# Patient Record
Sex: FEMALE | Race: WHITE | NOT HISPANIC OR LATINO | Employment: OTHER | URBAN - METROPOLITAN AREA
[De-identification: names, ages, dates, MRNs, and addresses within clinical notes are randomized per-mention and may not be internally consistent; named-entity substitution may affect disease eponyms.]

---

## 2017-01-13 ENCOUNTER — TELEPHONE (OUTPATIENT)
Dept: FAMILY MEDICINE | Facility: CLINIC | Age: 67
End: 2017-01-13

## 2017-01-13 NOTE — TELEPHONE ENCOUNTER
1/13/2017    Call Regarding Preventive Health Screening Colonoscopy and Mammogram    Attempt 1    Message     Comments: per patient has declined to schedule mammogram and colonoscopy screening at this time and states that she will call back on her own time to schedule.          Outreach   uche

## 2017-12-26 DIAGNOSIS — I10 HTN, GOAL BELOW 140/90: ICD-10-CM

## 2017-12-26 NOTE — TELEPHONE ENCOUNTER
Requested Prescriptions   Pending Prescriptions Disp Refills     lisinopril (PRINIVIL/ZESTRIL) 10 MG tablet [Pharmacy Med Name: LISINOPRIL 10MG TABLETS]  Last Written Prescription Date:  12/29/17  Last Fill Quantity: 90 tablet,  # refills: 3   Last Office Visit with [facility], P or [facility] prescribing provider:  12/29/17   Future Office Visit:      90 tablet 0     Sig: TAKE 1 TABLET BY MOUTH DAILY.    ACE Inhibitors (Including Combos) Protocol Failed    12/26/2017  9:45 AM       Failed - Blood pressure under 140/90    BP Readings from Last 3 Encounters:   12/29/16 (!) 158/92   11/13/15 126/74   06/01/15 140/80          Failed - Normal serum creatinine on file in past 12 months    Recent Labs   Lab Test 04/11/14   CR  1.0          Failed - Normal serum potassium on file in past 12 months    No lab results found.         Passed - Recent or future visit with authorizing provider's specialty    Patient had office visit in the last year or has a visit in the next 30 days with authorizing provider.  See chart review.          Passed - Patient is age 18 or older       Passed - No active pregnancy on record       Passed - No positive pregnancy test in past 12 months

## 2017-12-27 ENCOUNTER — TELEPHONE (OUTPATIENT)
Dept: FAMILY MEDICINE | Facility: CLINIC | Age: 67
End: 2017-12-27

## 2017-12-27 NOTE — TELEPHONE ENCOUNTER
12/27/2017    Call Regarding Preventive Health Screening Colonoscopy and Mammogram    Attempt 1    Message with male    Comments:       Outreach   SB

## 2017-12-27 NOTE — TELEPHONE ENCOUNTER
Patient returned call and stated that she will check with her outside of [place] provider, as she may not be due for colonoscopy and may no longer need a mammogram.

## 2017-12-28 RX ORDER — LISINOPRIL 10 MG/1
TABLET ORAL
Qty: 30 TABLET | Refills: 0 | Status: SHIPPED | OUTPATIENT
Start: 2017-12-28 | End: 2018-01-26

## 2017-12-28 NOTE — TELEPHONE ENCOUNTER
Medication is being filled for 1 time refill only due to:  Patient needs to be seen because it has been more than one year since last visit.   Berenice Nelson RN, BSN  [facility]

## 2018-01-26 DIAGNOSIS — I10 HTN, GOAL BELOW 140/90: ICD-10-CM

## 2018-01-26 RX ORDER — LISINOPRIL 10 MG/1
TABLET ORAL
Qty: 30 TABLET | Refills: 0 | Status: SHIPPED | OUTPATIENT
Start: 2018-01-26 | End: 2018-08-17 | Stop reason: DRUGHIGH

## 2018-01-26 NOTE — TELEPHONE ENCOUNTER
Angelina vicotria signed. She should keep appointment with Dr. Maloney in February to evaluate blood pressure, especially as it has been over a year since she was last seen in clinic.    He Levy,   1/26/2018 1:00 PM

## 2018-01-26 NOTE — TELEPHONE ENCOUNTER
"Requested Prescriptions   Pending Prescriptions Disp Refills     lisinopril (PRINIVIL/ZESTRIL) 10 MG tablet 30 tablet 0    ACE Inhibitors (Including Combos) Protocol Failed    1/26/2018 10:52 AM       Failed - Blood pressure under 140/90    BP Readings from Last 3 Encounters:   12/29/16 (!) 158/92   11/13/15 126/74   06/01/15 140/80                Failed - Normal serum creatinine on file in past 12 months    Recent Labs   Lab Test 04/11/14   CR  1.0            Failed - Normal serum potassium on file in past 12 months    No lab results found.         Passed - Recent or future visit with authorizing provider's specialty    Patient had office visit in the last year or has a visit in the next 30 days with authorizing provider.  See \"Patient Info\" tab in inbasket, or \"Choose Columns\" in Meds & Orders section of the refill encounter.            Passed - Patient is age 18 or older       Passed - No active pregnancy on record       Passed - No positive pregnancy test in past 12 months            Routing refill request to provider for review/approval because:  Labs not current:  Creatinine and potassium  BP out of range.      Patient has never been seen by Dr. Maloney in the past.      Routing this to [facility].      Essence Hargrove, BS, RN, PHN  [facility]) [phone]      "

## 2018-01-26 NOTE — TELEPHONE ENCOUNTER
Reason for Call:  Medication or medication refill:    Do you use a [facility]?  Name of the pharmacy and phone number for the current request:  [facility] [address] [place] [address] [id number] [facility] AT [address]    Name of the medication requested: lisinopril (PRINIVIL/ZESTRIL) 10 MG tablet    Other request: The patient says she only has one pill left of her lisinopril. She says she needs enough to get her by to see Dr. Maloney on 02/09/2018 at 10:00 a.m.    Can we leave a detailed message on this number? YES    Phone number patient can be reached at: Cell number on file:    Telephone Information:   Mobile [phone]     Best Time: Anytime    Call taken on 1/26/2018 at 10:49 AM by Jessenia Mckoy

## 2018-02-06 ENCOUNTER — TRANSFERRED RECORDS (OUTPATIENT)
Dept: HEALTH INFORMATION MANAGEMENT | Facility: CLINIC | Age: 68
End: 2018-02-06

## 2018-02-09 ENCOUNTER — OFFICE VISIT (OUTPATIENT)
Dept: FAMILY MEDICINE | Facility: CLINIC | Age: 68
End: 2018-02-09
Payer: COMMERCIAL

## 2018-02-09 VITALS
HEIGHT: 62 IN | SYSTOLIC BLOOD PRESSURE: 138 MMHG | BODY MASS INDEX: 29.4 KG/M2 | TEMPERATURE: 98 F | HEART RATE: 72 BPM | DIASTOLIC BLOOD PRESSURE: 82 MMHG | WEIGHT: 159.8 LBS | OXYGEN SATURATION: 98 %

## 2018-02-09 DIAGNOSIS — R39.15 URINARY URGENCY: ICD-10-CM

## 2018-02-09 DIAGNOSIS — I10 ESSENTIAL HYPERTENSION WITH GOAL BLOOD PRESSURE LESS THAN 140/90: Primary | ICD-10-CM

## 2018-02-09 DIAGNOSIS — Z11.59 NEED FOR HEPATITIS C SCREENING TEST: ICD-10-CM

## 2018-02-09 DIAGNOSIS — Z13.6 CARDIOVASCULAR SCREENING; LDL GOAL LESS THAN 130: ICD-10-CM

## 2018-02-09 DIAGNOSIS — Z78.0 ASYMPTOMATIC POSTMENOPAUSAL STATUS: ICD-10-CM

## 2018-02-09 DIAGNOSIS — H17.9 CORNEAL CLOUDING: ICD-10-CM

## 2018-02-09 DIAGNOSIS — Z12.11 SCREEN FOR COLON CANCER: ICD-10-CM

## 2018-02-09 DIAGNOSIS — Z91.81 AT RISK FOR FALLING: ICD-10-CM

## 2018-02-09 DIAGNOSIS — Z12.31 VISIT FOR SCREENING MAMMOGRAM: ICD-10-CM

## 2018-02-09 DIAGNOSIS — M05.79 RHEUMATOID ARTHRITIS INVOLVING MULTIPLE SITES WITH POSITIVE RHEUMATOID FACTOR (H): ICD-10-CM

## 2018-02-09 LAB
ALBUMIN UR-MCNC: NEGATIVE MG/DL
APPEARANCE UR: CLEAR
BILIRUB UR QL STRIP: NEGATIVE
COLOR UR AUTO: YELLOW
GLUCOSE UR STRIP-MCNC: NEGATIVE MG/DL
HGB UR QL STRIP: ABNORMAL
KETONES UR STRIP-MCNC: NEGATIVE MG/DL
LEUKOCYTE ESTERASE UR QL STRIP: NEGATIVE
NITRATE UR QL: NEGATIVE
NON-SQ EPI CELLS #/AREA URNS LPF: ABNORMAL /LPF
PH UR STRIP: 6.5 PH (ref 5–7)
RBC #/AREA URNS AUTO: ABNORMAL /HPF
SOURCE: ABNORMAL
SP GR UR STRIP: 1.01 (ref 1–1.03)
UROBILINOGEN UR STRIP-ACNC: 0.2 EU/DL (ref 0.2–1)
WBC #/AREA URNS AUTO: ABNORMAL /HPF

## 2018-02-09 PROCEDURE — 81001 URINALYSIS AUTO W/SCOPE: CPT | Performed by: FAMILY MEDICINE

## 2018-02-09 PROCEDURE — 99214 OFFICE O/P EST MOD 30 MIN: CPT | Performed by: FAMILY MEDICINE

## 2018-02-09 RX ORDER — INFLIXIMAB 100 MG/10ML
INJECTION, POWDER, LYOPHILIZED, FOR SOLUTION INTRAVENOUS
COMMUNITY
Start: 2016-11-04 | End: 2021-02-24

## 2018-02-09 RX ORDER — LISINOPRIL 10 MG/1
15 TABLET ORAL DAILY
Qty: 135 TABLET | Refills: 1 | Status: SHIPPED | OUTPATIENT
Start: 2018-02-09 | End: 2018-08-16

## 2018-02-09 RX ORDER — PREDNISONE 5 MG/1
TABLET ORAL PRN
Refills: 0 | COMMUNITY
Start: 2018-02-09 | End: 2021-02-24

## 2018-02-09 ASSESSMENT — ANXIETY QUESTIONNAIRES
5. BEING SO RESTLESS THAT IT IS HARD TO SIT STILL: NOT AT ALL
GAD7 TOTAL SCORE: 0
3. WORRYING TOO MUCH ABOUT DIFFERENT THINGS: NOT AT ALL
6. BECOMING EASILY ANNOYED OR IRRITABLE: NOT AT ALL
2. NOT BEING ABLE TO STOP OR CONTROL WORRYING: NOT AT ALL
1. FEELING NERVOUS, ANXIOUS, OR ON EDGE: NOT AT ALL
7. FEELING AFRAID AS IF SOMETHING AWFUL MIGHT HAPPEN: NOT AT ALL

## 2018-02-09 ASSESSMENT — PATIENT HEALTH QUESTIONNAIRE - PHQ9: 5. POOR APPETITE OR OVEREATING: NOT AT ALL

## 2018-02-09 NOTE — PROGRESS NOTES
"  SUBJECTIVE:                                                    Natasha Allison is a 67 year old female who presents to clinic today for the following health issues:    Patient of Dr. Karen Weiler's - transferring care to us at [place] from our [facility] as Dr. Weiler recently left there for a new position at the [facility].    Hypertension Follow-up:     Is exercising daily - for 35-60 minutes - crosstraining - bike for 4 miles and/or treadmill. Has some signif. Rheumatoid Arthritis - having a flare of her feet pain right now.  Wearing orthopedic slippers now.   Takes her lisinopril 10mg daily.       Outpatient blood pressures are not being checked.    Low Salt Diet: low salt    BP Readings from Last 5 Encounters:   02/09/18 138/82   12/29/16 (!) 158/92   11/13/15 126/74   06/01/15 140/80   11/21/14 116/62         Amount of exercise or physical activity: 6-7 days/week for an average of 30-60 minutes    Problems taking medications regularly: No    Medication side effects: methotrexate - headache and stomach issues    Diet: trying to eat healthier    Patient Active Problem List   Diagnosis     Rheumatoid arthritis involving multiple sites with positive rheumatoid factor (H)- sees  \"Tip\" =Dr. Benito Garcia MD - at [facility]      CARDIOVASCULAR SCREENING; LDL GOAL LESS THAN 130     Advanced directives, counseling/discussion     Essential hypertension with goal blood pressure less than 140/90     Corneal clouding- left eye  - since childhood shovel to the eye - decreased vision secondary to that - sees [facility] physicians and surgeons      Urinary urgency       Current Outpatient Prescriptions   Medication Sig Dispense Refill     inFLIXimab (REMICADE) 100 MG injection 3 mg/kg on Day 1, day 2nd week, day 6th week then every 8 weeks.       predniSONE (DELTASONE) 5 MG tablet Takes periodically for flares per her rheumatologist at [facility] 0     lisinopril (PRINIVIL/ZESTRIL) 10 MG tablet Take " "1.5 tablets (15 mg) by mouth daily 135 tablet 1     lisinopril (PRINIVIL/ZESTRIL) 10 MG tablet TAKE 1 TABLET BY MOUTH DAILY. 30 tablet 0     methotrexate sodium, pres-free, 50 MG/2ML SOLN injection CHEMO   0     aspirin 81 MG tablet Take  by mouth daily.       desoximetasone (TOPICORT) 0.25 % cream Apply 0.5 inches topically 2 times daily.       syringe, disposable, 1 ML MISC 1 Syringe once a week. To be used with Methotrexate Injections       meloxicam (MOBIC) 15 MG tablet Take 15 mg by mouth daily.            Allergies   Allergen Reactions     Latex          Problem list and histories reviewed & adjusted, as indicated.  Additional history: as documented    Reviewed and updated as needed this visit by clinical staff  Tobacco  Allergies  Meds  Med Hx  Surg Hx  Fam Hx  Soc Hx      Reviewed and updated as needed this visit by Provider  Tobacco  Med Hx       Recent Labs   Lab Test  04/04/14   0834   CHOL  246*   HDL  101   LDL  125   TRIG  103   CHOLHDLRATIO  2.4      No results found for: AST  No results found for: ALT  No results found for: BILICONJ   No results found for: BILITOTAL  No results found for: ALBUMIN  No results found for: PROTTOTAL   No results found for: ALKPHOS    Last colonoscopy appears to be in 2005 - pt will check on that - thinks she may have had one in [place] since then.       ROS:getting some urinary urgency. No dysuria. No blood in urine. No stress incontinence . Has been wearing pads just in case.    ROS: 12 point ROS neg other than the symptoms noted above    OBJECTIVE:                                                    /82 (BP Location: Right arm, Patient Position: Chair, Cuff Size: Adult Regular)  Pulse 72  Temp 98  F (36.7  C) (Oral)  Ht 5' 2\" (1.575 m)  Wt 159 lb 12.8 oz (72.5 kg)  SpO2 98%  BMI 29.23 kg/m2  Body mass index is 29.23 kg/(m^2).   GENERAL: healthy, alert, well nourished, well hydrated, no distress  HENT: Corneal clouding- left eye  , right eye normal; ear " canals- normal; TMs- normal; Nose- normal; Mouth- no ulcers, no lesions  NECK: no tenderness, no adenopathy, no asymmetry, no masses, no stiffness; thyroid- normal to palpation  RESP: lungs clear to auscultation - no rales, no rhonchi, no wheezes  CV: regular rates and rhythm, normal S1 S2, no S3 or S4 and no murmur, no click or rub -  ABDOMEN: soft, no tenderness, no  hepatosplenomegaly, no masses, normal bowel sounds  MS: extremities- no gross deformities noted, no edema  Diffuse rheumatoid arthritis changes note.     Diagnostic test results:  No results found for this or any previous visit (from the past 24 hour(s)).     ASSESSMENT/PLAN:                                                        ICD-10-CM    1. Essential hypertension with goal blood pressure less than 140/90 I10 lisinopril (PRINIVIL/ZESTRIL) 10 MG tablet     TSH with free T4 reflex     Comprehensive metabolic panel     CBC with platelets     Urine Microscopic   2. Rheumatoid arthritis involving multiple sites with positive rheumatoid factor (H) M05.79 predniSONE (DELTASONE) 5 MG tablet   3. Screen for colon cancer Z12.11 GASTROENTEROLOGY ADULT REF PROCEDURE ONLY     Fecal colorectal cancer screen FIT   4. Asymptomatic postmenopausal status Z78.0 DEXA HIP/PELVIS/SPINE - Future   5. Visit for screening mammogram Z12.31 MA SCREENING DIGITAL BILAT - Future  (s+30)   6. Need for hepatitis C screening test Z11.59 Hepatitis C Screen Reflex to HCV RNA Quant and Genotype   7. At risk for falling Z91.81    8. CARDIOVASCULAR SCREENING; LDL GOAL LESS THAN 130 Z13.6 Lipid panel reflex to direct LDL Fasting   9. Urinary urgency R39.15 UA reflex to Microscopic and Culture   10. Corneal clouding- left eye  - since childhood shovel to the eye - decreased vision secondary to that - sees [facility] physicians and surgeons  H17.9         Increase your lisinopril to 15mg daily.  Recheck your blood pressure in our pharmacy in 1 week or sooner if needed.  Have pharmacy send  me their note.    Recheck for fasting labs in the next month and recheck with me in 6 months. Please, call or return to clinic or go to the ER immediately if signs or symptoms worsen or fail to improve as anticipated.     See Patient Instructions         Beverley Maloney MD    [facility]

## 2018-02-09 NOTE — NURSING NOTE
"Chief Complaint   Patient presents with     Recheck Medication     Establish Care       Initial /80 (BP Location: Right arm, Patient Position: Chair, Cuff Size: Adult Large)  Pulse 72  Temp 98  F (36.7  C) (Oral)  Ht 5' 2\" (1.575 m)  Wt 159 lb 12.8 oz (72.5 kg)  SpO2 98%  BMI 29.23 kg/m2 Estimated body mass index is 29.23 kg/(m^2) as calculated from the following:    Height as of this encounter: 5' 2\" (1.575 m).    Weight as of this encounter: 159 lb 12.8 oz (72.5 kg).  Medication Reconciliation: complete   Laurie Dial CMA  "

## 2018-02-09 NOTE — MR AVS SNAPSHOT
"              After Visit Summary   2/9/2018    Natasha Alliosn    MRN: [id number]           Patient Information     Date Of Birth          1950        Visit Information        Provider Department      2/9/2018 10:00 AM Beverley Maloney MD [facility] [place]        Today's Diagnoses     Essential hypertension with goal blood pressure less than 140/90    -  1    Rheumatoid arthritis involving multiple sites with positive rheumatoid factor (H)        Screen for colon cancer        Asymptomatic postmenopausal status        Visit for screening mammogram        Need for hepatitis C screening test        At risk for falling        CARDIOVASCULAR SCREENING; LDL GOAL LESS THAN 130        Urinary urgency        Corneal clouding- left eye  - since childhood shovel to the eye - decreased vision secondary to that - sees [facility] and surgeons           Care Instructions        Please set up a lab only appointment to come in for your fasting labs.     URINARY INCONTINENCE [female]  Urinary Incontinence means loss of control of the bladder. This may be due to infection, medicine, aging, poor pelvic muscle tone, bladder spasms, obesity or urinary retention.    STRESS INCONTINENCE is a special form of incontinence in women where the muscles and ligaments supporting the bladder have been stretched by pregnancy. Coughing, sneezing or laughing increases bladder pressure and may cause loss of urine.  URGE INCONTINENCE (also called \"overactive bladder\") is a sudden urge to urinate even though there may not be much urine in the bladder. The need to urinate often during the night is common. It is due to bladder spasms. It is a common cause of incontinence in both men and women.  Treatment of this condition depends on the cause. Infections of the bladder are treated with antibiotics. Urinary retention is treated with a bladder catheter. Stress incontinence can be treated with special exercises to strengthen " muscles around the bladder, although sometimes surgery is required.  HOME CARE:  1) Avoid foods and drinks that may irritate the bladder (alcohol, caffeine, artificial sweeteners,  carbonated drinks, chocolate, citrus fruits and acidic fruits and juices).  2) Limit fluid intake to 6 to 8 cups a day.  3) Lose weight if you are overweight. This will reduce your symptoms.  4) If your problem is stress incontinence, talk to your doctor about Kegel exercises to strengthen the muscles around the bladder.  5) If necessary, wear absorbent pads to catch urine.  6) Bathe daily to maintain good hygiene and prevent odors and skin irritation from contact with urine.  7) If an antibiotic was prescribed to treat a bladder infection, be sure to take it until finished, even if you are feeling better before then.  8) If a bladder catheter was left in place, it is important to keep the bacteria from getting into the collection bag. Use a leg band to secure the drainage tube, so it does not pull on the catheter. Drain the collection bag when it becomes full using the drain spout at the bottom of the bag. Do not disconnect the bag from the catheter.  FOLLOW UP with your doctor or this facility as advised. If a catheter was left in place, it will need to be removed or changed soon.  GET PROMPT MEDICAL ATTENTION if any of the following occur:  -- Fever of 99.5 F (orally)  -- Bladder pain or fullness  -- Abdominal swelling, nausea or vomiting or back pain  -- Catheter falls out or stops draining (no urine from catheter in six hours)  -- Weakness, dizziness or fainting    5657-3250 Merged with [facility], [address] [place] [address], [place] [id number]. All rights reserved. This information is not intended as a substitute for professional medical care. Always follow your healthcare professional's instructions.                 Thank you for choosing [facility]  for your Health Care. It was a pleasure seeing you at your visit today.  Please contact us with any questions or concerns you may have.                   Beverley Maloney MD                                  To reach your [facility] team after hours call:   [phone]    Our clinic hours are:     Monday- 7:30 am - 7:00 pm                             Tuesday through Friday- 7:30 am - 5:00 pm                                        Saturday- 8:00 am - 12:00 pm                  Phone:  [phone]    Our pharmacy hours are:     Monday  8:00 am to 7:00 pm      Tuesday through Friday 8:00am to 6:00pm                        Saturday - 9:00 am to 1:00 pm      Sunday : Closed.              Phone:  [phone]      There is also information available at our web site:  [email or web address]    If your provider ordered any lab tests and you do not receive the results within 10 business days, please call the clinic.    If you need a medication refill please contact your pharmacy.  Please allow 2 business days for your refill to be completed.    Our clinic offers telephone visits and e visits.  Please ask one of your team members to explain more.      Use Zee[employer] (secure email communication and access to your chart) to send your primary care provider a message or make an appointment. Ask someone on your Team how to sign up for Zeebo.                           Follow-ups after your visit        Additional Services     GASTROENTEROLOGY ADULT REF PROCEDURE ONLY       Last Lab Result: Creatinine (mg/dL)       Date                     Value                 04/11/2014               1.0              ----------  Body mass index is 29.23 kg/(m^2).     Needed:  No  Language:  English    Patient will be contacted to schedule procedure.     Please be aware that coverage of these services is subject to the terms and limitations of your health insurance plan.  Call member services at your health plan with any benefit or coverage questions.  Any procedures must be performed  at a [facility] OR coordinated by your clinic's referral office.    Please bring the following with you to your appointment:    (1) Any X-Rays, CTs or MRIs which have been performed.  Contact the facility where they were done to arrange for  prior to your scheduled appointment.    (2) List of current medications   (3) This referral request   (4) Any documents/labs given to you for this referral                  Follow-up notes from your care team     Return in about 4 weeks (around 3/9/2018) for within the next month for fasting lab work - lab on ly visit ok  and recheck with me in 6 months .      Future tests that were ordered for you today     Open Future Orders        Priority Expected Expires Ordered    TSH with free T4 reflex Routine 2/24/2018 5/9/2018 2/9/2018    Comprehensive metabolic panel Routine 2/24/2018 5/9/2018 2/9/2018    CBC with platelets Routine 2/24/2018 5/9/2018 2/9/2018    Lipid panel reflex to direct LDL Fasting Routine 2/24/2018 5/9/2018 2/9/2018    DEXA HIP/PELVIS/SPINE - Future Routine  2/9/2019 2/9/2018    MA SCREENING DIGITAL BILAT - Future  (s+30) Routine  2/9/2019 2/9/2018    Hepatitis C Screen Reflex to HCV RNA Quant and Genotype Routine 2/24/2018 5/9/2018 2/9/2018    Fecal colorectal cancer screen FIT Routine 2/24/2018 5/9/2018 2/9/2018            Who to contact     If you have questions or need follow up information about today's clinic visit or your schedule please contact [facility] directly at [phone].  Normal or non-critical lab and imaging results will be communicated to you by MyChart, letter or phone within 4 business days after the clinic has received the results. If you do not hear from us within 7 days, please contact the clinic through MyChart or phone. If you have a critical or abnormal lab result, we will notify you by phone as soon as possible.  Submit refill requests through [email or web address] or call your pharmacy and they will forward the  "refill request to us. Please allow 3 business days for your refill to be completed.          Additional Information About Your Visit        [employer] Information     [employer] lets you send messages to your doctor, view your test results, renew your prescriptions, schedule appointments and more. To sign up, go to [email or web address] . Click on \"Log in\" on the left side of the screen, which will take you to the Welcome page. Then click on \"Sign up Now\" on the right side of the page.     You will be asked to enter the access code listed below, as well as some personal information. Please follow the directions to create your username and password.     Your access code is: [email or web address]  Expires: 5/10/2018 11:26 AM     Your access code will  in 90 days. If you need help or a new code, please call your [facility] clinic or [phone].        Care EveryWhere ID     This is your Care EveryWhere ID. This could be used by other organizations to access your [facility] medical records  [id number]        Your Vitals Were     Pulse Temperature Height Pulse Oximetry BMI (Body Mass Index)       72 98  F (36.7  C) (Oral) 5' 2\" (1.575 m) 98% 29.23 kg/m2        Blood Pressure from Last 3 Encounters:   18 138/82   16 (!) 158/92   11/13/15 126/74    Weight from Last 3 Encounters:   18 159 lb 12.8 oz (72.5 kg)   16 160 lb (72.6 kg)   11/13/15 157 lb (71.2 kg)              We Performed the Following     GASTROENTEROLOGY ADULT REF PROCEDURE ONLY     UA reflex to Microscopic and Culture          Today's Medication Changes          These changes are accurate as of 18 11:26 AM.  If you have any questions, ask your nurse or doctor.               These medicines have changed or have updated prescriptions.        Dose/Directions    * lisinopril 10 MG tablet   Commonly known as:  PRINIVIL/ZESTRIL   This may have changed:  Another medication with the same name was added. Make sure you understand how and when to " take each.   Used for:  HTN, goal below 140/90   Changed by:  Beverley Maloney MD        TAKE 1 TABLET BY MOUTH DAILY.   Quantity:  30 tablet   Refills:  0       * lisinopril 10 MG tablet   Commonly known as:  PRINIVIL/ZESTRIL   This may have changed:  You were already taking a medication with the same name, and this prescription was added. Make sure you understand how and when to take each.   Used for:  Essential hypertension with goal blood pressure less than 140/90   Changed by:  Beverley Maloney MD        Dose:  15 mg   Take 1.5 tablets (15 mg) by mouth daily   Quantity:  135 tablet   Refills:  1       predniSONE 5 MG tablet   Commonly known as:  DELTASONE   This may have changed:  additional instructions   Used for:  Rheumatoid arthritis involving multiple sites with positive rheumatoid factor (H)   Changed by:  Beverley Maloney MD        Takes periodically for flares per her rheumatologist at [facility]   Refills:  0       * Notice:  This list has 2 medication(s) that are the same as other medications prescribed for you. Read the directions carefully, and ask your doctor or other care provider to review them with you.         Where to get your medicines      These medications were sent to [facility] [address] [place] [address] [id number] [place] [address] AT SEC [address]  [address] [facility] [address]     Phone:  [phone]     lisinopril 10 MG tablet                Primary Care Provider Office Phone # Fax #    Beverley Maloney [phone]       [address] [place] [address]        Equal Access to Services     [facility] AH: Hadii aad ku hadasho Soomaali, waaxda luqadaha, qaybta kaalmada adeegyada, tory mack . So [facility] [phone].    ATENCIÓN: Si habla español, tiene a mondragon disposición servicios gratuitos de asistencia lingüística. Llame al [phone].    We comply with applicable federal civil rights  laws and [place] laws. We do not discriminate on the basis of race, color, national origin, age, disability, sex, sexual orientation, or gender identity.            Thank you!     Thank you for choosing [facility]  for your care. Our goal is always to provide you with excellent care. Hearing back from our patients is one way we can continue to improve our services. Please take a few minutes to complete the written survey that you may receive in the mail after your visit with us. Thank you!             Your Updated Medication List - Protect others around you: Learn how to safely use, store and throw away your medicines at www.disposemymeds.org.          This list is accurate as of 2/9/18 11:26 AM.  Always use your most recent med list.                   Brand Name Dispense Instructions for use Diagnosis    aspirin 81 MG tablet      Take  by mouth daily.        desoximetasone 0.25 % cream    TOPICORT     Apply 0.5 inches topically 2 times daily.        inFLIXimab 100 MG injection    REMICADE     3 mg/kg on Day 1, day 2nd week, day 6th week then every 8 weeks.        * lisinopril 10 MG tablet    PRINIVIL/ZESTRIL    30 tablet    TAKE 1 TABLET BY MOUTH DAILY.    HTN, goal below 140/90       * lisinopril 10 MG tablet    PRINIVIL/ZESTRIL    135 tablet    Take 1.5 tablets (15 mg) by mouth daily    Essential hypertension with goal blood pressure less than 140/90       meloxicam 15 MG tablet    MOBIC     Take 15 mg by mouth daily.        methotrexate sodium (pres-free) 50 MG/2ML Soln injection CHEMO           predniSONE 5 MG tablet    DELTASONE     Takes periodically for flares per her rheumatologist at [facility]    Rheumatoid arthritis involving multiple sites with positive rheumatoid factor (H)       syringe (disposable) 1 ML Misc      1 Syringe once a week. To be used with Methotrexate Injections        * Notice:  This list has 2 medication(s) that are the same as other medications prescribed for  you. Read the directions carefully, and ask your doctor or other care provider to review them with you.

## 2018-02-10 ASSESSMENT — ANXIETY QUESTIONNAIRES: GAD7 TOTAL SCORE: 0

## 2018-02-10 ASSESSMENT — PATIENT HEALTH QUESTIONNAIRE - PHQ9: SUM OF ALL RESPONSES TO PHQ QUESTIONS 1-9: 0

## 2018-02-12 ENCOUNTER — TELEPHONE (OUTPATIENT)
Dept: FAMILY MEDICINE | Facility: CLINIC | Age: 68
End: 2018-02-12

## 2018-02-12 NOTE — TELEPHONE ENCOUNTER
Pt wants to know if the DEXA scan is medicare eligible.    Advised pt to also call the central billing office and check on this, or their own insurance, but would send over to PCP incase they know as well.    María Elena Tavarez RN  [place] Triage

## 2018-02-13 PROCEDURE — G0328 FECAL BLOOD SCRN IMMUNOASSAY: HCPCS | Performed by: FAMILY MEDICINE

## 2018-02-14 NOTE — TELEPHONE ENCOUNTER
elpidio medicare usually pays for it every 2-3 years.   Please order- dx: asymptomatic menopausal status   Please inform patient.

## 2018-02-15 DIAGNOSIS — Z12.11 SCREEN FOR COLON CANCER: ICD-10-CM

## 2018-02-15 LAB — HEMOCCULT STL QL IA: NEGATIVE

## 2018-02-20 ENCOUNTER — TRANSFERRED RECORDS (OUTPATIENT)
Dept: HEALTH INFORMATION MANAGEMENT | Facility: CLINIC | Age: 68
End: 2018-02-20

## 2018-03-15 DIAGNOSIS — Z13.6 CARDIOVASCULAR SCREENING; LDL GOAL LESS THAN 130: ICD-10-CM

## 2018-03-15 DIAGNOSIS — Z12.31 VISIT FOR SCREENING MAMMOGRAM: ICD-10-CM

## 2018-03-15 DIAGNOSIS — I10 ESSENTIAL HYPERTENSION WITH GOAL BLOOD PRESSURE LESS THAN 140/90: ICD-10-CM

## 2018-03-15 DIAGNOSIS — Z11.59 NEED FOR HEPATITIS C SCREENING TEST: ICD-10-CM

## 2018-03-15 LAB
ALBUMIN SERPL-MCNC: 3.9 G/DL (ref 3.4–5)
ALP SERPL-CCNC: 51 U/L (ref 40–150)
ALT SERPL W P-5'-P-CCNC: 21 U/L (ref 0–50)
ANION GAP SERPL CALCULATED.3IONS-SCNC: 4 MMOL/L (ref 3–14)
AST SERPL W P-5'-P-CCNC: 21 U/L (ref 0–45)
BILIRUB SERPL-MCNC: 0.5 MG/DL (ref 0.2–1.3)
BUN SERPL-MCNC: 19 MG/DL (ref 7–30)
CALCIUM SERPL-MCNC: 9 MG/DL (ref 8.5–10.1)
CHLORIDE SERPL-SCNC: 103 MMOL/L (ref 94–109)
CHOLEST SERPL-MCNC: 270 MG/DL
CO2 SERPL-SCNC: 30 MMOL/L (ref 20–32)
CREAT SERPL-MCNC: 0.96 MG/DL (ref 0.52–1.04)
ERYTHROCYTE [DISTWIDTH] IN BLOOD BY AUTOMATED COUNT: 13.8 % (ref 10–15)
GFR SERPL CREATININE-BSD FRML MDRD: 58 ML/MIN/1.7M2
GLUCOSE SERPL-MCNC: 93 MG/DL (ref 70–99)
HCT VFR BLD AUTO: 43.2 % (ref 35–47)
HCV AB SERPL QL IA: NONREACTIVE
HDLC SERPL-MCNC: 85 MG/DL
HGB BLD-MCNC: 14.5 G/DL (ref 11.7–15.7)
LDLC SERPL CALC-MCNC: 161 MG/DL
MCH RBC QN AUTO: 32.3 PG (ref 26.5–33)
MCHC RBC AUTO-ENTMCNC: 33.6 G/DL (ref 31.5–36.5)
MCV RBC AUTO: 96 FL (ref 78–100)
NONHDLC SERPL-MCNC: 185 MG/DL
PLATELET # BLD AUTO: 230 10E9/L (ref 150–450)
POTASSIUM SERPL-SCNC: 4 MMOL/L (ref 3.4–5.3)
PROT SERPL-MCNC: 7.6 G/DL (ref 6.8–8.8)
RBC # BLD AUTO: 4.49 10E12/L (ref 3.8–5.2)
SODIUM SERPL-SCNC: 137 MMOL/L (ref 133–144)
TRIGL SERPL-MCNC: 118 MG/DL
TSH SERPL DL<=0.005 MIU/L-ACNC: 3.52 MU/L (ref 0.4–4)
WBC # BLD AUTO: 10.2 10E9/L (ref 4–11)

## 2018-03-15 PROCEDURE — 85027 COMPLETE CBC AUTOMATED: CPT | Performed by: FAMILY MEDICINE

## 2018-03-15 PROCEDURE — 80061 LIPID PANEL: CPT | Performed by: FAMILY MEDICINE

## 2018-03-15 PROCEDURE — 80053 COMPREHEN METABOLIC PANEL: CPT | Performed by: FAMILY MEDICINE

## 2018-03-15 PROCEDURE — 84443 ASSAY THYROID STIM HORMONE: CPT | Performed by: FAMILY MEDICINE

## 2018-03-15 PROCEDURE — 86803 HEPATITIS C AB TEST: CPT | Performed by: FAMILY MEDICINE

## 2018-03-15 PROCEDURE — 36415 COLL VENOUS BLD VENIPUNCTURE: CPT | Performed by: FAMILY MEDICINE

## 2018-03-15 PROCEDURE — 77067 SCR MAMMO BI INCL CAD: CPT | Mod: TC

## 2018-03-16 ENCOUNTER — RADIANT APPOINTMENT (OUTPATIENT)
Dept: BONE DENSITY | Facility: CLINIC | Age: 68
End: 2018-03-16
Attending: FAMILY MEDICINE
Payer: COMMERCIAL

## 2018-03-16 DIAGNOSIS — Z78.0 ASYMPTOMATIC POSTMENOPAUSAL STATUS: ICD-10-CM

## 2018-03-16 PROCEDURE — 77080 DXA BONE DENSITY AXIAL: CPT | Performed by: INTERNAL MEDICINE

## 2018-03-19 DIAGNOSIS — Z13.6 CARDIOVASCULAR SCREENING; LDL GOAL LESS THAN 130: Primary | ICD-10-CM

## 2018-03-26 NOTE — PROGRESS NOTES
"Please call pt with results below and mail her results too   Osteopenia., Degenerative changes of the lumbar spine which may falsely elevate results.     Patient had a study performed previously, however the scans are not available to compare to the current study.      Recommendations include ensuring adequate Calcium and Vitamin D.     The current NOF Guidelines recommend treatment for patients with prior hip or vertebral fracture, T-score -2.5 or below, or 10 year risk of any major osteoporotic fracture >20% or 10 year risk of hip fracture >3%, as calculated using the FRAX calculator (www.shef.ac.uk/FRAX or you can google \"FRAX\").       This patient's risks based on available information, with the use of FRAX, are 25 % for major osteoporotic fracture and 6.0 % for hip fracture.   Based on these guidelines, treatment (in addition to calcium and vitamin D) is recommended for this patient, after ruling out other causes of osteoporosis. - pt is on chronic intermittent prednisone for her arthritis issues - recommend also checking a vitamin D deficiency level as a future lab order. Please  enter future orders for this and pt  can do this as a lab only appointment.       Also recommend starting alendronate 70mg 1 tab po weekly . #12 refill x 3. . Recheck DEXA scan in 2 years per Medicare Guidelines.         For additional lab test information, labtestsonline.org is an excellent reference.    "

## 2018-03-29 ENCOUNTER — TELEPHONE (OUTPATIENT)
Dept: FAMILY MEDICINE | Facility: CLINIC | Age: 68
End: 2018-03-29

## 2018-03-29 DIAGNOSIS — M85.80 OSTEOPENIA: ICD-10-CM

## 2018-03-29 DIAGNOSIS — E55.9 VITAMIN D DEFICIENCY: Primary | ICD-10-CM

## 2018-03-29 NOTE — TELEPHONE ENCOUNTER
"Patient talked with Lou yesterday regarding dexa.  Natasha wanted to \"think about it and call back\".     She was very surprised at the results- \"I have never had a problem before\" .  She is reluctant to go on another medication- Alendronate.  She talked with pharmacy and they said they do have a liquid available.  She would like me to ask Dr Maloney what would be best absorbed and easiest for her to digest.    She doesn't feel she is high risk as she only takes prednisone 2x/year x one month.    She has never taken Calcium or Vit D before.  She is wondering if there is a certain brand Dr Maloney would suggest.    She was reluctant to do a Vit D level but after discussing she did agree to a lab only on 4/12/18    Routing to Dr Maloney:     1.Should we wait to suggest Calcium and Vit D until she does labs?    2. Alendrondrate liquid or tablets?     Vangie Desai RN- Triage [employer]            "

## 2018-04-03 NOTE — TELEPHONE ENCOUNTER
Reason for Call:  Other call back    Detailed comments: Pt calling back after talked to pharmacy    Phone Number Patient can be reached at: Home number on file [phone] (home)    Best Time: anytime    Can we leave a detailed message on this number? YES    Call taken on 4/3/2018 at 1:35 PM by Marina Salinas

## 2018-04-04 NOTE — TELEPHONE ENCOUNTER
Ok for alendronate liquid . Orders pended.  Ok to sign.     Recommend calcium 1200mg daily ( divided in twice daily dosing - and vitamin D 1000iu daily and daily exercise with some resistance training to help keep your bones strong.  Calcium citrate , gummies or chews are the most likely to be best absorbed.      Ok to start the above prior to vitamin d testing . Please  enter future orders for this and pt  can do this as a lab only appointment.

## 2018-04-04 NOTE — TELEPHONE ENCOUNTER
Left detailed vm advising pt to call back for recommendations below. Need pharmacy.    María Elena Tavarez RN  [facility]

## 2018-04-05 ENCOUNTER — MYC MEDICAL ADVICE (OUTPATIENT)
Dept: FAMILY MEDICINE | Facility: CLINIC | Age: 68
End: 2018-04-05

## 2018-04-05 DIAGNOSIS — M85.89 OSTEOPENIA OF MULTIPLE SITES: Primary | ICD-10-CM

## 2018-04-05 PROBLEM — M85.80 OSTEOPENIA: Status: ACTIVE | Noted: 2018-04-05

## 2018-04-05 RX ORDER — ALENDRONATE SODIUM 70 MG/75ML
SOLUTION ORAL
Qty: 325 ML | Refills: 5 | Status: CANCELLED | OUTPATIENT
Start: 2018-04-05

## 2018-04-05 NOTE — TELEPHONE ENCOUNTER
answered and advised us to send a mychart with any notes- pt is on a week vacation right now. Still awaiting pharmacy.    Sent mychart.  María Elena Tavarez RN  [place] Triage

## 2018-04-11 RX ORDER — ALENDRONATE SODIUM 70 MG/75ML
70 SOLUTION ORAL
Qty: 321.3 ML | Refills: 11 | Status: SHIPPED | OUTPATIENT
Start: 2018-04-11 | End: 2018-04-19

## 2018-04-11 NOTE — TELEPHONE ENCOUNTER
Orders for Alendronate were not in the pts chart anymore. Pended order, please advise on dosing in  and put through for the pt.    María Elena Tavarez RN  [place] Triage

## 2018-04-12 DIAGNOSIS — E55.9 VITAMIN D DEFICIENCY: ICD-10-CM

## 2018-04-12 LAB — DEPRECATED CALCIDIOL+CALCIFEROL SERPL-MC: 18 UG/L (ref 20–75)

## 2018-04-12 PROCEDURE — 36415 COLL VENOUS BLD VENIPUNCTURE: CPT | Performed by: FAMILY MEDICINE

## 2018-04-12 PROCEDURE — 82306 VITAMIN D 25 HYDROXY: CPT | Performed by: FAMILY MEDICINE

## 2018-04-12 NOTE — TELEPHONE ENCOUNTER
This has been routed with pharmacy to provider for specific orders. Done per yesterday.  María Elena Tavarez RN  [facility]

## 2018-04-13 ENCOUNTER — TELEPHONE (OUTPATIENT)
Dept: FAMILY MEDICINE | Facility: CLINIC | Age: 68
End: 2018-04-13

## 2018-04-13 DIAGNOSIS — M85.88 OSTEOPENIA OF OTHER SITE: Primary | ICD-10-CM

## 2018-04-13 NOTE — TELEPHONE ENCOUNTER
Prior Authorization Retail Medication Request    Medication/Dose: Alendronate 70 mg/75 ML solution  ICD code (if different than what is on RX):  M85.89  Previously Tried and Failed:  none  Rationale:  none    Insurance Name:  [facility]  Insurance ID:  [id number]      Pharmacy Information (if different than what is on RX)  Name:  Fiorella stark  Phone:  [phone]

## 2018-04-17 NOTE — TELEPHONE ENCOUNTER
PA Initiation    Medication: Alendronate 70 mg/75 ML solution  Insurance Company: [employer] [place] - Phone [phone] Fax [phone]  Pharmacy Filling the Rx: [facility] [id number] [place] [address] [facility] [address] OF [facility] [address]  Filling Pharmacy Phone: [phone]  Filling Pharmacy Fax:    Start Date: 4/17/2018

## 2018-04-17 NOTE — TELEPHONE ENCOUNTER
PRIOR AUTHORIZATION DENIED    Medication: Alendronate 70 mg/75 ML solution - DENIED     Denial Date: 4/17/2018    Denial Rational: The patient must try and fail 2 other drugs for her condition. These include: Alendronate tablets ( 5 mg, 35 mg, 70 mg), Ibandronate tablets or injection, Risedronate tablets, Etidronate tablets or Zoledronic acid injection ( 4 mg/5 ml, 5 mg /100 ml)      Appeal Information: A letter of Medical Necessity is needed

## 2018-04-19 RX ORDER — ALENDRONATE SODIUM 5 MG
5 TABLET ORAL
Qty: 90 TABLET | Refills: 3 | Status: SHIPPED | OUTPATIENT
Start: 2018-04-19 | End: 2018-10-29 | Stop reason: ALTCHOICE

## 2018-04-19 NOTE — TELEPHONE ENCOUNTER
Pt uses [facility]     The patient indicates understanding of these issues and agrees with the plan.  María Elena Tavarez RN  [facility]

## 2018-04-19 NOTE — TELEPHONE ENCOUNTER
Please call pt inform  and offer her option of doing alendronate 5mg po daily. - smaller pill.

## 2018-06-13 ENCOUNTER — OFFICE VISIT (OUTPATIENT)
Dept: FAMILY MEDICINE | Facility: CLINIC | Age: 68
End: 2018-06-13
Payer: COMMERCIAL

## 2018-06-13 VITALS
TEMPERATURE: 98.5 F | BODY MASS INDEX: 28.89 KG/M2 | HEART RATE: 80 BPM | SYSTOLIC BLOOD PRESSURE: 132 MMHG | HEIGHT: 62 IN | WEIGHT: 157 LBS | DIASTOLIC BLOOD PRESSURE: 76 MMHG | OXYGEN SATURATION: 98 %

## 2018-06-13 DIAGNOSIS — J01.00 ACUTE NON-RECURRENT MAXILLARY SINUSITIS: Primary | ICD-10-CM

## 2018-06-13 PROCEDURE — 99213 OFFICE O/P EST LOW 20 MIN: CPT | Performed by: FAMILY MEDICINE

## 2018-06-13 RX ORDER — DOXYCYCLINE 100 MG/1
100 CAPSULE ORAL 2 TIMES DAILY
Qty: 20 CAPSULE | Refills: 0 | Status: SHIPPED | OUTPATIENT
Start: 2018-06-13 | End: 2018-06-13

## 2018-06-13 RX ORDER — DOXYCYCLINE 100 MG/1
100 CAPSULE ORAL 2 TIMES DAILY
Qty: 20 CAPSULE | Refills: 0 | Status: SHIPPED | OUTPATIENT
Start: 2018-06-13 | End: 2018-10-29

## 2018-06-13 NOTE — MR AVS SNAPSHOT
After Visit Summary   6/13/2018    Natasha Allison    MRN: [id number]           Patient Information     Date Of Birth          1950        Visit Information        Provider Department      6/13/2018 10:20 AM He Levy,  [facility]        Today's Diagnoses     Acute non-recurrent maxillary sinusitis    -  1       Follow-ups after your visit        Follow-up notes from your care team     Return in about 10 days (around 6/23/2018) for follow up if symptoms not improving..      Your next 10 appointments already scheduled     Aug 15, 2018  9:40 AM CDT   Office Visit with Beverley Maloney MD   [facility]    [address] [place] [id number] [phone]           Bring a current list of meds and any records pertaining to this visit. For Physicals, please bring immunization records and any forms needing to be filled out. Please arrive 10 minutes early to complete paperwork.              Who to contact     If you have questions or need follow up information about today's clinic visit or your schedule please contact [facility] directly at [phone].  Normal or non-critical lab and imaging results will be communicated to you by MyChart, letter or phone within 4 business days after the clinic has received the results. If you do not hear from us within 7 days, please contact the clinic through [employer] or phone. If you have a critical or abnormal lab result, we will notify you by phone as soon as possible.  Submit refill requests through [employer] or call your pharmacy and they will forward the refill request to us. Please allow 3 business days for your refill to be completed.          Additional Information About Your Visit        MyChart Information     [employer] gives you secure access to your electronic health record. If you see a primary care provider, you can also send messages to your care team  "and make appointments. If you have questions, please call your primary care clinic.  If you do not have a primary care provider, please call [phone] and they will assist you.        Care EveryWhere ID     This is your Care EveryWhere ID. This could be used by other organizations to access your [place] medical records  [id number]        Your Vitals Were     Pulse Temperature Height Pulse Oximetry BMI (Body Mass Index)       80 98.5  F (36.9  C) (Oral) 5' 2\" (1.575 m) 98% 28.72 kg/m2        Blood Pressure from Last 3 Encounters:   06/13/18 132/76   02/09/18 138/82   12/29/16 (!) 158/92    Weight from Last 3 Encounters:   06/13/18 157 lb (71.2 kg)   02/09/18 159 lb 12.8 oz (72.5 kg)   12/29/16 160 lb (72.6 kg)              Today, you had the following     No orders found for display         Today's Medication Changes          These changes are accurate as of 6/13/18 11:02 AM.  If you have any questions, ask your nurse or doctor.               Start taking these medicines.        Dose/Directions    doxycycline 100 MG capsule   Commonly known as:  VIBRAMYCIN   Used for:  Acute non-recurrent maxillary sinusitis   Started by:  He Levy DO        Dose:  100 mg   Take 1 capsule (100 mg) by mouth 2 times daily   Quantity:  20 capsule   Refills:  0            Where to get your medicines      Some of these will need a paper prescription and others can be bought over the counter.  Ask your nurse if you have questions.     Bring a paper prescription for each of these medications     doxycycline 100 MG capsule                Primary Care Provider Office Phone # Fax #    Beverley Maloney [phone] [address] [facility] [address]        Equal Access to Services     [facility] LINA : Abbie June, jesusita brennan, tory reyes. So [facility] [phone].    ATENCIÓN: Si habla español, tiene a mondragon disposición " servicios gratuitos de asistencia lingüística. Gail keith [phone].    We comply with applicable federal civil rights laws and [place] laws. We do not discriminate on the basis of race, color, national origin, age, disability, sex, sexual orientation, or gender identity.            Thank you!     Thank you for choosing [facility]  for your care. Our goal is always to provide you with excellent care. Hearing back from our patients is one way we can continue to improve our services. Please take a few minutes to complete the written survey that you may receive in the mail after your visit with us. Thank you!             Your Updated Medication List - Protect others around you: Learn how to safely use, store and throw away your medicines at www.disposemymeds.org.          This list is accurate as of 6/13/18 11:02 AM.  Always use your most recent med list.                   Brand Name Dispense Instructions for use Diagnosis    alendronate 5 MG tablet    FOSAMAX    90 tablet    Take 1 tablet (5 mg) by mouth every morning (before breakfast) On empty stomach - wait 1 hour upright prior to other intake    Osteopenia of other site       aspirin 81 MG tablet      Take  by mouth daily.        desoximetasone 0.25 % cream    TOPICORT     Apply 0.5 inches topically 2 times daily.        doxycycline 100 MG capsule    VIBRAMYCIN    20 capsule    Take 1 capsule (100 mg) by mouth 2 times daily    Acute non-recurrent maxillary sinusitis       inFLIXimab 100 MG injection    REMICADE     3 mg/kg on Day 1, day 2nd week, day 6th week then every 8 weeks.        * lisinopril 10 MG tablet    PRINIVIL/ZESTRIL    30 tablet    TAKE 1 TABLET BY MOUTH DAILY.    HTN, goal below 140/90       * lisinopril 10 MG tablet    PRINIVIL/ZESTRIL    135 tablet    Take 1.5 tablets (15 mg) by mouth daily    Essential hypertension with goal blood pressure less than 140/90       meloxicam 15 MG tablet    MOBIC     Take 15 mg by mouth daily.         methotrexate sodium (pres-free) 50 MG/2ML Soln injection CHEMO           predniSONE 5 MG tablet    DELTASONE     Takes periodically for flares per her rheumatologist at [facility]    Rheumatoid arthritis involving multiple sites with positive rheumatoid factor (H)       syringe (disposable) 1 ML Misc      1 Syringe once a week. To be used with Methotrexate Injections        * Notice:  This list has 2 medication(s) that are the same as other medications prescribed for you. Read the directions carefully, and ask your doctor or other care provider to review them with you.

## 2018-06-13 NOTE — PROGRESS NOTES
"  SUBJECTIVE:   Natasha Allison is a 67 year old female who presents to clinic today for the following health issues:      Acute Illness   Acute illness concerns: URI- hoarse voice  Onset: x 1 week ago    Fever: no     Chills/Sweats: no     Headache (location?): YES- last night, this is not unusual through for her    Sinus Pressure:YES- feels it more in head    Conjunctivitis:  no    Ear Pain: no    Rhinorrhea: YES- a little    Congestion: no     Sore Throat: no      Cough: no    Wheeze: no     Decreased Appetite: no     Nausea: no     Vomiting: no     Diarrhea:  no     Dysuria/Freq.: no     Fatigue/Achiness: YES    Sick/Strep Exposure: YES- Sister was sick     Therapies Tried and outcome: nothing, worsening.      Problem list and histories reviewed & adjusted, as indicated.  Additional history: as documented    Patient Active Problem List   Diagnosis     Rheumatoid arthritis involving multiple sites with positive rheumatoid factor (H)- sees  \"Tip\" =Dr. Benito Garcia MD - at [facility]      CARDIOVASCULAR SCREENING; LDL GOAL LESS THAN 130     Advanced directives, counseling/discussion     Essential hypertension with goal blood pressure less than 140/90     Corneal clouding- left eye  - since childhood shovel to the eye - decreased vision secondary to that - sees [facility] surgeons      Urinary urgency     Osteopenia     Osteoporosis     No past surgical history on file.    Social History   Substance Use Topics     Smoking status: Former Smoker     Quit date: 11/13/1978     Smokeless tobacco: Never Used     Alcohol use Yes      Comment: 0-1 QD     Family History   Problem Relation Age of Onset     Hypertension Mother      Hypertension Father      HEART DISEASE Father      Intellectual Disability (Mental Retardation) Brother      Hypertension Sister      Hypertension Son      Hypertension Daughter      Colon Cancer Brother      Hypertension Brother      Hypertension Brother      " "Hypertension Brother      Hypertension Sister      Hypertension Sister            Reviewed and updated as needed this visit by clinical staff  Tobacco  Allergies  Meds  Problems       Reviewed and updated as needed this visit by Provider  Allergies  Meds  Problems         ROS:  Constitutional, HEENT, cardiovascular, pulmonary, gi and gu systems are negative, except as otherwise noted.    OBJECTIVE:     /76 (BP Location: Right arm, Patient Position: Sitting, Cuff Size: Adult Regular)  Pulse 80  Temp 98.5  F (36.9  C) (Oral)  Ht 5' 2\" (1.575 m)  Wt 157 lb (71.2 kg)  SpO2 98%  BMI 28.72 kg/m2  Body mass index is 28.72 kg/(m^2).  GENERAL: healthy, alert and no distress  EYES: Eyes grossly normal to inspection, PERRL and conjunctivae and sclerae normal  HENT: normal cephalic/atraumatic, ear canals and TM's normal, nose and mouth without ulcers or lesions, oropharynx clear, oral mucous membranes moist and sinuses: maxillary tenderness  NECK: no adenopathy and no asymmetry, masses, or scars  RESP: lungs clear to auscultation - no rales, rhonchi or wheezes  CV: regular rate and rhythm, normal S1 S2, no S3 or S4, no murmur, click or rub, no peripheral edema and peripheral pulses strong  MS: no gross musculoskeletal defects noted, no edema  SKIN: no suspicious lesions or rashes  PSYCH: mentation appears normal, affect normal/bright    Diagnostic Test Results:  none     ASSESSMENT/PLAN:   1. Acute non-recurrent maxillary sinusitis: discussed that symptoms may still be viral in nature. Recommend trying supportive cares for the next 3 days -- try Sudafed, acetaminophen and/or ibuprofen, push fluids, nasal saline or Flonase to help with congestion. Will also prescribe doxycycline to be filled if symptoms do not seem to improve with aforementioned cares over the next several days.  - doxycycline (VIBRAMYCIN) 100 MG capsule; Take 1 capsule (100 mg) by mouth 2 times daily  Dispense: 20 capsule; Refill: 0    He " DAVE Levy,   [facility]

## 2018-08-16 ENCOUNTER — MYC REFILL (OUTPATIENT)
Dept: FAMILY MEDICINE | Facility: CLINIC | Age: 68
End: 2018-08-16

## 2018-08-16 DIAGNOSIS — I10 ESSENTIAL HYPERTENSION WITH GOAL BLOOD PRESSURE LESS THAN 140/90: ICD-10-CM

## 2018-08-16 NOTE — TELEPHONE ENCOUNTER
"Requested Prescriptions   Pending Prescriptions Disp Refills     lisinopril (PRINIVIL/ZESTRIL) 10 MG tablet  Last Written Prescription Date:  1/26/2018  Last Fill Quantity: 30 tablet,  # refills: 0   Last Office Visit: 6/13/2018   Future Office Visit:      135 tablet 1     Sig: Take 1.5 tablets (15 mg) by mouth daily    ACE Inhibitors (Including Combos) Protocol Passed    8/16/2018  8:44 AM       Passed - Blood pressure under 140/90 in past 12 months    BP Readings from Last 3 Encounters:   06/13/18 132/76   02/09/18 138/82   12/29/16 (!) 158/92                Passed - Recent (12 mo) or future (30 days) visit within the authorizing provider's specialty    Patient had office visit in the last 12 months or has a visit in the next 30 days with authorizing provider or within the authorizing provider's specialty.  See \"Patient Info\" tab in inbasket, or \"Choose Columns\" in Meds & Orders section of the refill encounter.           Passed - Patient is age 18 or older       Passed - No active pregnancy on record       Passed - Normal serum creatinine on file in past 12 months    Recent Labs   Lab Test  03/15/18   0731   CR  0.96            Passed - Normal serum potassium on file in past 12 months    Recent Labs   Lab Test  03/15/18   0731   POTASSIUM  4.0            Passed - No positive pregnancy test in past 12 months          "

## 2018-08-16 NOTE — TELEPHONE ENCOUNTER
Message from MyChart:  Original authorizing provider: MD Natasha Espana would like a refill of the following medications:  lisinopril (PRINIVIL/ZESTRIL) 10 MG tablet [Beverley Maloney MD]    Preferred pharmacy: [facility] [address] [place] [id number] [facility] [address]    Comment:  Need refill.

## 2018-08-17 RX ORDER — LISINOPRIL 10 MG/1
15 TABLET ORAL DAILY
Qty: 135 TABLET | Refills: 1 | Status: SHIPPED | OUTPATIENT
Start: 2018-08-17 | End: 2019-02-09

## 2018-08-17 NOTE — TELEPHONE ENCOUNTER
Disp Refills Start End DANIEL   lisinopril (PRINIVIL/ZESTRIL) 10 MG tablet 135 tablet 1 2/9/2018  --   Sig - Route: Take 1.5 tablets (15 mg) by mouth daily - Oral   Class: E-Prescribe   Order: [id number]   E-Prescribing Status: Receipt confirmed by pharmacy (2/9/2018 11:17 AM CST)     Prescription approved per [facility] Refill Protocol.  María Elena Tavarez RN  [facility]

## 2018-10-09 ENCOUNTER — MYC MEDICAL ADVICE (OUTPATIENT)
Dept: FAMILY MEDICINE | Facility: CLINIC | Age: 68
End: 2018-10-09

## 2018-10-09 DIAGNOSIS — Z83.49 FAMILY HISTORY OF HEMOCHROMATOSIS: Primary | ICD-10-CM

## 2018-10-09 NOTE — TELEPHONE ENCOUNTER
Forwarded to J.  Please review patient's Mychart message and advise.  Essence Hargrove, RN, BS, PHN

## 2018-10-10 ENCOUNTER — TELEPHONE (OUTPATIENT)
Dept: FAMILY MEDICINE | Facility: CLINIC | Age: 68
End: 2018-10-10

## 2018-10-10 NOTE — TELEPHONE ENCOUNTER
Attempted to call patient to reschedule appointment, no answer and no option to leave a voicemail.  I will try again at a later time.    Laurie Dial, CMA

## 2018-10-10 NOTE — TELEPHONE ENCOUNTER
Name of caller: Natasha  Relationship of Patient: Self    Reason for Call: PT was calling back because she had to be resched due to Dr. Maloney being out. She does not want to see anyone else but she wants in the next few days or weeks.     Best phone number to reach pt at is: [phone]  Ok to leave a message with medical info? Yes    Pharmacy preferred (if calling for a refill): [facility]  Patient Representative - [facility]

## 2018-10-12 PROBLEM — Z83.49 FAMILY HISTORY OF HEMOCHROMATOSIS: Status: ACTIVE | Noted: 2018-10-12

## 2018-10-12 NOTE — TELEPHONE ENCOUNTER
futured  order. This is a genetic test and requires pt to sign consent prior to draw. Can do in lab. Please inform patient.

## 2018-10-12 NOTE — TELEPHONE ENCOUNTER
Patient notified by marco.    Essence Hargrove, BS, RN, N  [facility]) [phone]

## 2018-10-16 DIAGNOSIS — Z83.49 FAMILY HISTORY OF HEMOCHROMATOSIS: ICD-10-CM

## 2018-10-16 PROCEDURE — 36415 COLL VENOUS BLD VENIPUNCTURE: CPT | Performed by: FAMILY MEDICINE

## 2018-10-16 PROCEDURE — 81256 HFE GENE: CPT | Performed by: FAMILY MEDICINE

## 2018-10-23 LAB — COPATH REPORT: NORMAL

## 2018-10-29 ENCOUNTER — OFFICE VISIT (OUTPATIENT)
Dept: FAMILY MEDICINE | Facility: CLINIC | Age: 68
End: 2018-10-29
Payer: COMMERCIAL

## 2018-10-29 VITALS
SYSTOLIC BLOOD PRESSURE: 138 MMHG | BODY MASS INDEX: 28.71 KG/M2 | WEIGHT: 156 LBS | OXYGEN SATURATION: 100 % | DIASTOLIC BLOOD PRESSURE: 84 MMHG | TEMPERATURE: 98.3 F | HEART RATE: 78 BPM | HEIGHT: 62 IN

## 2018-10-29 DIAGNOSIS — Z23 NEED FOR PROPHYLACTIC VACCINATION AND INOCULATION AGAINST INFLUENZA: ICD-10-CM

## 2018-10-29 DIAGNOSIS — M81.0 AGE-RELATED OSTEOPOROSIS WITHOUT CURRENT PATHOLOGICAL FRACTURE: Primary | ICD-10-CM

## 2018-10-29 DIAGNOSIS — Z12.11 SCREEN FOR COLON CANCER: ICD-10-CM

## 2018-10-29 DIAGNOSIS — E83.110: ICD-10-CM

## 2018-10-29 DIAGNOSIS — M05.79 RHEUMATOID ARTHRITIS INVOLVING MULTIPLE SITES WITH POSITIVE RHEUMATOID FACTOR (H): ICD-10-CM

## 2018-10-29 DIAGNOSIS — H17.9 CORNEAL CLOUDING: ICD-10-CM

## 2018-10-29 DIAGNOSIS — R42 DIZZINESS: ICD-10-CM

## 2018-10-29 DIAGNOSIS — Z23 NEED FOR SHINGLES VACCINE: ICD-10-CM

## 2018-10-29 DIAGNOSIS — I10 ESSENTIAL HYPERTENSION WITH GOAL BLOOD PRESSURE LESS THAN 140/90: ICD-10-CM

## 2018-10-29 PROCEDURE — 36415 COLL VENOUS BLD VENIPUNCTURE: CPT | Performed by: FAMILY MEDICINE

## 2018-10-29 PROCEDURE — 99214 OFFICE O/P EST MOD 30 MIN: CPT | Performed by: FAMILY MEDICINE

## 2018-10-29 PROCEDURE — 80048 BASIC METABOLIC PNL TOTAL CA: CPT | Performed by: FAMILY MEDICINE

## 2018-10-29 RX ORDER — FOLIC ACID 1 MG/1
1 TABLET ORAL DAILY
Qty: 100 TABLET | Refills: 3 | COMMUNITY
Start: 2018-10-29

## 2018-10-29 NOTE — MR AVS SNAPSHOT
"              After Visit Summary   10/29/2018    Natasha Allison    MRN: [id number]           Patient Information     Date Of Birth          1950        Visit Information        Provider Department      10/29/2018 4:20 PM Beverley Maloney MD [facility]        Today's Diagnoses     Age-related osteoporosis without current pathological fracture    -  1    Rheumatoid arthritis involving multiple sites with positive rheumatoid factor (H)- sees  \"Tip\" =Dr. Benito Garcia MD - at [facility]         Dizziness - with a transient chill & ? 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                Thank you for choosing [facility]  for your Health Care. It was a pleasure seeing you at your visit today. Please contact us with any questions or concerns you may have.                   Beverley Maloney MD                                  To reach your [facility] care team after hours call:   [phone]    Our clinic hours are:     Monday- 7:30 am - 7:00 pm                             Tuesday through Friday- 7:30 am - 5:00 pm                                        Saturday- 8:00 am - 12:00 pm                  Phone:  [phone]    Our pharmacy hours are:     Monday  8:00 am to 7:00 pm      Tuesday through Friday 8:00am to 6:00pm         "                Saturday - 9:00 am to 1:00 pm      Sunday : Closed.              Phone:  [phone]      There is also information available at our web site:  [email or web address]    If your provider ordered any lab tests and you do not receive the results within 10 business days, please call the clinic.    If you need a medication refill please contact your pharmacy.  Please allow 2 business days for your refill to be completed.    Our clinic offers telephone visits and e visits.  Please ask one of your team members to explain more.      Use [employer] (secure email communication and access to your chart) to send your primary care provider a message or make an appointment. Ask someone on your Team how to sign up for [employer].                       Follow-ups after your visit        Additional Services     GASTROENTEROLOGY ADULT REF PROCEDURE ONLY Sandra Roman [phone]       Last Lab Result: Creatinine (mg/dL)       Date                     Value                 03/15/2018               0.96             ----------  There is no height or weight on file to calculate BMI.     Needed:  No  Language:  English    Patient will be contacted to schedule procedure.     Please be aware that coverage of these services is subject to the terms and limitations of your health insurance plan.  Call member services at your health plan with any benefit or coverage questions.  Any procedures must be performed at a [facility] facility OR coordinated by your clinic's referral office.    Please bring the following with you to your appointment:    (1) Any X-Rays, CTs or MRIs which have been performed.  Contact the facility where they were done to arrange for  prior to your scheduled appointment.    (2) List of current medications   (3) This referral request   (4) Any documents/labs given to you for this referral                  Follow-up notes from your care team     Return in about 3 months (around 1/29/2019) for  "Physical Exam, Lab Work, Routine Visit.      Your next 10 appointments already scheduled     Dec 07, 2018  1:40 PM CST   [facility]    [address] [place] [id number] [phone]           Bring a current list of meds and any records pertaining to this visit. For Physicals, please bring immunization records and any forms needing to be filled out. Please arrive 10 minutes early to complete paperwork.              Who to contact     If you have questions or need follow up information about today's clinic visit or your schedule please contact [facility] directly at [phone].  Normal or non-critical lab and imaging results will be communicated to you by [employer] In[employer], letter or phone within 4 business days after the clinic has received the results. If you do not hear from us within 7 days, please contact the clinic through [employer] or phone. If you have a critical or abnormal lab result, we will notify you by phone as soon as possible.  Submit refill requests through [employer] or call your pharmacy and they will forward the refill request to us. Please allow 3 business days for your refill to be completed.          Additional Information About Your Visit        [employer] Information     [employer] gives you secure access to your electronic health record. If you see a primary care provider, you can also send messages to your care team and make appointments. If you have questions, please call your primary care clinic.  If you do not have a primary care provider, please call [phone] and they will assist you.        Care EveryWhere ID     This is your Care EveryWhere ID. This could be used by other organizations to access your [place] medical records  [id number]        Your Vitals Were     Pulse Temperature Height Pulse Oximetry BMI (Body Mass Index)       78 98.3  F (36.8  C) (Oral) 5' 2\" " (1.575 m) 100% 28.53 kg/m2        Blood Pressure from Last 3 Encounters:   10/29/18 138/84   06/13/18 132/76   02/09/18 138/82    Weight from Last 3 Encounters:   10/29/18 156 lb (70.8 kg)   06/13/18 157 lb (71.2 kg)   02/09/18 159 lb 12.8 oz (72.5 kg)              We Performed the Following     GASTROENTEROLOGY ADULT REF PROCEDURE ONLY Sandra Roman [phone]          Today's Medication Changes          These changes are accurate as of 10/29/18  5:21 PM.  If you have any questions, ask your nurse or doctor.               Start taking these medicines.        Dose/Directions    folic acid 1 MG tablet   Commonly known as:  FOLVITE   Used for:  Rheumatoid arthritis involving multiple sites with positive rheumatoid factor (H)   Started by:  Beverley Maloney MD        Dose:  1 mg   Take 1 tablet (1 mg) by mouth daily   Quantity:  100 tablet   Refills:  3         Stop taking these medicines if you haven't already. Please contact your care team if you have questions.     alendronate 5 MG tablet   Commonly known as:  FOSAMAX   Stopped by:  Beverley Maloney MD                Where to get your medicines      Some of these will need a paper prescription and others can be bought over the counter.  Ask your nurse if you have questions.     You don't need a prescription for these medications     folic acid 1 MG tablet                Primary Care Provider Office Phone # Fax #    Beverley Maloney [phone]       [address] [place] [address]        Equal Access to Services     [facility]: Hadii misa vernon hadasho Sovelvet, waaxda luqadaha, qaybta kaalmada adeegyada, waxay idibrian erickson. So [facility] [phone].    ATENCIÓN: Si habla español, tiene a mondragon disposición servicios gratuitos de asistencia lingüística. Llame al [phone].    We comply with applicable federal civil rights laws and [place] laws. We do not discriminate on the basis of race,  color, national origin, age, disability, sex, sexual orientation, or gender identity.            Thank you!     Thank you for choosing [facility]  for your care. Our goal is always to provide you with excellent care. Hearing back from our patients is one way we can continue to improve our services. Please take a few minutes to complete the written survey that you may receive in the mail after your visit with us. Thank you!             Your Updated Medication List - Protect others around you: Learn how to safely use, store and throw away your medicines at www.disposemymeds.org.          This list is accurate as of 10/29/18  5:21 PM.  Always use your most recent med list.                   Brand Name Dispense Instructions for use Diagnosis    aspirin 81 MG tablet      Take  by mouth daily.        CALCIUM 500 + D3 500-200 MG-UNIT Tabs   Generic drug:  Calcium Carb-Cholecalciferol      Take 1 tablet by mouth 2 times daily    Age-related osteoporosis without current pathological fracture       * denosumab 60 MG/ML Soln injection    PROLIA     Inject 60 mg Subcutaneous once    Age-related osteoporosis without current pathological fracture       * denosumab 60 MG/ML Soln injection    PROLIA    1 mL    Inject 1 mL (60 mg) Subcutaneous once for 1 dose    Age-related osteoporosis without current pathological fracture       desoximetasone 0.25 % cream    TOPICORT     Apply 0.5 inches topically 2 times daily.        folic acid 1 MG tablet    FOLVITE    100 tablet    Take 1 tablet (1 mg) by mouth daily    Rheumatoid arthritis involving multiple sites with positive rheumatoid factor (H)       inFLIXimab 100 MG injection    REMICADE     3 mg/kg on Day 1, day 2nd week, day 6th week then every 8 weeks.        lisinopril 10 MG tablet    PRINIVIL/ZESTRIL    135 tablet    Take 1.5 tablets (15 mg) by mouth daily    Essential hypertension with goal blood pressure less than 140/90       meloxicam 15 MG tablet    MOBIC      Take 15 mg by mouth daily.        methotrexate sodium (pres-free) 50 MG/2ML Soln injection CHEMO           predniSONE 5 MG tablet    DELTASONE     Takes periodically for flares per her rheumatologist at [facility]    Rheumatoid arthritis involving multiple sites with positive rheumatoid factor (H)       syringe (disposable) 1 ML Misc      1 Syringe once a week. To be used with Methotrexate Injections        * Notice:  This list has 2 medication(s) that are the same as other medications prescribed for you. Read the directions carefully, and ask your doctor or other care provider to review them with you.

## 2018-10-29 NOTE — PROGRESS NOTES
"  SUBJECTIVE:                                                    Natasha Allison is a 68 year old female who presents to clinic today for the following health issues:    Hypertension Follow-up  Takes 15 mg lisinopril once daily.       Outpatient blood pressures are not being checked.    Low Salt Diet: no added salt    Amount of exercise or physical activity: 6-7 days/week for an average of 30-45 minutes    Problems taking medications regularly: No    Medication side effects: None    Diet: low salt    Intermittent episodes of chills, unusual intense focus on one thing, has to sit down and compose herself and lasts for about 1 minute then goes away.  Wondering if it has to do with new medication she is on.     BP Readings from Last 3 Encounters:   10/29/18 138/84   06/13/18 132/76   02/09/18 138/82     Osteoporosis  Takes 60 mg Prolia injections. Denies past history of compression fracture.     Rheumatoid Arthritis  Takes 100 mg Remicade infusion as prescribed. Takes meloxicam (15 mg), folic acid (1 mg) and prednisone (5 mg) for rheumatoid arthritis. Follows up with Dr. Garcia, a rheumatologist. After taking Prolia & Remicade injections together that started October 9th, she experiences chills and then she feels \"someting takes over her\" that lasts for about 1 minute. Believes it could be an anxious feeling. No numbness, tingling, or weakness in upper or lower extremities. No bowel or bladder control problems. No changes in speech, swallowing, hearing, coordination  or vision.     Cloudy cornea  She had a childhood injury that cause clouding of left cornea. This causes decreased vision on left side. Follows up with an ophthalmologist.     Problem list and histories reviewed & adjusted, as indicated.  Additional history: as documented    Reviewed and updated as needed this visit by clinical staff  Meds       Reviewed and updated as needed this visit by Provider       ROS:   ROS: 12 point ROS neg other than the " "symptoms noted above    This document serves as a record of the services and decisions personally performed and made by Beverley Maloney MD. It was created on her behalf by Shobha Btuler, a trained medical scribe. The creation of this document is based on the provider's statements to the medical scribe.  Shobha Butler 4:57 PM October 29, 2018  OBJECTIVE:                                                    /84 (BP Location: Left arm, Patient Position: Chair, Cuff Size: Adult Regular)  Pulse 78  Temp 98.3  F (36.8  C) (Oral)  Ht 1.575 m (5' 2\")  Wt 70.8 kg (156 lb)  SpO2 100%  BMI 28.53 kg/m2  Body mass index is 28.53 kg/(m^2).     GENERAL: healthy, alert, well nourished, well hydrated, no distress  HENT: clouding of left cornea secondary to childhood injury, ear canals- normal; TMs- normal; Nose- normal; Mouth- no ulcers, no lesions  NECK: no tenderness, no adenopathy, no asymmetry, no masses, no stiffness; thyroid- normal to palpation  RESP: lungs clear to auscultation - no rales, no rhonchi, no wheezes  CV: regular rates and rhythm, normal S1 S2, no S3 or S4 and no murmur, no click or rub -  ABDOMEN: soft, no tenderness, no  hepatosplenomegaly, no masses, normal bowel sounds    Diagnostic test results:  Diagnostic Test Results:  none      ASSESSMENT/PLAN:                                                        ICD-10-CM    1. Age-related osteoporosis without current pathological fracture M81.0 denosumab (PROLIA) 60 MG/ML SOLN injection     denosumab (PROLIA) 60 MG/ML SOLN injection     Calcium Carb-Cholecalciferol (CALCIUM 500 + D3) 500-200 MG-UNIT TABS   2. Rheumatoid arthritis involving multiple sites with positive rheumatoid factor (H)- sees  \"Tip\" =Dr. Benito Garcia MD - at [facility]  M05.79 folic acid (FOLVITE) 1 MG tablet   3. Dizziness - with a transient chill & ? increased anxiety- since conmittant dosing of Prolia and remicade at her rheumatologist's office 10/9/2018 R42    4. Essential " hypertension with goal blood pressure less than 140/90 I10    5. Compound heterozygous hemochromatosis type 1 (H) - H63D type -- doesn't predispose pt to iron overload  E83.110    6. Need for prophylactic vaccination and inoculation against influenza Z23    7. Screen for colon cancer Z12.11 GASTROENTEROLOGY ADULT REF PROCEDURE ONLY Sandra Roman [phone]   8. 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 Due for colonoscopy, she'll schedule at her convenience. Wait for a couple weeks to see if symptoms improve. Recommended to take Prolia and Remicade at separate times. Flu shot administered in office today.     See Patient Instructions    The information in this document, created by the medical scribe for me, accurately reflects the services I personally performed and the decisions made by me. I have reviewed and approved this document for accuracy prior to leaving the patient care area.  October 29, 2018 5:22 PM         Beverley Maloney MD    [facility]

## 2018-10-29 NOTE — PATIENT INSTRUCTIONS
Please make appt for routine preventative exam - medicare annual wellness visit.                Thank you for choosing [facility]  for your Health Care. It was a pleasure seeing you at your visit today. Please contact us with any questions or concerns you may have.                   Beverley Maloney MD                                  To reach your [facility] care team after hours call:   [phone]    Our clinic hours are:     Monday- 7:30 am - 7:00 pm                             Tuesday through Friday- 7:30 am - 5:00 pm                                        Saturday- 8:00 am - 12:00 pm                  Phone:  [phone]    Our pharmacy hours are:     Monday  8:00 am to 7:00 pm      Tuesday through Friday 8:00am to 6:00pm                        Saturday - 9:00 am to 1:00 pm      Sunday : Closed.              Phone:  [phone]      There is also information available at our web site:  [email or web address]    If your provider ordered any lab tests and you do not receive the results within 10 business days, please call the clinic.    If you need a medication refill please contact your pharmacy.  Please allow 2 business days for your refill to be completed.    Our clinic offers telephone visits and e visits.  Please ask one of your team members to explain more.      Use [employer] (secure email communication and access to your chart) to send your primary care provider a message or make an appointment. Ask someone on your Team how to sign up for [employer].

## 2018-10-30 LAB
ANION GAP SERPL CALCULATED.3IONS-SCNC: 9 MMOL/L (ref 3–14)
BUN SERPL-MCNC: 31 MG/DL (ref 7–30)
CALCIUM SERPL-MCNC: 10.2 MG/DL (ref 8.5–10.1)
CHLORIDE SERPL-SCNC: 105 MMOL/L (ref 94–109)
CO2 SERPL-SCNC: 26 MMOL/L (ref 20–32)
CREAT SERPL-MCNC: 0.92 MG/DL (ref 0.52–1.04)
GFR SERPL CREATININE-BSD FRML MDRD: 61 ML/MIN/1.7M2
GLUCOSE SERPL-MCNC: 81 MG/DL (ref 70–99)
POTASSIUM SERPL-SCNC: 4.9 MMOL/L (ref 3.4–5.3)
SODIUM SERPL-SCNC: 140 MMOL/L (ref 133–144)

## 2019-02-01 ENCOUNTER — TELEPHONE (OUTPATIENT)
Dept: FAMILY MEDICINE | Facility: CLINIC | Age: 69
End: 2019-02-01

## 2019-02-01 NOTE — TELEPHONE ENCOUNTER
I spoke with patient.  She asked if we received colonoscopy report from the [facility]. I advised her it looks like we have received her last colonoscopy report from them.  She also asked where order was sent for her colonoscopy order.  I advised her that order was sent to [facility].  I gave her the scheduling number at [facility] [phone].  Patient verbalized understanding and agreed with plan.    MICHAEL Huffman, RN, PHN  [facility]) [phone]

## 2019-02-01 NOTE — TELEPHONE ENCOUNTER
Reason for Call:  Other call back    Detailed comments: Confirm that clinic has gotten the records and talk about a colonoscopy  location    Phone Number Patient can be reached at: Cell number on file:    Telephone Information:   Mobile [phone]   She will be at home also - you can try that number if she does not answer her cell phone.     Best Time: any    Can we leave a detailed message on this number? Yes    Call taken on 2/1/2019 at 2:12 PM by Marlyn Fulton

## 2019-02-09 DIAGNOSIS — I10 ESSENTIAL HYPERTENSION WITH GOAL BLOOD PRESSURE LESS THAN 140/90: ICD-10-CM

## 2019-02-11 NOTE — TELEPHONE ENCOUNTER
"Requested Prescriptions   Pending Prescriptions Disp Refills     lisinopril (PRINIVIL/ZESTRIL) 10 MG tablet [Pharmacy Med Name: LISINOPRIL 10MG TABLETS] 135 tablet 0    Last Written Prescription Date:  8.17.18  Last Fill Quantity: 135,  # refills: 1   Last Office Visit: 10/29/2018   Future Office Visit:    Next 5 appointments (look out 90 days)    Apr 03, 2019  8:00 AM CDT  PHYSICAL with Beverley Maloney MD  [facility] [address] [place] [address] [phone]          Sig: TAKE 1 AND 1/2 TABLETS(15 MG) BY MOUTH DAILY    ACE Inhibitors (Including Combos) Protocol Passed - 2/9/2019  3:08 PM       Passed - Blood pressure under 140/90 in past 12 months    BP Readings from Last 3 Encounters:   10/29/18 138/84   06/13/18 132/76   02/09/18 138/82                Passed - Recent (12 mo) or future (30 days) visit within the authorizing provider's specialty    Patient had office visit in the last 12 months or has a visit in the next 30 days with authorizing provider or within the authorizing provider's specialty.  See \"Patient Info\" tab in inbasket, or \"Choose Columns\" in Meds & Orders section of the refill encounter.             Passed - Medication is active on med list       Passed - Patient is age 18 or older       Passed - No active pregnancy on record       Passed - Normal serum creatinine on file in past 12 months    Recent Labs   Lab Test 10/29/18  1722   CR 0.92            Passed - Normal serum potassium on file in past 12 months    Recent Labs   Lab Test 10/29/18  1722   POTASSIUM 4.9            Passed - No positive pregnancy test within past 12 months          "

## 2019-02-12 RX ORDER — LISINOPRIL 10 MG/1
TABLET ORAL
Qty: 135 TABLET | Refills: 0 | Status: SHIPPED | OUTPATIENT
Start: 2019-02-12 | End: 2019-04-03

## 2019-02-12 NOTE — TELEPHONE ENCOUNTER
Ok x one - patient has an appointment pending  Delmis Valenzuela,RN BSN  [facility]  [phone]

## 2019-03-12 ENCOUNTER — HOSPITAL ENCOUNTER (OUTPATIENT)
Facility: CLINIC | Age: 69
Discharge: HOME | End: 2019-03-12
Attending: INTERNAL MEDICINE | Admitting: INTERNAL MEDICINE
Payer: MEDICARE

## 2019-03-12 VITALS
OXYGEN SATURATION: 95 % | BODY MASS INDEX: 26.5 KG/M2 | WEIGHT: 144 LBS | DIASTOLIC BLOOD PRESSURE: 82 MMHG | SYSTOLIC BLOOD PRESSURE: 119 MMHG | RESPIRATION RATE: 16 BRPM | HEIGHT: 62 IN | HEART RATE: 65 BPM

## 2019-03-12 LAB — COLONOSCOPY: NORMAL

## 2019-03-12 PROCEDURE — 45378 DIAGNOSTIC COLONOSCOPY: CPT | Performed by: INTERNAL MEDICINE

## 2019-03-12 PROCEDURE — 25000128 H RX IP 250 OP 636: Performed by: INTERNAL MEDICINE

## 2019-03-12 PROCEDURE — G0105 COLORECTAL SCRN; HI RISK IND: HCPCS | Performed by: INTERNAL MEDICINE

## 2019-03-12 PROCEDURE — G0500 MOD SEDAT ENDO SERVICE >5YRS: HCPCS | Performed by: INTERNAL MEDICINE

## 2019-03-12 RX ORDER — ONDANSETRON 2 MG/ML
4 INJECTION INTRAMUSCULAR; INTRAVENOUS EVERY 6 HOURS PRN
Status: DISCONTINUED | OUTPATIENT
Start: 2019-03-12 | End: 2019-03-12 | Stop reason: HOSPADM

## 2019-03-12 RX ORDER — NALOXONE HYDROCHLORIDE 0.4 MG/ML
.1-.4 INJECTION, SOLUTION INTRAMUSCULAR; INTRAVENOUS; SUBCUTANEOUS
Status: DISCONTINUED | OUTPATIENT
Start: 2019-03-12 | End: 2019-03-12 | Stop reason: HOSPADM

## 2019-03-12 RX ORDER — FLUMAZENIL 0.1 MG/ML
0.2 INJECTION, SOLUTION INTRAVENOUS
Status: DISCONTINUED | OUTPATIENT
Start: 2019-03-12 | End: 2019-03-12 | Stop reason: HOSPADM

## 2019-03-12 RX ORDER — FENTANYL CITRATE 50 UG/ML
INJECTION, SOLUTION INTRAMUSCULAR; INTRAVENOUS PRN
Status: DISCONTINUED | OUTPATIENT
Start: 2019-03-12 | End: 2019-03-12 | Stop reason: HOSPADM

## 2019-03-12 RX ORDER — ONDANSETRON 4 MG/1
4 TABLET, ORALLY DISINTEGRATING ORAL EVERY 6 HOURS PRN
Status: DISCONTINUED | OUTPATIENT
Start: 2019-03-12 | End: 2019-03-12 | Stop reason: HOSPADM

## 2019-03-12 ASSESSMENT — MIFFLIN-ST. JEOR: SCORE: 1141.43

## 2019-03-12 NOTE — LETTER
February 8, 2019      Natasha A Estefany  [address] [facility] [address]    Thank you for choosing [facility]. You are scheduled for the following service:     Date:  03/12/19             Procedure:  COLONOSCOPY  Doctor:        Shweta  Arrival Time:  8:00 AM  *Check in at Emergency/Endoscopy desk*  Procedure Time:  8:30 AM     Location:   [facility]        Endoscopy Department, First Floor (Enter through ER Doors) *        [address] [place], [place] [address]      [phone] or [phone] () to reschedule      MIRALAX -GATORADE  PREP  Colonoscopy is the most accurate test to detect colon polyps and colon cancer; and the only test where polyps can be removed. During this procedure, a doctor examines the lining of your large intestine and rectum through a flexible tube.   Transportation  Arrange for a ride for the day of your procedure with a responsible adult.  A taxi ride is not an option unless you are accompanied by a responsible adult. If you fail to arrange transportation with a responsible adult, your procedure will be cancelled and rescheduled.    Purchase the  following supplies at your local pharmacy:  - 2 (two) bisacodyl tablets: each tablet contains 5 mg.  (Dulcolax  laxative NOT Dulcolax  stool softener)   - 1 (one) 8.3 oz bottle of Polyethylene Glycol (PEG) 3350 Powder   (MiraLAX , Smooth LAX , ClearLAX  or equivalent)  - 64 oz Gatorade    Regular Gatorade, Gatorade G2 , Powerade , Powerade Zero  or Pedialyte  is acceptable. Red colored flavors are not allowed; all other colors (yellow, green, orange, purple and blue) are okay. It is also okay to buy two 2.12 oz packets of powdered Gatorade that can be mixed with water to a total volume of 64 oz of liquid.  - 1 (one) 10 oz bottle of Magnesium Citrate (Red colored flavors are not allowed)  It is also okay for you to use a 0.5 oz package of powdered magnesium citrate (17  g) mixed with 10 oz of water.      PREPARATION FOR COLONOSCOPY    7 days before:    Discontinue fiber supplements and medications containing iron. This includes Metamucil  and Fibercon ; and multivitamins with iron.    3 days before:    Begin a low-fiber diet. A low-fiber diet helps making the cleanout more effective.     Examples of a low-fiber diet include (but are not limited to): white bread, white rice, pasta, crackers, fish, chicken, eggs, ground beef, creamy peanut butter, cooked/steamed/boiled vegetables, canned fruit, bananas, melons, milk, plain yogurt cheese, salad dressing and other condiments.     The following are not allowed on a low-fiber diet: seeds, nuts, popcorn, bran, whole wheat, corn, quinoa, raw fruits and vegetables, berries and dried fruit, beans and lentils.    For additional details on low-fiber diet, please refer to the table on the last page.    2 days before:    Continue the low-fiber diet.     Drink at least 8 glasses of water throughout the day.     Stop eating solid foods at 11:45 pm.    1 day before:    In the morning: begin a clear liquid diet (liquids you can see through).     Examples of a clear liquid diet include: water, clear broth or bouillon, Gatorade, Pedialyte or Powerade, carbonated and non-carbonated soft drinks (Sprite , 7-Up , ginger ale), strained fruit juices without pulp (apple, white grape, white cranberry), Jell-O  and popsicles.     The following are not allowed on a clear liquid diet: red liquids, alcoholic beverages, dairy products (milk, creamer, and yogurt), protein shakes, creamy broths, juice with pulp and chewing tobacco.    At noon: take 2 (two) bisacodyl tablets     At 4 (and no later than 6pm): start drinking the Miralax-Gatorade preparation (8.3 oz of Miralax mixed with 64 oz of Gatorade in a large pitcher). Drink 1(one) 8 oz glass every 15 minutes thereafter, until the mixture is gone.    COLON CLEANSING TIPS: drink adequate amounts of fluids before  and after your colon cleansing to prevent dehydration. Stay near a toilet because you will have diarrhea. Even if you are sitting on the toilet, continue to drink the cleansing solution every 15 minutes. If you feel nauseous or vomit, rinse your mouth with water, take a 15 to 30-minute-break and then continue drinking the solution. You will be uncomfortable until the stool has flushed from your colon (in about 2 to 4 hours). You may feel chilled.    Day of your procedure  You may take all of your morning medications including blood pressure medications, blood thinners (if you have not been instructed to stop these by our office), methadone, anti-seizure medications with sips of water 3 hours prior to your procedure or earlier. Do not take insulin or vitamins prior to your procedure. Continue the clear liquid diet.       4 hours prior: drink 10 oz of magnesium citrate. It may be easier to drink it with a straw.    STOP consuming all liquids after that.     Do not take anything by mouth during this time.     Allow extra time to travel to your procedure as you may need to stop and use a restroom along the way.    You are ready for the procedure, if you followed all instructions and your stool is no longer formed, but clear or yellow liquid. If you are unsure whether your colon is clean, please call our office at [phone] before you leave for your appointment.    Bring the following to your procedure:  - Insurance Card/Photo ID.   - List of current medications including over-the-counter medications and supplements.   - Your rescue inhaler if you currently use one to control asthma.      Canceling or rescheduling your appointment:   If you must cancel or reschedule your appointment, please call [phone] as soon as possible.      COLONOSCOPY PRE-PROCEDURE CHECKLIST    If you have diabetes, ask your regular doctor for diet and medication restrictions.  If you take an anticoagulant or anti-platelet medication (such  as Coumadin , Lovenox , Pradaxa , Xarelto , Eliquis , etc.), please call your primary doctor for advice on holding this medication.  If you take aspirin you may continue to do so.  If you are or may be pregnant, please discuss the risks and benefits of this procedure with your doctor.        What happens during a colonoscopy?    Plan to spend up to two hours, starting at registration time, at the endoscopy center the day of your procedure. The colonoscopy takes an average of 15 to 30 minutes. Recovery time is about 30 minutes.      Before the exam:    You will change into a gown.    Your medical history and medication list will be reviewed with you, unless that has been done over the phone prior to the procedure.     A nurse will insert an intravenous (IV) line into your hand or arm.    The doctor will meet with you and will give you a consent form to sign.  During the exam:     Medicine will be given through the IV line to help you relax.     Your heart rate and oxygen levels will be monitored. If your blood pressure is low, you may be given fluids through the IV line.     The doctor will insert a flexible hollow tube, called a colonoscope, into your rectum. The scope will be advanced slowly through the large intestine (colon).    You may have a feeling of fullness or pressure.     If an abnormal tissue or a polyp is found, the doctor may remove it through the endoscope for closer examination, or biopsy. Tissue removal is painless    After the exam:           Any tissue samples removed during the exam will be sent to a lab for evaluation. It may take 5-7 working days for you to be notified of the results.     A nurse will provide you with complete discharge instructions before you leave the endoscopy center. Be sure to ask the nurse for specific instructions if you take blood thinners such as Aspirin, Coumadin or Plavix.     The doctor will prepare a full report for you and for the physician who referred you for  the procedure.     Your doctor will talk with you about the initial results of your exam.      Medication given during the exam will prohibit you from driving for the rest of the day.     Following the exam, you may resume your normal diet. Your first meal should be light, no greasy foods. Avoid alcohol until the next day.     You may resume your regular activities the day after the procedure.         LOW-FIBER DIET    Foods RECOMMENDED Foods to AVOID   Breads, Cereal, Rice and Pasta:   White bread, rolls, biscuits, croissant and seymour toast.   Waffles, Surinamese toast and pancakes.   White rice, noodles, pasta, macaroni and peeled cooked potatoes.   Plain crackers and saltines.   Cooked cereals: farina, cream of rice.   Cold cereals: Puffed Rice , Rice Krispies , Corn Flakes  and Special K    Breads, Cereal, Rice and Pasta:   Breads or rolls with nuts, seeds or fruit.   Whole wheat, pumpernickel, rye breads and cornbread.   Potatoes with skin, brown or wild rice, and kasha (buckwheat).     Vegetables:   Tender cooked and canned vegetables without seeds: carrots, asparagus tips, green or wax beans, pumpkin, spinach, lima beans. Vegetables:   Raw or steamed vegetables.   Vegetables with seeds.   Sauerkraut.   Winter squash, peas, broccoli, Brussel sprouts, cabbage, onions, cauliflower, baked beans, peas and corn.   Fruits:   Strained fruit juice.   Canned fruit, except pineapple.   Ripe bananas and melon. Fruits:   Prunes and prune juice.   Raw fruits.   Dried fruits: figs, dates and raisins.   Milk/Dairy:   Milk: plain or flavored.   Yogurt, custard and ice cream.   Cheese and cottage cheese Milk/Dairy:     Meat and other proteins:   ground, well-cooked tender beef, lamb, ham, veal, pork, fish, poultry and organ meats.   Eggs.   Peanut butter without nuts. Meat and other proteins:   Tough, fibrous meats with gristle.   Dry beans, peas and lentils.   Peanut butter with nuts.   Tofu.   Fats, Snack, Sweets, Condiments and  Beverages:   Margarine, butter, oils, mayonnaise, sour cream and salad dressing, plain gravy.   Sugar, hard candy, clear jelly, honey and syrup.   Spices, cooked herbs, bouillon, broth and soups made with allowed vegetable, ketchup and mustard.   Coffee, tea and carbonated drinks.   Plain cakes, cookies and pretzels.   Gelatin, plain puddings, custard, ice cream, sherbet and popsicles. Fats, Snack, Sweets, Condiments and Beverages:   Nuts, seeds and coconut.   Jam, marmalade and preserves.   Pickles, olives, relish and horseradish.   All desserts containing nuts, seeds, dried fruit and coconut; or made from whole grains or bran.   Candy made with nuts or seeds.   Popcorn.                     DIRECTIONS TO THE ENDOSCOPY DEPARTMENT     From the north ([facility])  Take [address] South, exit on [address] [address]. Get into the left hand manjit, turn left (east), go one-half mile to [address] and turn left. Go north to the first stoplight, take a right on [address] and follow it to the Emergency entrance.    From the south ([facility])  Take [address]N to the [address]E split and exit on [address] [address]. On [address] , turn left (west) to [address]. Turn right (north) on [address]. Go north to the first stoplight, take a right on [address] and follow it to the Emergency entrance.    From the east via 35E ([facility])  Take [address] south to [address] [address] exit. Turn right on [address] . Go west to [address]. Turn right (north) on [address]. Go to the first stoplight, take a right and follow on [address] to the Emergency entrance.    From the east via [address] ([facility])  Take [address] to [address]. Turn left (south) on [address] to [address]. Turn left (east) on [address] and follow it to the Emergency entrance.    From the west via [address] ([place], [place])  Take [address] east to [address]. Turn right (south)  on [address] to [address]. Turn left (east) on [address] and follow it to the Emergency entrance.

## 2019-03-12 NOTE — H&P
"Pre-Endoscopy History and Physical     Natasha Allison MRN# [id number]   YOB: 1950 Age: 68 year old     Date of Procedure: 3/12/2019  Primary care provider: Beverley Maloney  Type of Endoscopy: Colonoscopy with possible biopsy, possible polypectomy  Reason for Procedure: screen  Type of Anesthesia Anticipated: Conscious Sedation    HPI:    Natasha is a 68 year old female who will be undergoing the above procedure.      A history and physical has been performed. The patient's medications and allergies have been reviewed. The risks and benefits of the procedure and the sedation options and risks were discussed with the patient.  All questions were answered and informed consent was obtained.      She denies a personal or family history of anesthesia complications or bleeding disorders.     Patient Active Problem List   Diagnosis     Rheumatoid arthritis involving multiple sites with positive rheumatoid factor (H)- sees  \"Tip\" =Dr. 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 increased anxiety- since conmittant dosing of Prolia and remicade at her rheumatologist's office 10/9/2018        Past Medical History:   Diagnosis Date     Hypertension      Osteoporosis     alendronate 5mg daily = stomach upset /nausea     " Rheumatoid arthritis(714.0)         Past Surgical History:   Procedure Laterality Date     CARPAL TUNNEL RELEASE RT/LT         Social History     Tobacco Use     Smoking status: Former Smoker     Last attempt to quit: 1978     Years since quittin.3     Smokeless tobacco: Never Used   Substance Use Topics     Alcohol use: Yes     Comment: 0-1 QD       Family History   Problem Relation Age of Onset     Hypertension Mother      Hypertension Father      Heart Disease Father      Intellectual Disability (Mental Retardation) Brother      Hypertension Sister      Hypertension Son      Hypertension Daughter      Colon Cancer Brother      Hypertension Brother      Hypertension Brother      Hypertension Brother      Hypertension Sister      Hypertension Sister        Prior to Admission medications    Medication Sig Start Date End Date Taking? Authorizing Provider   aspirin 81 MG tablet Take  by mouth daily.   Yes Reported, Patient   Calcium Carb-Cholecalciferol (CALCIUM 500 + D3) 500-200 MG-UNIT TABS Take 1 tablet by mouth 2 times daily 10/29/18  Yes Beverley Maloney MD   folic acid (FOLVITE) 1 MG tablet Take 1 tablet (1 mg) by mouth daily 10/29/18  Yes Beverley Maloney MD   lisinopril (PRINIVIL/ZESTRIL) 10 MG tablet TAKE 1 AND 1/2 TABLETS(15 MG) BY MOUTH DAILY 19  Yes Beverley Maloney MD   meloxicam (MOBIC) 15 MG tablet Take 15 mg by mouth daily.   Yes Reported, Patient   methotrexate sodium, pres-free, 50 MG/2ML SOLN injection CHEMO  16  Yes Reported, Patient   denosumab (PROLIA) 60 MG/ML SOLN injection Inject 60 mg Subcutaneous once    Reported, Patient   denosumab (PROLIA) 60 MG/ML SOLN injection Inject 1 mL (60 mg) Subcutaneous once for 1 dose 10/29/18   Beverley Maloney MD   desoximetasone (TOPICORT) 0.25 % cream Apply 0.5 inches topically 2 times daily.    Reported, Patient   inFLIXimab (REMICADE) 100 MG injection 3 mg/kg on Day 1, day 2nd week, day 6th week then every 8  "weeks. 11/4/16   Reported, Patient   predniSONE (DELTASONE) 5 MG tablet Takes periodically for flares per her rheumatologist at [facility] 2/9/18   Beverley Maloney MD   syringe, disposable, 1 ML MISC 1 Syringe once a week. To be used with Methotrexate Injections    Reported, Patient   zoster vaccine recombinant adjuvanted (SHINGRIX) injection Inject 0.5 mLs into the muscle once for 1 dose 10/29/18 10/29/18  Beverley Maloney MD       Allergies   Allergen Reactions     Latex         REVIEW OF SYSTEMS:   5 point ROS negative except as noted above in HPI, including Gen., Resp., CV, GI &  system review.    PHYSICAL EXAM:   There were no vitals taken for this visit. Estimated body mass index is 28.53 kg/m  as calculated from the following:    Height as of 10/29/18: 1.575 m (5' 2\").    Weight as of 10/29/18: 70.8 kg (156 lb).   GENERAL APPEARANCE: alert, and oriented  MENTAL STATUS: alert  AIRWAY EXAM: Mallampatti Class I (visualization of the soft palate, fauces, uvula, anterior and posterior pillars)  RESP: lungs clear to auscultation - no rales, rhonchi or wheezes  CV: regular rates and rhythm  DIAGNOSTICS:    Not indicated    IMPRESSION   ASA Class 2 - Mild systemic disease    PLAN:   Plan for Colonoscopy with possible biopsy, possible polypectomy. We discussed the risks, benefits and alternatives and the patient wished to proceed.    The above has been forwarded to the consulting provider.      Signed Electronically by: Jermaine Rock  March 12, 2019          "

## 2019-04-03 ENCOUNTER — OFFICE VISIT (OUTPATIENT)
Dept: FAMILY MEDICINE | Facility: CLINIC | Age: 69
End: 2019-04-03
Payer: MEDICARE

## 2019-04-03 VITALS
TEMPERATURE: 98.3 F | HEIGHT: 62 IN | OXYGEN SATURATION: 98 % | BODY MASS INDEX: 26.76 KG/M2 | SYSTOLIC BLOOD PRESSURE: 120 MMHG | HEART RATE: 90 BPM | DIASTOLIC BLOOD PRESSURE: 76 MMHG | WEIGHT: 145.4 LBS

## 2019-04-03 DIAGNOSIS — M81.0 AGE-RELATED OSTEOPOROSIS WITHOUT CURRENT PATHOLOGICAL FRACTURE: ICD-10-CM

## 2019-04-03 DIAGNOSIS — Z00.00 MEDICARE ANNUAL WELLNESS VISIT, SUBSEQUENT: ICD-10-CM

## 2019-04-03 DIAGNOSIS — D48.5 NEOPLASM OF UNCERTAIN BEHAVIOR OF SKIN: ICD-10-CM

## 2019-04-03 DIAGNOSIS — Z13.6 CARDIOVASCULAR SCREENING; LDL GOAL LESS THAN 130: ICD-10-CM

## 2019-04-03 DIAGNOSIS — Z71.89 ADVANCED DIRECTIVES, COUNSELING/DISCUSSION: ICD-10-CM

## 2019-04-03 DIAGNOSIS — M05.79 RHEUMATOID ARTHRITIS INVOLVING MULTIPLE SITES WITH POSITIVE RHEUMATOID FACTOR (H): ICD-10-CM

## 2019-04-03 DIAGNOSIS — Z00.01 ENCOUNTER FOR GENERAL ADULT MEDICAL EXAMINATION WITH ABNORMAL FINDINGS: Primary | ICD-10-CM

## 2019-04-03 DIAGNOSIS — I10 ESSENTIAL HYPERTENSION WITH GOAL BLOOD PRESSURE LESS THAN 140/90: ICD-10-CM

## 2019-04-03 DIAGNOSIS — E83.110: ICD-10-CM

## 2019-04-03 DIAGNOSIS — L60.8 TOENAIL DEFORMITY: ICD-10-CM

## 2019-04-03 DIAGNOSIS — Z12.31 VISIT FOR SCREENING MAMMOGRAM: ICD-10-CM

## 2019-04-03 LAB
ALBUMIN SERPL-MCNC: 4.1 G/DL (ref 3.4–5)
ALP SERPL-CCNC: 39 U/L (ref 40–150)
ALT SERPL W P-5'-P-CCNC: 23 U/L (ref 0–50)
ANION GAP SERPL CALCULATED.3IONS-SCNC: 7 MMOL/L (ref 3–14)
AST SERPL W P-5'-P-CCNC: 28 U/L (ref 0–45)
BILIRUB SERPL-MCNC: 0.9 MG/DL (ref 0.2–1.3)
BUN SERPL-MCNC: 26 MG/DL (ref 7–30)
CALCIUM SERPL-MCNC: 9.3 MG/DL (ref 8.5–10.1)
CHLORIDE SERPL-SCNC: 106 MMOL/L (ref 94–109)
CHOLEST SERPL-MCNC: 245 MG/DL
CO2 SERPL-SCNC: 26 MMOL/L (ref 20–32)
CREAT SERPL-MCNC: 0.93 MG/DL (ref 0.52–1.04)
FERRITIN SERPL-MCNC: 131 NG/ML (ref 8–252)
GFR SERPL CREATININE-BSD FRML MDRD: 63 ML/MIN/{1.73_M2}
GLUCOSE SERPL-MCNC: 99 MG/DL (ref 70–99)
HDLC SERPL-MCNC: 90 MG/DL
IRON SATN MFR SERPL: 43 % (ref 15–46)
IRON SERPL-MCNC: 167 UG/DL (ref 35–180)
LDLC SERPL CALC-MCNC: 129 MG/DL
NONHDLC SERPL-MCNC: 155 MG/DL
POTASSIUM SERPL-SCNC: 4.3 MMOL/L (ref 3.4–5.3)
PROT SERPL-MCNC: 8.2 G/DL (ref 6.8–8.8)
SODIUM SERPL-SCNC: 139 MMOL/L (ref 133–144)
TIBC SERPL-MCNC: 390 UG/DL (ref 240–430)
TRIGL SERPL-MCNC: 130 MG/DL
TSH SERPL DL<=0.005 MIU/L-ACNC: 1.87 MU/L (ref 0.4–4)

## 2019-04-03 PROCEDURE — 84443 ASSAY THYROID STIM HORMONE: CPT | Performed by: FAMILY MEDICINE

## 2019-04-03 PROCEDURE — 80061 LIPID PANEL: CPT | Performed by: FAMILY MEDICINE

## 2019-04-03 PROCEDURE — 83540 ASSAY OF IRON: CPT | Performed by: FAMILY MEDICINE

## 2019-04-03 PROCEDURE — 36415 COLL VENOUS BLD VENIPUNCTURE: CPT | Performed by: FAMILY MEDICINE

## 2019-04-03 PROCEDURE — 99214 OFFICE O/P EST MOD 30 MIN: CPT | Mod: 25 | Performed by: FAMILY MEDICINE

## 2019-04-03 PROCEDURE — 82728 ASSAY OF FERRITIN: CPT | Performed by: FAMILY MEDICINE

## 2019-04-03 PROCEDURE — 83550 IRON BINDING TEST: CPT | Performed by: FAMILY MEDICINE

## 2019-04-03 PROCEDURE — G0439 PPPS, SUBSEQ VISIT: HCPCS | Performed by: FAMILY MEDICINE

## 2019-04-03 PROCEDURE — 80053 COMPREHEN METABOLIC PANEL: CPT | Performed by: FAMILY MEDICINE

## 2019-04-03 RX ORDER — LISINOPRIL 10 MG/1
TABLET ORAL
Qty: 135 TABLET | Refills: 3 | Status: SHIPPED | OUTPATIENT
Start: 2019-04-03 | End: 2020-02-20

## 2019-04-03 ASSESSMENT — ANXIETY QUESTIONNAIRES
3. WORRYING TOO MUCH ABOUT DIFFERENT THINGS: NOT AT ALL
IF YOU CHECKED OFF ANY PROBLEMS ON THIS QUESTIONNAIRE, HOW DIFFICULT HAVE THESE PROBLEMS MADE IT FOR YOU TO DO YOUR WORK, TAKE CARE OF THINGS AT HOME, OR GET ALONG WITH OTHER PEOPLE: NOT DIFFICULT AT ALL
5. BEING SO RESTLESS THAT IT IS HARD TO SIT STILL: SEVERAL DAYS
7. FEELING AFRAID AS IF SOMETHING AWFUL MIGHT HAPPEN: NOT AT ALL
1. FEELING NERVOUS, ANXIOUS, OR ON EDGE: NOT AT ALL
6. BECOMING EASILY ANNOYED OR IRRITABLE: NOT AT ALL
GAD7 TOTAL SCORE: 1
2. NOT BEING ABLE TO STOP OR CONTROL WORRYING: NOT AT ALL

## 2019-04-03 ASSESSMENT — PATIENT HEALTH QUESTIONNAIRE - PHQ9
5. POOR APPETITE OR OVEREATING: NOT AT ALL
SUM OF ALL RESPONSES TO PHQ QUESTIONS 1-9: 1

## 2019-04-03 ASSESSMENT — MIFFLIN-ST. JEOR: SCORE: 1147.78

## 2019-04-03 NOTE — PATIENT INSTRUCTIONS
Preventive Health Recommendations    See your health care provider every year to    Review health changes.     Discuss preventive care.      Review your medicines if your doctor has prescribed any.      You no longer need a yearly Pap test unless you've had an abnormal Pap test in the past 10 years. If you have vaginal symptoms, such as bleeding or discharge, be sure to talk with your provider about a Pap test.      Every 1 to 2 years, have a mammogram.  If you are over 69, talk with your health care provider about whether or not you want to continue having screening mammograms.      Every 10 years, have a colonoscopy. Or, have a yearly FIT test (stool test). These exams will check for colon cancer.       Have a cholesterol test every 5 years, or more often if your doctor advises it.       Have a diabetes test (fasting glucose) every three years. If you are at risk for diabetes, you should have this test more often.       At age 65, have a bone density scan (DEXA) to check for osteoporosis (brittle bone disease).    Shots:    Get a flu shot each year.    Get a tetanus shot every 10 years.    Talk to your doctor about your pneumonia vaccines. There are now two you should receive - Pneumovax (PPSV 23) and Prevnar (PCV 13).    Talk to your pharmacist about the shingles vaccine.    Talk to your doctor about the hepatitis B vaccine.    Nutrition:     Eat at least 5 servings of fruits and vegetables each day.      Eat whole-grain bread, whole-wheat pasta and brown rice instead of white grains and rice.      Get adequate Calcium and Vitamin D.     Lifestyle    Exercise at least 150 minutes a week (30 minutes a day, 5 days a week). This will help you control your weight and prevent disease.      Limit alcohol to one drink per day.      No smoking.       Wear sunscreen to prevent skin cancer.       See your dentist twice a year for an exam and cleaning.      See your eye doctor every 1 to 2 years to screen for conditions  such as glaucoma, macular degeneration and cataracts.    Personalized Prevention Plan  You are due for the preventive services outlined below.  Your care team is available to assist you in scheduling these services.  If you have already completed any of these items, please share that information with your care team to update in your medical record.  Health Maintenance Due   Topic Date Due     Zoster (Shingles) Vaccine (1 of 2) 07/19/2001     Annual Wellness Visit  07/19/2016     Discuss Advance Directive Planning  04/01/2018     FALL RISK ASSESSMENT  02/09/2019     Depression Assessment 2 - yearly  02/09/2019     Colon Cancer Screening - FIT Test - yearly  02/13/2019                Thank you for choosing [facility]  for your Health Care. It was a pleasure seeing you at your visit today. Please contact us with any questions or concerns you may have.                   Beverley Maloney MD                                  To reach your [facility] care team after hours call:   [phone]    Our clinic hours are:     Monday- 7:30 am - 7:00 pm                             Tuesday through Friday- 7:30 am - 5:00 pm                                        Saturday- 8:00 am - 12:00 pm                  Phone:  [phone]    Our pharmacy hours are:     Monday  8:00 am to 7:00 pm      Tuesday through Friday 8:00am to 6:00pm                        Saturday - 9:00 am to 1:00 pm      Sunday : Closed.              Phone:  [phone]      There is also information available at our web site:  [email or web address]    If your provider ordered any lab tests and you do not receive the results within 10 business days, please call the clinic.    If you need a medication refill please contact your pharmacy.  Please allow 2 business days for your refill to be completed.    Our clinic offers telephone visits and e visits.  Please ask one of your team members to explain more.      Use [employer] (secure  email communication and access to your chart) to send your primary care provider a message or make an appointment. Ask someone on your Team how to sign up for [facility].

## 2019-04-03 NOTE — PROGRESS NOTES
"  SUBJECTIVE:   Natasha Allison is a 68 year old female who presents for Preventive Visit.  Are you in the first 12 months of your Medicare Part B coverage?  No    Physical Health:    In general, how would you rate your overall physical health? good    Outside of work, how many days during the week do you exercise? 2-3 days/week    Outside of work, approximately how many minutes a day do you exercise? 2-3 miles    If you drink alcohol do you typically have >3 drinks per day or >7 drinks per week? No    Do you usually eat at least 4 servings of fruit and vegetables a day, include whole grains & fiber and avoid regularly eating high fat or \"junk\" foods? Yes    Do you have any problems taking medications regularly?  No    Do you have any side effects from medications? yes with some medications, has discussed this before.    Needs assistance for the following daily activities: no assistance needed    Which of the following safety concerns are present in your home?  none identified     Hearing impairment: No    In the past 6 months, have you been bothered by leaking of urine? no      Mental Health:    In general, how would you rate your overall mental or emotional health? good  PHQ-2 Score:    PHQ-2 (  [employer]) 4/3/2019 2018   Q1: Little interest or pleasure in doing things 0 0   Q2: Feeling down, depressed or hopeless 0 0   PHQ-2 Score 0 0       Do you feel safe in your environment? Yes    Do you have a Health Care Directive? No: Advance care planning reviewed with patient; information given to patient to review.    Additional concerns to address?  YES - allergy testing, mole on nose that has possibly grown in size, and spot on big toe of left foot.    Fall risk:  Fallen 2 or more times in the past year?: No  Any fall with injury in the past year?: No      Cognitive Screenin) Repeat 3 items (Leader, Season, Table)  2) Clock draw: NORMAL  3) 3 item recall: Recalls 3 objects  Results: 3 items recalled: " COGNITIVE IMPAIRMENT LESS LIKELY    Mini-CogTM Copyright S Jim. Licensed by the author for use in [facility]; reprinted with permission (landon@.[email or web address]). All rights reserved.        Do you have sleep apnea, excessive snoring or daytime drowsiness?: sleep apnea      Hypertension Follow-up:       Outpatient blood pressures are not being checked.    Low Salt Diet: low salt    BP Readings from Last 5 Encounters:   19 120/76   19 119/82   10/29/18 138/84   18 132/76   18 138/82       Reviewed and updated as needed this visit by clinical staff  Tobacco  Allergies  Meds  Med Hx  Surg Hx  Fam Hx  Soc Hx      Reviewed and updated as needed this visit by Provider        Social History     Tobacco Use     Smoking status: Former Smoker     Last attempt to quit: 1978     Years since quittin.4     Smokeless tobacco: Never Used   Substance Use Topics     Alcohol use: Yes     Comment: 0-1 QD                           Current providers sharing in care for this patient include:   Patient Care Team:  Beverley Maloney MD as PCP - General (Family Practice)  Beverley Maloney MD as Assigned PCP    The following health maintenance items are reviewed in Epic and correct as of today:  Health Maintenance   Topic Date Due     ZOSTER IMMUNIZATION (2 of 2) 2018     FALL RISK ASSESSMENT  2019     PHQ-2 Q1 YR  2019     FIT Q1 YR  2019     MAMMO Q1 YR  03/15/2019     BMP Q1 YR  10/29/2019     MEDICARE ANNUAL WELLNESS VISIT  2020     DTAP/TDAP/TD IMMUNIZATION (3 - Td) 2022     LIPID SCREEN Q5 YR FEMALE (SYSTEM ASSIGNED)  03/15/2023     COLONOSCOPY Q5 YR  2024     ADVANCE DIRECTIVE PLANNING Q5 YRS  2024     DEXA SCAN SCREENING (SYSTEM ASSIGNED)  Completed     INFLUENZA VACCINE  Completed     PNEUMOCOCCAL IMMUNIZATION 65+ LOW/MEDIUM RISK  Completed     HEPATITIS C SCREENING  Completed     IPV IMMUNIZATION  Aged Out     MENINGITIS  "IMMUNIZATION  Aged Out     BP Readings from Last 3 Encounters:   19 120/76   19 119/82   10/29/18 138/84    Wt Readings from Last 3 Encounters:   19 66 kg (145 lb 6.4 oz)   19 65.3 kg (144 lb)   10/29/18 70.8 kg (156 lb)                  Patient Active Problem List   Diagnosis     Rheumatoid arthritis involving multiple sites with positive rheumatoid factor (H)- sees  \"Tip\" =Dr. 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 increased anxiety- since conmittant dosing of Prolia and remicade at her rheumatologist's office 10/9/2018     Past Surgical History:   Procedure Laterality Date     CARPAL TUNNEL RELEASE RT/LT       COLONOSCOPY N/A 3/12/2019    Procedure: COLONOSCOPY;  Surgeon: Jermaine Rock MD;  Location:  GI       Social History     Tobacco Use     Smoking status: Former Smoker     Last attempt to quit: 1978     Years since quittin.4     Smokeless tobacco: Never Used   Substance Use Topics     Alcohol use: Yes     Comment: 0-1 QD     Family History   Problem Relation Age of Onset     Hypertension Mother      Hypertension Father      Heart Disease Father      Intellectual Disability (Mental Retardation) Brother      Hypertension Sister      Hypertension Son      Hypertension Daughter  "     Colon Cancer Brother      Hypertension Brother      Hypertension Brother      Hypertension Brother      Hypertension Sister      Hypertension Sister          Current Outpatient Medications   Medication Sig Dispense Refill     aspirin 81 MG tablet Take  by mouth daily.       Calcium Carb-Cholecalciferol (CALCIUM 500 + D3) 500-200 MG-UNIT TABS Take 1 tablet by mouth 2 times daily       desoximetasone (TOPICORT) 0.25 % cream Apply 0.5 inches topically 2 times daily as needed        folic acid (FOLVITE) 1 MG tablet Take 1 tablet (1 mg) by mouth daily 100 tablet 3     inFLIXimab (REMICADE) 100 MG injection 3 mg/kg on Day 1, day 2nd week, day 6th week then every 8 weeks.       lisinopril (PRINIVIL/ZESTRIL) 10 MG tablet TAKE 1 AND 1/2 TABLETS(15 MG) BY MOUTH DAILY 135 tablet 3     meloxicam (MOBIC) 15 MG tablet Take 15 mg by mouth daily.       methotrexate sodium, pres-free, 50 MG/2ML SOLN injection CHEMO   0     predniSONE (DELTASONE) 5 MG tablet as needed Takes periodically for flares per her rheumatologist at [facility].  0     syringe, disposable, 1 ML MISC 1 Syringe once a week. To be used with Methotrexate Injections       denosumab (PROLIA) 60 MG/ML SOLN injection Inject 1 mL (60 mg) Subcutaneous once for 1 dose 1 mL 0     Allergies   Allergen Reactions     Alendronic Acid      Severe aching     Latex      Recent Labs   Lab Test 10/29/18  1722 03/15/18  0731  04/04/14  0834   LDL  --  161*  --  125   HDL  --  85  --  101   TRIG  --  118  --  103   ALT  --  21  --   --    CR 0.92 0.96   < >  --    GFRESTIMATED 61 58*   < >  --    GFRESTBLACK 74 70   < >  --    POTASSIUM 4.9 4.0  --   --    TSH  --  3.52  --   --     < > = values in this interval not displayed.      Pneumonia Vaccine:Adults age 65+ who received their first dose of Pneumovax (PPSV23) prior to age 65 years: Should be given PCV 13 > 1 year after their most recent PPSV23 AND should be given a another dose of PPSV23 > 5 years after their most recent  "dose of PPSV23      Mammogram Screening: Mammogram Screening: Patient over age 50, mutual decision to screen reflected in health maintenance.    Ma Screening Digital Bilat - Future  (s+30)    Result Date: 3/15/2018  Narrative: SCREENING MAMMOGRAM, BILATERAL, DIGITAL w/CAD - 3/15/2018 12:15 PM BREAST SYMPTOMS: No current breast complaints. COMPARISON:  Baseline. BREAST DENSITY: Scattered fibroglandular densities. COMMENTS: No findings of suspicion for malignancy.       History of abnormal Pap smear: NO - age 66 - see link Cervical Cytology Screening Guidelines  Last 3 Pap and HPV Results:          ROS: has lost some weight purposefully through good nutrition and exercise.   Wt Readings from Last 5 Encounters:   04/03/19 66 kg (145 lb 6.4 oz)   03/12/19 65.3 kg (144 lb)   10/29/18 70.8 kg (156 lb)   06/13/18 71.2 kg (157 lb)   02/09/18 72.5 kg (159 lb 12.8 oz)     Also has a new uncertain  neoplasm on nose and pigmented/brown area on left great toenail - would like those checked.   Constitutional, HEENT, cardiovascular, pulmonary, GI, , musculoskeletal, neuro, skin, endocrine and psych systems are negative, except as otherwise noted.    OBJECTIVE:   /76 (BP Location: Left arm, Patient Position: Chair, Cuff Size: Adult Regular)   Pulse 90   Temp 98.3  F (36.8  C) (Oral)   Ht 1.575 m (5' 2\")   Wt 66 kg (145 lb 6.4 oz)   SpO2 98%   BMI 26.59 kg/m   Estimated body mass index is 26.59 kg/m  as calculated from the following:    Height as of this encounter: 1.575 m (5' 2\").    Weight as of this encounter: 66 kg (145 lb 6.4 oz).  EXAM:   GENERAL APPEARANCE: healthy, alert and no distress  EYES: Eyes grossly normal to inspection, PERRL and conjunctivae and sclerae normal  HENT: ear canals and TM's normal, nose and mouth without ulcers or lesions, oropharynx clear and oral mucous membranes moist  NECK: no adenopathy, no asymmetry, masses, or scars and thyroid normal to palpation  RESP: lungs clear to auscultation " "- no rales, rhonchi or wheezes  BREAST: normal without masses, tenderness or nipple discharge and no palpable axillary masses or adenopathy  CV: regular rate and rhythm, normal S1 S2, no S3 or S4, no murmur, click or rub, no peripheral edema and peripheral pulses strong  ABDOMEN: soft, nontender, no hepatosplenomegaly, no masses and bowel sounds normal  MS: no musculoskeletal defects are noted and gait is age appropriate without ataxia  SKIN: no suspicious lesions or rashes- there is a pigmented brownish area under and including the left lateral great toenail and a 3mm pigmented lesion on nose that is a little suspicious.   NEURO: Normal strength and tone, sensory exam grossly normal, mentation intact and speech normal  PSYCH: mentation appears normal and affect normal/bright.     Diagnostic Test Results:  See [facility] orders.   Had cbc done at [facility] on 3/26/2019 - hgb 14.5  Normal cbc.   GFR was 58 and creatinine 1.00.   ALT was 17.     ASSESSMENT / PLAN:       ICD-10-CM    1. Encounter for general adult medical examination with abnormal findings Z00.01    2. Medicare annual wellness visit, subsequent Z00.00    3. Neoplasm of uncertain behavior of skin- pigmented nevus on nose - changing  D48.5 SKIN CARE REFERRAL     OFFICE/OUTPT VISIT,EST,LEVL IV   4. Toenail deformity- brownish pigmentation - left great toe  L60.8 SKIN CARE REFERRAL     OFFICE/OUTPT VISIT,EST,LEVL IV   5. Rheumatoid arthritis involving multiple sites with positive rheumatoid factor (H) - sees  \"Keys\" - rheumatology - in [place]  M05.79 OFFICE/OUTPT VISIT,EST,LEVL IV   6. Compound heterozygous hemochromatosis type 1 (H)  E83.110 Comprehensive metabolic panel     Ferritin     Iron and iron binding capacity     OFFICE/OUTPT VISIT,EST,LEVL IV   7. Essential hypertension with goal blood pressure less than 140/90- gets a little elevated for 1-2 days after MTX administration I10 Comprehensive metabolic panel     TSH with free T4 reflex     " "OFFICE/OUTPT VISIT,EST,LEVL IV   8. Age-related osteoporosis without current pathological fracture M81.0 OFFICE/OUTPT VISIT,EST,LEVL IV   9. Advanced directives, counseling/discussion Z71.89 Full Code   10. Visit for screening mammogram Z12.31 *MA Screening Digital Bilateral   11. CARDIOVASCULAR SCREENING; LDL GOAL LESS THAN 130 Z13.6 Lipid panel reflex to direct LDL Fasting     Comprehensive metabolic panel     Return in about 6 months (around 10/3/2019) for BP Recheck.     End of Life Planning:  Patient currently has an advanced directive: Yes.  Practitioner is supportive of decision.    COUNSELING:   Reviewed preventive health counseling, as reflected in patient instructions    BP Readings from Last 1 Encounters:   04/03/19 120/76     Estimated body mass index is 26.59 kg/m  as calculated from the following:    Height as of this encounter: 1.575 m (5' 2\").    Weight as of this encounter: 66 kg (145 lb 6.4 oz).      Weight management plan: Discussed healthy diet and exercise guidelines     reports that she quit smoking about 40 years ago. she has never used smokeless tobacco.      Appropriate preventive services were discussed with this patient, including applicable screening as appropriate for cardiovascular disease, diabetes, osteopenia/osteoporosis, and glaucoma.  As appropriate for age/gender, discussed screening for colorectal cancer, prostate cancer, breast cancer, and cervical cancer. Checklist reviewing preventive services available has been given to the patient.    Reviewed patients plan of care and provided an AVS. The Intermediate Care Plan ( asthma action plan, low back pain action plan, and migraine action plan) for Natasha meets the Care Plan requirement. This Care Plan has been established and reviewed with the Patient    No follow-ups on file. .    Counseling Resources:  ATP IV Guidelines  Pooled Cohorts Equation Calculator  Breast Cancer Risk Calculator  FRAX Risk Assessment  ICSI Preventive " Guidelines  Dietary Guidelines for Americans, 2010  USDA's MyPlate  ASA Prophylaxis  Lung CA Screening    Beverley Maloney MD  [facility]

## 2019-04-04 ASSESSMENT — ANXIETY QUESTIONNAIRES: GAD7 TOTAL SCORE: 1

## 2019-04-16 ENCOUNTER — TELEPHONE (OUTPATIENT)
Dept: FAMILY MEDICINE | Facility: CLINIC | Age: 69
End: 2019-04-16

## 2019-04-16 ENCOUNTER — ANCILLARY PROCEDURE (OUTPATIENT)
Dept: MAMMOGRAPHY | Facility: CLINIC | Age: 69
End: 2019-04-16
Payer: MEDICARE

## 2019-04-16 DIAGNOSIS — Z12.31 VISIT FOR SCREENING MAMMOGRAM: ICD-10-CM

## 2019-04-16 PROCEDURE — 77067 SCR MAMMO BI INCL CAD: CPT | Mod: TC

## 2019-04-16 NOTE — TELEPHONE ENCOUNTER
Reason for Call:  Other returning call    Detailed comments: Patient returning a call left for her. She would like a call back.    Phone Number Patient can be reached at: Cell number on file:    Telephone Information:   Mobile [phone]     Best Time: Anytime    Can we leave a detailed message on this number? YES    Call taken on 4/16/2019 at 1:48 PM by Jessenia Mckoy

## 2019-04-16 NOTE — TELEPHONE ENCOUNTER
Called # below    Advised pt there is nothing in the chart for results or calls from our clinic     Pt sated she was calling the skin clinic and somehow started talking with [facility] clinic     Rn advised to try call them again     Riddhi Barlkey RN, BSN  [facility]

## 2019-05-03 ENCOUNTER — TRANSFERRED RECORDS (OUTPATIENT)
Dept: HEALTH INFORMATION MANAGEMENT | Facility: CLINIC | Age: 69
End: 2019-05-03

## 2019-05-13 ENCOUNTER — MYC REFILL (OUTPATIENT)
Dept: FAMILY MEDICINE | Facility: CLINIC | Age: 69
End: 2019-05-13

## 2019-05-15 RX ORDER — LISINOPRIL 10 MG/1
TABLET ORAL
Qty: 135 TABLET | Refills: 3 | OUTPATIENT
Start: 2019-05-15

## 2019-05-15 NOTE — TELEPHONE ENCOUNTER
"Requested Prescriptions   Pending Prescriptions Disp Refills     lisinopril (PRINIVIL/ZESTRIL) 10 MG tablet 135 tablet 3     Sig: TAKE 1 AND 1/2 TABLETS(15 MG) BY MOUTH DAILY       ACE Inhibitors (Including Combos) Protocol Passed - 5/13/2019  7:49 AM        Passed - Blood pressure under 140/90 in past 12 months     BP Readings from Last 3 Encounters:   04/03/19 120/76   03/12/19 119/82   10/29/18 138/84                 Passed - Recent (12 mo) or future (30 days) visit within the authorizing provider's specialty     Patient had office visit in the last 12 months or has a visit in the next 30 days with authorizing provider or within the authorizing provider's specialty.  See \"Patient Info\" tab in inbasket, or \"Choose Columns\" in Meds & Orders section of the refill encounter.              Passed - Medication is active on med list        Passed - Patient is age 18 or older        Passed - No active pregnancy on record        Passed - Normal serum creatinine on file in past 12 months     Recent Labs   Lab Test 04/03/19  0906   CR 0.93             Passed - Normal serum potassium on file in past 12 months     Recent Labs   Lab Test 04/03/19  0906   POTASSIUM 4.3             Passed - No positive pregnancy test within past 12 months        Duplicate. Just filled. Advised pharmacy.  María Elena Tavarez RN  [facility]    "

## 2019-05-17 DIAGNOSIS — I10 ESSENTIAL HYPERTENSION WITH GOAL BLOOD PRESSURE LESS THAN 140/90: ICD-10-CM

## 2019-05-17 RX ORDER — LISINOPRIL 10 MG/1
TABLET ORAL
Qty: 135 TABLET | Refills: 0 | OUTPATIENT
Start: 2019-05-17

## 2019-05-17 NOTE — TELEPHONE ENCOUNTER
"Requested Prescriptions   Pending Prescriptions Disp Refills     lisinopril (PRINIVIL/ZESTRIL) 10 MG tablet [Pharmacy Med Name: LISINOPRIL 10MG TABLETS] 135 tablet 0     Sig: TAKE 1 AND 1/2 TABLETS(15 MG) BY MOUTH DAILY       ACE Inhibitors (Including Combos) Protocol Passed - 5/17/2019  3:59 AM        Passed - Blood pressure under 140/90 in past 12 months     BP Readings from Last 3 Encounters:   04/03/19 120/76   03/12/19 119/82   10/29/18 138/84                 Passed - Recent (12 mo) or future (30 days) visit within the authorizing provider's specialty     Patient had office visit in the last 12 months or has a visit in the next 30 days with authorizing provider or within the authorizing provider's specialty.  See \"Patient Info\" tab in inbasket, or \"Choose Columns\" in Meds & Orders section of the refill encounter.              Passed - Medication is active on med list        Passed - Patient is age 18 or older        Passed - No active pregnancy on record        Passed - Normal serum creatinine on file in past 12 months     Recent Labs   Lab Test 04/03/19  0906   CR 0.93             Passed - Normal serum potassium on file in past 12 months     Recent Labs   Lab Test 04/03/19  0906   POTASSIUM 4.3             Passed - No positive pregnancy test within past 12 months        Just filled 4/2019. Advised pharmacy.  María Elena Tavarez RN  [facility]    "

## 2019-09-30 ENCOUNTER — HEALTH MAINTENANCE LETTER (OUTPATIENT)
Age: 69
End: 2019-09-30

## 2020-02-19 NOTE — PATIENT INSTRUCTIONS
Try Chair yoga or Yoga for seniors and/or Pilates for Seniors on YouTube.      Look into Silver Sneakers programs at [facility] in [place] or [employer] in [place] as well.       Patient Education   Personalized Prevention Plan  You are due for the preventive services outlined below.  Your care team is available to assist you in scheduling these services.  If you have already completed any of these items, please share that information with your care team to update in your medical record.  Health Maintenance Due   Topic Date Due     Kidney Microalbumin Urine Test  1950     Zoster (Shingles) Vaccine (2 of 2) 12/24/2018     FIT Test  02/13/2019     PHQ-2  01/01/2020         Thank you so much or choosing [facility]  for your Health Care. It was a pleasure seeing you at your visit today! Please contact us with any questions or concerns you may have.                   Beverley Maloney MD                              To reach your [facility] care team after hours call:   [phone]    Our clinic hours are:     Monday- 7:30 am - 7:00 pm                             Tuesday through Friday- 7:30 am - 5:00 pm                                        Saturday- 8:00 am - 12:00 pm                  Phone:  [phone]    Our pharmacy hours are:     Monday  8:00 am to 7:00 pm      Tuesday through Friday 8:00am to 6:00pm                        Saturday - 9:00 am to 1:00 pm      Sunday : Closed.              Phone:  [phone]      There is also information available at our web site:  [email or web address]    If your provider ordered any lab tests and you do not receive the results within 10 business days, please call the clinic.    If you need a medication refill please contact your pharmacy.  Please allow 2 business days for your refill to be completed.    Our clinic offers telephone visits and e visits.  Please ask one of your team members to explain more.       Use [employer] (secure email communication and access to your chart) to send your primary care provider a message or make an appointment. Ask someone on your Team how to sign up for [employer].

## 2020-02-19 NOTE — PROGRESS NOTES
"SUBJECTIVE:   Natasha Allison is a 69 year old female who presents for Preventive Visit.  {PVP to remind patient that this is not necessarily a physical exam; physical exam may or may not be done:529513::\"click delete button to remove this line now\"}  {PVP to inform patient that additional E&M charge may apply, if additional problems addressed:675980::\"click delete button to remove this line now\"}  Are you in the first 12 months of your Medicare coverage?  { :944279::\"No\"}    HPI  Do you feel safe in your environment? { :820104}    Have you ever done Advance Care Planning? (For example, a Health Directive, POLST, or a discussion with a medical provider or your loved ones about your wishes): { :615708}    {Hearing Test Done (Optional):726383}  Fall risk  { :000348}  {If any of the above assessments are answered yes, consider ordering appropriate referrals (Optional):579518::\"click delete button to remove this line now\"}  Cognitive Screening { :139284}    {Do you have sleep apnea, excessive snoring or daytime drowsiness? (Optional):518214}    Reviewed and updated as needed this visit by clinical staff         Reviewed and updated as needed this visit by Provider        Social History     Tobacco Use     Smoking status: Former Smoker     Last attempt to quit: 1978     Years since quittin.2     Smokeless tobacco: Never Used   Substance Use Topics     Alcohol use: Yes     Comment: 0-1 QD     {Rooming Staff- Complete this question if Prescreen response is not shown below for today's visit. If you drink alcohol do you typically have >3 drinks per day or >7 drinks per week? (Optional):415278}    No flowsheet data found.{add AUDIT responses (Optional) (A score of 7 for adult men is an indication of hazardous drinking; a score of 8 or more is an indication of an alcohol use disorder.  A score of 7 or more for adult women is an indication of hazardous drinking or an alchohol use disorder):601159}    {Outside tests " "to abstract? :006474}    Hypertension Follow-up      Do you check your blood pressure regularly outside of the clinic? { :344733}     Are you following a low salt diet? { :513309}    Are your blood pressures ever more than 140 on the top number (systolic) OR more   than 90 on the bottom number (diastolic), for example 140/90? { :060068}      Current providers sharing in care for this patient include:   Patient Care Team:  Beverley Maloney MD as PCP - General (Family Practice)  Beverley Maloney MD as Assigned PCP    The following health maintenance items are reviewed in Epic and correct as of today:  Health Maintenance   Topic Date Due     MICROALBUMIN  1950     ZOSTER IMMUNIZATION (2 of 2) 12/24/2018     FIT  02/13/2019     PHQ-2  01/01/2020     MEDICARE ANNUAL WELLNESS VISIT  04/03/2020     BMP  04/03/2020     FALL RISK ASSESSMENT  04/03/2020     MAMMO SCREENING  04/16/2020     DTAP/TDAP/TD IMMUNIZATION (3 - Td) 12/18/2022     LIPID  04/03/2024     ADVANCE CARE PLANNING  04/03/2024     DEXA  Completed     HEPATITIS C SCREENING  Completed     INFLUENZA VACCINE  Completed     PNEUMOCOCCAL IMMUNIZATION 65+ LOW/MEDIUM RISK  Completed     IPV IMMUNIZATION  Aged Out     MENINGITIS IMMUNIZATION  Aged Out     Patient Active Problem List   Diagnosis     Rheumatoid arthritis involving multiple sites with positive rheumatoid factor (H)- sees  \"Tip\" =Dr. 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 increased anxiety- since conmittant dosing of Prolia and remicade at her rheumatologist's office 10/9/2018     Past Surgical History:   Procedure Laterality Date     CARPAL TUNNEL RELEASE RT/LT       COLONOSCOPY N/A 3/12/2019    Procedure: COLONOSCOPY;  Surgeon: Jermaine Rock MD;  Location:  GI       Social History     Tobacco Use     Smoking status: Former Smoker     Last attempt to quit: 1978     Years since quittin.2     Smokeless tobacco: Never Used   Substance Use Topics     Alcohol use: Yes     Comment: 0-1 QD     Family History   Problem Relation Age of Onset     Hypertension Mother      Hypertension Father      Heart Disease Father      Intellectual Disability (Mental Retardation) Brother      Hypertension Sister      Hypertension Son      Hypertension Daughter      Colon Cancer Brother      Hypertension Brother      Hypertension Brother      Hypertension Brother      Hypertension Sister      Hypertension Sister          Current Outpatient Medications   Medication Sig Dispense Refill     aspirin 81 MG tablet Take  by mouth daily.       Calcium Carb-Cholecalciferol (CALCIUM 500 + D3) 500-200 MG-UNIT TABS Take 1 tablet by mouth 2 times daily       denosumab (PROLIA) 60 MG/ML SOLN injection Inject 1 mL (60 mg) Subcutaneous once for 1 dose 1 mL 0     desoximetasone (TOPICORT) 0.25 % cream Apply 0.5 inches topically 2 times daily as needed        folic acid (FOLVITE) 1 MG tablet Take 1 tablet (1 mg) by mouth daily 100 tablet 3     inFLIXimab (REMICADE) 100 MG injection 3 mg/kg on Day 1, day 2nd week, day 6th week then every 8 weeks.       lisinopril (PRINIVIL/ZESTRIL) 10 MG tablet TAKE 1 AND 1/2 TABLETS(15 MG) BY MOUTH DAILY 135 tablet 3     meloxicam (MOBIC) 15 MG tablet Take 15 mg by mouth daily.       methotrexate  "sodium, pres-free, 50 MG/2ML SOLN injection CHEMO   0     predniSONE (DELTASONE) 5 MG tablet as needed Takes periodically for flares per her rheumatologist at [facility].  0     syringe, disposable, 1 ML MISC 1 Syringe once a week. To be used with Methotrexate Injections       Allergies   Allergen Reactions     Alendronic Acid      Severe aching     Latex      Review of Systems  Constitutional, HEENT, cardiovascular, pulmonary, GI, , musculoskeletal, neuro, skin, endocrine and psych systems are negative, except as otherwise noted.    OBJECTIVE:   There were no vitals taken for this visit. Estimated body mass index is 26.59 kg/m  as calculated from the following:    Height as of 4/3/19: 1.575 m (5' 2\").    Weight as of 4/3/19: 66 kg (145 lb 6.4 oz).  Physical Exam  GENERAL APPEARANCE: healthy, alert and no distress  EYES: Eyes grossly normal to inspection, PERRL and conjunctivae and sclerae normal  HENT: ear canals and TM's normal, nose and mouth without ulcers or lesions, oropharynx clear and oral mucous membranes moist  NECK: no adenopathy, no asymmetry, masses, or scars and thyroid normal to palpation  RESP: lungs clear to auscultation - no rales, rhonchi or wheezes  BREAST: normal without masses, tenderness or nipple discharge and no palpable axillary masses or adenopathy  CV: regular rate and rhythm, normal S1 S2, no S3 or S4, no murmur, click or rub, no peripheral edema and peripheral pulses strong  ABDOMEN: soft, nontender, no hepatosplenomegaly, no masses and bowel sounds normal  MS: no musculoskeletal defects are noted and gait is age appropriate without ataxia  SKIN: no suspicious lesions or rashes  NEURO: Normal strength and tone, sensory exam grossly normal, mentation intact and speech normal  PSYCH: mentation appears normal and affect normal/bright    {Diagnostic Test Results (Optional):959667::\"Diagnostic Test Results:\",\"Labs reviewed in Epic\"}    ASSESSMENT / PLAN:       ICD-10-CM    1. Encounter " "for Medicare annual wellness exam Z00.00          See patient instructions. Please, call or return to clinic or go to the ER immediately if signs or symptoms worsen or fail to improve as anticipated.     Return in about 53 weeks (around 2021) for Annual Wellness Visit.    COUNSELING:  {Medicare Counselin}    Estimated body mass index is 26.59 kg/m  as calculated from the following:    Height as of 4/3/19: 1.575 m (5' 2\").    Weight as of 4/3/19: 66 kg (145 lb 6.4 oz).    {Weight Management Plan (ACO) Complete if BMI is abnormal-  Ages 18-64  BMI >24.9.  Age 65+ with BMI <23 or >30 (Optional):826582}     reports that she quit smoking about 41 years ago. She has never used smokeless tobacco.  {Tobacco Cessation -- Complete if patient is a smoker (Optional):642373}    Appropriate preventive services were discussed with this patient, including applicable screening as appropriate for cardiovascular disease, diabetes, osteopenia/osteoporosis, and glaucoma.  As appropriate for age/gender, discussed screening for colorectal cancer, prostate cancer, breast cancer, and cervical cancer. Checklist reviewing preventive services available has been given to the patient.    Reviewed patients plan of care and provided an AVS. The {CarePlan:525133} for Natasha meets the Care Plan requirement. This Care Plan has been established and reviewed with the {PATIENT, FAMILY MEMBER, CAREGIVER:971936}.    Counseling Resources:  ATP IV Guidelines  Pooled Cohorts Equation Calculator  Breast Cancer Risk Calculator  FRAX Risk Assessment  ICSI Preventive Guidelines  Dietary Guidelines for Americans, 2010  USDA's MyPlate  ASA Prophylaxis  Lung CA Screening    Beverley Maloney MD  [facility]    Identified Health Risks:  "

## 2020-02-20 ENCOUNTER — OFFICE VISIT (OUTPATIENT)
Dept: FAMILY MEDICINE | Facility: CLINIC | Age: 70
End: 2020-02-20
Payer: MEDICARE

## 2020-02-20 VITALS
WEIGHT: 143 LBS | BODY MASS INDEX: 26.31 KG/M2 | HEIGHT: 62 IN | HEART RATE: 75 BPM | OXYGEN SATURATION: 98 % | DIASTOLIC BLOOD PRESSURE: 78 MMHG | SYSTOLIC BLOOD PRESSURE: 118 MMHG | TEMPERATURE: 98.1 F

## 2020-02-20 DIAGNOSIS — Z00.00 ENCOUNTER FOR MEDICARE ANNUAL WELLNESS EXAM: Primary | ICD-10-CM

## 2020-02-20 DIAGNOSIS — Z00.01 ENCOUNTER FOR ROUTINE ADULT MEDICAL EXAM WITH ABNORMAL FINDINGS: ICD-10-CM

## 2020-02-20 DIAGNOSIS — R42 DIZZINESS: ICD-10-CM

## 2020-02-20 DIAGNOSIS — I10 ESSENTIAL HYPERTENSION WITH GOAL BLOOD PRESSURE LESS THAN 140/90: ICD-10-CM

## 2020-02-20 DIAGNOSIS — Z12.31 VISIT FOR SCREENING MAMMOGRAM: ICD-10-CM

## 2020-02-20 DIAGNOSIS — H17.9 CORNEAL CLOUDING: ICD-10-CM

## 2020-02-20 DIAGNOSIS — E83.110: ICD-10-CM

## 2020-02-20 DIAGNOSIS — R39.15 URINARY URGENCY: ICD-10-CM

## 2020-02-20 DIAGNOSIS — Z78.0 ASYMPTOMATIC POSTMENOPAUSAL STATUS: ICD-10-CM

## 2020-02-20 DIAGNOSIS — Z13.6 CARDIOVASCULAR SCREENING; LDL GOAL LESS THAN 130: ICD-10-CM

## 2020-02-20 DIAGNOSIS — M05.79 RHEUMATOID ARTHRITIS INVOLVING MULTIPLE SITES WITH POSITIVE RHEUMATOID FACTOR (H): ICD-10-CM

## 2020-02-20 DIAGNOSIS — E55.9 VITAMIN D DEFICIENCY: ICD-10-CM

## 2020-02-20 DIAGNOSIS — Z23 NEED FOR SHINGLES VACCINE: ICD-10-CM

## 2020-02-20 DIAGNOSIS — J30.0 VASOMOTOR RHINITIS: ICD-10-CM

## 2020-02-20 DIAGNOSIS — Z12.11 SCREEN FOR COLON CANCER: ICD-10-CM

## 2020-02-20 DIAGNOSIS — M81.0 AGE-RELATED OSTEOPOROSIS WITHOUT CURRENT PATHOLOGICAL FRACTURE: ICD-10-CM

## 2020-02-20 LAB
ALBUMIN UR-MCNC: NEGATIVE MG/DL
AMORPH CRY #/AREA URNS HPF: ABNORMAL /HPF
APPEARANCE UR: CLEAR
BILIRUB UR QL STRIP: NEGATIVE
COLOR UR AUTO: YELLOW
GLUCOSE UR STRIP-MCNC: NEGATIVE MG/DL
HGB UR QL STRIP: ABNORMAL
KETONES UR STRIP-MCNC: NEGATIVE MG/DL
LEUKOCYTE ESTERASE UR QL STRIP: NEGATIVE
NITRATE UR QL: NEGATIVE
PH UR STRIP: 8 PH (ref 5–7)
RBC #/AREA URNS AUTO: ABNORMAL /HPF
SOURCE: ABNORMAL
SP GR UR STRIP: 1.02 (ref 1–1.03)
UROBILINOGEN UR STRIP-ACNC: 0.2 EU/DL (ref 0.2–1)
WBC #/AREA URNS AUTO: ABNORMAL /HPF

## 2020-02-20 PROCEDURE — 81001 URINALYSIS AUTO W/SCOPE: CPT | Performed by: FAMILY MEDICINE

## 2020-02-20 PROCEDURE — 36415 COLL VENOUS BLD VENIPUNCTURE: CPT | Performed by: FAMILY MEDICINE

## 2020-02-20 PROCEDURE — 82728 ASSAY OF FERRITIN: CPT | Performed by: FAMILY MEDICINE

## 2020-02-20 PROCEDURE — 80053 COMPREHEN METABOLIC PANEL: CPT | Performed by: FAMILY MEDICINE

## 2020-02-20 PROCEDURE — 99397 PER PM REEVAL EST PAT 65+ YR: CPT | Performed by: FAMILY MEDICINE

## 2020-02-20 PROCEDURE — 84443 ASSAY THYROID STIM HORMONE: CPT | Performed by: FAMILY MEDICINE

## 2020-02-20 PROCEDURE — 82043 UR ALBUMIN QUANTITATIVE: CPT | Performed by: FAMILY MEDICINE

## 2020-02-20 PROCEDURE — 82306 VITAMIN D 25 HYDROXY: CPT | Performed by: FAMILY MEDICINE

## 2020-02-20 PROCEDURE — 80061 LIPID PANEL: CPT | Performed by: FAMILY MEDICINE

## 2020-02-20 RX ORDER — LISINOPRIL 10 MG/1
10 TABLET ORAL DAILY
Qty: 90 TABLET | Refills: 3 | Status: SHIPPED | OUTPATIENT
Start: 2020-02-20 | End: 2020-10-16 | Stop reason: SINTOL

## 2020-02-20 RX ORDER — IPRATROPIUM BROMIDE 21 UG/1
2 SPRAY, METERED NASAL EVERY 12 HOURS PRN
Qty: 30 ML | Refills: 4 | Status: SHIPPED | OUTPATIENT
Start: 2020-02-20 | End: 2020-02-24

## 2020-02-20 ASSESSMENT — MIFFLIN-ST. JEOR: SCORE: 1131.89

## 2020-02-20 ASSESSMENT — ACTIVITIES OF DAILY LIVING (ADL): CURRENT_FUNCTION: NO ASSISTANCE NEEDED

## 2020-02-20 NOTE — PROGRESS NOTES
"SUBJECTIVE:   Natasha Allison is a 69 year old female who presents for Preventive Visit.  Are you in the first 12 months of your Medicare coverage?  No    Healthy Habits:    In general, how would you rate your overall health?  Good    Frequency of exercise:  2-3 days/week    Duration of exercise:  45-60 minutes    Do you usually eat at least 4 servings of fruit and vegetables a day, include whole grains    & fiber and avoid regularly eating high fat or \"junk\" foods?  Yes    Taking medications regularly:  Yes    Barriers to taking medications:  Not applicable    Medication side effects:  None    Ability to successfully perform activities of daily living:  No assistance needed    Home Safety:  No safety concerns identified    Hearing Impairment:  No hearing concerns    In the past 6 months, have you been bothered by leaking of urine?  No    In general, how would you rate your overall mental or emotional health?  Excellent      PHQ-2 Total Score:    Additional concerns today:  Yes (runny nose, prolia medication)    Do you feel safe in your environment? Yes    Have you ever done Advance Care Planning? (For example, a Health Directive, POLST, or a discussion with a medical provider or your loved ones about your wishes): Yes, patient states has an Advance Care Planning document and will bring a copy to the clinic.      Fall risk  Fallen 2 or more times in the past year?: No  Any fall with injury in the past year?: No    Cognitive Screening   1) Repeat 3 items (Leader, Season, Table)    2) Clock draw: NORMAL  3) 3 item recall: Recalls 3 objects  Results: 3 items recalled: COGNITIVE IMPAIRMENT LESS LIKELY    Mini-CogTM Copyright JOE Fernandez. Licensed by the author for use in [facility]; reprinted with permission ([email or web address]@.[facility]). All rights reserved.      Do you have sleep apnea, excessive snoring or daytime drowsiness?: yes    Reviewed and updated as needed this visit by clinical staff  Tobacco  Allergies  Meds  " "Problems  Med Hx  Surg Hx  Fam Hx         Reviewed and updated as needed this visit by Provider        Social History     Tobacco Use     Smoking status: Former Smoker     Last attempt to quit: 1978     Years since quittin.2     Smokeless tobacco: Never Used   Substance Use Topics     Alcohol use: Yes     Comment: 0-1 QD     If you drink alcohol do you typically have >3 drinks per day or >7 drinks per week? No    No flowsheet data found.    Hypertension Follow-up      Do you check your blood pressure regularly outside of the clinic? Yes     Are you following a low salt diet? Yes    Are your blood pressures ever more than 140 on the top number (systolic) OR more   than 90 on the bottom number (diastolic), for example 140/90? No    Pt lost ~30 lbs since she retired - believes this helping BP. Checks BP at home regularly. Pt wants to continue to lose weight = considering doing [employer] program. Notes no issues with lisinopril, though she's wondering if can lower dose. Denies leg edema.     BP Readings from Last 6 Encounters:   20 118/78   19 120/76   19 119/82   10/29/18 138/84   18 132/76   18 138/82     Osteoporosis  Pt dx'd with osteoporosis and treated with Prolia once yearly in past. She has not done Prolia injection for a while - found this may have been contributing to dizziness and increased anxiety she was having at the time. Pt interested in seeing if bone density has improved.     Rhinorrhea  Pt complains of rhinorrhea year round - clear mucus, not yellow/green. Her nose will \"drip\" anytime, even when she's only sitting. Tried using neti pot. Mother and sister has this issue as well.     Moles  Pt recently had full body skin exam by dermatologist in [place].     Colon cancer screening  Last colonoscopy in 2019 and needs repeat in 5 years (). Rarely gets hemorrhoids.     Pt had lab work done at rheumatologist. Results noted " "below:              Current providers sharing in care for this patient include:   Patient Care Team:  Beverley Maloney MD as PCP - General (Family Practice)  Beverley Maloney MD as Assigned PCP    The following health maintenance items are reviewed in Epic and correct as of today:  Health Maintenance   Topic Date Due     MICROALBUMIN  1950     ZOSTER IMMUNIZATION (2 of 2) 12/24/2018     FIT  02/13/2019     PHQ-2  01/01/2020     BMP  04/03/2020     FALL RISK ASSESSMENT  04/03/2020     MAMMO SCREENING  04/16/2020     MEDICARE ANNUAL WELLNESS VISIT  02/20/2021     DTAP/TDAP/TD IMMUNIZATION (3 - Td) 12/18/2022     LIPID  04/03/2024     ADVANCE CARE PLANNING  04/03/2024     DEXA  Completed     HEPATITIS C SCREENING  Completed     INFLUENZA VACCINE  Completed     PNEUMOCOCCAL IMMUNIZATION 65+ LOW/MEDIUM RISK  Completed     IPV IMMUNIZATION  Aged Out     MENINGITIS IMMUNIZATION  Aged Out     Patient Active Problem List   Diagnosis     Rheumatoid arthritis involving multiple sites with positive rheumatoid factor (H)- sees  \"Tip\" =Dr. Benito Garcia MD - at [facility]      CARDIOVASCULAR SCREENING; LDL GOAL LESS THAN 130     Advanced directives, counseling/discussion     Essential hypertension with goal blood pressure less than 140/90- gets a little elevated for 1-2 days after MTX administration     Corneal clouding- left eye  - since childhood shovel to the eye - decreased vision secondary to that - sees [facility] physicians and surgeons      Urinary urgency     Osteopenia     Osteoporosis     Family history of hemochromatosis- son     Age-related osteoporosis without current pathological fracture     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 increased anxiety- since conmittant dosing of Prolia and remicade at her rheumatologist's office 10/9/2018     Past Surgical History: "   Procedure Laterality Date     CARPAL TUNNEL RELEASE RT/LT       COLONOSCOPY N/A 3/12/2019    Procedure: COLONOSCOPY;  Surgeon: Jermaine Rock MD;  Location:  GI       Social History     Tobacco Use     Smoking status: Former Smoker     Last attempt to quit: 1978     Years since quittin.2     Smokeless tobacco: Never Used   Substance Use Topics     Alcohol use: Yes     Comment: 0-1 QD     Family History   Problem Relation Age of Onset     Hypertension Mother      Hypertension Father      Heart Disease Father      Intellectual Disability (Mental Retardation) Brother      Hypertension Sister      Hypertension Son      Hypertension Daughter      Colon Cancer Brother      Hypertension Brother      Hypertension Brother      Hypertension Brother      Hypertension Sister      Hypertension Sister          Current Outpatient Medications   Medication Sig Dispense Refill     aspirin 81 MG tablet Take  by mouth daily.       Calcium Carb-Cholecalciferol (CALCIUM 500 + D3) 500-200 MG-UNIT TABS Take 1 tablet by mouth 2 times daily       denosumab (PROLIA) 60 MG/ML SOLN injection Inject 1 mL (60 mg) Subcutaneous once for 1 dose 1 mL 0     desoximetasone (TOPICORT) 0.25 % cream Apply 0.5 inches topically 2 times daily as needed        folic acid (FOLVITE) 1 MG tablet Take 1 tablet (1 mg) by mouth daily 100 tablet 3     inFLIXimab (REMICADE) 100 MG injection 3 mg/kg on Day 1, day 2nd week, day 6th week then every 8 weeks.       lisinopril (PRINIVIL/ZESTRIL) 10 MG tablet TAKE 1 AND 1/2 TABLETS(15 MG) BY MOUTH DAILY 135 tablet 3     meloxicam (MOBIC) 15 MG tablet Take 15 mg by mouth daily.       methotrexate sodium, pres-free, 50 MG/2ML SOLN injection CHEMO 20 mg   0     predniSONE (DELTASONE) 5 MG tablet as needed Takes periodically for flares per her rheumatologist at [facility].  0     syringe, disposable, 1 ML MISC 1 Syringe once a week. To be used with Methotrexate Injections       Allergies   Allergen  "Reactions     Alendronic Acid      Severe aching     Latex      Review of Systems  Constitutional, HEENT, cardiovascular, pulmonary, GI, , musculoskeletal, neuro, skin, endocrine and psych systems are negative, except as otherwise noted.    This document serves as a record of the services and decisions personally performed and made by Beverley Maloney MD. It was created on her behalf by Shobha Butler, a trained medical scribe. The creation of this document is based on the provider's statements to the medical scribe.  Shobha Butler 10:38 AM February 20, 2020    OBJECTIVE:   /78   Pulse 75   Temp 98.1  F (36.7  C)   Ht 1.575 m (5' 2\")   Wt 64.9 kg (143 lb)   SpO2 98%   BMI 26.16 kg/m   Estimated body mass index is 26.16 kg/m  as calculated from the following:    Height as of this encounter: 1.575 m (5' 2\").    Weight as of this encounter: 64.9 kg (143 lb).  Physical Exam  GENERAL APPEARANCE: healthy, alert and no distress  EYES: slight congential corneal clouding and irregular pupil on left from trauma years ago, unchanged from previous. right eye grossly normal to inspection, PERRL and conjunctivae and sclerae normal  HENT: ear canals and TM's normal, nose and mouth without ulcers or lesions, oropharynx clear and oral mucous membranes moist  NECK: no adenopathy, no asymmetry, masses, or scars and thyroid normal to palpation  RESP: lungs clear to auscultation - no rales, rhonchi or wheezes  BREAST: normal without masses, tenderness or nipple discharge and no palpable axillary masses or adenopathy  CV: regular rate and rhythm, normal S1 S2, no S3 or S4, no murmur, click or rub, no peripheral edema and peripheral pulses strong  ABDOMEN: soft, nontender, no hepatosplenomegaly, no masses and bowel sounds normal  MS: no musculoskeletal defects are noted and gait is age appropriate without ataxia  SKIN: no suspicious lesions or rashes  NEURO: Normal strength and tone, sensory exam grossly normal, mentation intact and " speech normal  PSYCH: mentation appears normal and affect normal/bright    Diagnostic Test Results:  Labs reviewed in Epic  Results for orders placed or performed in visit on 02/20/20 (from the past 24 hour(s))   UA reflex to Microscopic and Culture   Result Value Ref Range    Color Urine Yellow     Appearance Urine Clear     Glucose Urine Negative NEG^Negative mg/dL    Bilirubin Urine Negative NEG^Negative    Ketones Urine Negative NEG^Negative mg/dL    Specific Gravity Urine 1.025 1.003 - 1.035    Blood Urine Trace (A) NEG^Negative    pH Urine 8.0 (H) 5.0 - 7.0 pH    Protein Albumin Urine Negative NEG^Negative mg/dL    Urobilinogen Urine 0.2 0.2 - 1.0 EU/dL    Nitrite Urine Negative NEG^Negative    Leukocyte Esterase Urine Negative NEG^Negative    Source Midstream Urine    Urine Microscopic   Result Value Ref Range    WBC Urine 0 - 5 OTO5^0 - 5 /HPF    RBC Urine O - 2 OTO2^O - 2 /HPF    Amorphous Crystals Many (A) NEG^Negative /HPF     ASSESSMENT / PLAN:       ICD-10-CM    1. Encounter for Medicare annual wellness exam Z00.00    2. Encounter for routine adult medical exam with abnormal findings Z00.01    3. Rheumatoid arthritis involving multiple sites with positive rheumatoid factor (H) M05.79    4. Compound heterozygous hemochromatosis type 1 (H) E83.110 Ferritin   5. Corneal clouding- left eye  - since childhood shovel to the eye - decreased vision secondary to that - sees [facility] physicians and surgeons  H17.9    6. 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 Age-related osteoporosis without current pathological fracture M81.0 DX Hip/Pelvis/Spine   8. CARDIOVASCULAR SCREENING; LDL GOAL LESS THAN 130 Z13.6 Lipid panel reflex to direct LDL Fasting   9. Essential hypertension with goal blood pressure less than 140/90- gets a little elevated for 1-2 days after MTX administration I10 Albumin Random Urine Quantitative with Creat Ratio      "Comprehensive metabolic panel     TSH with free T4 reflex     UA reflex to Microscopic and Culture     lisinopril 10 MG PO tablet     CANCELED: Magnesium   10. Rheumatoid arthritis involving multiple sites with positive rheumatoid factor (H)- sees  \"Tip\" =Dr. Benito Garcia MD - at [facility]  M05.79    11. Dizziness - with a transient chill & ? increased anxiety- since conmittant dosing of Prolia and remicade at her rheumatologist's office 10/9/2018 R42    12. Urinary urgency R39.15    13. Asymptomatic postmenopausal status Z78.0 DX Hip/Pelvis/Spine   14. Visit for screening mammogram Z12.31 MA Screen Bilateral w/Mohan   15. Screen for colon cancer Z12.11 Fecal colorectal cancer screen FIT   16. Vitamin D deficiency E55.9 25 Hydroxyvitamin D2 and D3   17. Need for shingles vaccine Z23 zoster vaccine recombinant adjuvanted IM injection   18. Vasomotor rhinitis J30.0 ipratropium 0.03 % NA nasal spray     Decrease lisinopril from 15mg to 10mg once daily. Monitor BP at home.     Start ipratropium 2 sprays into both nostrils every 12 hours as needed.     Try Chair yoga or Yoga for seniors and/or Pilates for Seniors on YouTube.  Look into [employer] programs at [facility] in [place] or [facility] [employer] [facility] in [place] as well.     See patient instructions. Please, call or return to clinic or go to the ER immediately if signs or symptoms worsen or fail to improve as anticipated.     Return in about 53 weeks (around 2/25/2021) for Annual Wellness Visit, Physical Exam, Lab Work.    COUNSELING:  Reviewed preventive health counseling, as reflected in patient instructions       Regular exercise       Healthy diet/nutrition       Vision screening       Dental care       Osteoporosis Prevention/Bone Health       Colon cancer screening    Estimated body mass index is 26.16 kg/m  as calculated from the following:    Height as of this encounter: 1.575 m (5' 2\").    Weight as of this encounter: 64.9 kg (143 lb).     " reports that she quit smoking about 41 years ago. She has never used smokeless tobacco.      Appropriate preventive services were discussed with this patient, including applicable screening as appropriate for cardiovascular disease, diabetes, osteopenia/osteoporosis, and glaucoma.  As appropriate for age/gender, discussed screening for colorectal cancer, prostate cancer, breast cancer, and cervical cancer. Checklist reviewing preventive services available has been given to the patient.    Reviewed patients plan of care and provided an AVS. The Basic Care Plan (routine screening as documented in Health Maintenance) for Natasha meets the Care Plan requirement. This Care Plan has been established and reviewed with the Patient.    Counseling Resources:  ATP IV Guidelines  Pooled Cohorts Equation Calculator  Breast Cancer Risk Calculator  FRAX Risk Assessment  ICSI Preventive Guidelines  Dietary Guidelines for Americans, 2010  USDA's MyPlate  ASA Prophylaxis  Lung CA Screening    The information in this document, created by the medical scribe for me, accurately reflects the services I personally performed and the decisions made by me. I have reviewed and approved this document for accuracy prior to leaving the patient care area.  February 20, 2020 11:02 AM    Beverley Maloney MD  [facility] [place]    Identified Health Risks:

## 2020-02-21 LAB
ALBUMIN SERPL-MCNC: 3.8 G/DL (ref 3.4–5)
ALP SERPL-CCNC: 46 U/L (ref 40–150)
ALT SERPL W P-5'-P-CCNC: 48 U/L (ref 0–50)
ANION GAP SERPL CALCULATED.3IONS-SCNC: 4 MMOL/L (ref 3–14)
AST SERPL W P-5'-P-CCNC: 54 U/L (ref 0–45)
BILIRUB SERPL-MCNC: 0.6 MG/DL (ref 0.2–1.3)
BUN SERPL-MCNC: 22 MG/DL (ref 7–30)
CALCIUM SERPL-MCNC: 9.6 MG/DL (ref 8.5–10.1)
CHLORIDE SERPL-SCNC: 104 MMOL/L (ref 94–109)
CHOLEST SERPL-MCNC: 235 MG/DL
CO2 SERPL-SCNC: 27 MMOL/L (ref 20–32)
CREAT SERPL-MCNC: 0.83 MG/DL (ref 0.52–1.04)
CREAT UR-MCNC: 49 MG/DL
DEPRECATED CALCIDIOL+CALCIFEROL SERPL-MC: <42 UG/L (ref 20–75)
FERRITIN SERPL-MCNC: 121 NG/ML (ref 8–252)
GFR SERPL CREATININE-BSD FRML MDRD: 72 ML/MIN/{1.73_M2}
GLUCOSE SERPL-MCNC: 83 MG/DL (ref 70–99)
HDLC SERPL-MCNC: 89 MG/DL
LDLC SERPL CALC-MCNC: 119 MG/DL
MICROALBUMIN UR-MCNC: 6 MG/L
MICROALBUMIN/CREAT UR: 12.73 MG/G CR (ref 0–25)
NONHDLC SERPL-MCNC: 146 MG/DL
POTASSIUM SERPL-SCNC: 4.7 MMOL/L (ref 3.4–5.3)
PROT SERPL-MCNC: 7.6 G/DL (ref 6.8–8.8)
SODIUM SERPL-SCNC: 135 MMOL/L (ref 133–144)
TRIGL SERPL-MCNC: 136 MG/DL
TSH SERPL DL<=0.005 MIU/L-ACNC: 1.87 MU/L (ref 0.4–4)
VITAMIN D2 SERPL-MCNC: <5 UG/L
VITAMIN D3 SERPL-MCNC: 37 UG/L

## 2020-02-24 ENCOUNTER — TELEPHONE (OUTPATIENT)
Dept: FAMILY MEDICINE | Facility: CLINIC | Age: 70
End: 2020-02-24

## 2020-02-24 DIAGNOSIS — J30.0 VASOMOTOR RHINITIS: Primary | ICD-10-CM

## 2020-02-24 DIAGNOSIS — Z12.11 SCREEN FOR COLON CANCER: ICD-10-CM

## 2020-02-24 PROCEDURE — 82274 ASSAY TEST FOR BLOOD FECAL: CPT | Performed by: FAMILY MEDICINE

## 2020-02-24 RX ORDER — AZELASTINE 1 MG/ML
1 SPRAY, METERED NASAL 2 TIMES DAILY PRN
Qty: 30 ML | Refills: 11 | Status: SHIPPED | OUTPATIENT
Start: 2020-02-24 | End: 2021-02-24

## 2020-02-24 NOTE — TELEPHONE ENCOUNTER
rec'd fax from [facility] - re: ipratropium 0.03 % NA nasal spray not covered well - too costly for patient. Requesting lower cost alternative.  Will try azelastine for the vasomotor rhinitis.   Please inform patient.

## 2020-02-29 LAB — HEMOCCULT STL QL IA: NEGATIVE

## 2020-03-16 ENCOUNTER — TELEPHONE (OUTPATIENT)
Dept: FAMILY MEDICINE | Facility: CLINIC | Age: 70
End: 2020-03-16

## 2020-03-16 NOTE — TELEPHONE ENCOUNTER
Reason for Call: Request for an order or referral:    Order or referral being requested: bone density    Date needed: n/a    Has the patient been seen by the PCP for this problem? YES    Additional comments: Pt had to cancel bone density test. Wondering if we can keep the order open so that she can schedule it in a few months without any problems. Thanks!    Phone number Patient can be reached at:  Home number on file [phone] (home)    Best Time:  any    Can we leave a detailed message on this number?  YES    Call taken on 3/16/2020 at 1:40 PM by Loraine Carey

## 2020-03-16 NOTE — TELEPHONE ENCOUNTER
Called and informed patient that her order is good for one year, and recommended waiting to scheduled until after July 6th. Patient understood.  Beverley Le MA

## 2020-05-05 DIAGNOSIS — I10 ESSENTIAL HYPERTENSION WITH GOAL BLOOD PRESSURE LESS THAN 140/90: ICD-10-CM

## 2020-05-06 RX ORDER — LISINOPRIL 10 MG/1
TABLET ORAL
Qty: 135 TABLET | OUTPATIENT
Start: 2020-05-06

## 2020-06-17 ENCOUNTER — ANCILLARY PROCEDURE (OUTPATIENT)
Dept: MAMMOGRAPHY | Facility: CLINIC | Age: 70
End: 2020-06-17
Attending: FAMILY MEDICINE
Payer: MEDICARE

## 2020-06-17 DIAGNOSIS — Z12.31 VISIT FOR SCREENING MAMMOGRAM: ICD-10-CM

## 2020-06-17 PROCEDURE — 77067 SCR MAMMO BI INCL CAD: CPT | Mod: TC

## 2020-09-01 ENCOUNTER — ANCILLARY PROCEDURE (OUTPATIENT)
Dept: BONE DENSITY | Facility: CLINIC | Age: 70
End: 2020-09-01
Payer: MEDICARE

## 2020-09-01 DIAGNOSIS — M81.0 AGE-RELATED OSTEOPOROSIS WITHOUT CURRENT PATHOLOGICAL FRACTURE: ICD-10-CM

## 2020-09-01 DIAGNOSIS — Z78.0 ASYMPTOMATIC POSTMENOPAUSAL STATUS: ICD-10-CM

## 2020-09-01 PROCEDURE — 77085 DXA BONE DENSITY AXL VRT FX: CPT | Performed by: FAMILY MEDICINE

## 2020-09-17 ENCOUNTER — MYC MEDICAL ADVICE (OUTPATIENT)
Dept: FAMILY MEDICINE | Facility: CLINIC | Age: 70
End: 2020-09-17

## 2020-10-15 ENCOUNTER — MYC MEDICAL ADVICE (OUTPATIENT)
Dept: FAMILY MEDICINE | Facility: CLINIC | Age: 70
End: 2020-10-15

## 2020-10-15 DIAGNOSIS — I10 ESSENTIAL HYPERTENSION WITH GOAL BLOOD PRESSURE LESS THAN 140/90: Primary | ICD-10-CM

## 2020-10-16 RX ORDER — LOSARTAN POTASSIUM 25 MG/1
25 TABLET ORAL DAILY
Qty: 90 TABLET | Refills: 1 | Status: SHIPPED | OUTPATIENT
Start: 2020-10-16 | End: 2021-02-24

## 2020-10-16 NOTE — TELEPHONE ENCOUNTER
Interesting.  Makes sense. Try losartan 25mg instead - this is the closest equivalent to 10mg of lisinopril .  New rx sent to    [id number] - [place], [place] - [address]      Please inform patient.     BP Readings from Last 3 Encounters:   02/20/20 118/78   04/03/19 120/76   03/12/19 119/82     Recommend Recheck your blood pressure as a nurse only visit appointment or walk in to our pharmacy  (only if you are already a member of our pharmacy blood pressure program) in 1-2 weeks or sooner if needed.  Have nursing or pharmacy send me their note.     Recommend rechecking bmp at that time as well secondary to change in medication  -Please assist pt in making appt for that as a lab only appt.     Creatinine   Date Value Ref Range Status   02/20/2020 0.83 0.52 - 1.04 mg/dL Final     Pt has appt with me for px 2/21/2021.

## 2020-10-16 NOTE — TELEPHONE ENCOUNTER
Routing to PCP to review and advise if new med is needed.    Mahamed SMITH RN   [facility]

## 2021-02-24 ENCOUNTER — OFFICE VISIT (OUTPATIENT)
Dept: FAMILY MEDICINE | Facility: CLINIC | Age: 71
End: 2021-02-24
Payer: MEDICARE

## 2021-02-24 VITALS
HEART RATE: 96 BPM | HEIGHT: 62 IN | WEIGHT: 149 LBS | DIASTOLIC BLOOD PRESSURE: 76 MMHG | BODY MASS INDEX: 27.42 KG/M2 | SYSTOLIC BLOOD PRESSURE: 120 MMHG | TEMPERATURE: 97 F | OXYGEN SATURATION: 98 %

## 2021-02-24 DIAGNOSIS — E83.110: ICD-10-CM

## 2021-02-24 DIAGNOSIS — E55.9 VITAMIN D DEFICIENCY: ICD-10-CM

## 2021-02-24 DIAGNOSIS — R42 DIZZINESS: ICD-10-CM

## 2021-02-24 DIAGNOSIS — H17.9 CORNEAL CLOUDING: ICD-10-CM

## 2021-02-24 DIAGNOSIS — Z00.01 ENCOUNTER FOR ROUTINE ADULT MEDICAL EXAM WITH ABNORMAL FINDINGS: ICD-10-CM

## 2021-02-24 DIAGNOSIS — L71.0 PERIORAL DERMATITIS: ICD-10-CM

## 2021-02-24 DIAGNOSIS — Z12.31 VISIT FOR SCREENING MAMMOGRAM: ICD-10-CM

## 2021-02-24 DIAGNOSIS — M05.79 RHEUMATOID ARTHRITIS INVOLVING MULTIPLE SITES WITH POSITIVE RHEUMATOID FACTOR (H): ICD-10-CM

## 2021-02-24 DIAGNOSIS — J30.0 VASOMOTOR RHINITIS: ICD-10-CM

## 2021-02-24 DIAGNOSIS — Z00.00 ENCOUNTER FOR MEDICARE ANNUAL WELLNESS EXAM: Primary | ICD-10-CM

## 2021-02-24 DIAGNOSIS — M81.0 AGE-RELATED OSTEOPOROSIS WITHOUT CURRENT PATHOLOGICAL FRACTURE: ICD-10-CM

## 2021-02-24 DIAGNOSIS — Z13.6 CARDIOVASCULAR SCREENING; LDL GOAL LESS THAN 130: ICD-10-CM

## 2021-02-24 DIAGNOSIS — I10 ESSENTIAL HYPERTENSION WITH GOAL BLOOD PRESSURE LESS THAN 140/90: ICD-10-CM

## 2021-02-24 PROBLEM — M85.80 OSTEOPENIA: Status: RESOLVED | Noted: 2018-04-05 | Resolved: 2021-02-24

## 2021-02-24 PROCEDURE — 99214 OFFICE O/P EST MOD 30 MIN: CPT | Mod: 25 | Performed by: FAMILY MEDICINE

## 2021-02-24 PROCEDURE — 82043 UR ALBUMIN QUANTITATIVE: CPT | Performed by: FAMILY MEDICINE

## 2021-02-24 PROCEDURE — G0439 PPPS, SUBSEQ VISIT: HCPCS | Performed by: FAMILY MEDICINE

## 2021-02-24 RX ORDER — SWAB
1 SWAB, NON-MEDICATED MISCELLANEOUS DAILY
COMMUNITY
Start: 2021-02-24 | End: 2023-01-01

## 2021-02-24 RX ORDER — MINOCYCLINE HYDROCHLORIDE 100 MG/1
100 TABLET ORAL 2 TIMES DAILY
COMMUNITY
Start: 2021-02-24 | End: 2021-08-19

## 2021-02-24 RX ORDER — CLINDAMYCIN PHOSPHATE 10 UG/ML
LOTION TOPICAL 2 TIMES DAILY
COMMUNITY
Start: 2021-02-24 | End: 2022-01-01

## 2021-02-24 RX ORDER — INFLIXIMAB-DYYB 100 MG/10ML
INJECTION, POWDER, LYOPHILIZED, FOR SOLUTION INTRAVENOUS
COMMUNITY

## 2021-02-24 RX ORDER — LOSARTAN POTASSIUM 25 MG/1
25 TABLET ORAL DAILY
Qty: 90 TABLET | Refills: 3 | Status: SHIPPED | OUTPATIENT
Start: 2021-02-24 | End: 2022-01-01

## 2021-02-24 RX ORDER — BACILLUS COAGULANS/INULIN 1B-250 MG
1 CAPSULE ORAL DAILY
COMMUNITY
Start: 2021-02-24 | End: 2022-01-01

## 2021-02-24 RX ORDER — PIMECROLIMUS 10 MG/G
CREAM TOPICAL 2 TIMES DAILY
COMMUNITY
Start: 2021-02-24 | End: 2021-08-19

## 2021-02-24 RX ORDER — LACTOBACILLUS RHAMNOSUS GG 10B CELL
1 CAPSULE ORAL 2 TIMES DAILY
COMMUNITY
End: 2021-08-19

## 2021-02-24 ASSESSMENT — MIFFLIN-ST. JEOR: SCORE: 1154.11

## 2021-02-24 ASSESSMENT — ACTIVITIES OF DAILY LIVING (ADL): CURRENT_FUNCTION: NO ASSISTANCE NEEDED

## 2021-02-24 NOTE — PATIENT INSTRUCTIONS
[facility]  [address] [place] [address]  Office: [phone]   Fax:    [phone]     Ask your rheumatologist about getting the Pfizer or Moderna COVID-19 novel coronavirus vaccines.     We are working hard to begin vaccinating more people against COVID-19. Currently, we are only vaccinating individuals age 75 and older and Phase 1a workers - healthcare workers who are unable to do their job remotely. Vaccine availability is very limited.    If you are 75 or older, or a healthcare worker who is unable to do your job remotely, please log in to [facility] using this link to see if we have an open appointment and schedule an appointment.  If there are no appointments left, you will be unable to schedule and need to check back later.  If you are a healthcare worker, you will be asked to provide proof of employment at your appointment. If you cannot, you will be turned away.    Vaccine appointments are being added as they become available. Please check your [facility] account frequently for availability. If you have technical difficulty using [employer], call [phone] for assistance.    You can learn more about the [facility]'s phased approach to administering the vaccine, with details on each phase, https://www.health.[facility].[place]/dise[place]/coronavirus/vaccine/plan.html and/or https://www.[place]/covid19/vaccine/connector/    As vaccine supply increases and we are able to open appointments to more groups, we will share that information on our website [email or web address]. Check this website to stay up to date on COVID-19 vaccination information.    Thank you so much or choosing [facility]  for your Health Care. It was a pleasure seeing you at your visit today! Please contact us with any questions or concerns you may have.                   Beverley Maloney MD                              To reach your [facility]  - [facility] care team after hours call:   [phone]    PLEASE NOTE OUR HOURS HAVE CHANGED secondary to COVID-19 coronavirus pandemic, as we are trying to minimize patient exposure to the virus,  which is now widespread in the state.  These hours may change with very little notice.  We apologize for any inconvenience.       Our current clinic hours are:          Monday- Thursday   7:00am - 6:00pm  in person.      Friday  7:00am- 5:00pm                       Saturday and Sunday : Closed to in person and virtual visits        We have telephone and virtual visit times available between    7:00am - 6pm on Monday-Friday as well.                                                Phone:  [phone]      Our pharmacy hours: Monday through Friday 9:00am to 5:00pm                        Saturday - 9:00 am to 12 noon       Sunday : Closed.              Phone:  [phone]              ###  Please note: at this time we are not accepting any walk-in visits. ###      There is also information available at our web site:  [email or web address]    If your provider ordered any lab tests and you do not receive the results within 10 business days, please call the clinic.    If you need a medication refill please contact your pharmacy.  Please allow 2 business days for your refill to be completed.    Our clinic offers telephone visits and e visits.  Please ask one of your team members to explain more.      Use [facility] (secure email communication and access to your chart) to send your primary care provider a message or make an appointment. Ask someone on your Team how to sign up for [employer].                       Patient Education   Personalized Prevention Plan  You are due for the preventive services outlined below.  Your care team is available to assist you in scheduling these services.  If you have already completed any of these items, please share that information with your care team to update in your medical record.  Health  Maintenance Due   Topic Date Due     ANNUAL REVIEW OF HM ORDERS  1950     Zoster (Shingles) Vaccine (2 of 2) 12/24/2018     PHQ-2  01/01/2021     Basic Metabolic Panel  02/20/2021     Kidney Microalbumin Urine Test  02/20/2021     FALL RISK ASSESSMENT  02/20/2021       Thank you so much or choosing [facility]  for your Health Care. It was a pleasure seeing you at your visit today! Please contact us with any questions or concerns you may have.                   Beverley Maloney MD                              To reach your [facility] care team after hours call:   [phone]    PLEASE NOTE OUR HOURS HAVE CHANGED secondary to COVID-19 coronavirus pandemic, as we are trying to minimize patient exposure to the virus,  which is now widespread in the [facility].  These hours may change with very little notice.  We apologize for any inconvenience.       Our current clinic hours are:          Monday- Thursday   7:00am - 6:00pm  in person.      Friday  7:00am- 5:00pm                       Saturday and Sunday : Closed to in person and virtual visits        We have telephone and virtual visit times available between    7:00am - 6pm on Monday-Friday as well.                                                Phone:  [phone]      Our pharmacy hours: Monday through Friday 9:00am to 5:00pm                        Saturday - 9:00 am to 12 noon       Sunday : Closed.              Phone:  [phone]              ###  Please note: at this time we are not accepting any walk-in visits. ###      There is also information available at our web site:  [email or web address]    If your provider ordered any lab tests and you do not receive the results within 10 business days, please call the clinic.    If you need a medication refill please contact your pharmacy.  Please allow 2 business days for your refill to be completed.    Our clinic offers telephone visits and e visits.  Please  ask one of your team members to explain more.      Use [employer] (secure email communication and access to your chart) to send your primary care provider a message or make an appointment. Ask someone on your Team how to sign up for [employer].

## 2021-02-24 NOTE — PROGRESS NOTES
"SUBJECTIVE:   Natasha Allison is a 70 year old female who presents for Preventive Visit.    {Split Bill scripting  The purpose of this visit is to discuss your medical history and prevent health problems before you are sick. You may be responsible for a co-pay, coinsurance, or deductible if your visit today includes services such as checking on a sore throat, having an x-ray or lab test, or treating and evaluating a new or existing condition :251029}  Patient has been advised of split billing requirements and indicates understanding: {Yes and No:443965}   Are you in the first 12 months of your Medicare coverage?  { :384276::\"No\"}    SUBJECTIVE:  Natasha Allison is a 70 year old female who presents for Preventive Visit.    {Split Bill scripting  The purpose of this visit is to discuss your medical history and prevent health problems before you are sick. You may be responsible for a co-pay, coinsurance, or deductible if your visit today includes services such as checking on a sore throat, having an x-ray or lab test, or treating and evaluating a new or existing condition :226772}  Patient has been advised of split billing requirements and indicates understanding: {Yes and No:804282}  Are you in the first 12 months of your Medicare Part B coverage?  { :164374::\"No\"}    Physical Health:  In general, how would you rate your overall physical health? { :168702}  Outside of work, how many days during the week do you exercise? { :893143}  Outside of work, approximately how many minutes a day do you exercise?{ :951084}   If you drink alcohol do you typically have >3 drinks per day or >7 drinks per week? { :728613}  Do you usually eat at least 4 servings of fruit and vegetables a day, include whole grains & fiber and avoid regularly eating high fat or \"junk\" foods? { :710492::\"Yes\"}  Do you have any problems taking medications regularly?  { :634129::\"No\"}  Do you have any side effects from medications? { :489048}  Needs " "assistance for the following daily activities: { :052437}  Which of the following safety concerns are present in your home?  { :272212::\"none identified\"}   Hearing impairment: { :201173}  In the past 6 months, have you been bothered by leaking of urine? { :657269}    Mental Health:  In general, how would you rate your overall mental or emotional health? { :662722}  PHQ-2 Score:      Do you feel safe in your environment? { :340336}    Have you ever done Advance Care Planning? (For example, a Health Directive, POLST, or a discussion with a medical provider or your loved ones about your wishes): { :001111}    Additional concerns to address?  {If YES, notify patient they may be responsible for a copay, coinsurance or deductible if the visit today includes services such as checking on a sore throat, having an x-ray or lab test, or treating and evaluating a new or existing condition :328144::\"No\"}    Fall risk:  { :052029}  {If any of the above assessments are answered yes, consider ordering appropriate referrals (Optional):753059::\"click delete button to remove this line now\"}  Cognitive Screening: { :609452}    {Do you have sleep apnea, excessive snoring or daytime drowsiness? (Optional):149790}    {Outside tests to abstract? :171584}    {additional problems to add (Optional):036265}    Reviewed and updated as needed this visit by clinical staff                 Reviewed and updated as needed this visit by Provider                Social History    Tobacco Use      Smoking status: Former Smoker        Quit date: 1978        Years since quittin.3      Smokeless tobacco: Never Used    Alcohol use: Yes      Comment: 0-1 QD                          Current providers sharing in care for this patient include: {Rooming staff:  Please update Care Team in Rooming Activity, refresh this note and then delete this statement}  Patient Care Team:  Beverley Maloney MD as PCP - General (Family Practice)  [place], " "Beverley TOBIN MD as Assigned PCP    The following health maintenance items are reviewed in Epic and correct as of today:  ZOSTER IMMUNIZATION(2 of 2) due on 2018  PHQ-2 due on 2021  BMP due on 2021  MICROALBUMIN due on 2021  FALL RISK ASSESSMENT due on 2021  MAMMO SCREENING due on 2021  MEDICARE ANNUAL WELLNESS VISIT due on 2022  ANNUAL REVIEW OF HM ORDERS due on 2022  DTAP/TDAP/TD IMMUNIZATION(3 - Td) due on 2022  COLORECTAL CANCER SCREENING due on 2024  LIPID due on 2025  ADVANCE CARE PLANNING due on 2025  DEXA due on 2035  HEPATITIS C SCREENING Completed  INFLUENZA VACCINE Completed  Pneumococcal Vaccine: 65+ Years Completed  Pneumococcal Vaccine: Pediatrics (0 to 5 Years) and At-Risk Patients (6 to 64 Years) Completed  IPV IMMUNIZATION Completed  MENINGITIS IMMUNIZATION Completed  HEPATITIS B IMMUNIZATION Completed  {Chronicprobdata (Optional):333306}  {Decision Support (Optional):272078}    ROS:  {ROS COMP:972747}    OBJECTIVE:  There were no vitals taken for this visit. Estimated body mass index is 26.16 kg/m  as calculated from the following:    Height as of 20: 1.575 m (5' 2\").    Weight as of 20: 64.9 kg (143 lb).  EXAM:   {Exam :254518}    {Diagnostic Test Results (Optional):975431::\"Diagnostic Test Results:\",\"Labs reviewed in Epic\"}    ASSESSMENT / PLAN:  {Dia Picklist:207939}    Patient has been advised of split billing requirements and indicates understanding: {YES / NO:087526::\"Yes\"}    COUNSELING:  {Medicare Counselin}    Estimated body mass index is 26.16 kg/m  as calculated from the following:    Height as of 20: 1.575 m (5' 2\").    Weight as of 20: 64.9 kg (143 lb).    {Weight Management Plan (ACO) Complete if BMI is abnormal-  Ages 18-64  BMI >24.9.  Age 65+ with BMI <23 or >30 (Optional):535624}    She reports that she quit smoking about 42 years ago. She has never used smokeless " "tobacco.    Appropriate preventive services were discussed with this patient, including applicable screening as appropriate for cardiovascular disease, diabetes, osteopenia/osteoporosis, and glaucoma.  As appropriate for age/gender, discussed screening for colorectal cancer, prostate cancer, breast cancer, and cervical cancer. Checklist reviewing preventive services available has been given to the patient.    Reviewed patients plan of care and provided an AVS. The {CarePlan:447829} for Natasha meets the Care Plan requirement. This Care Plan has been established and reviewed with the {PATIENT, FAMILY MEMBER, CAREGIVER:531800}.    Counseling Resources:  ATP IV Guidelines  Pooled Cohorts Equation Calculator  Breast Cancer Risk Calculator  BRCA-Related Cancer Risk Assessment: FHS-7 Tool  FRAX Risk Assessment  ICSI Preventive Guidelines  Dietary Guidelines for Americans,   [email or web address]'s MyPlate  ASA Prophylaxis  Lung CA Screening    Beverley Maloney MD  [facility] [place]      Do you feel safe in your environment? { :909055}    Have you ever done Advance Care Planning? (For example, a Health Directive, POLST, or a discussion with a medical provider or your loved ones about your wishes): { :463796}    {Hearing Test Done (Optional):505027}   Fall risk  { :672805}  {If any of the above assessments are answered yes, consider ordering appropriate referrals (Optional):941653::\"click delete button to remove this line now\"}  Cognitive Screening { :773821}    {Do you have sleep apnea, excessive snoring or daytime drowsiness? (Optional):841044}    Reviewed and updated as needed this visit by clinical staff                 Reviewed and updated as needed this visit by Provider                Social History     Tobacco Use     Smoking status: Former Smoker     Quit date: 1978     Years since quittin.3     Smokeless tobacco: Never Used   Substance Use Topics     Alcohol use: Yes     Comment: 0-1 QD "     {Rooming Staff- Complete this question if Prescreen response is not shown below for today's visit. If you drink alcohol do you typically have >3 drinks per day or >7 drinks per week? (Optional):378202}    Alcohol Use 2/20/2020   Prescreen: >3 drinks/day or >7 drinks/week? No   {add AUDIT responses (Optional) (A score of 7 for adult men is an indication of hazardous drinking; a score of 8 or more is an indication of an alcohol use disorder.  A score of 7 or more for adult women is an indication of hazardous drinking or an alchohol use disorder):630003}    {Outside tests to abstract? :883164}    Hypertension Follow-up      Do you check your blood pressure regularly outside of the clinic? { :505793}     Are you following a low salt diet? { :170525}    Are your blood pressures ever more than 140 on the top number (systolic) OR more   than 90 on the bottom number (diastolic), for example 140/90? { :511526}      Current providers sharing in care for this patient include: {Rooming staff:  Please update Care Team in Rooming Activity, refresh this note and then delete this statement}  Patient Care Team:  Beverley Maloney MD as PCP - General (Family Practice)  Beverley Maloney MD as Assigned PCP    The following health maintenance items are reviewed in Epic and correct as of today:  Health Maintenance   Topic Date Due     ANNUAL REVIEW OF HM ORDERS  1950     ZOSTER IMMUNIZATION (2 of 2) 12/24/2018     PHQ-2  01/01/2021     BMP  02/20/2021     MICROALBUMIN  02/20/2021     FALL RISK ASSESSMENT  02/20/2021     MEDICARE ANNUAL WELLNESS VISIT  02/20/2021     MAMMO SCREENING  06/17/2021     DTAP/TDAP/TD IMMUNIZATION (3 - Td) 12/18/2022     COLORECTAL CANCER SCREENING  03/12/2024     LIPID  02/20/2025     ADVANCE CARE PLANNING  03/01/2025     DEXA  09/01/2035     HEPATITIS C SCREENING  Completed     INFLUENZA VACCINE  Completed     Pneumococcal Vaccine: 65+ Years  Completed     Pneumococcal Vaccine:  "Pediatrics (0 to 5 Years) and At-Risk Patients (6 to 64 Years)  Aged Out     IPV IMMUNIZATION  Aged Out     MENINGITIS IMMUNIZATION  Aged Out     HEPATITIS B IMMUNIZATION  Aged Out     {Chronicprobdata (optional):615994}  {Mammo Decision Support :909509}    Review of Systems  {ROS COMP (Optional):741075}    OBJECTIVE:   There were no vitals taken for this visit. Estimated body mass index is 26.16 kg/m  as calculated from the following:    Height as of 20: 1.575 m (5' 2\").    Weight as of 20: 64.9 kg (143 lb).  Physical Exam  {Exam (Optional) :020238}    {Diagnostic Test Results (Optional):523080::\"Diagnostic Test Results:\",\"Labs reviewed in Epic\"}    ASSESSMENT / PLAN:   {Diag Picklist:434774}    Patient has been advised of split billing requirements and indicates understanding: {YES / NO:827862::\"Yes\"}  COUNSELING:  {Medicare Counselin}    Estimated body mass index is 26.16 kg/m  as calculated from the following:    Height as of 20: 1.575 m (5' 2\").    Weight as of 20: 64.9 kg (143 lb).    {Weight Management Plan (ACO) Complete if BMI is abnormal-  Ages 18-64  BMI >24.9.  Age 65+ with BMI <23 or >30 (Optional):849416}    She reports that she quit smoking about 42 years ago. She has never used smokeless tobacco.      Appropriate preventive services were discussed with this patient, including applicable screening as appropriate for cardiovascular disease, diabetes, osteopenia/osteoporosis, and glaucoma.  As appropriate for age/gender, discussed screening for colorectal cancer, prostate cancer, breast cancer, and cervical cancer. Checklist reviewing preventive services available has been given to the patient.    Reviewed patients plan of care and provided an AVS. The {CarePlan:128156} for Natasha meets the Care Plan requirement. This Care Plan has been established and reviewed with the {PATIENT, FAMILY MEMBER, CAREGIVER:258245}.    Counseling Resources:  ATP IV Guidelines  Pooled Cohorts " Equation Calculator  Breast Cancer Risk Calculator  Breast Cancer: Medication to Reduce Risk  FRAX Risk Assessment  ICSI Preventive Guidelines  Dietary Guidelines for Americans, 2010  [employer]'s MyPlate  ASA Prophylaxis  Lung CA Screening    Beverley Maloney MD  [facility]    Identified Health Risks:

## 2021-02-24 NOTE — PROGRESS NOTES
"SUBJECTIVE:   Natasha Allison is a 70 year old female who presents for Preventive Visit and the following other medical problems:      1. Encounter for Medicare annual wellness exam    2. Encounter for routine adult medical exam with abnormal findings    3. Essential hypertension with goal blood pressure less than 140/90- gets a little elevated for 1-2 days after MTX administration- needs labs and med refill     4. Rheumatoid arthritis involving multiple sites with positive rheumatoid factor (H)    5. Compound heterozygous hemochromatosis type 1 (H)    6. Compound heterozygous hemochromatosis type 1 (H) - H63D type -- doesn't predispose pt to iron overload     7. CARDIOVASCULAR SCREENING; LDL GOAL LESS THAN 130    8. Age-related osteoporosis without current pathological fracture    9. Corneal clouding- left eye  - since childhood shovel to the eye - decreased vision secondary to that - sees [facility] physicians and surgeons     10. Dizziness - with a transient chill & ? increased anxiety- since conmittant dosing of Prolia and remicade at her rheumatologist's office 10/9/2018    11. Rheumatoid arthritis involving multiple sites with positive rheumatoid factor (H)- sees  \"Tip\" =Dr. Benito Garcia MD - at [facility]     12. Vasomotor rhinitis- pt declines refills on azelastine nasal spray today - no longer using this     13. Vitamin D deficiency [E55.9]- has 1-2 dairy servings/day with 400 iu vitamin D daily supplement     14. Perioral dermatitis- at [facility] - Dr. Villanueva- see below.     15. Visit for screening mammogram       Has been seeing dermatologist for perioral dermatitis  - on clindamycin topical and minocycline oral abx.      Went over her med list in detail today and did full reconciliation.      Pt will ask her rheumatologist about getting Pfizer and Moderna vaccines with  Her immunosuppressive meds.     Patient has been advised of split billing requirements and indicates " "understanding: Yes   Are you in the first 12 months of your Medicare coverage?  No    Healthy Habits:    In general, how would you rate your overall health?  Good    Frequency of exercise:  6-7 days/week    Duration of exercise:  30-45 minutes    Do you usually eat at least 4 servings of fruit and vegetables a day, include whole grains    & fiber and avoid regularly eating high fat or \"junk\" foods?  No    Taking medications regularly:  Yes    Barriers to taking medications:  None    Medication side effects:  Other (Sometimes a litle dizzy)    Ability to successfully perform activities of daily living:  No assistance needed    Home Safety:  No safety concerns identified    Hearing Impairment:  No hearing concerns    In the past 6 months, have you been bothered by leaking of urine?  No    In general, how would you rate your overall mental or emotional health?  Good      PHQ-2 Total Score:    Additional concerns today:  Yes (calcium questions and covid immunization)    Do you feel safe in your environment? Yes    Have you ever done Advance Care Planning? (For example, a Health Directive, POLST, or a discussion with a medical provider or your loved ones about your wishes): No, advance care planning information given to patient to review.  Patient declined advance care planning discussion at this time.      Fall risk  Fallen 2 or more times in the past year?: No  Any fall with injury in the past year?: No    Cognitive Screening :   1) Repeat 3 items (Leader, Season, Table)    2) Clock draw: NORMAL  3) 3 item recall: Recalls 2 objects   Results: NORMAL clock, 1-2 items recalled: COGNITIVE IMPAIRMENT LESS LIKELY    Mini-CogTM Copyright JOE Fernandez. Licensed by the author for use in [facility]; reprinted with permission (landon@.[facility]). All rights reserved.      Do you have sleep apnea, excessive snoring or daytime drowsiness?: no    Reviewed and updated as needed this visit by clinical staff  Tobacco  Allergies  " Meds  Problems  Med Hx  Surg Hx  Fam Hx          Reviewed and updated as needed this visit by Provider     Problems            Social History     Tobacco Use     Smoking status: Former Smoker     Quit date: 1978     Years since quittin.3     Smokeless tobacco: Never Used   Substance Use Topics     Alcohol use: Yes     Comment: 0-1 QD     If you drink alcohol do you typically have >3 drinks per day or >7 drinks per week? No    Alcohol Use 2021   Prescreen: >3 drinks/day or >7 drinks/week? No           Hypertension Follow-up:       Do you check your blood pressure regularly outside of the clinic? No     Are you following a low salt diet? Yes    Are your blood pressures ever more than 140 on the top number (systolic) OR more   than 90 on the bottom number (diastolic), for example 140/90? No      Current providers sharing in care for this patient include:   Patient Care Team:  Beverley Maloney MD as PCP - General (Family Practice)  Beverley Maloney MD as Assigned PCP    The following health maintenance items are reviewed in Epic and correct as of today:  Health Maintenance   Topic Date Due     BMP  2021     MICROALBUMIN  2021     FALL RISK ASSESSMENT  2021     ZOSTER IMMUNIZATION (2 of 2) 2021 (Originally 2018)     MAMMO SCREENING  2021     MEDICARE ANNUAL WELLNESS VISIT  2022     ANNUAL REVIEW OF HM ORDERS  2022     DTAP/TDAP/TD IMMUNIZATION (3 - Td) 2022     COLORECTAL CANCER SCREENING  2024     LIPID  2025     ADVANCE CARE PLANNING  2026     DEXA  2035     HEPATITIS C SCREENING  Completed     PHQ-2  Completed     INFLUENZA VACCINE  Completed     Pneumococcal Vaccine: 65+ Years  Completed     Pneumococcal Vaccine: Pediatrics (0 to 5 Years) and At-Risk Patients (6 to 64 Years)  Aged Out     IPV IMMUNIZATION  Aged Out     MENINGITIS IMMUNIZATION  Aged Out     HEPATITIS B IMMUNIZATION  Aged Out     Labs  "reviewed in EPIC  BP Readings from Last 3 Encounters:   21 120/76   20 118/78   19 120/76    Wt Readings from Last 3 Encounters:   21 67.6 kg (149 lb)   20 64.9 kg (143 lb)   19 66 kg (145 lb 6.4 oz)                  Patient Active Problem List   Diagnosis     Rheumatoid arthritis involving multiple sites with positive rheumatoid factor (H)- sees  \"Tip\" =Dr. Benito Garcia MD - at [facility]      CARDIOVASCULAR SCREENING; LDL GOAL LESS THAN 130     Advanced directives, counseling/discussion     Essential hypertension with goal blood pressure less than 140/90- gets a little elevated for 1-2 days after MTX administration     Corneal clouding- left eye  - since childhood shovel to the eye - decreased vision secondary to that - sees [facility] physicians and surgeons      Urinary urgency     Other osteoporosis without current pathological fracture     Family history of hemochromatosis- son     Age-related osteoporosis without current pathological fracture     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 increased anxiety- since conmittant dosing of Prolia and remicade at her rheumatologist's office 10/9/2018     Vasomotor rhinitis     Vitamin D deficiency     Perioral dermatitis- at [facility] - Dr. Villanueva     Past Surgical History:   Procedure Laterality Date     CARPAL TUNNEL RELEASE RT/LT       COLONOSCOPY N/A 3/12/2019    Procedure: COLONOSCOPY;  Surgeon: Jermaine Rock MD;  Location:  GI       Social History     Tobacco Use     Smoking status: Former Smoker     Quit date: 1978     Years since quittin.3     Smokeless tobacco: Never Used   Substance Use Topics     Alcohol use: Yes     Comment: 0-1 QD     Family History   Problem Relation Age of Onset     Hypertension Mother      Hypertension Father      Heart Disease Father      Intellectual " Disability (Mental Retardation) Brother      Hypertension Sister      Hypertension Son      Hypertension Daughter      Colon Cancer Brother      Hypertension Brother      Hypertension Brother      Hypertension Brother      Hypertension Sister      Hypertension Sister          Current Outpatient Medications   Medication Sig Dispense Refill     aspirin 81 MG tablet Take  by mouth daily.       Bacillus Coagulans-Inulin (PROBIOTIC) 1-250 BILLION-MG CAPS Take 1 capsule by mouth daily       Calcium Carb-Cholecalciferol (CALCIUM 500 + D3) 500-200 MG-UNIT TABS Take 1 tablet by mouth 2 times daily       clindamycin (CLEOCIN T) 1 % external lotion Apply topically 2 times daily       desoximetasone (TOPICORT) 0.25 % cream Apply 0.5 inches topically 2 times daily as needed        folic acid (FOLVITE) 1 MG tablet Take 1 tablet (1 mg) by mouth daily 100 tablet 3     inFLIXimab-dyyb (INFLECTRA) 100 MG injection Inject into the vein once       lactobacillus rhamnosus, GG, (CULTURELL) capsule Take 1 capsule by mouth 2 times daily       losartan (COZAAR) 25 MG tablet Take 1 tablet (25 mg) by mouth daily For blood pressure 90 tablet 3     meloxicam (MOBIC) 15 MG tablet Take 15 mg by mouth daily.       methotrexate sodium, pres-free, 50 MG/2ML SOLN injection CHEMO 20 mg   0     minocycline (DYNACIN) 100 MG tablet Take 1 tablet (100 mg) by mouth 2 times daily       pimecrolimus (ELIDEL) 1 % external cream Apply topically 2 times daily       syringe, disposable, 1 ML MISC 1 Syringe once a week. To be used with Methotrexate Injections       vitamin D3 (CHOLECALCIFEROL) 10 MCG (400 UNIT) capsule Take 1 capsule (400 Units) by mouth daily       Allergies   Allergen Reactions     Alendronic Acid      Severe aching     Latex      Recent Labs   Lab Test 02/20/20  1109 04/03/19  0906 03/15/18  0731 03/15/18  0731   * 129*  --  161*   HDL 89 90  --  85   TRIG 136 130  --  118   ALT 48 23  --  21   CR 0.83 0.93   < > 0.96   GFRESTIMATED 72  "63   < > 58*   GFRESTBLACK 83 73   < > 70   POTASSIUM 4.7 4.3   < > 4.0   TSH 1.87 1.87  --  3.52    < > = values in this interval not displayed.      mammo annually     Review of Systems  Constitutional, HEENT, cardiovascular, pulmonary, gi and gu systems are negative, except as otherwise noted.    OBJECTIVE:   /76   Pulse 96   Temp 97  F (36.1  C)   Ht 1.575 m (5' 2\")   Wt 67.6 kg (149 lb)   SpO2 98%   BMI 27.25 kg/m   Estimated body mass index is 27.25 kg/m  as calculated from the following:    Height as of this encounter: 1.575 m (5' 2\").    Weight as of this encounter: 67.6 kg (149 lb).  Physical Exam  GENERAL APPEARANCE: healthy, alert and no distress  EYES: Eyes grossly normal to inspection, PERRL and conjunctivae and sclerae normal  HENT: ear canals and TM's normal, nose and mouth without ulcers or lesions, oropharynx clear and oral mucous membranes moist  NECK: no adenopathy, no asymmetry, masses, or scars and thyroid normal to palpation  RESP: lungs clear to auscultation - no rales, rhonchi or wheezes  BREAST: normal without masses, tenderness or nipple discharge and no palpable axillary masses or adenopathy  CV: regular rate and rhythm, normal S1 S2, no S3 or S4, no murmur, click or rub, no peripheral edema and peripheral pulses strong  ABDOMEN: soft, nontender, no hepatosplenomegaly, no masses and bowel sounds normal  MS: no musculoskeletal defects are noted and gait is age appropriate without ataxia  SKIN: no suspicious lesions or rashes  NEURO: Normal strength and tone, sensory exam grossly normal, mentation intact and speech normal  PSYCH: mentation appears normal and affect normal/bright    Diagnostic Test Results:  Labs reviewed in Epic    ASSESSMENT / PLAN:       ICD-10-CM    1. Encounter for Medicare annual wellness exam  Z00.00 Albumin Random Urine Quantitative with Creat Ratio     CANCELED: Basic metabolic panel  (Ca, Cl, CO2, Creat, Gluc, K, Na, BUN)   2. Encounter for routine adult " "medical exam with abnormal findings  Z00.01    3. Essential hypertension with goal blood pressure less than 140/90- gets a little elevated for 1-2 days after MTX administration- needs labs and med refill   I10 losartan (COZAAR) 25 MG tablet     vitamin D3 (CHOLECALCIFEROL) 10 MCG (400 UNIT) capsule     Albumin Random Urine Quantitative with Creat Ratio     CBC with platelets differential     Comprehensive metabolic panel     TSH with free T4 reflex   4. Rheumatoid arthritis involving multiple sites with positive rheumatoid factor (H)  M05.79 Bacillus Coagulans-Inulin (PROBIOTIC) 1-250 BILLION-MG CAPS   5. Compound heterozygous hemochromatosis type 1 (H)  E83.110    6. Compound heterozygous hemochromatosis type 1 (H) - H63D type -- doesn't predispose pt to iron overload   E83.110    7. CARDIOVASCULAR SCREENING; LDL GOAL LESS THAN 130  Z13.6 Lipid panel reflex to direct LDL Fasting   8. Age-related osteoporosis without current pathological fracture  M81.0 Comprehensive metabolic panel   9. Corneal clouding- left eye  - since childhood shovel to the eye - decreased vision secondary to that - sees [facility] physicians and surgeons   H17.9    10. Dizziness - with a transient chill & ? increased anxiety- since conmittant dosing of Prolia and remicade at her rheumatologist's office 10/9/2018  R42    11. Rheumatoid arthritis involving multiple sites with positive rheumatoid factor (H)- sees  \"Tip\" =Dr. Benito Garcia MD - at [facility]   M05.79    12. Vasomotor rhinitis- pt declines refills on azelastine nasal spray today - no longer using this   J30.0    13. Vitamin D deficiency [E55.9]- has 1-2 dairy servings/day with 400 iu vitamin D daily supplement   E55.9 25 Hydroxyvitamin D2 and D3   14. Perioral dermatitis- at [facility] - Dr. Villanueva- see below.   L71.0 minocycline (DYNACIN) 100 MG tablet   15. Visit for screening mammogram  Z12.31 MA Screen Bilateral w/Mohan       Patient has been advised of " "split billing requirements and indicates understanding: Yes  COUNSELING:  Reviewed preventive health counseling, as reflected in patient instructions    Estimated body mass index is 27.25 kg/m  as calculated from the following:    Height as of this encounter: 1.575 m (5' 2\").    Weight as of this encounter: 67.6 kg (149 lb).    Last colonoscopy 3/12/2019   - Impression:                 - The entire examined colon is normal on direct and                             retroflexion views.                             - No specimens collected.   Recommendation:           - Repeat colonoscopy in 5 years for surveillance.       She reports that she quit smoking about 42 years ago. She has never used smokeless tobacco.      Appropriate preventive services were discussed with this patient, including applicable screening as appropriate for cardiovascular disease, diabetes, osteopenia/osteoporosis, and glaucoma.  As appropriate for age/gender, discussed screening for colorectal cancer, prostate cancer, breast cancer, and cervical cancer. Checklist reviewing preventive services available has been given to the patient.    Reviewed patients plan of care and provided an AVS. The Complex Care Plan (for patients with higher acuity and needing more deliberate coordination of services) for Natasha meets the Care Plan requirement. This Care Plan has been established and reviewed with the Patient.    Counseling Resources:  ATP IV Guidelines  Pooled Cohorts Equation Calculator  Breast Cancer Risk Calculator  Breast Cancer: Medication to Reduce Risk  FRAX Risk Assessment  ICSI Preventive Guidelines  Dietary Guidelines for Americans, 2010  USDA's MyPlate  ASA Prophylaxis  Lung CA Screening    Beverley Maloney MD  [facility]    Identified Health Risks:  "

## 2021-02-24 NOTE — PROGRESS NOTES
"SUBJECTIVE:   Natasha Allison is a 70 year old female who presents for Preventive Visit.    {Split Bill scripting  The purpose of this visit is to discuss your medical history and prevent health problems before you are sick. You may be responsible for a co-pay, coinsurance, or deductible if your visit today includes services such as checking on a sore throat, having an x-ray or lab test, or treating and evaluating a new or existing condition :161615}  Patient has been advised of split billing requirements and indicates understanding: {Yes and No:642832}   Are you in the first 12 months of your Medicare coverage?  { :198059::\"No\"}    SUBJECTIVE:  Natasha Allison is a 70 year old female who presents for Preventive Visit.    {Split Bill scripting  The purpose of this visit is to discuss your medical history and prevent health problems before you are sick. You may be responsible for a co-pay, coinsurance, or deductible if your visit today includes services such as checking on a sore throat, having an x-ray or lab test, or treating and evaluating a new or existing condition :010115}  Patient has been advised of split billing requirements and indicates understanding: {Yes and No:103138}  Are you in the first 12 months of your Medicare Part B coverage?  { :548059::\"No\"}    Physical Health:  In general, how would you rate your overall physical health? { :273521}  Outside of work, how many days during the week do you exercise? { :746891}  Outside of work, approximately how many minutes a day do you exercise?{ :419058}   If you drink alcohol do you typically have >3 drinks per day or >7 drinks per week? { :509788}  Do you usually eat at least 4 servings of fruit and vegetables a day, include whole grains & fiber and avoid regularly eating high fat or \"junk\" foods? { :977954::\"Yes\"}  Do you have any problems taking medications regularly?  { :934752::\"No\"}  Do you have any side effects from medications? { :780773}  Needs " "assistance for the following daily activities: { :880899}  Which of the following safety concerns are present in your home?  { :020458::\"none identified\"}   Hearing impairment: { :542873}  In the past 6 months, have you been bothered by leaking of urine? { :877601}    Mental Health:  In general, how would you rate your overall mental or emotional health? { :994227}  PHQ-2 Score:      Do you feel safe in your environment? { :125057}    Have you ever done Advance Care Planning? (For example, a Health Directive, POLST, or a discussion with a medical provider or your loved ones about your wishes): { :790614}    Additional concerns to address?  {If YES, notify patient they may be responsible for a copay, coinsurance or deductible if the visit today includes services such as checking on a sore throat, having an x-ray or lab test, or treating and evaluating a new or existing condition :629792::\"No\"}    Fall risk:  { :993929}  {If any of the above assessments are answered yes, consider ordering appropriate referrals (Optional):585763::\"click delete button to remove this line now\"}  Cognitive Screening: { :622333}    {Do you have sleep apnea, excessive snoring or daytime drowsiness? (Optional):999956}    {Outside tests to abstract? :046048}    {additional problems to add (Optional):705798}    Reviewed and updated as needed this visit by clinical staff                 Reviewed and updated as needed this visit by Provider                Social History    Tobacco Use      Smoking status: Former Smoker        Quit date: 1978        Years since quittin.3      Smokeless tobacco: Never Used    Alcohol use: Yes      Comment: 0-1 QD                          Current providers sharing in care for this patient include: {Rooming staff:  Please update Care Team in Rooming Activity, refresh this note and then delete this statement}  Patient Care Team:  Beverley Maloney MD as PCP - General (Family Practice)  Haider, " "Beverley TOBIN MD as Assigned PCP    The following health maintenance items are reviewed in Epic and correct as of today:  ZOSTER IMMUNIZATION(2 of 2) due on 2018  PHQ-2 due on 2021  BMP due on 2021  MICROALBUMIN due on 2021  FALL RISK ASSESSMENT due on 2021  MAMMO SCREENING due on 2021  MEDICARE ANNUAL WELLNESS VISIT due on 2022  ANNUAL REVIEW OF HM ORDERS due on 2022  DTAP/TDAP/TD IMMUNIZATION(3 - Td) due on 2022  COLORECTAL CANCER SCREENING due on 2024  LIPID due on 2025  ADVANCE CARE PLANNING due on 2025  DEXA due on 2035  HEPATITIS C SCREENING Completed  INFLUENZA VACCINE Completed  Pneumococcal Vaccine: 65+ Years Completed  Pneumococcal Vaccine: Pediatrics (0 to 5 Years) and At-Risk Patients (6 to 64 Years) Completed  IPV IMMUNIZATION Completed  MENINGITIS IMMUNIZATION Completed  HEPATITIS B IMMUNIZATION Completed  {Chronicprobdata (Optional):239569}  {Decision Support (Optional):801577}    ROS:  {ROS COMP:230942}    OBJECTIVE:  There were no vitals taken for this visit. Estimated body mass index is 26.16 kg/m  as calculated from the following:    Height as of 20: 1.575 m (5' 2\").    Weight as of 20: 64.9 kg (143 lb).  EXAM:   {Exam :694929}    {Diagnostic Test Results (Optional):211284::\"Diagnostic Test Results:\",\"Labs reviewed in Epic\"}    ASSESSMENT / PLAN:  {Dia Picklist:036770}    Patient has been advised of split billing requirements and indicates understanding: {YES / NO:324165::\"Yes\"}    COUNSELING:  {Medicare Counselin}    Estimated body mass index is 26.16 kg/m  as calculated from the following:    Height as of 20: 1.575 m (5' 2\").    Weight as of 20: 64.9 kg (143 lb).    {Weight Management Plan (ACO) Complete if BMI is abnormal-  Ages 18-64  BMI >24.9.  Age 65+ with BMI <23 or >30 (Optional):047464}    She reports that she quit smoking about 42 years ago. She has never used smokeless " "tobacco.    Appropriate preventive services were discussed with this patient, including applicable screening as appropriate for cardiovascular disease, diabetes, osteopenia/osteoporosis, and glaucoma.  As appropriate for age/gender, discussed screening for colorectal cancer, prostate cancer, breast cancer, and cervical cancer. Checklist reviewing preventive services available has been given to the patient.    Reviewed patients plan of care and provided an AVS. The {CarePlan:360287} for Natasha meets the Care Plan requirement. This Care Plan has been established and reviewed with the {PATIENT, FAMILY MEMBER, CAREGIVER:961498}.    Counseling Resources:  ATP IV Guidelines  Pooled Cohorts Equation Calculator  Breast Cancer Risk Calculator  BRCA-Related Cancer Risk Assessment: FHS-7 Tool  FRAX Risk Assessment  ICSI Preventive Guidelines  Dietary Guidelines for Americans,   [employer]'s MyPlate  ASA Prophylaxis  Lung CA Screening    Beverley Maloney MD  [facility] [place]      Do you feel safe in your environment? { :187352}    Have you ever done Advance Care Planning? (For example, a Health Directive, POLST, or a discussion with a medical provider or your loved ones about your wishes): { :748418}    {Hearing Test Done (Optional):273420}   Fall risk  { :544914}  {If any of the above assessments are answered yes, consider ordering appropriate referrals (Optional):324132::\"click delete button to remove this line now\"}  Cognitive Screening { :632712}    {Do you have sleep apnea, excessive snoring or daytime drowsiness? (Optional):659924}    Reviewed and updated as needed this visit by clinical staff                 Reviewed and updated as needed this visit by Provider                Social History     Tobacco Use     Smoking status: Former Smoker     Quit date: 1978     Years since quittin.3     Smokeless tobacco: Never Used   Substance Use Topics     Alcohol use: Yes     Comment: 0-1 QD "     {Rooming Staff- Complete this question if Prescreen response is not shown below for today's visit. If you drink alcohol do you typically have >3 drinks per day or >7 drinks per week? (Optional):030267}    Alcohol Use 2/20/2020   Prescreen: >3 drinks/day or >7 drinks/week? No   {add AUDIT responses (Optional) (A score of 7 for adult men is an indication of hazardous drinking; a score of 8 or more is an indication of an alcohol use disorder.  A score of 7 or more for adult women is an indication of hazardous drinking or an alchohol use disorder):890046}    {Outside tests to abstract? :416550}    Hypertension Follow-up      Do you check your blood pressure regularly outside of the clinic? { :277889}     Are you following a low salt diet? { :578085}    Are your blood pressures ever more than 140 on the top number (systolic) OR more   than 90 on the bottom number (diastolic), for example 140/90? { :589210}      Current providers sharing in care for this patient include: {Rooming staff:  Please update Care Team in Rooming Activity, refresh this note and then delete this statement}  Patient Care Team:  Beverley Maloney MD as PCP - General (Family Practice)  Beverley Maloney MD as Assigned PCP    The following health maintenance items are reviewed in Epic and correct as of today:  Health Maintenance   Topic Date Due     ZOSTER IMMUNIZATION (2 of 2) 12/24/2018     PHQ-2  01/01/2021     BMP  02/20/2021     MICROALBUMIN  02/20/2021     FALL RISK ASSESSMENT  02/20/2021     MAMMO SCREENING  06/17/2021     MEDICARE ANNUAL WELLNESS VISIT  02/24/2022     ANNUAL REVIEW OF HM ORDERS  02/24/2022     DTAP/TDAP/TD IMMUNIZATION (3 - Td) 12/18/2022     COLORECTAL CANCER SCREENING  03/12/2024     LIPID  02/20/2025     ADVANCE CARE PLANNING  03/01/2025     DEXA  09/01/2035     HEPATITIS C SCREENING  Completed     INFLUENZA VACCINE  Completed     Pneumococcal Vaccine: 65+ Years  Completed     Pneumococcal Vaccine:  "Pediatrics (0 to 5 Years) and At-Risk Patients (6 to 64 Years)  Aged Out     IPV IMMUNIZATION  Aged Out     MENINGITIS IMMUNIZATION  Aged Out     HEPATITIS B IMMUNIZATION  Aged Out     {Chronicprobdata (Optional):037435}  {Mammo Decision Support :452396}    Review of Systems  {ROS COMP (Optional):834947}    OBJECTIVE:   There were no vitals taken for this visit. Estimated body mass index is 26.16 kg/m  as calculated from the following:    Height as of 20: 1.575 m (5' 2\").    Weight as of 20: 64.9 kg (143 lb).  Physical Exam  {Exam (Optional) :938056}    {Diagnostic Test Results (Optional):723565::\"Diagnostic Test Results:\",\"Labs reviewed in Epic\"}    ASSESSMENT / PLAN:   {Diag Picklist:592733}    Patient has been advised of split billing requirements and indicates understanding: {YES / NO:633321::\"Yes\"}  COUNSELING:  {Medicare Counselin}    Estimated body mass index is 26.16 kg/m  as calculated from the following:    Height as of 20: 1.575 m (5' 2\").    Weight as of 20: 64.9 kg (143 lb).    {Weight Management Plan (ACO) Complete if BMI is abnormal-  Ages 18-64  BMI >24.9.  Age 65+ with BMI <23 or >30 (Optional):865115}    She reports that she quit smoking about 42 years ago. She has never used smokeless tobacco.      Appropriate preventive services were discussed with this patient, including applicable screening as appropriate for cardiovascular disease, diabetes, osteopenia/osteoporosis, and glaucoma.  As appropriate for age/gender, discussed screening for colorectal cancer, prostate cancer, breast cancer, and cervical cancer. Checklist reviewing preventive services available has been given to the patient.    Reviewed patients plan of care and provided an AVS. The {CarePlan:465396} for Natasha meets the Care Plan requirement. This Care Plan has been established and reviewed with the {PATIENT, FAMILY MEMBER, CAREGIVER:417798}.    Counseling Resources:  ATP IV Guidelines  Pooled Cohorts " Equation Calculator  Breast Cancer Risk Calculator  Breast Cancer: Medication to Reduce Risk  FRAX Risk Assessment  ICSI Preventive Guidelines  Dietary Guidelines for Americans, 2010  [employer]'s MyPlate  ASA Prophylaxis  Lung CA Screening    Beverley Maloney MD  [facility]    Identified Health Risks:

## 2021-02-25 LAB
CREAT UR-MCNC: 90 MG/DL
MICROALBUMIN UR-MCNC: 35 MG/L
MICROALBUMIN/CREAT UR: 38.45 MG/G CR (ref 0–25)

## 2021-03-02 ENCOUNTER — TRANSFERRED RECORDS (OUTPATIENT)
Dept: HEALTH INFORMATION MANAGEMENT | Facility: CLINIC | Age: 71
End: 2021-03-02

## 2021-03-02 LAB
ALT SERPL-CCNC: 19 U/L (ref 0–55)
CREAT SERPL-MCNC: 0.9 MG/DL (ref 0.55–1.02)
GFR SERPL CREATININE-BSD FRML MDRD: >60 ML/MIN/1.73M2

## 2021-03-05 ENCOUNTER — IMMUNIZATION (OUTPATIENT)
Dept: NURSING | Facility: CLINIC | Age: 71
End: 2021-03-05
Payer: MEDICARE

## 2021-03-05 PROCEDURE — 0011A PR COVID VAC MODERNA 100 MCG/0.5 ML IM: CPT

## 2021-03-05 PROCEDURE — 91301 PR COVID VAC MODERNA 100 MCG/0.5 ML IM: CPT

## 2021-03-10 DIAGNOSIS — E55.9 VITAMIN D DEFICIENCY: ICD-10-CM

## 2021-03-10 DIAGNOSIS — I10 ESSENTIAL HYPERTENSION WITH GOAL BLOOD PRESSURE LESS THAN 140/90: ICD-10-CM

## 2021-03-10 DIAGNOSIS — Z13.6 CARDIOVASCULAR SCREENING; LDL GOAL LESS THAN 130: ICD-10-CM

## 2021-03-10 DIAGNOSIS — M81.0 AGE-RELATED OSTEOPOROSIS WITHOUT CURRENT PATHOLOGICAL FRACTURE: ICD-10-CM

## 2021-03-10 LAB
BASOPHILS NFR BLD AUTO: 0.3 %
DIFFERENTIAL METHOD BLD: NORMAL
EOSINOPHIL NFR BLD AUTO: 1.4 %
ERYTHROCYTE [DISTWIDTH] IN BLOOD BY AUTOMATED COUNT: 13.5 % (ref 10–15)
HCT VFR BLD AUTO: 41.4 % (ref 35–47)
HGB BLD-MCNC: 13.8 G/DL (ref 11.7–15.7)
LYMPHOCYTES NFR BLD AUTO: 45 %
MCH RBC QN AUTO: 33 PG (ref 26.5–33)
MCHC RBC AUTO-ENTMCNC: 33.3 G/DL (ref 31.5–36.5)
MCV RBC AUTO: 99 FL (ref 78–100)
MONOCYTES NFR BLD AUTO: 9.1 %
NEUTROPHILS NFR BLD AUTO: 44.2 %
PLATELET # BLD AUTO: 200 10E9/L (ref 150–450)
RBC # BLD AUTO: 4.18 10E12/L (ref 3.8–5.2)
WBC # BLD AUTO: 6.3 10E9/L (ref 4–11)

## 2021-03-10 PROCEDURE — 80061 LIPID PANEL: CPT | Performed by: FAMILY MEDICINE

## 2021-03-10 PROCEDURE — 36415 COLL VENOUS BLD VENIPUNCTURE: CPT | Performed by: FAMILY MEDICINE

## 2021-03-10 PROCEDURE — 85025 COMPLETE CBC W/AUTO DIFF WBC: CPT | Performed by: FAMILY MEDICINE

## 2021-03-10 PROCEDURE — 84443 ASSAY THYROID STIM HORMONE: CPT | Performed by: FAMILY MEDICINE

## 2021-03-10 PROCEDURE — 82043 UR ALBUMIN QUANTITATIVE: CPT | Performed by: FAMILY MEDICINE

## 2021-03-10 PROCEDURE — 80053 COMPREHEN METABOLIC PANEL: CPT | Performed by: FAMILY MEDICINE

## 2021-03-10 PROCEDURE — 82306 VITAMIN D 25 HYDROXY: CPT | Performed by: FAMILY MEDICINE

## 2021-03-11 LAB
ALBUMIN SERPL-MCNC: 3.3 G/DL (ref 3.4–5)
ALP SERPL-CCNC: 61 U/L (ref 40–150)
ALT SERPL W P-5'-P-CCNC: 21 U/L (ref 0–50)
ANION GAP SERPL CALCULATED.3IONS-SCNC: 5 MMOL/L (ref 3–14)
AST SERPL W P-5'-P-CCNC: 24 U/L (ref 0–45)
BILIRUB SERPL-MCNC: 0.4 MG/DL (ref 0.2–1.3)
BUN SERPL-MCNC: 16 MG/DL (ref 7–30)
CALCIUM SERPL-MCNC: 8.6 MG/DL (ref 8.5–10.1)
CHLORIDE SERPL-SCNC: 109 MMOL/L (ref 94–109)
CHOLEST SERPL-MCNC: 224 MG/DL
CO2 SERPL-SCNC: 24 MMOL/L (ref 20–32)
CREAT SERPL-MCNC: 0.85 MG/DL (ref 0.52–1.04)
CREAT UR-MCNC: 160 MG/DL
GFR SERPL CREATININE-BSD FRML MDRD: 69 ML/MIN/{1.73_M2}
GLUCOSE SERPL-MCNC: 91 MG/DL (ref 70–99)
HDLC SERPL-MCNC: 89 MG/DL
LDLC SERPL CALC-MCNC: 117 MG/DL
MICROALBUMIN UR-MCNC: 7 MG/L
MICROALBUMIN/CREAT UR: 4.36 MG/G CR (ref 0–25)
NONHDLC SERPL-MCNC: 135 MG/DL
POTASSIUM SERPL-SCNC: 4.3 MMOL/L (ref 3.4–5.3)
PROT SERPL-MCNC: 7.2 G/DL (ref 6.8–8.8)
SODIUM SERPL-SCNC: 138 MMOL/L (ref 133–144)
TRIGL SERPL-MCNC: 91 MG/DL
TSH SERPL DL<=0.005 MIU/L-ACNC: 2.76 MU/L (ref 0.4–4)

## 2021-03-13 LAB
DEPRECATED CALCIDIOL+CALCIFEROL SERPL-MC: <41 UG/L (ref 20–75)
VITAMIN D2 SERPL-MCNC: <5 UG/L
VITAMIN D3 SERPL-MCNC: 36 UG/L

## 2021-03-29 NOTE — RESULT ENCOUNTER NOTE
All your labs are normal or stable from previous.   . Your total cholesterol is a bit high because your HDL ( healthy) cholesterol is so awesome!    Keep up the good work!     Please, continue your current medications and/or supplements.   Follow up as we discussed at your last office visit.     Thank you so much for choosing [facility].  Please contact us with any questions that you may have.   We appreciate the opportunity to serve you now and look forward to supporting your healthcare needs for a long time to come!    Most Sincerely,     Beverley Maloney MD

## 2021-04-02 ENCOUNTER — IMMUNIZATION (OUTPATIENT)
Dept: NURSING | Facility: CLINIC | Age: 71
End: 2021-04-02
Attending: INTERNAL MEDICINE
Payer: MEDICARE

## 2021-04-02 PROCEDURE — 91301 PR COVID VAC MODERNA 100 MCG/0.5 ML IM: CPT

## 2021-04-02 PROCEDURE — 0012A PR COVID VAC MODERNA 100 MCG/0.5 ML IM: CPT

## 2021-04-12 DIAGNOSIS — I10 ESSENTIAL HYPERTENSION WITH GOAL BLOOD PRESSURE LESS THAN 140/90: ICD-10-CM

## 2021-04-14 RX ORDER — LOSARTAN POTASSIUM 25 MG/1
TABLET ORAL
Qty: 90 TABLET | Refills: 3 | OUTPATIENT
Start: 2021-04-14

## 2021-04-14 NOTE — TELEPHONE ENCOUNTER
#90 x 3 refill sent 2/24/21  No pharmacy change  Rx denied. Pharmacy notified.       Mirtha Briggs RN  [facility]

## 2021-04-15 ENCOUNTER — OFFICE VISIT (OUTPATIENT)
Dept: FAMILY MEDICINE | Facility: CLINIC | Age: 71
End: 2021-04-15
Payer: MEDICARE

## 2021-04-15 VITALS
OXYGEN SATURATION: 100 % | DIASTOLIC BLOOD PRESSURE: 80 MMHG | TEMPERATURE: 97.2 F | HEART RATE: 95 BPM | HEIGHT: 62 IN | BODY MASS INDEX: 27.6 KG/M2 | WEIGHT: 150 LBS | SYSTOLIC BLOOD PRESSURE: 130 MMHG | RESPIRATION RATE: 16 BRPM

## 2021-04-15 DIAGNOSIS — L25.9 CONTACT DERMATITIS, UNSPECIFIED CONTACT DERMATITIS TYPE, UNSPECIFIED TRIGGER: Primary | ICD-10-CM

## 2021-04-15 PROCEDURE — 99213 OFFICE O/P EST LOW 20 MIN: CPT | Performed by: NURSE PRACTITIONER

## 2021-04-15 RX ORDER — TRIAMCINOLONE ACETONIDE 1 MG/G
OINTMENT TOPICAL 2 TIMES DAILY
Qty: 30 G | Refills: 0 | Status: SHIPPED | OUTPATIENT
Start: 2021-04-15 | End: 2021-04-22

## 2021-04-15 ASSESSMENT — MIFFLIN-ST. JEOR: SCORE: 1158.65

## 2021-04-15 NOTE — PATIENT INSTRUCTIONS
"  Patient Education     Contact Dermatitis  Contact dermatitis is a skin rash caused by something that touches the skin and makes it irritated and inflamed. Your skin may be red, swollen, dry, and may be cracked. Blisters may form and ooze. The rash will itch.   Contact dermatitis often forms on the face and neck, backs of hands, forearms, genitals, and lower legs. But it can affect any area.   People can get contact dermatitis from lots of sources. These include:    Plants such as poison ivy, oak, or sumac    Chemicals in hair dyes and rinses, soaps, solvents, waxes, fingernail polish, and deodorants     Jewelry or watchbands made of nickel or cobalt  Contact dermatitis is not passed from person to person.  Talk with your healthcare provider about what may have caused the rash. A type of allergy testing called \"patch testing\" may be used to discover what you are allergic to. You will need to stay away from the source of the rash in the future to prevent it from coming back.   Treatment is done to ease itching and prevent the rash from coming back. The rash should go away in a few days to a few weeks.   Home care  Your healthcare provider may prescribe medicine to ease swelling and itching. Follow all instructions when using these medicines.   General care    Stay away from anything that heats up your skin, such as hot showers or baths, or direct sunlight. This can make itching worse.    Apply cold compresses to soothe your sores to help ease your symptoms. Do this for 30 minutes 3 to 4 times a day. You can make a cold compress by soaking a cloth in cold water. Squeeze out excess water. You can add colloidal oatmeal to the water to help reduce itching. For severe itching in a small area, apply an ice pack wrapped in a thin towel. Do this for 20 minutes 3 to 4 times a day.    You can also try wet dressings. One way to do this is to wear a wet piece of clothing under a dry one. Wear a damp shirt under a dry shirt if " your upper body is affected. This can relieve itching and prevent you from scratching the affected area.    You can also help ease large areas of itching by taking a lukewarm bath with colloidal oatmeal added to the water.    Use hydrocortisone cream for redness and irritation, unless another medicine was prescribed. Calamine lotion can also relieve mild symptoms.    Use oral diphenhydramine to help reduce itching. You can buy this antihistamine at drugstores and grocery stores. It can make you sleepy, so use lower doses during the daytime. Don't use diphenhydramine if you have glaucoma or have trouble urinating because of an enlarged prostate.    If a plant causes your rash, make sure to wash your skin and the clothes you were wearing when you came into contact with the plant. This is to wash away the plant oils that gave you the rash and prevent more or worse symptoms. If you have a pet that's been outdoors, its fur may also have oil from the plant. Bathe your pet with soap or shampoo.    Stay away from the substance or object that causes your symptoms. If you can t stay away from it, wear gloves or some other type of protection    Follow-up care  Follow up with your healthcare provider, or as advised.  When to seek medical advice  Call your healthcare provider or seek medical attention right away if any of these occur:     Spreading of the rash to other parts of your body    Severe swelling of your face, eyelids, mouth, throat or tongue    Trouble urinating due to swelling in the genital area    Fever of 100.4 F (38 C) or higher, or as advised by your provider    Redness or swelling that gets worse    Pain that gets worse    Foul-smelling fluid leaking from the skin    Yellow-brown crusts on the open blisters  Agenus last reviewed this educational content on 8/1/2019 2000-2021 [employer]. All rights reserved. This information is not intended as a substitute for professional medical care.  Always follow your healthcare professional's instructions.

## 2021-04-15 NOTE — PROGRESS NOTES
Assessment & Plan     1. Contact dermatitis, unspecified contact dermatitis type, unspecified trigger  Discussed to apply ointment in thin layers on areas with rash. Educated on contact dermatitis and when to come back for worsening, persistent, or a change in symptoms. Patient education sheet attached to AVS. Stated that she can continue taking Waldryl if that helps with the itching.   - triamcinolone (KENALOG) 0.1 % external ointment; Apply topically 2 times daily for 7 days  Dispense: 30 g; Refill: 0    Follow-up:    Return in about 1 week (around 4/22/2021) for No improvement or sooner with worsening symptoms.     MICHAEL FletcherN, RN, FNP DNP student     This patient was seen alongside a student nurse practitioner.  The history, reviews of systems, objective data, and assessment/plan were completed by myself.      SINCERE Torrez [facility] [place] is a 70 year old who presents for the following health issues:     HPI     Rash  Onset/Duration: 1 week (started Monday 4/12)   Description  Location: started on left leg; spread to right side of abdomen and bilateral upper thighs    Character: round, blotchy, raised, red  Itching: cant be itchy at times; doesn't always notice  Intensity: moderate  Progression of Symptoms: same, has not gotten worse besides spreading to other areas of the body  Accompanying signs and symptoms:   Fever: no  Body aches or joint pain: no  Sore throat symptoms: no  Recent cold symptoms: no  Slight body aches, but attributes this to not moving much   History:           Previous episodes of similar rash: None  New exposures: None  Recent travel: no  Exposure to similar rash: patient was doing yard work when the rash occurred, working in a new area of the yard, in between her house and neighbors; picking up leaves. Also cleaning up in further backyard, usually not in that area,  states that there is poison ivy but it has not been  "removed yet.   Precipitating or alleviating factors: cortaid waldryl  Therapies tried and outcome: Waldryl and Cortaid - been helping quite a bit     maybe has similar rash, been using calamine lotion and this is helping    Was off RA meds for COVID and restarted Methotrexate this week. Patient does infusions for RA- has concerns for taking a medication for the rash.     Review of Systems   CONSTITUTIONAL: NEGATIVE for fever, chills  INTEGUMENTARY/SKIN: POSITIVE for intermittently itchy, red, rash on bilateral upper thighs and right side of abdomen  EYES: NEGATIVE for vision changes or irritation  ENT/MOUTH: NEGATIVE for ear, mouth and throat problems  RESP: NEGATIVE for significant cough or SOB  CV: NEGATIVE for chest pain, palpitations or peripheral edema  GI: NEGATIVE for nausea, abdominal pain, heartburn, or change in bowel habits  MUSCULOSKELETAL: POSITIVE for slight myalgia; NEGATIVE for significant arthralgias       Objective    /80   Pulse 95   Temp 97.2  F (36.2  C) (Tympanic)   Resp 16   Ht 1.575 m (5' 2\")   Wt 68 kg (150 lb)   SpO2 100%   BMI 27.44 kg/m    Body mass index is 27.44 kg/m .     Physical Exam   GENERAL: healthy, alert and no distress  SKIN:  erythematous linear papular rash on bilateral upper thighs and erythematous patch on right side of abdomen      "

## 2021-04-19 ENCOUNTER — TELEPHONE (OUTPATIENT)
Dept: FAMILY MEDICINE | Facility: CLINIC | Age: 71
End: 2021-04-19

## 2021-04-19 NOTE — TELEPHONE ENCOUNTER
Pt calling - stating that her rash is spreading along her bra line and belt line - it is present on both legs as before - but it is starting to improve with the ointment     Bra line the rash is longer and thinner and oozing but it could be sweat possible too  - clear in color, itches     Belt line - looks like little bug bites (little red dots) and they itch      Pt stated that she has RA and will be getting her infusion tomorrow - pt would like to start over some left over prednisone if her RA acts up she has 5 mg tabs at home and 65 tabs     Best # [phone] ok LM     Please review and advise     Thank you     Riddhi Barkley RN, BSN  [facility]

## 2021-04-19 NOTE — TELEPHONE ENCOUNTER
Sounds like worsening.  Revisit to reassess advised.  In clinic versus she could send pictures via [employer] and set up a virtual visit.    I would be hesitant and do not recommend to start steroids without a visual on the rash and absolute necessity.      Please help her set up visit to discuss.         Shobha Wilkins, FNP-BC

## 2021-04-19 NOTE — LETTER
[address] [address]  [phone]        April 21, 2021    Natasha Allison                                                                                                                                                        [address] [place] [id number]              Dear Natasha,    We have been unsuccessful in our attempts to reach you by phone.  Please call us at the [facility] [phone]   between the hours of 8:00am - 5:00 pm, Monday through Friday.        Sincerely,            MATTHEW Torres / BEN-RN

## 2021-04-20 NOTE — TELEPHONE ENCOUNTER
Attempt # 1  Called # [phone]     Left a non detailed VM to call back at [phone] and ask for any available Triage Nurse.    Mahamed Mayberry RN   [facility]

## 2021-04-21 NOTE — TELEPHONE ENCOUNTER
Attempt # 2  Called # [phone]     Letter sent    Left a non detailed VM to call back at [phone] and ask for any available Triage Nurse.    Mahamed Mayberry RN   [facility]

## 2021-06-22 ENCOUNTER — ANCILLARY PROCEDURE (OUTPATIENT)
Dept: MAMMOGRAPHY | Facility: CLINIC | Age: 71
End: 2021-06-22
Payer: MEDICARE

## 2021-06-22 DIAGNOSIS — Z12.31 VISIT FOR SCREENING MAMMOGRAM: ICD-10-CM

## 2021-06-22 PROCEDURE — 77063 BREAST TOMOSYNTHESIS BI: CPT | Mod: TC | Performed by: RADIOLOGY

## 2021-06-22 PROCEDURE — 77067 SCR MAMMO BI INCL CAD: CPT | Mod: TC | Performed by: RADIOLOGY

## 2021-06-23 NOTE — RESULT ENCOUNTER NOTE
Your mammogram was normal.     Thank you so much for choosing [facility].  Please contact us with any questions that you may have.   We appreciate the opportunity to serve you now and look forward to supporting your healthcare needs for a long time to come!    Most Sincerely,     Bevelrey Maloney MD

## 2021-08-04 ENCOUNTER — NURSE TRIAGE (OUTPATIENT)
Dept: FAMILY MEDICINE | Facility: CLINIC | Age: 71
End: 2021-08-04

## 2021-08-04 NOTE — TELEPHONE ENCOUNTER
Pt calls.      She has been having problems - pronouncing words, SOB.  If she goes to the bathroom at night, she gets SOB.  Her speech is really bothering.  She feels as if she sounds as if she is drinking.  She says her mom had a stroke.    She said all of this started in May.      The speech problems started in May.  No weakness on one side.  No balance problems.  She is on a bp med.  She does not check her bp at home.  Denies dizziness and headaches.  No facial droop.      The SOB started before May - maybe earlier Spring.  The SOB bothers her when she is sleeping.  Sometimes she is SOB during the day and sometimes it is not there. No chest pain.  No cough.  She has RA so she has some joints that are swollen, but that has been there a long time.  No ankle swelling.      She says she feels physically pretty healthy.  She has not been seen for this because she thought it would go away.      Advised to be seen.      Offered virtual visit.  She would like to be seen in person. Will forward to Dr. Maloney and  Triage for advisal.

## 2021-08-04 NOTE — TELEPHONE ENCOUNTER
Attempt # 1    Called #   Telephone Information:   Mobile [phone]         Left a non detailed VM     Riddhi Barkley RN, BSN  [facility]

## 2021-08-04 NOTE — TELEPHONE ENCOUNTER
"Patient called nurse line back after being disconnected from previous triage nurse.     Discussed protocol with patient, see previous note.   S-(situation): Patient directed to RN triage line for concerns of ongoing neurological symptoms.     B-(background): Patient states that since May of this year (2021) she has been having more difficulty pronouncing certain words, often having trouble with shortness of breath after walking short distances as well.     A-(assessment):   Patient denies the following:  -gait changes  -facial droop  -tongue deviation  -eyelid droop  -numbness and tingling (both bilaterally and unilaterally, including arms, legs, and face)  -strength weak in one side vs the other (both arms and legs)  -headaches   -confusion  -disorientation/hallucinations  -changes to bowel and bladder    Patient states significant history of hypertension. Patient states family history of TIA (mother).     Patient does state that she has noticed new changes in her swallowing over the past couple weeks, mainly at night. Patient denies food or water \"going down the wrong pipe.\" Patient noticing it's a bit harder to swallow.     R-(recommendations): Due to development of symptoms and family history, discussed with patient the possibility of going to the Emergency Department for assessment and evaluation. This discussion was had due to limited availability of provider's at [facility] for several days, especially in-person visits. Patient hesitant, wants to set up an appointment with a provider at [facility] instead. Patient and RN discussed signs and symptoms that would warrant an emergency department visit, such as symptoms described above. Patient agreed. Only able to make a telephone visit tomorrow with [facility]CP clinic. Advised to call back if in need of further guidance.     Radha Martines, MICHAELN, RN  [facility]      "

## 2021-08-04 NOTE — TELEPHONE ENCOUNTER
"  Reason for Disposition    Neurologic deficit of gradual onset, ANY of the following: * Weakness of the face, arm, or leg on one side of the body * Numbness of the face, arm, or leg on one side of the body * Loss of speech or garbled speech     Patient states that she has been having speech difficulties since May of this year. Patient also states that she has noticed swallowing difficulty recently, but only at night. Patient denies food or water \"going down the wrong pipe.\"    Patient wants to be seen    Additional Information    Negative: Confusion, disorientation, or hallucinations is the main symptom    Negative: Dizziness is the main symptom    Negative: Followed a head injury within last 3 days    Negative: Headache (with neurologic deficit)    Negative: Unable to urinate (or only a few drops) and bladder feels very full    Negative: Loss of control of bowel or bladder (i.e., incontinence) of new onset    Negative: Back pain with numbness (loss of sensation) in groin or rectal area    Negative: Patient sounds very sick or weak to the triager    Protocols used: NEUROLOGIC DEFICIT-A-OH    "

## 2021-08-05 ENCOUNTER — VIRTUAL VISIT (OUTPATIENT)
Dept: FAMILY MEDICINE | Facility: CLINIC | Age: 71
End: 2021-08-05
Payer: MEDICARE

## 2021-08-05 DIAGNOSIS — R47.89 ALTERATION IN SPEECH: Primary | ICD-10-CM

## 2021-08-05 DIAGNOSIS — G45.9 TIA (TRANSIENT ISCHEMIC ATTACK): ICD-10-CM

## 2021-08-05 DIAGNOSIS — R06.02 SOB (SHORTNESS OF BREATH) ON EXERTION: ICD-10-CM

## 2021-08-05 PROCEDURE — 99442 PR PHYSICIAN TELEPHONE EVALUATION 11-20 MIN: CPT | Mod: 95 | Performed by: NURSE PRACTITIONER

## 2021-08-05 NOTE — PROGRESS NOTES
Natasha is a 71 year old who is being evaluated via a billable telephone visit.      What phone number would you like to be contacted at? [phone]  How would you like to obtain your AVS? MyChart    Assessment & Plan     Alteration in speech  Ongoing for several months. Feels slow and sometimes hard time pronouncing words. Discussed red flags and when to proceed to ED. For now evaluate imaging outpatient.   - CTA Head Neck with Contrast    TIA (transient ischemic attack)  Suspect, evaluate with CT.    SOB (shortness of breath) on exertion  Unknown etiology. No red flags of chest pain, lightheadedness, palpitations. Unable to assess due to virtual visit. Follow up visit scheduled. Encouraged to monitor pulse, BP and oxygen at home if able.       Ordering of each unique test  No LOS data to display   Time spent doing chart review, history and exam, documentation and further activities per the note       See Patient Instructions    No follow-ups on file.    Shobha Benedict [facility]   Natasha is a 71 year old who presents for the following health issues     HPI     B-(background): Patient states that since May of this year (2021) she has been having more difficulty pronouncing certain words, often having trouble with shortness of breath after walking short distances as well.      A-(assessment):   Patient denies the following:  -gait changes  -facial droop  -tongue deviation  -eyelid droop  -numbness and tingling (both bilaterally and unilaterally, including arms, legs, and face)  -strength weak in one side vs the other (both arms and legs)  -headaches   -confusion  -disorientation/hallucinations  -changes to bowel and bladder     Patient states significant history of hypertension. Patient states family history of TIA (mother), many TIA.      Patient does state that she has noticed new changes in her swallowing over the past couple weeks, mainly at night. Patient denies food  "or water \"going down the wrong pipe.\" Patient noticing it's a bit harder to swallow.      R-(recommendations): Due to development of symptoms and family history, discussed with patient the possibility of going to the Emergency Department for assessment and evaluation.     She has been having problems - pronouncing words, SOB.  If she goes to the bathroom at night, she gets SOB.  Her speech is really bothering.  She feels as if she sounds as if she is drinking.  She says her mom had a stroke.   She said all of this started in May.     The speech problems started in May.  No weakness on one side.  No balance problems.  She is on a bp med.  She does not check her bp at home.  Denies dizziness and headaches.  No facial droop.     The SOB started before May - maybe earlier Spring.  The SOB bothers her when she is sleeping.  Sometimes she is SOB during the day and sometimes it is not there. Notices with exercise. No chest pain.  No cough.  She has RA so she has some joints that are swollen, but that has been there a long time.  No ankle swelling.     She says she feels physically pretty healthy.  She has not been seen for this because she thought it would go away.           Feels speech is \"slow\" coming out. Has seemed like it has been there but has gotten worse over past few months. COVID shot in April, seemed to get worse in May.     No medication changes since January.    Review of Systems   Constitutional, HEENT, cardiovascular, pulmonary, GI, , musculoskeletal, neuro, skin, endocrine and psych systems are negative, except as otherwise noted.      Objective           Vitals:  No vitals were obtained today due to virtual visit.    Physical Exam   healthy, alert and no distress  PSYCH: Alert and oriented times 3; coherent speech, normal   rate and volume, able to articulate logical thoughts, able   to abstract reason, no tangential thoughts, no hallucinations   or delusions  Her affect is normal  RESP: No cough, no " audible wheezing, able to talk in full sentences  Remainder of exam unable to be completed due to telephone visits              MIKEY Perez     [address] [address]  [email or web address]  [email or web address]   Office: [phone]                 Phone call duration: 16 minutes

## 2021-08-11 ENCOUNTER — HOSPITAL ENCOUNTER (OUTPATIENT)
Dept: CT IMAGING | Facility: CLINIC | Age: 71
Discharge: HOME | End: 2021-08-11
Attending: NURSE PRACTITIONER | Admitting: NURSE PRACTITIONER
Payer: MEDICARE

## 2021-08-11 DIAGNOSIS — R47.89 ALTERATION IN SPEECH: ICD-10-CM

## 2021-08-11 LAB
CREAT BLD-MCNC: 1 MG/DL (ref 0.5–1)
GFR SERPL CREATININE-BSD FRML MDRD: 57 ML/MIN/1.73M2

## 2021-08-11 PROCEDURE — 82565 ASSAY OF CREATININE: CPT

## 2021-08-11 PROCEDURE — 250N000011 HC RX IP 250 OP 636: Performed by: RADIOLOGY

## 2021-08-11 PROCEDURE — 250N000009 HC RX 250: Performed by: RADIOLOGY

## 2021-08-11 PROCEDURE — 70498 CT ANGIOGRAPHY NECK: CPT | Mod: ME

## 2021-08-11 RX ORDER — IOPAMIDOL 755 MG/ML
500 INJECTION, SOLUTION INTRAVASCULAR ONCE
Status: COMPLETED | OUTPATIENT
Start: 2021-08-11 | End: 2021-08-11

## 2021-08-11 RX ADMIN — IOPAMIDOL 70 ML: 755 INJECTION, SOLUTION INTRAVENOUS at 13:29

## 2021-08-11 RX ADMIN — SODIUM CHLORIDE 80 ML: 9 INJECTION, SOLUTION INTRAVENOUS at 13:29

## 2021-08-12 NOTE — RESULT ENCOUNTER NOTE
Triage: please advise of results/recommendations:     Natasha  Here are your recent results.  Your CT angiogram of your head/neck do not reveal any abnormalities or evidence of a stroke or mini stroke.  I would recommend further discussion with Shobha Benedict on 8/16/2021 on next steps.      If you have any questions please do not hesitate to contact our office via phone ([phone]) or MyChart.    Cinda Marte, MS, PA-C (covering for Shobha Benedict)  [facility]

## 2021-08-19 ENCOUNTER — OFFICE VISIT (OUTPATIENT)
Dept: FAMILY MEDICINE | Facility: CLINIC | Age: 71
End: 2021-08-19
Payer: MEDICARE

## 2021-08-19 ENCOUNTER — ANCILLARY PROCEDURE (OUTPATIENT)
Dept: GENERAL RADIOLOGY | Facility: CLINIC | Age: 71
End: 2021-08-19
Attending: NURSE PRACTITIONER
Payer: MEDICARE

## 2021-08-19 VITALS
HEART RATE: 66 BPM | SYSTOLIC BLOOD PRESSURE: 132 MMHG | DIASTOLIC BLOOD PRESSURE: 78 MMHG | BODY MASS INDEX: 26.31 KG/M2 | HEIGHT: 62 IN | WEIGHT: 143 LBS | OXYGEN SATURATION: 98 % | TEMPERATURE: 97.6 F

## 2021-08-19 DIAGNOSIS — R05.9 COUGH: ICD-10-CM

## 2021-08-19 DIAGNOSIS — J01.10 ACUTE NON-RECURRENT FRONTAL SINUSITIS: Primary | ICD-10-CM

## 2021-08-19 PROCEDURE — 99213 OFFICE O/P EST LOW 20 MIN: CPT | Performed by: NURSE PRACTITIONER

## 2021-08-19 PROCEDURE — 71046 X-RAY EXAM CHEST 2 VIEWS: CPT | Mod: FY | Performed by: RADIOLOGY

## 2021-08-19 RX ORDER — NEEDLES, SAFETY 22GX1 1/2"
NEEDLE, DISPOSABLE MISCELLANEOUS
COMMUNITY
Start: 2021-06-21

## 2021-08-19 RX ORDER — DOXYCYCLINE 100 MG/1
100 CAPSULE ORAL 2 TIMES DAILY
Qty: 14 CAPSULE | Refills: 0 | Status: SHIPPED | OUTPATIENT
Start: 2021-08-19 | End: 2021-08-26

## 2021-08-19 ASSESSMENT — MIFFLIN-ST. JEOR: SCORE: 1121.89

## 2021-08-19 NOTE — PROGRESS NOTES
"Assessment & Plan     Acute non-recurrent frontal sinusitis  On exam today heart sounds good-sinuses look inflamed. Chest wheezy. Trial treatment for bronchitis/sinus and see if improving.   - doxycycline monohydrate (MONODOX) 100 MG capsule  Dispense: 14 capsule; Refill: 0    Cough  Xray normal.  - XR Chest 2 Views  - doxycycline monohydrate (MONODOX) 100 MG capsule  Dispense: 14 capsule; Refill: 0      Review of the result(s) of each unique test - CXR without acute findings  Ordering of each unique test  No LOS data to display   Time spent doing chart review, history and exam, documentation and further activities per the note       BMI:   Estimated body mass index is 26.16 kg/m  as calculated from the following:    Height as of this encounter: 1.575 m (5' 2\").    Weight as of this encounter: 64.9 kg (143 lb).       See Patient Instructions    No follow-ups on file.    Shobha Benedict [facility] PREET Kaur is a 71 year old who presents for the following health issues:    History of Present Illness       Vascular Disease:  She presents for follow up of vascular disease.  She never takes nitroglycerin. She takes daily aspirin.    She eats 2-3 servings of fruits and vegetables daily.She consumes 0 sweetened beverage(s) daily.She exercises with enough effort to increase her heart rate 30 to 60 minutes per day.  She exercises with enough effort to increase her heart rate 4 days per week.   She is taking medications regularly.        Follow up visit SOB    Onset: Approx 3 month(s) ago. Spring 2021.     History:  Patient states that since May of this year (2021) she has been having more difficulty pronouncing certain words, often having trouble with shortness of breath after walking short distances as well.     Accompanying Signs & Symptoms:   Some difficulty walking long distances. Bothers her when she is sleeping.    Precipitating:    Walking, talking, exertion.    Progression of " Symptoms: (better, worse, same): better    Therapies tried- Working on health, walking more.  ROS: review:       Tolerated treadmill alright when did it the other day.   At night noticing more SOB when up from sleep. Not sure if post nasal drip/congestion oculd contribute to symptoms. She has had ongoing congestion with symptoms. No allergy medications  Sometimes with walking short distances.          Review of Systems   Constitutional, HEENT, cardiovascular, pulmonary, GI, , musculoskeletal, neuro, skin, endocrine and psych systems are negative, except as otherwise noted.      Objective    There were no vitals taken for this visit.  There is no height or weight on file to calculate BMI.  Physical Exam   GENERAL: healthy, alert and no distress  NECK: no adenopathy, no asymmetry, masses, or scars and thyroid normal to palpation  RESP: lungs clear to auscultation - no rales, rhonchi or wheezes and expiratory wheezes R upper posterior and L upper posterior  CV: regular rate and rhythm, normal S1 S2, no S3 or S4, no murmur, click or rub, no peripheral edema and peripheral pulses strong  ABDOMEN: soft, nontender, no hepatosplenomegaly, no masses and bowel sounds normal  MS: no gross musculoskeletal defects noted, no edema    No results found.          MIKEY Perez     [address] [address]  [email or web address]   Office: [phone]

## 2021-10-24 ENCOUNTER — HEALTH MAINTENANCE LETTER (OUTPATIENT)
Age: 71
End: 2021-10-24

## 2021-10-25 ENCOUNTER — TELEPHONE (OUTPATIENT)
Dept: FAMILY MEDICINE | Facility: CLINIC | Age: 71
End: 2021-10-25
Payer: MEDICARE

## 2021-10-25 DIAGNOSIS — R06.02 SOB (SHORTNESS OF BREATH) ON EXERTION: Primary | ICD-10-CM

## 2021-10-25 NOTE — TELEPHONE ENCOUNTER
Patient is requesting a referral to an ENT.  She is still have breathing issues especially at night     Please set up referral and I will call patient to let her know you have placed it.       Delmis Noonan

## 2021-10-28 NOTE — TELEPHONE ENCOUNTER
Ok for ENT referral, but I'm not sure to what this exactly is pertaining.  Triage, please call pt. ? Recurrent sinusitis?   I haven't seen pt since her px 2/24/2021 and upon review of my note at that time there was no mention of problems sleeping or ENT issues.

## 2021-10-29 NOTE — TELEPHONE ENCOUNTER
"Called and spoke with patient.     She states at night mouth gets dry.  Per  she \"moans\" and wakes him up. Neither are getting a good nights sleep. She states she gets short of breath during the day, with walking, hard time talking. Sates her nose runs constantly. Last seen by Shobha Benedict CNP on 8/19.     Referral pended.     Routing to provider to review and advise.     Ambika Conde RN  [facility]    "

## 2021-10-29 NOTE — TELEPHONE ENCOUNTER
Attempt # 1    Called # [phone]     Left a non detailed VM to call back at [phone] and ask for any available Triage Nurse.    See 8/19/21 OV note from Shobha Benedict CNP.     Ambika Conde RN  [facility]

## 2021-11-12 ENCOUNTER — IMMUNIZATION (OUTPATIENT)
Dept: FAMILY MEDICINE | Facility: CLINIC | Age: 71
End: 2021-11-12
Payer: MEDICARE

## 2021-11-12 DIAGNOSIS — Z23 NEED FOR PROPHYLACTIC VACCINATION AND INOCULATION AGAINST INFLUENZA: Primary | ICD-10-CM

## 2021-11-12 PROCEDURE — 90662 IIV NO PRSV INCREASED AG IM: CPT

## 2021-11-12 PROCEDURE — 99207 PR NO CHARGE NURSE ONLY: CPT

## 2021-11-12 PROCEDURE — G0008 ADMIN INFLUENZA VIRUS VAC: HCPCS

## 2021-11-15 ENCOUNTER — TRANSFERRED RECORDS (OUTPATIENT)
Dept: HEALTH INFORMATION MANAGEMENT | Facility: CLINIC | Age: 71
End: 2021-11-15
Payer: MEDICARE

## 2022-01-01 ENCOUNTER — TELEPHONE (OUTPATIENT)
Dept: FAMILY MEDICINE | Facility: CLINIC | Age: 72
End: 2022-01-01
Payer: MEDICARE

## 2022-01-01 ENCOUNTER — MYC MEDICAL ADVICE (OUTPATIENT)
Dept: PULMONOLOGY | Facility: CLINIC | Age: 72
End: 2022-01-01

## 2022-01-01 ENCOUNTER — TELEPHONE (OUTPATIENT)
Dept: INTERVENTIONAL RADIOLOGY/VASCULAR | Facility: CLINIC | Age: 72
End: 2022-01-01

## 2022-01-01 ENCOUNTER — TELEPHONE (OUTPATIENT)
Dept: NEUROLOGY | Facility: CLINIC | Age: 72
End: 2022-01-01

## 2022-01-01 ENCOUNTER — MEDICAL CORRESPONDENCE (OUTPATIENT)
Dept: HEALTH INFORMATION MANAGEMENT | Facility: CLINIC | Age: 72
End: 2022-01-01

## 2022-01-01 ENCOUNTER — ALLIED HEALTH/NURSE VISIT (OUTPATIENT)
Dept: NEUROLOGY | Facility: CLINIC | Age: 72
End: 2022-01-01

## 2022-01-01 ENCOUNTER — THERAPY VISIT (OUTPATIENT)
Dept: OCCUPATIONAL THERAPY | Facility: CLINIC | Age: 72
End: 2022-01-01
Payer: MEDICARE

## 2022-01-01 ENCOUNTER — MYC MEDICAL ADVICE (OUTPATIENT)
Dept: NEUROLOGY | Facility: CLINIC | Age: 72
End: 2022-01-01

## 2022-01-01 ENCOUNTER — TELEPHONE (OUTPATIENT)
Dept: FAMILY MEDICINE | Facility: CLINIC | Age: 72
End: 2022-01-01

## 2022-01-01 ENCOUNTER — LAB (OUTPATIENT)
Dept: LAB | Facility: CLINIC | Age: 72
End: 2022-01-01
Payer: MEDICARE

## 2022-01-01 ENCOUNTER — OFFICE VISIT (OUTPATIENT)
Dept: NEUROLOGY | Facility: CLINIC | Age: 72
End: 2022-01-01
Payer: MEDICARE

## 2022-01-01 ENCOUNTER — HOSPITAL ENCOUNTER (OUTPATIENT)
Age: 72
End: 2022-01-01
Attending: STUDENT IN AN ORGANIZED HEALTH CARE EDUCATION/TRAINING PROGRAM
Payer: MEDICARE

## 2022-01-01 ENCOUNTER — MYC MEDICAL ADVICE (OUTPATIENT)
Dept: FAMILY MEDICINE | Facility: CLINIC | Age: 72
End: 2022-01-01

## 2022-01-01 ENCOUNTER — OFFICE VISIT (OUTPATIENT)
Dept: FAMILY MEDICINE | Facility: CLINIC | Age: 72
End: 2022-01-01
Payer: MEDICARE

## 2022-01-01 ENCOUNTER — OFFICE VISIT (OUTPATIENT)
Dept: PULMONOLOGY | Facility: CLINIC | Age: 72
End: 2022-01-01
Attending: FAMILY MEDICINE
Payer: MEDICARE

## 2022-01-01 ENCOUNTER — HOSPITAL ENCOUNTER (OUTPATIENT)
Dept: RESPIRATORY THERAPY | Facility: CLINIC | Age: 72
Discharge: HOME | End: 2022-03-21
Attending: FAMILY MEDICINE | Admitting: FAMILY MEDICINE
Payer: MEDICARE

## 2022-01-01 ENCOUNTER — MYC MEDICAL ADVICE (OUTPATIENT)
Dept: FAMILY MEDICINE | Facility: CLINIC | Age: 72
End: 2022-01-01
Payer: MEDICARE

## 2022-01-01 ENCOUNTER — HOSPITAL ENCOUNTER (OUTPATIENT)
Dept: CARDIOLOGY | Facility: CLINIC | Age: 72
Discharge: HOME | End: 2022-05-05
Attending: FAMILY MEDICINE | Admitting: FAMILY MEDICINE
Payer: MEDICARE

## 2022-01-01 ENCOUNTER — HOME INFUSION (PRE-WILLOW HOME INFUSION) (OUTPATIENT)
Dept: PHARMACY | Facility: CLINIC | Age: 72
End: 2022-01-01

## 2022-01-01 ENCOUNTER — TRANSFERRED RECORDS (OUTPATIENT)
Dept: HEALTH INFORMATION MANAGEMENT | Facility: CLINIC | Age: 72
End: 2022-01-01

## 2022-01-01 ENCOUNTER — THERAPY VISIT (OUTPATIENT)
Dept: SPEECH THERAPY | Facility: CLINIC | Age: 72
End: 2022-01-01
Payer: MEDICARE

## 2022-01-01 ENCOUNTER — PATIENT OUTREACH (OUTPATIENT)
Dept: CARE COORDINATION | Facility: CLINIC | Age: 72
End: 2022-01-01

## 2022-01-01 ENCOUNTER — APPOINTMENT (OUTPATIENT)
Dept: MEDSURG UNIT | Facility: CLINIC | Age: 72
End: 2022-01-01
Attending: PSYCHIATRY & NEUROLOGY
Payer: MEDICARE

## 2022-01-01 ENCOUNTER — OFFICE VISIT (OUTPATIENT)
Dept: NEUROLOGY | Facility: CLINIC | Age: 72
End: 2022-01-01
Attending: FAMILY MEDICINE
Payer: MEDICARE

## 2022-01-01 ENCOUNTER — TELEPHONE (OUTPATIENT)
Dept: PULMONOLOGY | Facility: CLINIC | Age: 72
End: 2022-01-01
Payer: MEDICARE

## 2022-01-01 ENCOUNTER — HOSPITAL ENCOUNTER (OUTPATIENT)
Facility: CLINIC | Age: 72
Setting detail: OBSERVATION
Discharge: HOME | End: 2022-11-02
Attending: PSYCHIATRY & NEUROLOGY | Admitting: STUDENT IN AN ORGANIZED HEALTH CARE EDUCATION/TRAINING PROGRAM
Payer: MEDICARE

## 2022-01-01 ENCOUNTER — APPOINTMENT (OUTPATIENT)
Dept: INTERVENTIONAL RADIOLOGY/VASCULAR | Facility: CLINIC | Age: 72
End: 2022-01-01
Attending: PSYCHIATRY & NEUROLOGY
Payer: MEDICARE

## 2022-01-01 ENCOUNTER — OFFICE VISIT (OUTPATIENT)
Dept: PULMONOLOGY | Facility: CLINIC | Age: 72
End: 2022-01-01
Payer: MEDICARE

## 2022-01-01 ENCOUNTER — APPOINTMENT (OUTPATIENT)
Dept: EDUCATION SERVICES | Facility: CLINIC | Age: 72
End: 2022-01-01
Attending: PSYCHIATRY & NEUROLOGY
Payer: MEDICARE

## 2022-01-01 ENCOUNTER — PRE VISIT (OUTPATIENT)
Dept: OTOLARYNGOLOGY | Facility: CLINIC | Age: 72
End: 2022-01-01

## 2022-01-01 ENCOUNTER — ANCILLARY PROCEDURE (OUTPATIENT)
Dept: MAMMOGRAPHY | Facility: CLINIC | Age: 72
End: 2022-01-01
Attending: FAMILY MEDICINE
Payer: MEDICARE

## 2022-01-01 ENCOUNTER — LAB (OUTPATIENT)
Dept: LAB | Facility: CLINIC | Age: 72
End: 2022-01-01
Attending: NURSE PRACTITIONER
Payer: MEDICARE

## 2022-01-01 ENCOUNTER — HOSPITAL ENCOUNTER (OUTPATIENT)
Dept: CT IMAGING | Facility: CLINIC | Age: 72
Discharge: HOME | End: 2022-06-14
Attending: OTOLARYNGOLOGY | Admitting: OTOLARYNGOLOGY
Payer: MEDICARE

## 2022-01-01 ENCOUNTER — DOCUMENTATION ONLY (OUTPATIENT)
Dept: RESPIRATORY THERAPY | Facility: CLINIC | Age: 72
End: 2022-01-01

## 2022-01-01 ENCOUNTER — MEDICAL CORRESPONDENCE (OUTPATIENT)
Dept: HEALTH INFORMATION MANAGEMENT | Facility: CLINIC | Age: 72
End: 2022-01-01
Payer: MEDICARE

## 2022-01-01 ENCOUNTER — HOSPITAL ENCOUNTER (OUTPATIENT)
Dept: CT IMAGING | Facility: CLINIC | Age: 72
Discharge: HOME | End: 2022-05-20
Attending: STUDENT IN AN ORGANIZED HEALTH CARE EDUCATION/TRAINING PROGRAM | Admitting: STUDENT IN AN ORGANIZED HEALTH CARE EDUCATION/TRAINING PROGRAM
Payer: MEDICARE

## 2022-01-01 ENCOUNTER — HEALTH MAINTENANCE LETTER (OUTPATIENT)
Age: 72
End: 2022-01-01

## 2022-01-01 VITALS
HEART RATE: 83 BPM | RESPIRATION RATE: 16 BRPM | OXYGEN SATURATION: 99 % | BODY MASS INDEX: 20.61 KG/M2 | SYSTOLIC BLOOD PRESSURE: 157 MMHG | WEIGHT: 112 LBS | HEIGHT: 62 IN | DIASTOLIC BLOOD PRESSURE: 97 MMHG

## 2022-01-01 VITALS
BODY MASS INDEX: 23.1 KG/M2 | WEIGHT: 125.5 LBS | HEART RATE: 82 BPM | DIASTOLIC BLOOD PRESSURE: 79 MMHG | OXYGEN SATURATION: 100 % | HEIGHT: 62 IN | SYSTOLIC BLOOD PRESSURE: 131 MMHG

## 2022-01-01 VITALS
SYSTOLIC BLOOD PRESSURE: 123 MMHG | TEMPERATURE: 98 F | HEIGHT: 62 IN | WEIGHT: 104.5 LBS | RESPIRATION RATE: 18 BRPM | DIASTOLIC BLOOD PRESSURE: 70 MMHG | HEART RATE: 72 BPM | BODY MASS INDEX: 19.23 KG/M2 | OXYGEN SATURATION: 96 %

## 2022-01-01 VITALS
HEIGHT: 62 IN | WEIGHT: 125 LBS | SYSTOLIC BLOOD PRESSURE: 136 MMHG | BODY MASS INDEX: 23 KG/M2 | HEART RATE: 75 BPM | DIASTOLIC BLOOD PRESSURE: 86 MMHG | OXYGEN SATURATION: 99 %

## 2022-01-01 VITALS
OXYGEN SATURATION: 100 % | SYSTOLIC BLOOD PRESSURE: 128 MMHG | HEIGHT: 62 IN | DIASTOLIC BLOOD PRESSURE: 85 MMHG | HEART RATE: 83 BPM | BODY MASS INDEX: 23 KG/M2 | WEIGHT: 125 LBS

## 2022-01-01 VITALS
WEIGHT: 105.1 LBS | HEART RATE: 87 BPM | OXYGEN SATURATION: 96 % | SYSTOLIC BLOOD PRESSURE: 143 MMHG | BODY MASS INDEX: 19.34 KG/M2 | DIASTOLIC BLOOD PRESSURE: 91 MMHG | HEIGHT: 62 IN

## 2022-01-01 VITALS
OXYGEN SATURATION: 99 % | TEMPERATURE: 97.7 F | HEIGHT: 62 IN | BODY MASS INDEX: 24.11 KG/M2 | WEIGHT: 131 LBS | HEART RATE: 84 BPM | DIASTOLIC BLOOD PRESSURE: 82 MMHG | SYSTOLIC BLOOD PRESSURE: 132 MMHG

## 2022-01-01 VITALS
HEART RATE: 100 BPM | WEIGHT: 107 LBS | BODY MASS INDEX: 19.57 KG/M2 | SYSTOLIC BLOOD PRESSURE: 125 MMHG | DIASTOLIC BLOOD PRESSURE: 80 MMHG | OXYGEN SATURATION: 97 %

## 2022-01-01 VITALS
OXYGEN SATURATION: 97 % | DIASTOLIC BLOOD PRESSURE: 74 MMHG | TEMPERATURE: 97 F | SYSTOLIC BLOOD PRESSURE: 158 MMHG | RESPIRATION RATE: 18 BRPM | HEART RATE: 65 BPM

## 2022-01-01 DIAGNOSIS — Z93.1 PEG (PERCUTANEOUS ENDOSCOPIC GASTROSTOMY) STATUS (H): ICD-10-CM

## 2022-01-01 DIAGNOSIS — R13.12 OROPHARYNGEAL DYSPHAGIA: ICD-10-CM

## 2022-01-01 DIAGNOSIS — R06.09 DOE (DYSPNEA ON EXERTION): ICD-10-CM

## 2022-01-01 DIAGNOSIS — N18.2 CKD (CHRONIC KIDNEY DISEASE) STAGE 2, GFR 60-89 ML/MIN: ICD-10-CM

## 2022-01-01 DIAGNOSIS — J38.01 PARESIS OF RIGHT VOCAL CORD: Primary | ICD-10-CM

## 2022-01-01 DIAGNOSIS — R49.8 VOICE TREMOR: ICD-10-CM

## 2022-01-01 DIAGNOSIS — Z11.59 ENCOUNTER FOR SCREENING FOR OTHER VIRAL DISEASES: ICD-10-CM

## 2022-01-01 DIAGNOSIS — E55.9 VITAMIN D DEFICIENCY: ICD-10-CM

## 2022-01-01 DIAGNOSIS — R47.1 DYSARTHRIA: ICD-10-CM

## 2022-01-01 DIAGNOSIS — R06.02 SOB (SHORTNESS OF BREATH) ON EXERTION: Primary | ICD-10-CM

## 2022-01-01 DIAGNOSIS — I10 ESSENTIAL HYPERTENSION WITH GOAL BLOOD PRESSURE LESS THAN 140/90: ICD-10-CM

## 2022-01-01 DIAGNOSIS — E46 MALNUTRITION, UNSPECIFIED TYPE (H): ICD-10-CM

## 2022-01-01 DIAGNOSIS — Z74.09 IMPAIRED MOBILITY AND ADLS: ICD-10-CM

## 2022-01-01 DIAGNOSIS — Z93.1 PEG (PERCUTANEOUS ENDOSCOPIC GASTROSTOMY) STATUS (H): Primary | ICD-10-CM

## 2022-01-01 DIAGNOSIS — M35.00 SICCA SYNDROME (H): ICD-10-CM

## 2022-01-01 DIAGNOSIS — G93.5 COMPRESSION OF BRAINSTEM (H): Primary | ICD-10-CM

## 2022-01-01 DIAGNOSIS — Z11.59 ENCOUNTER FOR SCREENING FOR OTHER VIRAL DISEASES: Primary | ICD-10-CM

## 2022-01-01 DIAGNOSIS — G12.21 ALS (AMYOTROPHIC LATERAL SCLEROSIS) (H): Primary | ICD-10-CM

## 2022-01-01 DIAGNOSIS — J38.01 PARESIS OF RIGHT VOCAL CORD: ICD-10-CM

## 2022-01-01 DIAGNOSIS — R63.4 WEIGHT LOSS: ICD-10-CM

## 2022-01-01 DIAGNOSIS — J39.2 DRY THROAT: ICD-10-CM

## 2022-01-01 DIAGNOSIS — R06.02 SOB (SHORTNESS OF BREATH) ON EXERTION: ICD-10-CM

## 2022-01-01 DIAGNOSIS — R47.89 ALTERATION IN SPEECH: ICD-10-CM

## 2022-01-01 DIAGNOSIS — K11.0 ATROPHY OF SALIVARY GLAND: ICD-10-CM

## 2022-01-01 DIAGNOSIS — R47.1 DYSARTHRIA: Primary | ICD-10-CM

## 2022-01-01 DIAGNOSIS — Z87.891 PERSONAL HISTORY OF TOBACCO USE, PRESENTING HAZARDS TO HEALTH: ICD-10-CM

## 2022-01-01 DIAGNOSIS — R68.2 DRY MOUTH: Primary | ICD-10-CM

## 2022-01-01 DIAGNOSIS — G12.20 MOTOR NEURON DISEASE (H): ICD-10-CM

## 2022-01-01 DIAGNOSIS — J38.00 VOCAL CORD PARALYSIS: Primary | ICD-10-CM

## 2022-01-01 DIAGNOSIS — Z23 HIGH PRIORITY FOR COVID-19 VACCINATION: ICD-10-CM

## 2022-01-01 DIAGNOSIS — M06.9 RHEUMATOID ARTHRITIS, RHEUMATOID FACTOR STATUS UNKNOWN (H): ICD-10-CM

## 2022-01-01 DIAGNOSIS — G25.2 ACTION TREMOR: ICD-10-CM

## 2022-01-01 DIAGNOSIS — Z78.9 IMPAIRED MOBILITY AND ADLS: ICD-10-CM

## 2022-01-01 DIAGNOSIS — G12.20 MOTOR NEURON DISEASE (H): Primary | ICD-10-CM

## 2022-01-01 DIAGNOSIS — G12.21 ALS (AMYOTROPHIC LATERAL SCLEROSIS) (H): ICD-10-CM

## 2022-01-01 DIAGNOSIS — R29.2 BRISK DEEP TENDON REFLEXES: ICD-10-CM

## 2022-01-01 DIAGNOSIS — R29.2 BRISK DEEP TENDON REFLEXES: Primary | ICD-10-CM

## 2022-01-01 DIAGNOSIS — Z12.31 VISIT FOR SCREENING MAMMOGRAM: ICD-10-CM

## 2022-01-01 DIAGNOSIS — Z78.9 IMPAIRED INSTRUMENTAL ACTIVITIES OF DAILY LIVING (IADL): ICD-10-CM

## 2022-01-01 DIAGNOSIS — Z13.6 CARDIOVASCULAR SCREENING; LDL GOAL LESS THAN 130: ICD-10-CM

## 2022-01-01 DIAGNOSIS — G95.20 CERVICAL SPINAL CORD COMPRESSION (H): ICD-10-CM

## 2022-01-01 DIAGNOSIS — Z00.00 ENCOUNTER FOR MEDICARE ANNUAL WELLNESS EXAM: Primary | ICD-10-CM

## 2022-01-01 DIAGNOSIS — M05.79 RHEUMATOID ARTHRITIS INVOLVING MULTIPLE SITES WITH POSITIVE RHEUMATOID FACTOR (H): ICD-10-CM

## 2022-01-01 DIAGNOSIS — E83.110: ICD-10-CM

## 2022-01-01 DIAGNOSIS — R13.19 OTHER DYSPHAGIA: ICD-10-CM

## 2022-01-01 LAB
ALBUMIN SERPL-MCNC: 3.8 G/DL (ref 3.4–5)
ALP SERPL-CCNC: 60 U/L (ref 40–150)
ALT SERPL W P-5'-P-CCNC: 24 U/L (ref 0–50)
ANION GAP SERPL CALCULATED.3IONS-SCNC: 10 MMOL/L (ref 7–15)
ANION GAP SERPL CALCULATED.3IONS-SCNC: 11 MMOL/L (ref 3–14)
ANION GAP SERPL CALCULATED.3IONS-SCNC: 11 MMOL/L (ref 7–15)
AST SERPL W P-5'-P-CCNC: 25 U/L (ref 0–45)
BASOPHILS # BLD AUTO: 0 10E3/UL (ref 0–0.2)
BASOPHILS NFR BLD AUTO: 0 %
BILIRUB SERPL-MCNC: 0.6 MG/DL (ref 0.2–1.3)
BUN SERPL-MCNC: 14 MG/DL (ref 7–30)
BUN SERPL-MCNC: 18 MG/DL (ref 8–23)
BUN SERPL-MCNC: 21.8 MG/DL (ref 8–23)
CALCIUM SERPL-MCNC: 10.3 MG/DL (ref 8.8–10.2)
CALCIUM SERPL-MCNC: 9.1 MG/DL (ref 8.8–10.2)
CALCIUM SERPL-MCNC: 9.5 MG/DL (ref 8.5–10.1)
CHLORIDE BLD-SCNC: 106 MMOL/L (ref 94–109)
CHLORIDE SERPL-SCNC: 101 MMOL/L (ref 98–107)
CHLORIDE SERPL-SCNC: 102 MMOL/L (ref 98–107)
CHOLEST SERPL-MCNC: 202 MG/DL
CO2 SERPL-SCNC: 21 MMOL/L (ref 20–32)
CREAT BLD-MCNC: 0.8 MG/DL (ref 0.5–1)
CREAT SERPL-MCNC: 0.64 MG/DL (ref 0.51–0.95)
CREAT SERPL-MCNC: 0.67 MG/DL (ref 0.51–0.95)
CREAT SERPL-MCNC: 0.72 MG/DL (ref 0.52–1.04)
CREAT UR-MCNC: 76 MG/DL
DEPRECATED CALCIDIOL+CALCIFEROL SERPL-MC: <50 UG/L (ref 20–75)
DEPRECATED HCO3 PLAS-SCNC: 27 MMOL/L (ref 22–29)
DEPRECATED HCO3 PLAS-SCNC: 28 MMOL/L (ref 22–29)
DLCOCOR-%PRED-PRE: 78 %
DLCOCOR-PRE: 14.2 ML/MIN/MMHG
DLCOUNC-%PRED-PRE: 80 %
DLCOUNC-PRE: 14.66 ML/MIN/MMHG
DLCOUNC-PRED: 18.17 ML/MIN/MMHG
ENA SS-A AB SER IA-ACNC: 0.5 U/ML
ENA SS-A AB SER IA-ACNC: NEGATIVE
ENA SS-B IGG SER IA-ACNC: <0.6 U/ML
ENA SS-B IGG SER IA-ACNC: NEGATIVE
EOSINOPHIL # BLD AUTO: 0.1 10E3/UL (ref 0–0.7)
EOSINOPHIL NFR BLD AUTO: 2 %
ERV-%PRED-PRE: 46 %
ERV-PRE: 0.33 L
ERV-PRED: 0.7 L
ERYTHROCYTE [DISTWIDTH] IN BLOOD BY AUTOMATED COUNT: 13.2 % (ref 10–15)
ERYTHROCYTE [DISTWIDTH] IN BLOOD BY AUTOMATED COUNT: 13.4 % (ref 10–15)
ERYTHROCYTE [DISTWIDTH] IN BLOOD BY AUTOMATED COUNT: 14.4 % (ref 10–15)
EXPTIME-PRE: 1.85 SEC
EXPTIME-PRE: 3.21 SEC
EXPTIME-PRE: 6.25 SEC
FASTING STATUS PATIENT QL REPORTED: YES
FEF2575-%PRED-PRE: 74 %
FEF2575-%PRED-PRE: 78 %
FEF2575-%PRED-PRE: 84 %
FEF2575-PRE: 1.3 L/SEC
FEF2575-PRE: 1.4 L/SEC
FEF2575-PRE: 1.48 L/SEC
FEF2575-PRED: 1.76 L/SEC
FEF2575-PRED: 1.76 L/SEC
FEF2575-PRED: 1.79 L/SEC
FEFMAX-%PRED-PRE: 45 %
FEFMAX-%PRED-PRE: 50 %
FEFMAX-%PRED-PRE: 72 %
FEFMAX-PRE: 2.42 L/SEC
FEFMAX-PRE: 2.64 L/SEC
FEFMAX-PRE: 3.85 L/SEC
FEFMAX-PRED: 5.27 L/SEC
FEFMAX-PRED: 5.27 L/SEC
FEFMAX-PRED: 5.34 L/SEC
FEV1-%PRED-PRE: 47 %
FEV1-%PRED-PRE: 63 %
FEV1-%PRED-PRE: 66 %
FEV1-PRE: 0.97 L
FEV1-PRE: 1.31 L
FEV1-PRE: 1.35 L
FEV1FEV6-PRE: 82 %
FEV1FEV6-PRE: 90 %
FEV1FEV6-PRE: 91 %
FEV1FEV6-PRED: 79 %
FEV1FVC-PRE: 82 %
FEV1FVC-PRE: 90 %
FEV1FVC-PRE: 90 %
FEV1FVC-PRED: 78 %
FEV1SVC-PRE: 74 %
FEV1SVC-PRED: 74 %
FIFMAX-PRE: 1.04 L/SEC
FIFMAX-PRE: 1.27 L/SEC
FIFMAX-PRE: 1.33 L/SEC
FRCPLETH-%PRED-PRE: 95 %
FRCPLETH-PRE: 2.49 L
FRCPLETH-PRED: 2.6 L
FVC-%PRED-PRE: 41 %
FVC-%PRED-PRE: 57 %
FVC-%PRED-PRE: 59 %
FVC-PRE: 1.08 L
FVC-PRE: 1.5 L
FVC-PRE: 1.58 L
FVC-PRED: 2.62 L
FVC-PRED: 2.62 L
FVC-PRED: 2.65 L
GFR SERPL CREATININE-BSD FRML MDRD: 89 ML/MIN/1.73M2
GFR SERPL CREATININE-BSD FRML MDRD: >60 ML/MIN/1.73M2
GFR SERPL CREATININE-BSD FRML MDRD: >90 ML/MIN/1.73M2
GFR SERPL CREATININE-BSD FRML MDRD: >90 ML/MIN/1.73M2
GLUCOSE BLD-MCNC: 94 MG/DL (ref 70–99)
GLUCOSE SERPL-MCNC: 84 MG/DL (ref 70–99)
GLUCOSE SERPL-MCNC: 91 MG/DL (ref 70–99)
HCT VFR BLD AUTO: 41.9 % (ref 35–47)
HCT VFR BLD AUTO: 43.8 % (ref 35–47)
HCT VFR BLD AUTO: 46.4 % (ref 35–47)
HDLC SERPL-MCNC: 94 MG/DL
HGB BLD-MCNC: 13.5 G/DL (ref 11.7–15.7)
HGB BLD-MCNC: 14.5 G/DL (ref 11.7–15.7)
HGB BLD-MCNC: 15.1 G/DL (ref 11.7–15.7)
IC-%PRED-PRE: 68 %
IC-PRE: 1.44 L
IC-PRED: 2.11 L
IMM GRANULOCYTES # BLD: 0 10E3/UL
IMM GRANULOCYTES NFR BLD: 0 %
INR PPP: 1.04 (ref 0.85–1.15)
LDLC SERPL CALC-MCNC: 92 MG/DL
LYMPHOCYTES # BLD AUTO: 2.1 10E3/UL (ref 0.8–5.3)
LYMPHOCYTES NFR BLD AUTO: 36 %
MAGNESIUM SERPL-MCNC: 1.9 MG/DL (ref 1.7–2.3)
MAGNESIUM SERPL-MCNC: 2.2 MG/DL (ref 1.6–2.3)
MCH RBC QN AUTO: 31 PG (ref 26.5–33)
MCH RBC QN AUTO: 31.3 PG (ref 26.5–33)
MCH RBC QN AUTO: 32.2 PG (ref 26.5–33)
MCHC RBC AUTO-ENTMCNC: 32.2 G/DL (ref 31.5–36.5)
MCHC RBC AUTO-ENTMCNC: 32.5 G/DL (ref 31.5–36.5)
MCHC RBC AUTO-ENTMCNC: 33.1 G/DL (ref 31.5–36.5)
MCV RBC AUTO: 96 FL (ref 78–100)
MCV RBC AUTO: 96 FL (ref 78–100)
MCV RBC AUTO: 97 FL (ref 78–100)
MEP-PRE: 29 CMH2O
MEP-PRE: 33 CMH2O
MICROALBUMIN UR-MCNC: 5 MG/L
MICROALBUMIN/CREAT UR: 6.58 MG/G CR (ref 0–25)
MIP-PRE: -11 CMH2O
MIP-PRE: -16 CMH2O
MONOCYTES # BLD AUTO: 0.5 10E3/UL (ref 0–1.3)
MONOCYTES NFR BLD AUTO: 9 %
NEUTROPHILS # BLD AUTO: 3.1 10E3/UL (ref 1.6–8.3)
NEUTROPHILS NFR BLD AUTO: 53 %
NONHDLC SERPL-MCNC: 108 MG/DL
PHOSPHATE SERPL-MCNC: 3.8 MG/DL (ref 2.5–4.5)
PLATELET # BLD AUTO: 137 10E3/UL (ref 150–450)
PLATELET # BLD AUTO: 148 10E3/UL (ref 150–450)
PLATELET # BLD AUTO: 201 10E3/UL (ref 150–450)
POTASSIUM BLD-SCNC: 4.1 MMOL/L (ref 3.4–5.3)
POTASSIUM SERPL-SCNC: 4.1 MMOL/L (ref 3.4–5.3)
POTASSIUM SERPL-SCNC: 4.3 MMOL/L (ref 3.4–5.3)
PROT SERPL-MCNC: 8.1 G/DL (ref 6.8–8.8)
PYRIDOXAL PHOS SERPL-SCNC: 46.7 NMOL/L
RBC # BLD AUTO: 4.35 10E6/UL (ref 3.8–5.2)
RBC # BLD AUTO: 4.51 10E6/UL (ref 3.8–5.2)
RBC # BLD AUTO: 4.83 10E6/UL (ref 3.8–5.2)
RVPLETH-%PRED-PRE: 109 %
RVPLETH-PRE: 2.17 L
RVPLETH-PRED: 1.97 L
SARS-COV-2 RNA RESP QL NAA+PROBE: NEGATIVE
SODIUM SERPL-SCNC: 138 MMOL/L (ref 133–144)
SODIUM SERPL-SCNC: 138 MMOL/L (ref 136–145)
SODIUM SERPL-SCNC: 141 MMOL/L (ref 136–145)
TLCPLETH-%PRED-PRE: 85 %
TLCPLETH-PRE: 3.94 L
TLCPLETH-PRED: 4.6 L
TRIGL SERPL-MCNC: 82 MG/DL
TSH SERPL DL<=0.005 MIU/L-ACNC: 1.53 MU/L (ref 0.4–4)
VA-%PRED-PRE: 69 %
VA-PRE: 2.99 L
VC-%PRED-PRE: 63 %
VC-PRE: 1.77 L
VC-PRED: 2.81 L
VITAMIN D2 SERPL-MCNC: <5 UG/L
VITAMIN D3 SERPL-MCNC: 45 UG/L
WBC # BLD AUTO: 5.9 10E3/UL (ref 4–11)
WBC # BLD AUTO: 6.2 10E3/UL (ref 4–11)
WBC # BLD AUTO: 7.5 10E3/UL (ref 4–11)

## 2022-01-01 PROCEDURE — 83735 ASSAY OF MAGNESIUM: CPT | Performed by: STUDENT IN AN ORGANIZED HEALTH CARE EDUCATION/TRAINING PROGRAM

## 2022-01-01 PROCEDURE — 99205 OFFICE O/P NEW HI 60 MIN: CPT | Performed by: PSYCHIATRY & NEUROLOGY

## 2022-01-01 PROCEDURE — 49440 PLACE GASTROSTOMY TUBE PERC: CPT | Performed by: RADIOLOGY

## 2022-01-01 PROCEDURE — 92610 EVALUATE SWALLOWING FUNCTION: CPT | Mod: GN | Performed by: SPEECH-LANGUAGE PATHOLOGIST

## 2022-01-01 PROCEDURE — 71250 CT THORAX DX C-: CPT | Mod: MG

## 2022-01-01 PROCEDURE — 84207 ASSAY OF VITAMIN B-6: CPT | Mod: 90 | Performed by: FAMILY MEDICINE

## 2022-01-01 PROCEDURE — 250N000009 HC RX 250: Performed by: PHYSICIAN ASSISTANT

## 2022-01-01 PROCEDURE — 99000 SPECIMEN HANDLING OFFICE-LAB: CPT | Performed by: FAMILY MEDICINE

## 2022-01-01 PROCEDURE — 92526 ORAL FUNCTION THERAPY: CPT | Mod: GN | Performed by: SPEECH-LANGUAGE PATHOLOGIST

## 2022-01-01 PROCEDURE — 250N000011 HC RX IP 250 OP 636: Performed by: OTOLARYNGOLOGY

## 2022-01-01 PROCEDURE — 999N000142 HC STATISTIC PROCEDURE PREP ONLY

## 2022-01-01 PROCEDURE — 95910 NRV CNDJ TEST 7-8 STUDIES: CPT | Performed by: PSYCHIATRY & NEUROLOGY

## 2022-01-01 PROCEDURE — 85027 COMPLETE CBC AUTOMATED: CPT | Performed by: STUDENT IN AN ORGANIZED HEALTH CARE EDUCATION/TRAINING PROGRAM

## 2022-01-01 PROCEDURE — 99152 MOD SED SAME PHYS/QHP 5/>YRS: CPT | Performed by: RADIOLOGY

## 2022-01-01 PROCEDURE — 86235 NUCLEAR ANTIGEN ANTIBODY: CPT | Mod: 59

## 2022-01-01 PROCEDURE — 82565 ASSAY OF CREATININE: CPT

## 2022-01-01 PROCEDURE — 82043 UR ALBUMIN QUANTITATIVE: CPT | Performed by: FAMILY MEDICINE

## 2022-01-01 PROCEDURE — 92522 EVALUATE SPEECH PRODUCTION: CPT | Mod: GN | Performed by: SPEECH-LANGUAGE PATHOLOGIST

## 2022-01-01 PROCEDURE — 85027 COMPLETE CBC AUTOMATED: CPT | Performed by: NURSE PRACTITIONER

## 2022-01-01 PROCEDURE — 84443 ASSAY THYROID STIM HORMONE: CPT | Performed by: FAMILY MEDICINE

## 2022-01-01 PROCEDURE — 272N000151 HC KIT CR11

## 2022-01-01 PROCEDURE — 82306 VITAMIN D 25 HYDROXY: CPT | Performed by: FAMILY MEDICINE

## 2022-01-01 PROCEDURE — 36415 COLL VENOUS BLD VENIPUNCTURE: CPT | Performed by: NURSE PRACTITIONER

## 2022-01-01 PROCEDURE — C1769 GUIDE WIRE: HCPCS

## 2022-01-01 PROCEDURE — 250N000009 HC RX 250: Performed by: OTOLARYNGOLOGY

## 2022-01-01 PROCEDURE — 97166 OT EVAL MOD COMPLEX 45 MIN: CPT | Mod: GO | Performed by: OCCUPATIONAL THERAPIST

## 2022-01-01 PROCEDURE — 94729 DIFFUSING CAPACITY: CPT

## 2022-01-01 PROCEDURE — 94375 RESPIRATORY FLOW VOLUME LOOP: CPT | Performed by: INTERNAL MEDICINE

## 2022-01-01 PROCEDURE — 99153 MOD SED SAME PHYS/QHP EA: CPT

## 2022-01-01 PROCEDURE — 99214 OFFICE O/P EST MOD 30 MIN: CPT | Mod: 25 | Performed by: FAMILY MEDICINE

## 2022-01-01 PROCEDURE — 80061 LIPID PANEL: CPT | Performed by: FAMILY MEDICINE

## 2022-01-01 PROCEDURE — G0378 HOSPITAL OBSERVATION PER HR: HCPCS

## 2022-01-01 PROCEDURE — 99219 PR INITIAL OBSERVATION CARE,LEVEL II: CPT | Mod: GC | Performed by: PSYCHIATRY & NEUROLOGY

## 2022-01-01 PROCEDURE — 77063 BREAST TOMOSYNTHESIS BI: CPT | Mod: TC | Performed by: RADIOLOGY

## 2022-01-01 PROCEDURE — 93306 TTE W/DOPPLER COMPLETE: CPT | Mod: 26 | Performed by: INTERNAL MEDICINE

## 2022-01-01 PROCEDURE — 99152 MOD SED SAME PHYS/QHP 5/>YRS: CPT

## 2022-01-01 PROCEDURE — G0439 PPPS, SUBSEQ VISIT: HCPCS | Performed by: FAMILY MEDICINE

## 2022-01-01 PROCEDURE — 85025 COMPLETE CBC W/AUTO DIFF WBC: CPT | Performed by: FAMILY MEDICINE

## 2022-01-01 PROCEDURE — 92507 TX SP LANG VOICE COMM INDIV: CPT | Mod: GN | Performed by: SPEECH-LANGUAGE PATHOLOGIST

## 2022-01-01 PROCEDURE — 80048 BASIC METABOLIC PNL TOTAL CA: CPT | Performed by: STUDENT IN AN ORGANIZED HEALTH CARE EDUCATION/TRAINING PROGRAM

## 2022-01-01 PROCEDURE — 95887 MUSC TST DONE W/N TST NONEXT: CPT | Performed by: PSYCHIATRY & NEUROLOGY

## 2022-01-01 PROCEDURE — 250N000013 HC RX MED GY IP 250 OP 250 PS 637: Performed by: STUDENT IN AN ORGANIZED HEALTH CARE EDUCATION/TRAINING PROGRAM

## 2022-01-01 PROCEDURE — 77067 SCR MAMMO BI INCL CAD: CPT | Mod: TC | Performed by: RADIOLOGY

## 2022-01-01 PROCEDURE — 84100 ASSAY OF PHOSPHORUS: CPT | Performed by: STUDENT IN AN ORGANIZED HEALTH CARE EDUCATION/TRAINING PROGRAM

## 2022-01-01 PROCEDURE — 96374 THER/PROPH/DIAG INJ IV PUSH: CPT

## 2022-01-01 PROCEDURE — 93306 TTE W/DOPPLER COMPLETE: CPT

## 2022-01-01 PROCEDURE — 83735 ASSAY OF MAGNESIUM: CPT | Performed by: FAMILY MEDICINE

## 2022-01-01 PROCEDURE — 85610 PROTHROMBIN TIME: CPT | Performed by: NURSE PRACTITIONER

## 2022-01-01 PROCEDURE — 86235 NUCLEAR ANTIGEN ANTIBODY: CPT

## 2022-01-01 PROCEDURE — U0003 INFECTIOUS AGENT DETECTION BY NUCLEIC ACID (DNA OR RNA); SEVERE ACUTE RESPIRATORY SYNDROME CORONAVIRUS 2 (SARS-COV-2) (CORONAVIRUS DISEASE [COVID-19]), AMPLIFIED PROBE TECHNIQUE, MAKING USE OF HIGH THROUGHPUT TECHNOLOGIES AS DESCRIBED BY CMS-2020-01-R: HCPCS

## 2022-01-01 PROCEDURE — 258N000003 HC RX IP 258 OP 636: Performed by: NURSE PRACTITIONER

## 2022-01-01 PROCEDURE — 95885 MUSC TST DONE W/NERV TST LIM: CPT | Performed by: PSYCHIATRY & NEUROLOGY

## 2022-01-01 PROCEDURE — 80053 COMPREHEN METABOLIC PANEL: CPT | Performed by: FAMILY MEDICINE

## 2022-01-01 PROCEDURE — 99204 OFFICE O/P NEW MOD 45 MIN: CPT | Performed by: STUDENT IN AN ORGANIZED HEALTH CARE EDUCATION/TRAINING PROGRAM

## 2022-01-01 PROCEDURE — 70491 CT SOFT TISSUE NECK W/DYE: CPT

## 2022-01-01 PROCEDURE — 94726 PLETHYSMOGRAPHY LUNG VOLUMES: CPT

## 2022-01-01 PROCEDURE — 999N000132 HC STATISTIC PP CARE STAGE 1

## 2022-01-01 PROCEDURE — 36415 COLL VENOUS BLD VENIPUNCTURE: CPT

## 2022-01-01 PROCEDURE — 36415 COLL VENOUS BLD VENIPUNCTURE: CPT | Performed by: STUDENT IN AN ORGANIZED HEALTH CARE EDUCATION/TRAINING PROGRAM

## 2022-01-01 PROCEDURE — 250N000011 HC RX IP 250 OP 636: Performed by: PHYSICIAN ASSISTANT

## 2022-01-01 PROCEDURE — 250N000011 HC RX IP 250 OP 636: Performed by: STUDENT IN AN ORGANIZED HEALTH CARE EDUCATION/TRAINING PROGRAM

## 2022-01-01 PROCEDURE — 99205 OFFICE O/P NEW HI 60 MIN: CPT | Performed by: STUDENT IN AN ORGANIZED HEALTH CARE EDUCATION/TRAINING PROGRAM

## 2022-01-01 PROCEDURE — 36415 COLL VENOUS BLD VENIPUNCTURE: CPT | Performed by: FAMILY MEDICINE

## 2022-01-01 PROCEDURE — 255N000002 HC RX 255 OP 636: Performed by: RADIOLOGY

## 2022-01-01 PROCEDURE — 91306 COVID-19,PF,MODERNA (18+ YRS BOOSTER .25ML): CPT | Performed by: FAMILY MEDICINE

## 2022-01-01 PROCEDURE — 99214 OFFICE O/P EST MOD 30 MIN: CPT | Performed by: STUDENT IN AN ORGANIZED HEALTH CARE EDUCATION/TRAINING PROGRAM

## 2022-01-01 PROCEDURE — 0064A COVID-19,PF,MODERNA (18+ YRS BOOSTER .25ML): CPT | Performed by: FAMILY MEDICINE

## 2022-01-01 PROCEDURE — 97535 SELF CARE MNGMENT TRAINING: CPT | Mod: GO | Performed by: OCCUPATIONAL THERAPIST

## 2022-01-01 PROCEDURE — U0005 INFEC AGEN DETEC AMPLI PROBE: HCPCS

## 2022-01-01 PROCEDURE — 80048 BASIC METABOLIC PNL TOTAL CA: CPT | Performed by: NURSE PRACTITIONER

## 2022-01-01 PROCEDURE — 94010 BREATHING CAPACITY TEST: CPT

## 2022-01-01 PROCEDURE — 250N000011 HC RX IP 250 OP 636: Performed by: NURSE PRACTITIONER

## 2022-01-01 PROCEDURE — 999N000157 HC STATISTIC RCP TIME EA 10 MIN

## 2022-01-01 RX ORDER — NALOXONE HYDROCHLORIDE 0.4 MG/ML
0.4 INJECTION, SOLUTION INTRAMUSCULAR; INTRAVENOUS; SUBCUTANEOUS
Status: DISCONTINUED | OUTPATIENT
Start: 2022-01-01 | End: 2022-01-01 | Stop reason: HOSPADM

## 2022-01-01 RX ORDER — IODIXANOL 320 MG/ML
50 INJECTION, SOLUTION INTRAVASCULAR ONCE
Status: COMPLETED | OUTPATIENT
Start: 2022-01-01 | End: 2022-01-01

## 2022-01-01 RX ORDER — ACETAMINOPHEN 325 MG/1
650 TABLET ORAL EVERY 4 HOURS PRN
Status: DISCONTINUED | OUTPATIENT
Start: 2022-01-01 | End: 2022-01-01 | Stop reason: HOSPADM

## 2022-01-01 RX ORDER — IBUPROFEN 200 MG
400 TABLET ORAL EVERY 6 HOURS PRN
Status: DISCONTINUED | OUTPATIENT
Start: 2022-01-01 | End: 2022-01-01 | Stop reason: HOSPADM

## 2022-01-01 RX ORDER — LIDOCAINE HYDROCHLORIDE 20 MG/ML
JELLY TOPICAL ONCE
Status: COMPLETED | OUTPATIENT
Start: 2022-01-01 | End: 2022-01-01

## 2022-01-01 RX ORDER — CEFAZOLIN SODIUM 2 G/100ML
2 INJECTION, SOLUTION INTRAVENOUS
Status: COMPLETED | OUTPATIENT
Start: 2022-01-01 | End: 2022-01-01

## 2022-01-01 RX ORDER — RILUZOLE 50 MG/1
TABLET, FILM COATED ORAL
Qty: 60 TABLET | Refills: 2 | Status: SHIPPED | OUTPATIENT
Start: 2022-01-01 | End: 2023-01-01

## 2022-01-01 RX ORDER — ONDANSETRON 2 MG/ML
4 INJECTION INTRAMUSCULAR; INTRAVENOUS EVERY 6 HOURS PRN
Status: DISCONTINUED | OUTPATIENT
Start: 2022-01-01 | End: 2022-01-01 | Stop reason: HOSPADM

## 2022-01-01 RX ORDER — NALOXONE HYDROCHLORIDE 0.4 MG/ML
0.2 INJECTION, SOLUTION INTRAMUSCULAR; INTRAVENOUS; SUBCUTANEOUS
Status: DISCONTINUED | OUTPATIENT
Start: 2022-01-01 | End: 2022-01-01 | Stop reason: HOSPADM

## 2022-01-01 RX ORDER — MELOXICAM 7.5 MG/1
15 TABLET ORAL DAILY
Status: DISCONTINUED | OUTPATIENT
Start: 2022-01-01 | End: 2022-01-01 | Stop reason: HOSPADM

## 2022-01-01 RX ORDER — SODIUM CHLORIDE 9 MG/ML
INJECTION, SOLUTION INTRAVENOUS CONTINUOUS
Status: DISCONTINUED | OUTPATIENT
Start: 2022-01-01 | End: 2022-01-01 | Stop reason: HOSPADM

## 2022-01-01 RX ORDER — DESOXIMETASONE 2.5 MG/G
1 CREAM TOPICAL PRN
COMMUNITY
Start: 2021-11-09

## 2022-01-01 RX ORDER — ONDANSETRON 4 MG/1
4 TABLET, ORALLY DISINTEGRATING ORAL EVERY 6 HOURS PRN
Status: DISCONTINUED | OUTPATIENT
Start: 2022-01-01 | End: 2022-01-01 | Stop reason: HOSPADM

## 2022-01-01 RX ORDER — RILUZOLE 50 MG/1
50 TABLET, FILM COATED ORAL EVERY 12 HOURS
Status: DISCONTINUED | OUTPATIENT
Start: 2022-01-01 | End: 2022-01-01 | Stop reason: HOSPADM

## 2022-01-01 RX ORDER — ASPIRIN 81 MG
1 TABLET,CHEWABLE ORAL DAILY PRN
COMMUNITY
End: 2023-01-01

## 2022-01-01 RX ORDER — FENTANYL CITRATE 50 UG/ML
25-50 INJECTION, SOLUTION INTRAMUSCULAR; INTRAVENOUS EVERY 5 MIN PRN
Status: DISCONTINUED | OUTPATIENT
Start: 2022-01-01 | End: 2022-01-01 | Stop reason: HOSPADM

## 2022-01-01 RX ORDER — ALBUTEROL SULFATE 90 UG/1
2 AEROSOL, METERED RESPIRATORY (INHALATION) EVERY 6 HOURS PRN
Qty: 18 G | Refills: 3 | Status: SHIPPED | OUTPATIENT
Start: 2022-01-01 | End: 2023-01-01

## 2022-01-01 RX ORDER — DIPHENHYDRAMINE HYDROCHLORIDE AND LIDOCAINE HYDROCHLORIDE AND ALUMINUM HYDROXIDE AND MAGNESIUM HYDRO
10 KIT EVERY 6 HOURS PRN
Status: DISCONTINUED | OUTPATIENT
Start: 2022-01-01 | End: 2022-01-01 | Stop reason: HOSPADM

## 2022-01-01 RX ORDER — LIDOCAINE 40 MG/G
CREAM TOPICAL
Status: DISCONTINUED | OUTPATIENT
Start: 2022-01-01 | End: 2022-01-01 | Stop reason: HOSPADM

## 2022-01-01 RX ORDER — FOLIC ACID 1 MG/1
1 TABLET ORAL DAILY
Status: DISCONTINUED | OUTPATIENT
Start: 2022-01-01 | End: 2022-01-01 | Stop reason: HOSPADM

## 2022-01-01 RX ORDER — IOPAMIDOL 755 MG/ML
500 INJECTION, SOLUTION INTRAVASCULAR ONCE
Status: COMPLETED | OUTPATIENT
Start: 2022-01-01 | End: 2022-01-01

## 2022-01-01 RX ORDER — LOSARTAN POTASSIUM 25 MG/1
25 TABLET ORAL DAILY
Qty: 90 TABLET | Refills: 3 | Status: ON HOLD | OUTPATIENT
Start: 2022-01-01 | End: 2022-01-01

## 2022-01-01 RX ORDER — FLUMAZENIL 0.1 MG/ML
0.2 INJECTION, SOLUTION INTRAVENOUS
Status: DISCONTINUED | OUTPATIENT
Start: 2022-01-01 | End: 2022-01-01 | Stop reason: HOSPADM

## 2022-01-01 RX ORDER — FLUTICASONE PROPIONATE AND SALMETEROL 113; 14 UG/1; UG/1
1 POWDER, METERED RESPIRATORY (INHALATION) 2 TIMES DAILY
Qty: 1 EACH | Refills: 3 | Status: SHIPPED | OUTPATIENT
Start: 2022-01-01 | End: 2022-01-01

## 2022-01-01 RX ORDER — RILUZOLE 50 MG/1
50 TABLET, FILM COATED ORAL EVERY 12 HOURS
Qty: 90 TABLET | Refills: 0 | Status: SHIPPED | OUTPATIENT
Start: 2022-01-01 | End: 2022-01-01

## 2022-01-01 RX ADMIN — LIDOCAINE HYDROCHLORIDE: 20 JELLY TOPICAL at 12:03

## 2022-01-01 RX ADMIN — SODIUM CHLORIDE: 9 INJECTION, SOLUTION INTRAVENOUS at 10:06

## 2022-01-01 RX ADMIN — FENTANYL CITRATE 50 MCG: 50 INJECTION, SOLUTION INTRAMUSCULAR; INTRAVENOUS at 11:50

## 2022-01-01 RX ADMIN — SODIUM CHLORIDE 65 ML: 9 INJECTION, SOLUTION INTRAVENOUS at 13:02

## 2022-01-01 RX ADMIN — FOLIC ACID 1 MG: 1 TABLET ORAL at 08:29

## 2022-01-01 RX ADMIN — MELOXICAM 15 MG: 7.5 TABLET ORAL at 08:30

## 2022-01-01 RX ADMIN — ACETAMINOPHEN 650 MG: 325 TABLET, FILM COATED ORAL at 08:29

## 2022-01-01 RX ADMIN — GLUCAGON HYDROCHLORIDE 1 MG: KIT at 11:51

## 2022-01-01 RX ADMIN — MIDAZOLAM 1 MG: 1 INJECTION INTRAMUSCULAR; INTRAVENOUS at 11:50

## 2022-01-01 RX ADMIN — CHOLECALCIFEROL (VITAMIN D3) 10 MCG (400 UNIT) TABLET 10 MCG: at 08:29

## 2022-01-01 RX ADMIN — ACETAMINOPHEN 650 MG: 325 TABLET, FILM COATED ORAL at 17:06

## 2022-01-01 RX ADMIN — MIDAZOLAM 0.5 MG: 1 INJECTION INTRAMUSCULAR; INTRAVENOUS at 11:55

## 2022-01-01 RX ADMIN — FENTANYL CITRATE 25 MCG: 50 INJECTION, SOLUTION INTRAMUSCULAR; INTRAVENOUS at 11:58

## 2022-01-01 RX ADMIN — ONDANSETRON 4 MG: 2 INJECTION INTRAMUSCULAR; INTRAVENOUS at 14:12

## 2022-01-01 RX ADMIN — IODIXANOL 10 ML: 320 INJECTION, SOLUTION INTRAVASCULAR at 12:25

## 2022-01-01 RX ADMIN — FENTANYL CITRATE 25 MCG: 50 INJECTION, SOLUTION INTRAMUSCULAR; INTRAVENOUS at 11:55

## 2022-01-01 RX ADMIN — ACETAMINOPHEN 650 MG: 325 TABLET, FILM COATED ORAL at 23:47

## 2022-01-01 RX ADMIN — MIDAZOLAM 0.5 MG: 1 INJECTION INTRAMUSCULAR; INTRAVENOUS at 11:58

## 2022-01-01 RX ADMIN — LIDOCAINE HYDROCHLORIDE: 20 JELLY TOPICAL at 12:12

## 2022-01-01 RX ADMIN — IOPAMIDOL 80 ML: 755 INJECTION, SOLUTION INTRAVENOUS at 13:01

## 2022-01-01 RX ADMIN — RILUZOLE 50 MG: 50 TABLET ORAL at 05:55

## 2022-01-01 RX ADMIN — RILUZOLE 50 MG: 50 TABLET ORAL at 19:45

## 2022-01-01 RX ADMIN — CEFAZOLIN SODIUM 2 G: 2 INJECTION, SOLUTION INTRAVENOUS at 10:06

## 2022-01-01 RX ADMIN — LIDOCAINE HYDROCHLORIDE 10 ML: 10 INJECTION, SOLUTION EPIDURAL; INFILTRATION; INTRACAUDAL; PERINEURAL at 11:57

## 2022-01-01 ASSESSMENT — ENCOUNTER SYMPTOMS
PARESTHESIAS: 0
CHILLS: 0
HEARTBURN: 0
DYSURIA: 0
DYSURIA: 0
FREQUENCY: 0
BREAST MASS: 0
PALPITATIONS: 0
HEMATURIA: 0
NAUSEA: 0
HEMATOCHEZIA: 0
NAUSEA: 0
PALPITATIONS: 0
DIZZINESS: 0
DIARRHEA: 0
DIARRHEA: 0
NERVOUS/ANXIOUS: 0
CONSTIPATION: 0
EYE PAIN: 0
ABDOMINAL PAIN: 0
FEVER: 0
COUGH: 0
CHILLS: 0
PARESTHESIAS: 0
WEAKNESS: 1
DIZZINESS: 0
HEADACHES: 0
FREQUENCY: 0
CONSTIPATION: 0
SHORTNESS OF BREATH: 1
MYALGIAS: 0
ABDOMINAL PAIN: 0
BREAST MASS: 0
HEMATURIA: 0
SORE THROAT: 0
SORE THROAT: 0
ARTHRALGIAS: 1
ARTHRALGIAS: 1
MYALGIAS: 0
NERVOUS/ANXIOUS: 0
HEMATOCHEZIA: 0
COUGH: 0
HEARTBURN: 0
EYE PAIN: 0
SHORTNESS OF BREATH: 1
JOINT SWELLING: 1
WEAKNESS: 1
FEVER: 0
HEADACHES: 0
JOINT SWELLING: 1

## 2022-01-01 ASSESSMENT — ACTIVITIES OF DAILY LIVING (ADL)
ADLS_ACUITY_SCORE: 38
ADLS_ACUITY_SCORE: 38
CURRENT_FUNCTION: NO ASSISTANCE NEEDED
ADLS_ACUITY_SCORE: 38
ADLS_ACUITY_SCORE: 35
ADLS_ACUITY_SCORE: 36
ADLS_ACUITY_SCORE: 36
ADLS_ACUITY_SCORE: 38
ADLS_ACUITY_SCORE: 36
ADLS_ACUITY_SCORE: 38
DEPENDENT_IADLS:: TRANSPORTATION
ADLS_ACUITY_SCORE: 38
CURRENT_FUNCTION: NO ASSISTANCE NEEDED
ADLS_ACUITY_SCORE: 38

## 2022-01-01 ASSESSMENT — REVISED AMYOTROPHIC LATERAL SCLEROSIS FUNCTIONAL RATING SCALE (ALSFRS-R)
SWALLOWING: 2
HANDWRITING: 4
SALIVATION: SLIGHT BUT DEFINITE EXCESS OF SALIVA IN MOUTH; MAY HAVE NIGHTTIME DROOLING
SALIVATION: MARKED EXCESS OF SALIVA WITH SOME DROOLING
RESPIRATORY_INSUFFICIENCY: 4
TURNING_IN_BED_AND_ADJUSTING_BED_CLOTHES: NORMAL
RESPIRATORY_INSUFFICIENCY: 3
ORTHOPENA: 3
WALKING: 4
RESPIRATORY_SUBTOTAL_SCORE: 9
SWALLOWING: 3
DRESSING_AND_HYGEINE: 3
SALIVATION: 1
CLIMBING_STAIRS: NORMAL
DRESSING_AND_HYGEINE: INDEPENDENT AND COMPLETE SELF-CARE WITH EFFORT OR DECREASED EFFICIENCY
SWALLOWING: EARLY EATING PROBLEMS - OCCASIONAL CHOKING
ORTHOPENA: SOME DIFFICULTY SLEEPING AT NIGHT DUE TO SHORTNESS OF BREATH, DOES NOT ROUTINELY USE MORE THAN TWO PILLOWS
CLIMBING_STAIRS: 4
FINE_MOTOR_SUBTOTAL_SCORE: 12
GROSS_MOTOR_SUBTOTAL_SCORE: 11
SPEECH: DETECTABLE SPEECH DISTURBANCES
DYSPNEA: OCCURS WHEN WALKING
SPEECH: 3
BULBAR_SUBTOTAL: 5
DRESSING_AND_HYGEINE: NORMAL FUNCTION
BULBAR_SUBTOTAL: 9
FINE_MOTOR_SUBTOTAL_SCORE: 9
SPEECH: INTELLIGIBLE WITH REPEATING
RESPIRATORY_SUBTOTAL_SCORE: 10
HANDWRITING: 3
TURNING_IN_BED_AND_ADJUSTING_BED_CLOTHES: SOMEWHAT SLOW AND CLUMSY, BUT NO HELP NEEDED
WALKING: NORMAL
CUTTING_FOOD_AND_HANDLING_UTENSILES: 4
CLIMBING_STAIRS: SLOW
DYSPNEA: 3
WALKING: 4
DYSPNEA: 3
HANDWRITING: SLOW OR SLOPPY: ALL WORDS ARE LEGIBLE
SPEECH: 2
CLIMBING_STAIRS: 3
TURNING_IN_BED_AND_ADJUSTING_BED_CLOTHES: 3
ORTHOPENA: 3
ORTHOPENA: SOME DIFFICULTY SLEEPING AT NIGHT DUE TO SHORTNESS OF BREATH, DOES NOT ROUTINELY USE MORE THAN TWO PILLOWS
TURNING_IN_BED_AND_ADJUSTING_BED_CLOTHES: 4
SIX_ITEM_SUBTOTAL: 19
WALKING: NORMAL
SWALLOWING: DIETARY CONSISTENCY CHANGES
DRESSING_AND_HYGEINE: 4
ALSFRS_TOTAL_SCORE: 39
DYSPNEA: OCCURS WHEN WALKING
SIX_ITEM_SUBTOTAL: 21
CUTTING_FOOD_AND_HANDLING_UTENSILES: 3
ALSFRS_TOTAL_SCORE: 37
HANDWRITING: NORMAL
GROSS_MOTOR_SUBTOTAL_SCORE: 11
RESPIRATORY_INSUFFICIENCY: INTERMITTENT USE OF NIPPV
SALIVATION: 3

## 2022-01-01 ASSESSMENT — PAIN SCALES - GENERAL: PAINLEVEL: NO PAIN (0)

## 2022-03-02 PROBLEM — R47.89 ALTERATION IN SPEECH: Status: ACTIVE | Noted: 2022-01-01

## 2022-03-02 PROBLEM — G25.2 ACTION TREMOR: Status: ACTIVE | Noted: 2022-01-01

## 2022-03-02 PROBLEM — Z87.891 PERSONAL HISTORY OF TOBACCO USE, PRESENTING HAZARDS TO HEALTH: Status: ACTIVE | Noted: 2022-01-01

## 2022-03-02 PROBLEM — R06.02 SOB (SHORTNESS OF BREATH) ON EXERTION: Status: ACTIVE | Noted: 2022-01-01

## 2022-03-02 PROBLEM — R06.09 DOE (DYSPNEA ON EXERTION): Status: ACTIVE | Noted: 2022-01-01

## 2022-03-02 NOTE — PATIENT INSTRUCTIONS
[facility]  [address] [place] [address]  Office: [phone]   Fax:    [phone]       For possible COVID-19 novel coronavirus vaccine or other vaccine mild reactions not related to allergies:   I would not take the Ibuprofen prior to your shot, but rather afterward.  No prescription steroids within 2 weeks of the shots as they can suppress your immune system, but Ibuprofen is not a significant immunosuppressant at the 400mg dosage.   Ok to take tylenol 2-325 or 2-500mg tabs prior to vaccine.  Ok to also take claritin (a non-sedating anti-histamine) 10 mg daily for 2 days and 2 otc Ibuprofen every 4-6 hours while awake for the next 36 hours and I had no problems.  There has been some controversy about whether to take Ibuprofen 400mg by mouth every 4-5 hours or naproxen 220mg by mouth twice daily  ,but nothing concrete from the CDC or WHO to recommend against taking those medications.  If you do take them, take them with food so they don't irritate your stomach.  You can do the above regimen for 2-3 after your first,  Second, or third  shots to decrease the possibility of achiness, fever, chills after your COVID-19 novel coronavirus vaccines.      Hope that helps!   Sincerely,  Beverley Maloney MD           Patient Education     Prevention Guidelines, Women Ages 65 and Older  Screening tests and vaccines are an important part of managing your health. A screening test is done to find possible disorders or diseases in people who don't have any symptoms. The goal is to find a disease early so lifestyle changes can be made and you can be watched more closely to reduce the risk of disease, or to detect it early enough to treat it most effectively. Screening tests are not considered diagnostic, but are used to determine if more testing is needed. Health counseling is essential, too. Below are guidelines for these, for women ages 65 and older. Talk with your healthcare  provider to make sure you re up to date on what you need.  Screening Who needs it How often   Type 2 diabetes or prediabetes All women beginning at age 45 and women without symptoms at any age who are overweight or obese and have 1 or more additional risk factors for diabetes At least every 3 years   Type 2 diabetes All women with prediabetes Every year   Unhealthy alcohol use All women in this age group At routine exams   Blood pressure All women in this age group Yearly checkup if your blood pressure is normal  Normal blood pressure is less than 120/80 mm Hg  If your blood pressure reading is higher than normal, follow the advice of your healthcare provider   Breast cancer All women of average risk Mammograms should be done every 1 or 2 years. Talk with your healthcare provider about your risk factors and how often you need the test and for how long.   Cervical cancer Only women who had abnormal screening results before age 65 Talk with your healthcare provider   Chlamydia Women at increased risk for infection At routine exams   Colorectal cancer All women at average risk in this age group through age 75 who are in good health. For women ages 76 to 85, talk with your healthcare provider about whether to continue screening. For women 85 and older, screening is not needed. Multiple tests are available and are used at different times. Possible tests include:    Flexible sigmoidoscopy every 5 years, or    Colonoscopy every 10 years, or    CT colonography (virtual colonoscopy) every 5 years, or    Yearly fecal occult blood test, or    Yearly fecal immunochemical test every year, or    Stool DNA test, every 3 years  If you choose a test other than a colonoscopy and have an abnormal test result, you will need to follow-up with a colonoscopy. Talk with your healthcare provider which test is best for you.  Some people should be screened using a different schedule because of their personal or family health history. Talk  with your healthcare provider about your health history.   Depression All women in this age group At routine exams   Gonorrhea Sexually active women at increased risk for infection At yearly routine exams   Hepatitis C Anyone at increased risk; 1 time for those born between 1945 and 1965 At routine exams   High cholesterol or triglycerides All women in this age group who are at risk for coronary artery disease At least every 5 years; talk with your healthcare provider about your risk   HIV Women at increased risk for infection At routine exams; talk with your healthcare provider about your risk   Lung cancer Adults ages 55 to 74 who are in fairly good health and are at higher risk for lung cancer    Currently smoke or have quit within the past 15 years    30-pack year smoking history  Eligibility criteria and age limit (possibly up to age 80) may vary across major organizations Yearly lung cancer screening with a low dose CT scan (LDCT); talk with your healthcare provider   Obesity All women in this age group At yearly routine exams   Osteoporosis All women in this age group Routinely done every 2 years, but repeat as advised by your healthcare provider   Syphilis Women at increased risk for infection At routine exams; talk with your healthcare provider   Thyroid-stimulating hormone (TSH) Only women in this age group with symptoms of thyroid dysfunction Talk with your healthcare provider   Tuberculosis Women at increased risk for infection Talk with your healthcare provider   Vision All women in this age group Every 1 to 2 years; if you have a chronic health condition, ask your healthcare provider if you need exams more often   Vaccine Who needs it How often   Chickenpox (varicella) All women in this age group who have no record of this infection or vaccine 2 doses; second dose should be given at least 4 weeks after the first dose   Hepatitis A Women at increased risk for infection 2 or 3 doses (depending on the  vaccine) given at least 6 months apart; talk with your healthcare provider   Hepatitis B Women at increased risk for infection 2 or 3 doses (depending on the vaccine); second dose should be given 1 month after the first dose; if a the third dose, it should be given at least 2 months after the second dose and at least 4 months after the first dose   Haemophilus influenza type B (HIB) Women at increased risk for infection 1 to 3 doses; talk with your healthcare provider   Influenza (flu) All women in this age group Once a year   Pneumococcal conjugate vaccine (PCV13) and pneumococcal polysaccharide vaccine (PPSV23) All women in this age group  PPSV 23: women who have not been vaccinated or have not had infection  PCV 13: women at increased risk for infection PPSV: 1 dose at age 65 or older  PCV 13: 1 dose at age 65 or older; talk with your healthcare provider   Tetanus/diphtheria/pertussis (Td/Tdap) booster All women in this age group Td every 10 years, or a 1-time dose of Tdap instead of a Td booster after age 18, then Td every 10 years   Zoster (shingles) All women in this age group 2 vaccines are available:    Recombinant zoster vaccine (RZV; Shigrix) is recommended as the preferred shingles vaccine. It's given in a series of 2 doses. The 2nd dose is given 2 to 6 months after the first. This is given even if you've had shingles before or had a previous zoster live vaccine.    Zoster live vaccine live (ZVL; Zostavax) may be given to healthy adults over age 60. It's given in one dose.   Counseling Who needs it How often   Diet and exercise Women who are overweight or obese When diagnosed, and then at routine exams   Fall prevention (exercise and vitamin D supplements) All women in this age group At yearly routine exams   Sexually transmitted infection prevention Women at increased risk for infection-talk with your healthcare provider At routine exams   Use of daily aspirin Women up to age 70 who are at high risk for  cardiovascular problems and not at increased risk for bleeding as identified by your healthcare provider When your risk is known   Use of tobacco and the health effects it can cause All women in this age group Every exam   [employer] last reviewed this educational content on 7/1/2020 2000-2021 [employer]. All rights reserved. This information is not intended as a substitute for professional medical care. Always follow your healthcare professional's instructions.         Thank you so much or choosing [facility]  for your Health Care. It was a pleasure seeing you at your visit today! Please contact us with any questions or concerns you may have.                   Beverley Maloney MD                              To reach your [facility] care team after hours call:   [phone]    PLEASE NOTE OUR HOURS HAVE CHANGED secondary to COVID-19 coronavirus pandemic, as we are trying to minimize patient exposure to the virus,  which is now widespread in the state.  These hours may change with very little notice.  We apologize for any inconvenience.       Our current clinic hours are:          Monday- Thursday   7:00am - 6:00pm  in person.      Friday  7:00am- 5:00pm                       Saturday and Sunday : Closed to in person and virtual visits        We have telephone and virtual visit times available between    7:00am - 6pm on Monday-Friday as well.                                                Phone:  [phone]      Our pharmacy hours: Monday through Friday 8:00am to 5:00pm                        Saturday - 9:00 am to 12 noon       Sunday : Closed.              Phone:  [phone]              ###  Please note: at this time we are not accepting any walk-in visits. ###      There is also information available at our web site:  [email or web address]    If your provider ordered any lab tests and you do not receive the results within 10 business  days, please call the clinic.    If you need a medication refill please contact your pharmacy.  Please allow 3 business days for your refill to be completed.    Our clinic offers telephone visits and e visits.  Please ask one of your team members to explain more.      Use [employer] (secure email communication and access to your chart) to send your primary care provider a message or make an appointment. Ask someone on your Team how to sign up for [employer].

## 2022-03-02 NOTE — PROGRESS NOTES
"SUBJECTIVE:   Natasha Allison is a 71 year old female who presents for Preventive Visit and the following other medical problems:     1. Encounter for Medicare annual wellness exam    2. Action tremor    3. Voice tremor    4. FORDE (dyspnea on exertion)    5. Essential hypertension with goal blood pressure less than 140/90- gets a little elevated for 1-2 days after MTX administration- needs labs and refills     6. Rheumatoid arthritis involving multiple sites with positive rheumatoid factor (H)- sees  \"Tip\" =Dr. Benito Garcia MD - at [facility]     7. Vitamin D deficiency- needs labs rechecked     8. CARDIOVASCULAR SCREENING; LDL GOAL LESS THAN 130    9. Alteration in speech    10. SOB (shortness of breath) on exertion    11. Compound heterozygous hemochromatosis type 1 (H) - H63D type -- doesn't predispose pt to iron overload     12. High priority for COVID-19 vaccination    13. Visit for screening mammogram    14. Personal history of tobacco use, presenting hazards to health    15. Essential hypertension with goal blood pressure less than 140/90- gets a little elevated for 1-2 days after MTX administration- needs labs and med refill               Patient has been advised of split billing requirements and indicates understanding: Yes  Are you in the first 12 months of your Medicare coverage?  No    Healthy Habits:     In general, how would you rate your overall health?  Good    Frequency of exercise:  1 day/week    Duration of exercise:  15-30 minutes    Do you usually eat at least 4 servings of fruit and vegetables a day, include whole grains    & fiber and avoid regularly eating high fat or \"junk\" foods?  No    Taking medications regularly:  Yes    Medication side effects:  None    Ability to successfully perform activities of daily living:  No assistance needed    Home Safety:  No safety concerns identified    Hearing Impairment:  Difficulty understanding soft or whispered speech    In the past 6 " months, have you been bothered by leaking of urine? Yes    In general, how would you rate your overall mental or emotional health?  Good      PHQ-2 Total Score: 0    Additional concerns today:  Yes    Feels like her voice is weaker and more shakey than previous. Seeing ENT and doing physical therapy for that.  She thought it was from the COVID-19 shots.   Also feels shortness of breath with talking and now with going up stairs.  Doesn't feel shortness of breath with going up stairs at home.  She used to walk/run for 2 miles over a 50 minutes to 1 hour period on her home treadmill. She stopped that about a month ago secondary to boredom and feeling isolated. Discussed restarting that for conditioning.     Likes to decorate her how for the seasons and is up and down her stairs a lot, but this year, feels more shortness of breath with stairs than previous.        Do you feel safe in your environment? Yes    Have you ever done Advance Care Planning? (For example, a Health Directive, POLST, or a discussion with a medical provider or your loved ones about your wishes): No, advance care planning information given to patient to review.  Patient declined advance care planning discussion at this time.     Pt cannot recall any hx of TIA.  Her Rheumatoid Arthritis is worsening.  Still seeing her [facility]ologist.        Immunization History   Administered Date(s) Administered     COVID-19,PF,Moderna 03/05/2021, 04/02/2021     Influenza (High Dose) 3 valent vaccine 11/04/2016, 11/14/2017, 09/25/2018, 10/30/2019     Influenza (IIV3) PF 11/13/2003     Influenza Vaccine IM > 6 months Valent IIV4 (Alfuria,Fluzone) 11/14/2017     Influenza, Quad, High Dose, Pf, 65yr+ (Fluzone HD) 10/07/2020, 11/12/2021     Pneumo Conj 13-V (2010&after) 08/16/2013     Pneumococcal 23 valent 12/16/2010, 11/14/2017     TD (ADULT, 7+) 02/06/2008     TDAP Vaccine (Boostrix) 12/18/2012     Zoster vaccine recombinant adjuvanted (SHINGRIX)  10/29/2018     Pt desires to get her Moderna mRNA COVID-19 novel coronavirus booster shot today.        Last colonoscopy was in 2019 - repeat in 5 years = .      Fall risk  Fallen 2 or more times in the past year?: No  Any fall with injury in the past year?: No     Cognitive Screening   1) Repeat 3 items (Leader, Season, Table)    2) Clock draw: NORMAL  3) 3 item recall: Recalls 2 objects   Results: NORMAL clock, 1-2 items recalled: COGNITIVE IMPAIRMENT LESS LIKELY    Mini-CogTM Copyright S Jim. Licensed by the author for use in [facility] [employer]; reprinted with permission (landon@[facility]). All rights reserved.      Do you have sleep apnea, excessive snoring or daytime drowsiness?: yes    Reviewed and updated as needed this visit by clinical staff   Tobacco  Allergies  Meds  Problems  Med Hx  Surg Hx  Fam Hx          Reviewed and updated as needed this visit by Provider   Tobacco  Allergies  Meds  Problems  Med Hx  Surg Hx  Fam Hx         Social History     Tobacco Use     Smoking status: Former Smoker     Packs/day: 0.00     Years: 0.00     Pack years: 0.00     Quit date: 1978     Years since quittin.3     Smokeless tobacco: Never Used   Substance Use Topics     Alcohol use: Yes     Comment: 0-1 QD         Alcohol Use 3/1/2022   Prescreen: >3 drinks/day or >7 drinks/week? Not Applicable   Prescreen: >3 drinks/day or >7 drinks/week? -     Hypertension Follow-up      Do you check your blood pressure regularly outside of the clinic? No     Are you following a low salt diet? Yes    Are your blood pressures ever more than 140 on the top number (systolic) OR more   than 90 on the bottom number (diastolic), for example 140/90? N/a    BP Readings from Last 3 Encounters:   22 132/82   21 132/78   04/15/21 130/80     Creatinine   Date Value Ref Range Status   03/10/2021 0.85 0.52 - 1.04 mg/dL Final     Creatinine POCT   Date Value Ref Range Status   2021 1.0 0.5 - 1.0  "mg/dL Final     Pt is fasting this am for her cholesterol, etc.    Does feels a bit shakey at times if she doesn't eat regularly.  No hx of hypoglycemia.        Current providers sharing in care for this patient include:   Patient Care Team:  Beverley Maloney MD as PCP - General (Family Practice)  Beverley Maloney MD as Assigned PCP    The following health maintenance items are reviewed in Epic and correct as of today:  Health Maintenance Due   Topic Date Due     COVID-19 Vaccine (3 - Moderna risk 4-dose series) 04/30/2021     FALL RISK ASSESSMENT  02/24/2022     CMP  03/10/2022     MICROALBUMIN  03/10/2022     CBC W/DIFFERENTIAL  03/10/2022     VITAMIN D  03/10/2022     Lab work is in process  Labs reviewed in EPIC  BP Readings from Last 3 Encounters:   03/02/22 132/82   08/19/21 132/78   04/15/21 130/80    Wt Readings from Last 3 Encounters:   03/02/22 59.4 kg (131 lb)   08/19/21 64.9 kg (143 lb)   04/15/21 68 kg (150 lb)                  Patient Active Problem List   Diagnosis     Rheumatoid arthritis involving multiple sites with positive rheumatoid factor (H)- sees Dr. \"Tip\" =Dr. Benito Garcia MD - at [facility]      CARDIOVASCULAR SCREENING; LDL GOAL LESS THAN 130     Advanced directives, counseling/discussion     Essential hypertension with goal blood pressure less than 140/90- gets a little elevated for 1-2 days after MTX administration     Corneal clouding- left eye  - since childhood shovel to the eye - decreased vision secondary to that - sees [facility] and surgeons      Urinary urgency     Other osteoporosis without current pathological fracture     Family history of hemochromatosis- son     Age-related osteoporosis without current pathological fracture     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 increased anxiety- since conmittant dosing of Prolia and " "remicade at her rheumatologist's office 10/9/2018     Vasomotor rhinitis     Vitamin D deficiency     Perioral dermatitis- at [facility] - Dr. Villanueva     Past Surgical History:   Procedure Laterality Date     CARPAL TUNNEL RELEASE RT/LT       COLONOSCOPY N/A 3/12/2019    Procedure: COLONOSCOPY;  Surgeon: Jermaine Rock MD;  Location:  GI       Social History     Tobacco Use     Smoking status: Former Smoker     Packs/day: 0.00     Years: 0.00     Pack years: 0.00     Quit date: 1978     Years since quittin.3     Smokeless tobacco: Never Used   Substance Use Topics     Alcohol use: Yes     Comment: 0-1 QD     Family History   Problem Relation Age of Onset     Hypertension Mother      Hypertension Father      Heart Disease Father      Intellectual Disability (Mental Retardation) Brother      Hypertension Sister      Hypertension Son      Hypertension Daughter      Colon Cancer Brother      Hypertension Brother      Hypertension Brother      Hypertension Brother      Hypertension Sister      Hypertension Sister          Current Outpatient Medications   Medication Sig Dispense Refill     B-D TB SYRINGE 27G X 1/2\" 1 ML MISC USE FOR METHOTREXATE INJECTION       Calcium Carb-Cholecalciferol (CALCIUM 500 + D3) 500-200 MG-UNIT TABS Take 1 tablet by mouth 2 times daily       folic acid (FOLVITE) 1 MG tablet Take 1 tablet (1 mg) by mouth daily 100 tablet 3     inFLIXimab-dyyb (INFLECTRA) 100 MG injection Inject into the vein once       losartan (COZAAR) 25 MG tablet Take 1 tablet (25 mg) by mouth daily For blood pressure 90 tablet 3     meloxicam (MOBIC) 15 MG tablet Take 15 mg by mouth daily.       methotrexate sodium, pres-free, 50 MG/2ML SOLN injection CHEMO 20 mg   0     syringe, disposable, 1 ML MISC 1 Syringe once a week. To be used with Methotrexate Injections       Syringe/Needle, Disp, (SYRINGE LUER SLIP) 27G X 1/2\" 1 ML MISC USE FOR METHOTREXATE INJECTION       vitamin D3 (CHOLECALCIFEROL) 10 " MCG (400 UNIT) capsule Take 1 capsule (400 Units) by mouth daily       aspirin 81 MG tablet Take  by mouth daily. (Patient not taking: Reported on 3/2/2022)       Allergies   Allergen Reactions     Alendronic Acid      Severe aching     Latex      Seasonal Allergies Other (See Comments)     Watery eyes, runny nose.     Recent Labs   Lab Test 08/11/21  1323 03/10/21  0824 03/02/21  0000 02/20/20  1109 04/03/19  0906   LDL  --  117*  --  119* 129*   HDL  --  89  --  89 90   TRIG  --  91  --  136 130   ALT  --  21 19 48 23   CR 1.0 0.85 0.90 0.83 0.93   GFRESTIMATED 57* 69 >60 72 63   GFRESTBLACK  --  80  --  83 73   POTASSIUM  --  4.3  --  4.7 4.3   TSH  --  2.76  --  1.87 1.87      Pneumonia Vaccine:Adults age 65+ who received Pneumovax (PPSV23) at 65 years or older: Should be given PCV13 > 1 year after their most recent PPSV23  Mammogram Screening: Mammogram Screening: Recommended mammography every 1-2 years with patient discussion and risk factor consideration  History of abnormal Pap smear: NO - age 65 - see link Cervical Cytology Screening Guidelines        Review of Systems   Constitutional: Negative for chills and fever.   HENT: Negative for congestion, ear pain, hearing loss and sore throat.    Eyes: Negative for pain and visual disturbance.   Respiratory: Positive for shortness of breath. Negative for cough.    Cardiovascular: Negative for chest pain, palpitations and peripheral edema.   Gastrointestinal: Negative for abdominal pain, constipation, diarrhea, heartburn, hematochezia and nausea.   Breasts:  Negative for tenderness, breast mass and discharge.   Genitourinary: Positive for urgency. Negative for dysuria, frequency, genital sores, hematuria, pelvic pain, vaginal bleeding and vaginal discharge.   Musculoskeletal: Positive for arthralgias and joint swelling. Negative for myalgias.   Skin: Negative for rash.   Neurological: Positive for weakness. Negative for dizziness, headaches and paresthesias.  "  Psychiatric/Behavioral: Negative for mood changes. The patient is not nervous/anxious.          OBJECTIVE:   /82   Pulse 84   Temp 97.7  F (36.5  C)   Ht 1.575 m (5' 2\")   Wt 59.4 kg (131 lb)   SpO2 99%   BMI 23.96 kg/m   Estimated body mass index is 23.96 kg/m  as calculated from the following:    Height as of this encounter: 1.575 m (5' 2\").    Weight as of this encounter: 59.4 kg (131 lb).  Physical Exam:   GENERAL APPEARANCE: healthy, alert and no distress, mild vocal tremor is noted.   EYES: Eyes grossly normal to inspection, PERRL and conjunctivae and sclerae normal  HENT: ear canals and TM's normal, nose and mouth without ulcers or lesions, oropharynx clear and oral mucous membranes moist  NECK: no adenopathy, no asymmetry, masses, or scars and thyroid normal to palpation  RESP: lungs clear to auscultation - no rales, rhonchi or wheezes  BREAST: normal without masses, tenderness or nipple discharge and no palpable axillary masses or adenopathy  CV: regular rate and rhythm, normal S1 S2, no S3 or S4, no murmur, click or rub, no peripheral edema and peripheral pulses strong  ABDOMEN: soft, nontender, no hepatosplenomegaly, no masses and bowel sounds normal  MS: no musculoskeletal defects are noted and gait is age appropriate without ataxia  SKIN: no suspicious lesions or rashes  NEURO: Normal strength and tone, sensory exam grossly normal, mentation intact and speech normal, mild action tremor noted on finger -nose-finger testing bilaterally today.   PSYCH: mentation appears normal and affect normal/bright    Diagnostic Test Results:  Labs reviewed in Epic    ASSESSMENT / PLAN:       ICD-10-CM    1. Encounter for Medicare annual wellness exam  Z00.00    2. Action tremor  G25.2 Adult Neurology  Referral     TSH with free T4 reflex     Magnesium     Vitamin B6     OFFICE/OUTPT VISIT,EST,LEVL IV     TSH with free T4 reflex     Magnesium     Vitamin B6   3. Voice tremor  R49.8 Adult Neurology " " Referral     TSH with free T4 reflex     Magnesium     Vitamin B6     OFFICE/OUTPT VISIT,EST,LEVL IV     TSH with free T4 reflex     Magnesium     Vitamin B6   4. FORDE (dyspnea on exertion)  R06.00 Adult Neurology  Referral     Echocardiogram Complete     General PFT Lab (Please always keep checked)     Pulmonary Function Test     OFFICE/OUTPT VISIT,EST,LEVL IV   5. Essential hypertension with goal blood pressure less than 140/90- gets a little elevated for 1-2 days after MTX administration- needs labs and refills   I10 REVIEW OF HEALTH MAINTENANCE PROTOCOL ORDERS     Albumin Random Urine Quantitative with Creat Ratio     CBC with Platelets & Differential     Comprehensive metabolic panel     OFFICE/OUTPT VISIT,EST,LEVL IV     losartan (COZAAR) 25 MG tablet     Albumin Random Urine Quantitative with Creat Ratio     CBC with Platelets & Differential     Comprehensive metabolic panel   6. Rheumatoid arthritis involving multiple sites with positive rheumatoid factor (H)- sees Dr. \"Tip\" =Dr. Benito Garcia MD - at [facility]   M05.79 CBC with Platelets & Differential     OFFICE/OUTPT VISIT,EST,LEVL IV     CBC with Platelets & Differential   7. Vitamin D deficiency- needs labs rechecked   E55.9 25 Hydroxyvitamin D2 and D3     OFFICE/OUTPT VISIT,EST,LEVL IV     25 Hydroxyvitamin D2 and D3   8. CARDIOVASCULAR SCREENING; LDL GOAL LESS THAN 130  Z13.6 Lipid panel reflex to direct LDL Fasting     Lipid panel reflex to direct LDL Fasting   9. Alteration in speech  R47.89 TSH with free T4 reflex     Vitamin B6     OFFICE/OUTPT VISIT,EST,LEVL IV     TSH with free T4 reflex     Vitamin B6   10. SOB (shortness of breath) on exertion  R06.02 Magnesium     OFFICE/OUTPT VISIT,EST,LEVL IV     Magnesium     Adult Pulmonary Medicine Referral   11. Compound heterozygous hemochromatosis type 1 (H) - H63D type -- doesn't predispose pt to iron overload   E83.110    12. High priority for COVID-19 vaccination  " "Z23 COVID-19,PF,MODERNA (18+ YRS BOOSTER .25ML)     VACCINE ADMINISTRATION, INITIAL   13. Visit for screening mammogram  Z12.31 MA Screen Bilateral w/Mohan   14. Personal history of tobacco use, presenting hazards to health  Z87.891 General PFT Lab (Please always keep checked)     Pulmonary Function Test     Adult Pulmonary Medicine Referral   15. Essential hypertension with goal blood pressure less than 140/90- gets a little elevated for 1-2 days after MTX administration- needs labs and med refill   I10      Was a smoker  Many years ago. Quit in 1978 afer 18 years of smoking 0.75 ppd on average.      Patient has been advised of split billing requirements and indicates understanding: Yes    COUNSELING:  Reviewed preventive health counseling, as reflected in patient instructions    Estimated body mass index is 23.96 kg/m  as calculated from the following:    Height as of this encounter: 1.575 m (5' 2\").    Weight as of this encounter: 59.4 kg (131 lb).    Reviewed pt's chart in detail today - hx of 0.75ppd over 18 years cigarette smoking.  Quit in 1978.      Pt will get her hearing checked with ENT and follow up with them re: her voice as well.     She reports that she quit smoking about 43 years ago. She smoked 0.00 packs per day for 0.00 years. She has never used smokeless tobacco.    Appropriate preventive services were discussed with this patient, including applicable screening as appropriate for cardiovascular disease, diabetes, osteopenia/osteoporosis, and glaucoma.  As appropriate for age/gender, discussed screening for colorectal cancer, prostate cancer, breast cancer, and cervical cancer. Checklist reviewing preventive services available has been given to the patient.    Reviewed patients plan of care and provided an AVS. The Complex Care Plan (for patients with higher acuity and needing more deliberate coordination of services) for Natasha meets the Care Plan requirement. This Care Plan has been established " and reviewed with the Patient.     says she makes voice noises when she sleep and he wakes her up to stop her from doing that.     Discussed sleeping in separate bedrooms so they can both get better rest.     Counseling Resources:  ATP IV Guidelines  Pooled Cohorts Equation Calculator  Breast Cancer Risk Calculator  Breast Cancer: Medication to Reduce Risk  FRAX Risk Assessment  ICSI Preventive Guidelines  Dietary Guidelines for Americans, 2010  USDA's MyPlate  ASA Prophylaxis  Lung CA Screening          Beverley Maloney MD  [facility]    Identified Health Risks:

## 2022-03-21 NOTE — PROGRESS NOTES
PFT Note:     Pt completed pulmonary function testing with DLCO.  Good Pt effort and cooperation.     Spirometry  Meets all ATS recommendations.    Plethysmography  All plethysmographic measurements meet ATS recommendations.    DLCO  Meets all ATS recommendations.  DLCO is an average of 2 maneuvers.  Predicted DLCO is corrected for a Hgb of 14.5 drawn on 3/2/2022.     March 21, 2022.5:42 PM  Stanislav Moran, RT

## 2022-05-09 NOTE — TELEPHONE ENCOUNTER
Patient calls.     Had wellness back in March. Dr. Maloney placed referrals for Neurologist and Pulmonologist. Patient scheduled for Both in June/July. Patient making sure Dr. Maloney wanted her to do those. Explained reasons for referrals. Patient will keep appointments. Patient stated an understanding and agreed with plan.     Ambika Conde RN  [facility]

## 2022-05-13 NOTE — LETTER
5/13/2022         RE: Natasha Allison  [address] [facility] [address]        Dear Colleague,    Thank you for referring your patient, Natasha Allison, to the [facility]. Please see a copy of my visit note below.    Pulmonary Clinic Note    Date of Service: 5/13/2022    Chief Complaint   Patient presents with     New Patient     FORDE and SOB       A/P:  71F being seen for dyspnea. Her PFTs show likely moderate obstruction w/ air-trapping. Last CXR clear, but in 8/2021. This is suggestive of asthma vs small airways disease related to rheumatoid arthritis. I would like to start her on ICS-LABA (Breo) and prn albuterol and assess her response.     Additionally, she has symptoms c/w vocal cord dysfunction. She has been following with a speech therapist outside the [facility] system and I recommended she talk to them if her inhalers are not providing relief.     I would like to obtain a CT chest to evaluate for underlying rheumatoid lung disease vs pulmonary toxicity related to her RA meds.      History:  71F HTN, RA (on methotrexate and infliximab) being seen for dyspnea on exertion. Started around August 2021. Notices at all times, w/ rest and exertion. Able to walk up a flight of stairs. No CP or chest tightness. Occasional wheezing. Never tried inhalers. No recent illnesses. Started coughing recently, dry. No orthopnea or PND. Has been on methotrexate for a long time. Has throat tightness. Harder to get air in. Voice gets hoarse. Not clearly triggered by odors. Triggered by laughing.     No known pulmonary dz. No FHx of lung disease.     Smoking: quit in 1982, < 10 pack year history       Recent travel: [place]                       Bird exposure: no             Animal exposure: no        Inhalation exposure: potential mold exposure     Occupation: retired, worked as a                           10 point review of systems negative, aside from that mentioned in HPI.    BP  "131/79 (BP Location: Left arm, Patient Position: Sitting, Cuff Size: Adult Regular)   Pulse 82   Ht 1.575 m (5' 2\")   Wt 56.9 kg (125 lb 8 oz)   SpO2 100%   BMI 22.95 kg/m    Gen: well-appearing  HEENT: Mallampati IV  Card: RRR  Pulm: clear bilaterally   Abd: soft  MSK: no edema  Skin: no obvious rash  Psych: normal affect  Neuro: alert and oriented     Labs:  Personally reviewed    Imaging/Studies: Personally reviewed  PFTs (3/2022) - non-specific spirometry s/o obstruction, normal TLC, e/o air-trapping, normal DLCO  CXR (2021) - clear     TTE (3/2022) - normal     Past Medical History:   Diagnosis Date     Hypertension     lisinopril 10mg daily = annoying runny nose     Osteoporosis     alendronate 5mg daily = stomach upset /nausea     Rheumatoid arthritis(714.0)      Past Surgical History:   Procedure Laterality Date     CARPAL TUNNEL RELEASE RT/LT       COLONOSCOPY N/A 3/12/2019    Procedure: COLONOSCOPY;  Surgeon: Jermaine Rock MD;  Location:  GI     Family History   Problem Relation Age of Onset     Hypertension Mother      Hypertension Father      Heart Disease Father      Intellectual Disability (Mental Retardation) Brother      Hypertension Sister      Hypertension Son      Hypertension Daughter      Colon Cancer Brother      Hypertension Brother      Hypertension Brother      Hypertension Brother      Hypertension Sister      Hypertension Sister      Social History     Socioeconomic History     Marital status:      Spouse name: Jaden     Number of children: 2     Years of education: 13     Highest education level: Not on file   Occupational History     Occupation: retired - offce with sales, admin      Comment: retired in 2016    Tobacco Use     Smoking status: Former Smoker     Packs/day: 0.75     Years: 18.00     Pack years: 13.50     Quit date: 1978     Years since quittin.5     Smokeless tobacco: Never Used   Vaping Use     Vaping Use: Never used   Substance and Sexual " Activity     Alcohol use: Yes     Comment: 0-1 QD     Drug use: No     Sexual activity: Not Currently   Other Topics Concern     Parent/sibling w/ CABG, MI or angioplasty before 65F 55M? Not Asked   Social History Narrative    Cinda Guy's sister      Social Determinants of Health     Financial Resource Strain: Not on file   Food Insecurity: Not on file   Transportation Needs: Not on file   Physical Activity: Not on file   Stress: Not on file   Social Connections: Not on file   Intimate Partner Violence: Not on file   Housing Stability: Not on file       50 minutes spent reviewing chart, reviewing test results, talking with and examining patient, formulating plan, and documentation on the day of the encounter.    Wei Cedillo MD  Pulmonary and Critical Care Medicine  [facility]

## 2022-05-13 NOTE — PROGRESS NOTES
"Pulmonary Clinic Note    Date of Service: 5/13/2022    Chief Complaint   Patient presents with     New Patient     FORDE and SOB       A/P:  71F being seen for dyspnea. Her PFTs show likely moderate obstruction w/ air-trapping. Last CXR clear, but in 8/2021. This is suggestive of asthma vs small airways disease related to rheumatoid arthritis. I would like to start her on ICS-LABA (Breo) and prn albuterol and assess her response.     Additionally, she has symptoms c/w vocal cord dysfunction. She has been following with a speech therapist outside the [facility] system and I recommended she talk to them if her inhalers are not providing relief.     I would like to obtain a CT chest to evaluate for underlying rheumatoid lung disease vs pulmonary toxicity related to her RA meds.      History:  71F HTN, RA (on methotrexate and infliximab) being seen for dyspnea on exertion. Started around August 2021. Notices at all times, w/ rest and exertion. Able to walk up a flight of stairs. No CP or chest tightness. Occasional wheezing. Never tried inhalers. No recent illnesses. Started coughing recently, dry. No orthopnea or PND. Has been on methotrexate for a long time. Has throat tightness. Harder to get air in. Voice gets hoarse. Not clearly triggered by odors. Triggered by laughing.     No known pulmonary dz. No FHx of lung disease.     Smoking: quit in 1982, < 10 pack year history       Recent travel: [place]                       Bird exposure: no             Animal exposure: no        Inhalation exposure: potential mold exposure     Occupation: retired, worked as a                           10 point review of systems negative, aside from that mentioned in HPI.    /79 (BP Location: Left arm, Patient Position: Sitting, Cuff Size: Adult Regular)   Pulse 82   Ht 1.575 m (5' 2\")   Wt 56.9 kg (125 lb 8 oz)   SpO2 100%   BMI 22.95 kg/m    Gen: well-appearing  HEENT: Mallampati IV  Card: RRR  Pulm: clear bilaterally "   Abd: soft  MSK: no edema  Skin: no obvious rash  Psych: normal affect  Neuro: alert and oriented     Labs:  Personally reviewed    Imaging/Studies: Personally reviewed  PFTs (3/2022) - non-specific spirometry s/o obstruction, normal TLC, e/o air-trapping, normal DLCO  CXR (2021) - clear     TTE (3/2022) - normal     Past Medical History:   Diagnosis Date     Hypertension     lisinopril 10mg daily = annoying runny nose     Osteoporosis     alendronate 5mg daily = stomach upset /nausea     Rheumatoid arthritis(714.0)      Past Surgical History:   Procedure Laterality Date     CARPAL TUNNEL RELEASE RT/LT       COLONOSCOPY N/A 3/12/2019    Procedure: COLONOSCOPY;  Surgeon: Jermaine Rock MD;  Location:  GI     Family History   Problem Relation Age of Onset     Hypertension Mother      Hypertension Father      Heart Disease Father      Intellectual Disability (Mental Retardation) Brother      Hypertension Sister      Hypertension Son      Hypertension Daughter      Colon Cancer Brother      Hypertension Brother      Hypertension Brother      Hypertension Brother      Hypertension Sister      Hypertension Sister      Social History     Socioeconomic History     Marital status:      Spouse name: Jaden     Number of children: 2     Years of education: 13     Highest education level: Not on file   Occupational History     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 Not Asked   Social History Narrative    Cinda Guy's sister       Social Determinants of Health     Financial Resource Strain: Not on file   Food Insecurity: Not on file   Transportation Needs: Not on file   Physical Activity: Not on file   Stress: Not on file   Social Connections: Not on file   Intimate Partner Violence: Not on file   Housing Stability: Not on file       50 minutes spent reviewing chart, reviewing test results, talking with and examining patient, formulating plan, and documentation on the day of the encounter.    Wei Cedillo MD  Pulmonary and Critical Care Medicine  [facility]

## 2022-05-13 NOTE — PATIENT INSTRUCTIONS
Thank you for coming to pulmonary clinic. Your pulmonary function tests shows obstructive lung disease which may be related to asthma or possibly rheumatoid arthritis related lung disease. Your chest imaging is clear. I would like you to start using Breo, regardless of how you are feeling, rinse your mouth out after using. You may use albuterol as needed for shortness of breath or wheezing. If this is not working in 1-2 months, I will refer you to speech therapy for potential vocal cord dysfunction. I would also like to get a CT scan to evaluate for rheumatoid related lung disease. I will see you back in 6 months.

## 2022-05-13 NOTE — NURSING NOTE
"Chief Complaint   Patient presents with     New Patient     FORDE and SOB       Vitals:    05/13/22 1229   BP: 131/79   BP Location: Left arm   Patient Position: Sitting   Cuff Size: Adult Regular   Pulse: 82   SpO2: 100%   Weight: 56.9 kg (125 lb 8 oz)   Height: 1.575 m (5' 2\")       Body mass index is 22.95 kg/m .      ANN-MARIE Dudley    "

## 2022-05-20 NOTE — TELEPHONE ENCOUNTER
Received fax from pharmacy stating copay for Breo was too high for pt. Sent in alternative.     Ann Chowdhury RN

## 2022-05-20 NOTE — TELEPHONE ENCOUNTER
DAI for pt to call  and reschdule her upcoming visit with Dr Cedillo on 11/9/2022. He will not be in clinic that day.       By Merissa Miller

## 2022-05-24 NOTE — TELEPHONE ENCOUNTER
Please see my chart message below     Please review and advise     Thank you     Riddhi Barkley RN, BSN  [facility]

## 2022-06-16 NOTE — PROGRESS NOTES
"[facility]/[place]  Section of General Neurology  New Patient Visit      Natasha Allison MRN# [id number]   Age: 71 year old YOB: 1950     Requesting physician: Beverley Nixon     Reason for Consultation: Dysarthria and dysphagia.        History of Presenting Symptoms:   Natasha Allison is a 71 year old female who presents today for evaluation of dysarthria and dysphagia.  She has a PMH of RA, HTN among other PMH as below.      She has noticed gradual worsening since summer 2021 regarding speech, swallowing.  Struggles to talk and breathe in this regard as well.   She wonders if it is related to her second COVID shot in April 2021.    They got COVID in December 2021 but ended up doing OK.   She has limited taste overall.    She has worked with ENT, has worked with SLP in this regard.   She has not found the speech therapy people to be all that helpful though she enjoyed her therapist and spending time with them.   Talking more out of the corner of her mouth, drooling more often too are other symptoms she has noticed.    She perhaps feels weaker in her arms and legs, non specifically, but feels more limited by pain.   She feels like she gets winded more easily too.    She has lost ~30 pounds not intentionally, she suspects due to poor appetite.      Here with her  Jaden  She lives in [place]  She is a retired .   She follows with Dr. Cedillo in Pulmonary regarding dyspnea who noted that \"her PFTs show likely moderate obstruction w/ air-trapping. Last CXR clear, but in 8/2021. This is suggestive of asthma vs small airways disease related to rheumatoid arthritis.\"     She follows with rheumatology for RA.  Has considered a burst of steroids in this regard, she is hesitant, but could be worth a try.       Past Medical History:     Patient Active Problem List   Diagnosis     Rheumatoid arthritis involving multiple sites with positive rheumatoid " "factor (H)- sees  \"Tip\" =Dr. Benito Garcia MD - at [facility]      CARDIOVASCULAR SCREENING; LDL GOAL LESS THAN 130     Advanced directives, counseling/discussion     Essential hypertension with goal blood pressure less than 140/90- gets a little elevated for 1-2 days after MTX administration     Corneal clouding- left eye  - since childhood shovel to the eye - decreased vision secondary to that - sees [facility] surgeons      Urinary urgency     Other osteoporosis without current pathological fracture     Family history of hemochromatosis- son     Age-related osteoporosis without current pathological fracture     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 increased anxiety- since conmittant dosing of Prolia and remicade at her rheumatologist's office 10/9/2018     Vasomotor rhinitis     Vitamin D deficiency     Perioral dermatitis- at [facility] - Dr. Villanueva     Action tremor     SOB (shortness of breath) on exertion     Alteration in speech     Personal history of tobacco use, presenting hazards to health     Past Medical History:   Diagnosis Date     Hypertension     lisinopril 10mg daily = annoying runny nose     Osteoporosis     alendronate 5mg daily = stomach upset /nausea     Rheumatoid arthritis(714.0)         Past Surgical History:     Past Surgical History:   Procedure Laterality Date     CARPAL TUNNEL RELEASE RT/LT       COLONOSCOPY N/A 3/12/2019    Procedure: COLONOSCOPY;  Surgeon: Jermaine Rock MD;  Location:  GI        Social History:     Social History     Tobacco Use     Smoking status: Former Smoker     Packs/day: 0.75     Years: 18.00     Pack years: 13.50     Quit date: 1978     Years since quittin.6     Smokeless tobacco: Never Used   Vaping Use     Vaping Use: Never used   Substance Use Topics     Alcohol use: Yes     Comment: 0-1 QD " "    Drug use: No        Family History:     Family History   Problem Relation Age of Onset     Hypertension Mother      Hypertension Father      Heart Disease Father      Intellectual Disability (Mental Retardation) Brother      Hypertension Sister      Hypertension Son      Hypertension Daughter      Colon Cancer Brother      Hypertension Brother      Hypertension Brother      Hypertension Brother      Hypertension Sister      Hypertension Sister         Medications:     Current Outpatient Medications   Medication Sig     albuterol (PROAIR HFA/PROVENTIL HFA/VENTOLIN HFA) 108 (90 Base) MCG/ACT inhaler Inhale 2 puffs into the lungs every 6 hours as needed for shortness of breath / dyspnea or wheezing     aspirin 81 MG tablet Take  by mouth daily. (Patient not taking: No sig reported)     B-D TB SYRINGE 27G X 1/2\" 1 ML MISC USE FOR METHOTREXATE INJECTION     calcium carb-cholecalciferol (OS-MICHELLE) 500-200 MG-UNIT tablet Take 1 tablet by mouth 2 times daily     fluticasone-salmeterol (AIRDUO RESPICLICK) 113-14 MCG/ACT inhaler Inhale 1 puff into the lungs 2 times daily     folic acid (FOLVITE) 1 MG tablet Take 1 tablet (1 mg) by mouth daily     inFLIXimab-dyyb (INFLECTRA) 100 MG injection Inject into the vein once     losartan (COZAAR) 25 MG tablet Take 1 tablet (25 mg) by mouth daily For blood pressure     meloxicam (MOBIC) 15 MG tablet Take 15 mg by mouth daily.     methotrexate sodium, pres-free, 50 MG/2ML SOLN injection CHEMO 20 mg      syringe, disposable, 1 ML MISC 1 Syringe once a week To be used with Methotrexate Injections     Syringe/Needle, Disp, (SYRINGE LUER SLIP) 27G X 1/2\" 1 ML MISC USE FOR METHOTREXATE INJECTION     vitamin D3 (CHOLECALCIFEROL) 10 MCG (400 UNIT) capsule Take 1 capsule (400 Units) by mouth daily     No current facility-administered medications for this visit.        Allergies:     Allergies   Allergen Reactions     Alendronic Acid      Severe aching     Latex      Seasonal Allergies Other " "(See Comments)     Watery eyes, runny nose.        Review of Systems:   As noted above     Physical Exam:   Vitals: /86   Pulse 75   Ht 1.575 m (5' 2\")   Wt 56.7 kg (125 lb)   SpO2 99%   BMI 22.86 kg/m    CV: peripheral pulse appreciated  Lungs: breathing comfortably  Extremities: no edema    Neuro:   General Appearance: No apparent distress, well-nourished, well-groomed, pleasant     Mental Status: Alert and oriented to person, place, and time. Speech fluent and comprehension intact. Moderate dysarthria    Cranial Nerves:   II: Visual fields: normal  III: Pupils: L cornea cloudy from injury as a child, R pupil normal/reactive  III,IV,VI: Extraocular Movements: intact   V: Facial sensation: intact to light touch  VII: Facial strength: possible R lower facial weakness but variable with activation.   VIII: Hearing: intact grossly  IX: Palate: elevates b/l but to variable heights and quivers easily  XII: Tongue movement: normal.  To rest there were possible fasciculations but was unclear if she was fully relaxed.     Motor Exam:   5/5 in LE.  UE confounded by RA.  5/5 proximally. 4/5 , slightly weaker on L, 4-/5 finger abduction b/l also worse on L slightly.  Atrophy of hand muscles noted in palm b/l.      Sensory: intact to light touch, vibration, and pinprick throughout    Coordination: no dysmetria with finger-to-nose bilaterally    Reflexes: biceps, triceps  patellar 3+, ankle jerks 2+ and symmetric. Toes are equivocal, pratt negative.              Data: Pertinent prior to visit   Imaging:  CT ANGIOGRAM OF THE HEAD AND NECK WITH CONTRAST  8/11/2021 1:45 PM      HISTORY: Transient ischemic attack (TIA); Alteration in speech.      TECHNIQUE:  CT angiography with an injection of 70mL Isovue-370 IV  with scans through the head and neck. Images were transferred to a  separate 3-D workstation where multiplanar reformations and 3-D images  were created. Estimates of carotid stenoses are made relative to " the  distal internal carotid artery diameters except as noted. Radiation  dose for this scan was reduced using automated exposure control,  adjustment of the mA and/or kV according to patient size, or iterative  reconstruction technique.     COMPARISON: None.      CT ANGIOGRAM HEAD FINDINGS:    The vertebral arteries, basilar artery, and posterior cerebral  arteries are patent. Fetal origin of the right posterior cerebral  artery. The internal carotid arteries, anterior cerebral arteries, and  middle cerebral arteries are patent. No evidence of large vessel  occlusion or high-grade stenosis. No evidence of aneurysm or vascular  malformation.      CT ANGIOGRAM NECK FINDINGS:   A three-vessel aortic arch is present. The bilateral common carotid,  internal carotid, external carotid, and vertebral arteries are patent.  Mild plaquing at the bilateral carotid bifurcations. Approximately 10%  stenosis of the proximal right internal carotid artery. No evidence of  large vessel occlusion or high-grade stenosis. No evidence of  dissection.      Moderate cervical spine degenerative change. Left greater than right  temporomandibular joint degenerative change. Fatty atrophy of the  bilateral submandibular glands.                                                                       IMPRESSION:   CTA Head: No evidence of large vessel occlusion or high-grade  stenosis.   CTA Neck: No evidence of large vessel occlusion or high-grade  stenosis.   I personally reviewed the above imaging and agree with the findings in the report.          CT OF THE NECK WITH CONTRAST  6/14/2022 1:10 PM      COMPARISON: None     HISTORY: Paresis of right vocal cord.     TECHNIQUE:  Axial CT images of the neck were acquired after the  intravenous administration of 80mL Isovue-370 nonionic iodinated  contrast material. Coronal reconstructions were created.     FINDINGS: There is no cervical lymphadenopathy. There are no fluid  collections or abscesses in  the neck. The submandibular glands  bilaterally are atrophied. The parotid glands bilaterally are  unremarkable. The thyroid gland is within normal limits. No evidence  for mucosal space lesion. The lung apices are clear.     Paramedian location of the right true vocal fold consistent with the  patient's history of right vocal cord paresis is noted. No evidence  for abnormality along the course of the 10th cranial nerve or right  recurrent laryngeal nerve from the skull base to the upper chest.                                                                      IMPRESSION:  1. Atrophy of the submandibular glands bilaterally.  2. Paramedian location of the right true vocal cord consistent with  patient's history of right vocal cord paresis. No evidence for  abnormality along the course of the right vagus or right recurrent  laryngeal nerves.  3. Otherwise, normal soft tissue neck CT.    Procedures:      Laboratory:  SSA, SSB negative           Assessment and Plan:   Assessment:  Natasha Allison is a pleasant 71 year old female who presents in consultation for dysarthria and dysphagia.   She has a PMH of RA, HTN among other PMH as above.  She has been worked up thoroughly in this regard including with ENT whose notes I cannot see in our system nor care everywhere to review at this time.  CT has shown radiographic evidence right vocal chord paresis which apparently has been confirmed via laryngoscopic evaluation as well.  The etiology of this remains unclear.  In setting of these symptoms, sialorrhea, some hand weakness that is confounded by her RA we discussed that neurologically speaking a neurodegenerative condition would be important to rule out.   To me the differential that needs to be worked up is bulbar ALS most prominently.  This could explain her brisk reflexes but we discussed I consider brain and C spine MRIs to be important things to obtain to rule out other confounding variables.  I do think they have a  bit of testing fatigue, and though some things can be seen on CT which were noted above we discussed what MRI could/couldn't tell us in this regard.  The insidious decline would point me more towards ruling in/out a neurodegenerative process more so than a stroke causing this but MRI would help in this regard as well. We will wait for now after discussion regarding MRIs.   Will start with EMG and see what this is able to tell us.  They will reach out should they want to try additional speech therapy, which they have completed and have received dysphagia recommendations previously.  I did suggest seeing one of our neuromuscular subspecialists to see if they have any additional recommendations as well.  I will see her back after her EMG is completed to decide on next steps.            Nik Lu MD   of Neurology   [facility]      The total time of this encounter today amounted to 62 minutes. This time included time spent with the patient, prep work, ordering tests, and performing post visit documentation.

## 2022-06-21 NOTE — TELEPHONE ENCOUNTER
[facility] Call Center    Phone Message    May a detailed message be left on voicemail: yes     Reason for Call: Appointment Intake    Referring Provider Name: Dr. Lu  Diagnosis and/or Symptoms: Dysarthria  Brisk deep tendon reflexes    Referral Notes:  to review and further work up/rule out bulbar ALS as a differential-neuromuscular    ALS patients need to be review by the clinic per our neurology protocols.    Please review and contact the patient to schedule.    Action Taken: Message routed to:  [facility] ([facility] Neurology    Travel Screening: Not Applicable

## 2022-06-21 NOTE — LETTER
"    6/21/2022         RE: Natasha Allison  [address]  [facility] [address]        Dear Colleague,    Thank you for referring your patient, Natasha Allison, to the [facility]. Please see a copy of my visit note below.    [facility]  Section of General Neurology  New Patient Visit      Natasha Allison MRN# [id number]   Age: 71 year old YOB: 1950     Requesting physician: Beverley Nixon     Reason for Consultation: Dysarthria and dysphagia.        History of Presenting Symptoms:   Natasha Allison is a 71 year old female who presents today for evaluation of dysarthria and dysphagia.  She has a PMH of RA, HTN among other PMH as below.      She has noticed gradual worsening since summer 2021 regarding speech, swallowing.  Struggles to talk and breathe in this regard as well.   She wonders if it is related to her second COVID shot in April 2021.    They got COVID in December 2021 but ended up doing OK.   She has limited taste overall.    She has worked with ENT, has worked with SLP in this regard.   She has not found the speech therapy people to be all that helpful though she enjoyed her therapist and spending time with them.   Talking more out of the corner of her mouth, drooling more often too are other symptoms she has noticed.    She perhaps feels weaker in her arms and legs, non specifically, but feels more limited by pain.   She feels like she gets winded more easily too.    She has lost ~30 pounds not intentionally, she suspects due to poor appetite.      Here with her  Jaden  She lives in [place]  She is a retired .   She follows with Dr. Cedillo in Pulmonary regarding dyspnea who noted that \"her PFTs show likely moderate obstruction w/ air-trapping. Last CXR clear, but in 8/2021. This is suggestive of asthma vs small airways disease related to rheumatoid arthritis.\"     She follows with " "rheumatology for RA.  Has considered a burst of steroids in this regard, she is hesitant, but could be worth a try.       Past Medical History:     Patient Active Problem List   Diagnosis     Rheumatoid arthritis involving multiple sites with positive rheumatoid factor (H)- sees  \"Tip\" =Dr. Benito Garcia MD - at [facility]      CARDIOVASCULAR SCREENING; LDL GOAL LESS THAN 130     Advanced directives, counseling/discussion     Essential hypertension with goal blood pressure less than 140/90- gets a little elevated for 1-2 days after MTX administration     Corneal clouding- left eye  - since childhood shovel to the eye - decreased vision secondary to that - sees [facility] physicians and surgeons      Urinary urgency     Other osteoporosis without current pathological fracture     Family history of hemochromatosis- son     Age-related osteoporosis without current pathological fracture     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 increased anxiety- since conmittant dosing of Prolia and remicade at her rheumatologist's office 10/9/2018     Vasomotor rhinitis     Vitamin D deficiency     Perioral dermatitis- at [facility] - Dr. Villanueva     Action tremor     SOB (shortness of breath) on exertion     Alteration in speech     Personal history of tobacco use, presenting hazards to health     Past Medical History:   Diagnosis Date     Hypertension     lisinopril 10mg daily = annoying runny nose     Osteoporosis     alendronate 5mg daily = stomach upset /nausea     Rheumatoid arthritis(714.0)         Past Surgical History:     Past Surgical History:   Procedure Laterality Date     CARPAL TUNNEL RELEASE RT/LT       COLONOSCOPY N/A 3/12/2019    Procedure: COLONOSCOPY;  Surgeon: Jermaine Rock MD;  Location:  GI        Social History:     Social History     Tobacco Use     Smoking status: Former " "Smoker     Packs/day: 0.75     Years: 18.00     Pack years: 13.50     Quit date: 1978     Years since quittin.6     Smokeless tobacco: Never Used   Vaping Use     Vaping Use: Never used   Substance Use Topics     Alcohol use: Yes     Comment: 0-1 QD     Drug use: No        Family History:     Family History   Problem Relation Age of Onset     Hypertension Mother      Hypertension Father      Heart Disease Father      Intellectual Disability (Mental Retardation) Brother      Hypertension Sister      Hypertension Son      Hypertension Daughter      Colon Cancer Brother      Hypertension Brother      Hypertension Brother      Hypertension Brother      Hypertension Sister      Hypertension Sister         Medications:     Current Outpatient Medications   Medication Sig     albuterol (PROAIR HFA/PROVENTIL HFA/VENTOLIN HFA) 108 (90 Base) MCG/ACT inhaler Inhale 2 puffs into the lungs every 6 hours as needed for shortness of breath / dyspnea or wheezing     aspirin 81 MG tablet Take  by mouth daily. (Patient not taking: No sig reported)     B-D TB SYRINGE 27G X 1/2\" 1 ML MISC USE FOR METHOTREXATE INJECTION     calcium carb-cholecalciferol (OS-MICHELLE) 500-200 MG-UNIT tablet Take 1 tablet by mouth 2 times daily     fluticasone-salmeterol (AIRDUO RESPICLICK) 113-14 MCG/ACT inhaler Inhale 1 puff into the lungs 2 times daily     folic acid (FOLVITE) 1 MG tablet Take 1 tablet (1 mg) by mouth daily     inFLIXimab-dyyb (INFLECTRA) 100 MG injection Inject into the vein once     losartan (COZAAR) 25 MG tablet Take 1 tablet (25 mg) by mouth daily For blood pressure     meloxicam (MOBIC) 15 MG tablet Take 15 mg by mouth daily.     methotrexate sodium, pres-free, 50 MG/2ML SOLN injection CHEMO 20 mg      syringe, disposable, 1 ML MISC 1 Syringe once a week To be used with Methotrexate Injections     Syringe/Needle, Disp, (SYRINGE LUER SLIP) 27G X 1/2\" 1 ML MISC USE FOR METHOTREXATE INJECTION     vitamin D3 (CHOLECALCIFEROL) 10 " "MCG (400 UNIT) capsule Take 1 capsule (400 Units) by mouth daily     No current facility-administered medications for this visit.        Allergies:     Allergies   Allergen Reactions     Alendronic Acid      Severe aching     Latex      Seasonal Allergies Other (See Comments)     Watery eyes, runny nose.        Review of Systems:   As noted above     Physical Exam:   Vitals: /86   Pulse 75   Ht 1.575 m (5' 2\")   Wt 56.7 kg (125 lb)   SpO2 99%   BMI 22.86 kg/m    CV: peripheral pulse appreciated  Lungs: breathing comfortably  Extremities: no edema    Neuro:   General Appearance: No apparent distress, well-nourished, well-groomed, pleasant     Mental Status: Alert and oriented to person, place, and time. Speech fluent and comprehension intact. Moderate dysarthria    Cranial Nerves:   II: Visual fields: normal  III: Pupils: L cornea cloudy from injury as a child, R pupil normal/reactive  III,IV,VI: Extraocular Movements: intact   V: Facial sensation: intact to light touch  VII: Facial strength: possible R lower facial weakness but variable with activation.   VIII: Hearing: intact grossly  IX: Palate: elevates b/l but to variable heights and quivers easily  XII: Tongue movement: normal.  To rest there were possible fasciculations but was unclear if she was fully relaxed.     Motor Exam:   5/5 in LE.  UE confounded by RA.  5/5 proximally. 4/5 , slightly weaker on L, 4-/5 finger abduction b/l also worse on L slightly.  Atrophy of hand muscles noted in palm b/l.      Sensory: intact to light touch, vibration, and pinprick throughout    Coordination: no dysmetria with finger-to-nose bilaterally    Reflexes: biceps, triceps  patellar 3+, ankle jerks 2+ and symmetric. Toes are equivocal, pratt negative.              Data: Pertinent prior to visit   Imaging:  CT ANGIOGRAM OF THE HEAD AND NECK WITH CONTRAST  8/11/2021 1:45 PM      HISTORY: Transient ischemic attack (TIA); Alteration in speech.      TECHNIQUE:  " CT angiography with an injection of 70mL Isovue-370 IV  with scans through the head and neck. Images were transferred to a  separate 3-D workstation where multiplanar reformations and 3-D images  were created. Estimates of carotid stenoses are made relative to the  distal internal carotid artery diameters except as noted. Radiation  dose for this scan was reduced using automated exposure control,  adjustment of the mA and/or kV according to patient size, or iterative  reconstruction technique.     COMPARISON: None.      CT ANGIOGRAM HEAD FINDINGS:    The vertebral arteries, basilar artery, and posterior cerebral  arteries are patent. Fetal origin of the right posterior cerebral  artery. The internal carotid arteries, anterior cerebral arteries, and  middle cerebral arteries are patent. No evidence of large vessel  occlusion or high-grade stenosis. No evidence of aneurysm or vascular  malformation.      CT ANGIOGRAM NECK FINDINGS:   A three-vessel aortic arch is present. The bilateral common carotid,  internal carotid, external carotid, and vertebral arteries are patent.  Mild plaquing at the bilateral carotid bifurcations. Approximately 10%  stenosis of the proximal right internal carotid artery. No evidence of  large vessel occlusion or high-grade stenosis. No evidence of  dissection.      Moderate cervical spine degenerative change. Left greater than right  temporomandibular joint degenerative change. Fatty atrophy of the  bilateral submandibular glands.                                                                       IMPRESSION:   CTA Head: No evidence of large vessel occlusion or high-grade  stenosis.   CTA Neck: No evidence of large vessel occlusion or high-grade  stenosis.   I personally reviewed the above imaging and agree with the findings in the report.          CT OF THE NECK WITH CONTRAST  6/14/2022 1:10 PM      COMPARISON: None     HISTORY: Paresis of right vocal cord.     TECHNIQUE:  Axial CT  images of the neck were acquired after the  intravenous administration of 80mL Isovue-370 nonionic iodinated  contrast material. Coronal reconstructions were created.     FINDINGS: There is no cervical lymphadenopathy. There are no fluid  collections or abscesses in the neck. The submandibular glands  bilaterally are atrophied. The parotid glands bilaterally are  unremarkable. The thyroid gland is within normal limits. No evidence  for mucosal space lesion. The lung apices are clear.     Paramedian location of the right true vocal fold consistent with the  patient's history of right vocal cord paresis is noted. No evidence  for abnormality along the course of the 10th cranial nerve or right  recurrent laryngeal nerve from the skull base to the upper chest.                                                                      IMPRESSION:  1. Atrophy of the submandibular glands bilaterally.  2. Paramedian location of the right true vocal cord consistent with  patient's history of right vocal cord paresis. No evidence for  abnormality along the course of the right vagus or right recurrent  laryngeal nerves.  3. Otherwise, normal soft tissue neck CT.    Procedures:      Laboratory:  SSA, SSB negative           Assessment and Plan:   Assessment:  Natahsa Allison is a pleasant 71 year old female who presents in consultation for dysarthria and dysphagia.   She has a PMH of RA, HTN among other PMH as above.  She has been worked up thoroughly in this regard including with ENT whose notes I cannot see in our system nor care everywhere to review at this time.  CT has shown radiographic evidence right vocal chord paresis which apparently has been confirmed via laryngoscopic evaluation as well.  The etiology of this remains unclear.  In setting of these symptoms, sialorrhea, some hand weakness that is confounded by her RA we discussed that neurologically speaking a neurodegenerative condition would be important to rule out.   To me  the differential that needs to be worked up is bulbar ALS most prominently.  This could explain her brisk reflexes but we discussed I consider brain and C spine MRIs to be important things to obtain to rule out other confounding variables.  I do think they have a bit of testing fatigue, and though some things can be seen on CT which were noted above we discussed what MRI could/couldn't tell us in this regard.  The insidious decline would point me more towards ruling in/out a neurodegenerative process more so than a stroke causing this but MRI would help in this regard as well. We will wait for now after discussion regarding MRIs.   Will start with EMG and see what this is able to tell us.  They will reach out should they want to try additional speech therapy, which they have completed and have received dysphagia recommendations previously.  I did suggest seeing one of our neuromuscular subspecialists to see if they have any additional recommendations as well.  I will see her back after her EMG is completed to decide on next steps.            Nik Lu MD   of Neurology   [facility]      The total time of this encounter today amounted to 62 minutes. This time included time spent with the patient, prep work, ordering tests, and performing post visit documentation.        Again, thank you for allowing me to participate in the care of your patient.        Sincerely,        Mathew Lu MD

## 2022-06-21 NOTE — NURSING NOTE
"Natasha Allison is a 71 year old female who presents for:  Chief Complaint   Patient presents with     Consult     Voice tremor        Initial Vitals:  /86   Pulse 75   Ht 1.575 m (5' 2\")   Wt 56.7 kg (125 lb)   SpO2 99%   BMI 22.86 kg/m   Estimated body mass index is 22.86 kg/m  as calculated from the following:    Height as of this encounter: 1.575 m (5' 2\").    Weight as of this encounter: 56.7 kg (125 lb).. Body surface area is 1.57 meters squared. BP completed using cuff size: regular    Nursing Comments:     Mathieu Solis    "

## 2022-06-29 NOTE — TELEPHONE ENCOUNTER
Hi Aliyah, I am not sure if this patient has this diagnosis yet so I don't think they need that clinic per se.  I more so wanted a neuromuscular opinion if anything else should be done.  We can wait on further testing/EMG before deciding if so but seeing Dr. Meyer in [place] could be an option too. Thanks for reaching out to them

## 2022-06-29 NOTE — TELEPHONE ENCOUNTER
"Contacted patient to schedule in Neuromuscular Clinic-possible bulbar ALS. Patient and spouse indicated they will not go \"downtown\" and adamantly stated they do not want an appointment. Pt stated she will discuss alternative options with Dr Lu.  "

## 2022-07-05 NOTE — RESULT ENCOUNTER NOTE
Your mammogram was normal.     Thank you so much for choosing [facility].  Please contact us with any questions that you may have.   We appreciate the opportunity to serve you now and look forward to supporting your healthcare needs for a long time to come!    Most Sincerely,     Beverley Maloney MD

## 2022-07-14 NOTE — LETTER
[facility]   [address] [place] [id number]  [phone]        July 20, 2022    Natasha Allison                                                                                                                                                        [address] [place] [address]              Dear Natasha,    We have been unsuccessful in our attempts to reach you by phone.  Please call us at the [facility] [phone]   between the hours of 8:00am - 5:00 pm, Monday through Friday.        Sincerely,               Beverley Maloney M.D./AMIE

## 2022-07-14 NOTE — TELEPHONE ENCOUNTER
Speech issues since last June paralyzed right vocal cord     Weight from 130 down to 114 since April     Patient Drinks boost 2 times a day between meals   Tried to eat High dense calories   Eats  3-5 small frequent meals a day     Coughs once  In a while with liquids   Can hear herself swallow.     reviewed to call the clinic if s/s of aspiration pneumonia with patient and or persistently  coughing with food and or liquids.   Recommended to  increase boost up to 3 times a day   Does not use a straw.     Routing to provider for further recommendations  ? Nutrition consult    Vida WARD RN   [facility] Triage

## 2022-07-17 PROBLEM — R13.19 OTHER DYSPHAGIA: Status: ACTIVE | Noted: 2022-01-01

## 2022-07-17 PROBLEM — N18.2 CKD (CHRONIC KIDNEY DISEASE) STAGE 2, GFR 60-89 ML/MIN: Status: ACTIVE | Noted: 2022-01-01

## 2022-07-17 PROBLEM — R63.4 WEIGHT LOSS: Status: ACTIVE | Noted: 2022-01-01

## 2022-07-17 PROBLEM — J38.01 PARESIS OF RIGHT VOCAL CORD: Status: ACTIVE | Noted: 2022-01-01

## 2022-07-17 NOTE — TELEPHONE ENCOUNTER
Recommend speech therapy swallow re-eval , thick-it for liquids and nutirition consult. Orders placed. Please inform patient.

## 2022-07-19 NOTE — TELEPHONE ENCOUNTER
Attempt # 1  Called # [phone]     Left a non detailed VM to call back at [phone] and ask for any available Triage Nurse.    Mahamed Mayberry RN   [facility]

## 2022-07-20 NOTE — TELEPHONE ENCOUNTER
Attempt # 2  Called # [phone]     Left a non detailed VM to call back at [phone] and ask for any available Triage Nurse.    Letter sent      Mahamed Mayberry RN   [facility]

## 2022-07-21 NOTE — TELEPHONE ENCOUNTER
Spoke with patient and her .  She states that she has already gone to speech therapy and had the swallow test done at [facility].  They do not want to do it again.  They want to find answers.  They are going to [place] through Neurology on 8/3/2022 and doing some testing in regards to responses to the Brain.      Angela Buck RN

## 2022-07-25 NOTE — TELEPHONE ENCOUNTER
Is there anything else we can help with?   I reviewed recent ENT notes from Dr. Mccoy as well.  Not sure how else we can help.

## 2022-07-25 NOTE — TELEPHONE ENCOUNTER
Called #   Telephone Information:   Mobile [phone]     What are the options for treatment for the pralyzed of the vocal cords?       This is what they want to know, they will still see neurology but they need to know options for treatment       Riddhi Barkley RN, BSN  [facility]

## 2022-07-29 NOTE — TELEPHONE ENCOUNTER
Sorry, I don't know what the treatment is for paralyzed vocal cords other than speech therapy.  ENT or Neurology would be the ones to ask about that .  Sorry, outside FP's area of expertise.

## 2022-08-02 NOTE — TELEPHONE ENCOUNTER
Attempt # 1  Called # [phone]     Left a non detailed VM to call back at [phone] and ask for any available Triage Nurse.    Mahamed Mayberry RN   [facility]

## 2022-08-02 NOTE — TELEPHONE ENCOUNTER
Pt called from other encounter. Pt and  were upset to find Pt has CKD stage 2 and no one had told them about this diagnosis or what to do about it.     Writer advised how criteria is met to enter each stage of kidney disease. Advised to keep BP under control, avoid NSAID's, and stay well hydrated. Pt noted she is taking Meloxicam for rheumatoid arthritis pains, inquired if needing to stop this medication due to kidney function. Writer advised will send note to PCP for direction, advised may need a visit with provider to discuss.     GFR Estimate   Date Value Ref Range Status   03/02/2022 89 >60 mL/min/1.73m2 Final     Comment:     Effective December 21, 2021 eGFRcr in adults is calculated using the 2021 CKD-EPI creatinine equation which includes age and gender (Johanne et al., NEJ, DOI: 10.1056/ANSApg4896510)   03/10/2021 69 >60 mL/min/[1.73_m2] Final     Comment:     Non  GFR Calc  Starting 12/18/2018, serum creatinine based estimated GFR (eGFR) will be   calculated using the Chronic Kidney Disease Epidemiology Collaboration   (CKD-EPI) equation.     03/02/2021 >60 >60 ml/min/1.73m2 Final   02/20/2020 72 >60 mL/min/[1.73_m2] Final     Comment:     Non  GFR Calc  Starting 12/18/2018, serum creatinine based estimated GFR (eGFR) will be   calculated using the Chronic Kidney Disease Epidemiology Collaboration   (CKD-EPI) equation.       GFR, ESTIMATED POCT   Date Value Ref Range Status   06/14/2022 >60 >60 mL/min/1.73m2 Final   08/11/2021 57 (L) >60 mL/min/1.73m2 Final     Mahamed SMITH RN   [facility]

## 2022-08-02 NOTE — TELEPHONE ENCOUNTER
Pt noted to be following with neurology tomorrow. Pt noted has seen ENT x3 without significant findings for cause. Pt noted ENT did suggest lab to check Lyme's. Writer advised Neurology could order that, if not to call back to have PCP order.   Future Appointments   Date Time Provider Department Center   8/3/2022  9:15 AM Kajal Wright MD [facility]   9/1/2022  1:30 PM Ashly Saxena RD [facility]   10/11/2022  2:00 PM Mathew Lu MD [facility]    11/11/2022 10:00 AM Wei Cedillo MD [facility]    3/8/2023  7:40 AM Beverley Maloney MD [facility]       Mahamed SMITH RN   [facility]

## 2022-08-03 NOTE — LETTER
8/3/2022         RE: Natasha Allison  [address] [facility] [address]        Dear Colleague,    Thank you for referring your patient, Natasha Allison, to the [facility]. Please see a copy of my visit note below.    See Procedure Note      Again, thank you for allowing me to participate in the care of your patient.        Sincerely,        Kajal Wright MD

## 2022-08-03 NOTE — PROCEDURES
[facility]  Electrodiagnostic Laboratory                 Department of Neurology                                                                                                         Test Date:  8/3/2022    Patient: Natasha Allison : 1950 Physician: Kajal Wright MD   Sex: Female AGE: 72 year Ref Phys: Nik Lu MD   ID#: [id number]   Technician: LAURIE Feliz     Clinical Information:  Referred by Dr. Alves for EMG evaluation.  The patient has a 15-month history of progressive dysarthria, dysphagia and weight loss of 30 pounds.    Examination revealed visible fasciculations in the quadriceps muscles bilaterally.  The patient has hand muscle atrophy bilaterally.  Fasciculations are present in the tongue.  The patient has weakness with tongue movement bilaterally.  Dysarthric speech is present.    Techniques:  Motor and sensory conduction studies were done with surface recording electrodes. EMG was done with a concentric needle electrode.     Results:  Motor nerve conduction studies:  Fibular, tibial, ulnar motor studies on the right side are within normal limits.  Right median motor reveals normal distal latency with low amplitude and normal conduction velocity.    Sensory studies:  Right median and ulnar antidromic sensory studies are within normal limits.  Right sural study is within normal limits.    F waves:  Right median F wave is prolonged.  Tibial and ulnar are within normal limits.    Needle examination:  Needle examination was performed with a survey of muscles in the right lower extremity, right upper extremity, thoracic paraspinals and right genioglossus muscle.  There is presence of increased insertional activity in multiple divisions with presence of fibrillation potentials and fasciculations.  Full results are tabulated below.  With activation musculature is either normal or shows some chronic neurogenic changes seen with some polyphasia  present.    Interpretation:  This is an abnormal EMG.  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 Results have been discussed with Dr Lu. He is out of office this week and will communicate with the patient next week.      ___________________________  Kajal Wright MD        Nerve Conduction Studies  Motor Sites      Latency Amplitude Neg. Amp Diff Segment Distance Velocity Neg. Dur Neg Area Diff Temperature Comment   Site (ms) Norm (mV) Norm %  cm m/s Norm ms %  C    Right Fibular (EDB) Motor   Ankle 4.6  < 6.0 3.4  > 2.0  Ankle-EDB 8   6.2  31.8    Bel Fib Head 10.5 - 3.4 - 0 Bel Fib Head-Ankle 28 47  > 38 6.5 0.99 31.7    Pop Fossa 12.6 - 3.3 - -2.9 Pop Fossa-Bel Fib Head 10 48  > 38 6.6 -3.9 31.7    Right Median (APB) Motor   Wrist 4.2  < 4.4 3.4  > 5.0  Wrist-APB 8   4.8  32.8    Elbow 8.2 - 3.4 - 0 Elbow-Wrist 20 50  > 48 5.0 8.2 32.7    Erb's Pt. 8.3 - 1.30 -      4.0  - MGA Check   Right Tibial (AHB) Motor   Ankle 3.6  < 6.5 6.9  > 4.4  Ankle-AHB 8   6.3  -    Knee 12.2 - 5.7 - -17.4 Knee-Ankle 34 40  > 38 6.7 -10.1 31.8    Right Ulnar (ADM) Motor   Wrist 2.7  < 3.5 8.1  > 5.0  Wrist-ADM 8   4.1  32.4    Bel Elbow 6.1 - 6.2 - -23.5 Bel Elbow-Wrist 19.5 57  > 48 4.2 -18.4 32.5    Abv Elbow 7.4 - 6.7 - 8.1 Abv Elbow-Bel Elbow 9 69  > 48 4.4 10.9 32.5    Erb's Pt. 8.7 - 6.4 - -4.5 Erb's Pt.-Abv Elbow 9 69 - 4.5 -2.8 32.5      F-Wave Sites      Min F-Lat Max-Min F-Lat Mean F-Lat   Site (ms) ms ms   Right Median F-Wave   Wrist 37.9 - 37.9   Right Tibial F-Wave   Ankle 46.0 7.1 -   Right Ulnar F-Wave   Wrist 25.6 5.8 27.3     Sensory Sites      Onset Lat Peak Lat Amp (O-P) Amp (P-P) Segment Distance Velocity Temperature Comment   Site ms ms  V Norm  V  cm m/s Norm  C    Right Median Sensory   Wrist-Dig II 2.8 3.6 24  > 10 32 Wrist-Dig II 14 50  > 48 32.9    Right Sural Sensory   Calf-Lat Mall 3.0 3.8 13  > 5 16 Calf-Lat Mall  14 47  > 38 31.7    Right Ulnar Sensory   Wrist-Dig V 2.1 2.8 20  > 8 30 Wrist-Dig V 12.5 60  > 48 32.8        Electromyography     Side Muscle Ins Act Fibs/PSW Fasc HF Amp Dur Poly Recrt Int Pat   Right Tib Anterior Incr 1+ 1+ 0 Nml Nml 2+ Tessy Nml   Right Gastroc Incr 1+ Nml 0 Nml Nml 0 Nml Nml   Right Vastus Lat Incr None 2+ 0 1+ 1+ 1+ Nml Nml   Right FDI Nml None Nml 0 1+ 1+ 0 Tessy Nml   Right Triceps Nml None 1+ 0 Nml Nml 0 Nml Nml   Right Biceps Nml None Nml 0 Nml Nml 0 Nml Nml   Right Deltoid Incr None 1+ 0 Nml Nml 0 Nml Nml   Right Genioglossus Nml None Nml 0 1+ 1+ 0 Tessy Nml   Right T10 Parasp Inc None 1+ 0        Right T6 Parasp Incr 1+ Nml 0              NCS Waveforms:    Motor                F-Wave           Sensory             Ultrasound Images:

## 2022-08-04 NOTE — TELEPHONE ENCOUNTER
Pt called back. She is able to come to appt 8/12/22.    Marilyn ACOSTA RN, BSN  [facility]

## 2022-08-04 NOTE — TELEPHONE ENCOUNTER
Attempted to call pt.  Provider requested to see pt next week to discuss EMG results.     [facility].  Writer put an appt on hold with Dr. Lu for 8/12/22 at 8 AM.     Marilyn ACOSTA RN, BSN  [facility]

## 2022-08-08 NOTE — TELEPHONE ENCOUNTER
This is just a medical classification only - it has little effect on the patiend right now.     Please forward the following information to her:     What is Chronic Kidney Disease?     In 2015 or so, the association   of cardiologists has determined that this is another potential risk factor for   heart disease, such as cholesterol, smoking, family history, etc.   Chronic kidney disease (CKD) is a loss of kidney function.  When the kidneys are damaged, they do not remove wastes and extra water from the blood as well as they should.  The most common causes of CKD are high blood pressure, high cholesterol, and diabetes.   It can also run in families, so you may be at higher risk if you have a   blood relative with kidney failure.  CKD is a silent condition (having few or no symptoms) and develops so slowly that most people do not realize they are sick until the disease is advanced.    Left undetected and untreated CKD can eventually lead to further problems   including hypertension, anemia, weak bones, poor nutrition, nerve damage,   and in severe cases kidney failure (requiring dialysis or transplant).    CKD also increases a patient s risk for developing diseases of the heart and   blood vessels.    We can diagnose CKD through blood and urine tests.  By detecting CKD   in its very early stages we can prevent or delay the complications of CKD, including   kidney failure and heart disease.  Stages of CKD:  There are five stages of chronic kidney disease.  Your stage   of kidney damage is based on the presence of kidney damage (often in the   form of urinary proteins) and your glomerular filtration rate (GFR), which is a   measure of your level of kidney function.CKD stage 2 just means that your GFR   or filtering rate of the kidneys is very slightly diminished and that you have another   medical diagnosis, such as high blood pressure or high cholesterol.   Things you can do to slow down or avoid kidney failure:  If  you have diabetes, control your blood sugar.  Studies show that keeping tight    control of blood sugar can delay or prevent kidney failure  If you have high blood pressure, it is important to lower your blood pressure.    Medications called ACE (angiotensin-converting enzyme) inhibitors or ARBs   (angiotensin receptor blockers) are used to keep your kidney disease from getting   worse.  Be active by including exercise in your daily routine.  Eat a well balanced diet.   We may have you watch the amount of protein you eat.     Too much protein can make the kidneys work too hard.  If you smoke, Quit smoking.  Smoking damages the kidneys.  It also raises blood   pressure.  Discuss all of your medications, including over-the-counter medications, with   your doctor.  You should  avoid certain medications, including popular medications   such as Advil, Motrin, Aleve (and other  NSAIDs ) which can further damage your   kidneys.  For more information on Chronic Kidney Disease, please see the National Kidney   Foundation www.kidney.org.

## 2022-08-08 NOTE — TELEPHONE ENCOUNTER
My chart message sent     Riddhi Barkley RN, BSN  [facility]

## 2022-08-12 NOTE — PATIENT INSTRUCTIONS
I do think on the basis of your EMG and the totality of your symptoms ALS is the correct diagnosis  I would value neuromuscular subspecialty consultation to further confirm the diagnosis and to establish with a multi disciplinary ALS clinic if so.   I will place this referral but also reach out regarding options at the [facility] should you so choose.    I will reach out next week with updates

## 2022-08-12 NOTE — NURSING NOTE
"Natasha Allison is a 72 year old female who presents for:  Chief Complaint   Patient presents with     Follow Up     EMG review        Initial Vitals:  /85   Pulse 83   Ht 1.575 m (5' 2\")   Wt 56.7 kg (125 lb)   SpO2 100%   BMI 22.86 kg/m   Estimated body mass index is 22.86 kg/m  as calculated from the following:    Height as of this encounter: 1.575 m (5' 2\").    Weight as of this encounter: 56.7 kg (125 lb).. Body surface area is 1.57 meters squared. BP completed using cuff size: dixie Solis    "

## 2022-08-12 NOTE — LETTER
8/12/2022         RE: Natasha Allison  [address] [facility] [id number]        Dear Colleague,    Thank you for referring your patient, Natasha Allison, to the [facility] NEUROLOGY CL[facility]. Please see a copy of my visit note below.    [facility]  Section of General Neurology  Return Patient Visit    Natasha Allison MRN# [id number]   Age: 72 year old YOB: 1950     Brief history of symptoms: The patient was initially seen in neurologic consultation on 6/21/22. Please see the comprehensive neurologic consultation note from that date in the Epic records for details.          Assessment and Plan:     Natasha Allison is a very pleasant 72 year old female who presents in follow up and to review EMG findings.  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  She of course is hopeful that this is not the case.  I have recommend neuromuscular consultation/ALS clinic for review if further work up would be indicated and to further confirm/support this diagnosis.  If so I feel the multi-disciplinary ALS clinic would be the best place for her to obtain continued care.  All questions answered.  I will reach out with updates regarding their inquiries of options here at the [place] vs at the [facility].         Nik Lu MD   of Neurology   [facility]      Interval history:   We discussed EMG results.  That they were quite suggestive of motor neuron disease and the diagnosis of ALS.   She wonders if there could be an alternative diagnosis.  She has done research about Lyme disease which I told her I did not think would be likely to be pertinent to her findings.   She is worried about her weight loss and notes she hopes she has 10 years  "left in her.  We discussed ALS prognosis which is variable and I would not give her an extact time line in this regard if this diagnosis is correct.  Natasha notes continued issues with taste/smell from COVID 19 as well and feels this is part of the issues with eating/weight potentially        Past Medical History:     Patient Active Problem List   Diagnosis     Rheumatoid arthritis involving multiple sites with positive rheumatoid factor (H)- sees  \"Tip\" =Dr. Benito Garcia MD - at [facility]      CARDIOVASCULAR SCREENING; LDL GOAL LESS THAN 130     Advanced directives, counseling/discussion     Essential hypertension with goal blood pressure less than 140/90- gets a little elevated for 1-2 days after MTX administration     Corneal clouding- left eye  - since childhood shovel to the eye - decreased vision secondary to that - sees [facility] physicians and surgeons      Urinary urgency     Other osteoporosis without current pathological fracture     Family history of hemochromatosis- son     Age-related osteoporosis without current pathological fracture     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 increased anxiety- since conmittant dosing of Prolia and remicade at her rheumatologist's office 10/9/2018     Vasomotor rhinitis     Vitamin D deficiency     Perioral dermatitis- at [facility] - Dr. Villanueva     Action tremor     SOB (shortness of breath) on exertion     Alteration in speech     Personal history of tobacco use, presenting hazards to health     Paresis of right vocal cord     Other dysphagia     Weight loss     CKD (chronic kidney disease) stage 2, GFR 60-89 ml/min     Past Medical History:   Diagnosis Date     Hypertension     lisinopril 10mg daily = annoying runny nose     Osteoporosis     alendronate 5mg daily = stomach upset /nausea     Rheumatoid arthritis(714.0)         Past " "Surgical History:     Past Surgical History:   Procedure Laterality Date     CARPAL TUNNEL RELEASE RT/LT       COLONOSCOPY N/A 3/12/2019    Procedure: COLONOSCOPY;  Surgeon: Jermaine Rock MD;  Location:  GI        Social History:     Social History     Tobacco Use     Smoking status: Former Smoker     Packs/day: 0.75     Years: 18.00     Pack years: 13.50     Quit date: 1978     Years since quittin.7     Smokeless tobacco: Never Used   Vaping Use     Vaping Use: Never used   Substance Use Topics     Alcohol use: Yes     Comment: 0-1 QD     Drug use: No        Family History:     Family History   Problem Relation Age of Onset     Hypertension Mother      Hypertension Father      Heart Disease Father      Intellectual Disability (Mental Retardation) Brother      Hypertension Sister      Hypertension Son      Hypertension Daughter      Colon Cancer Brother      Hypertension Brother      Hypertension Brother      Hypertension Brother      Hypertension Sister      Hypertension Sister         Medications:     Current Outpatient Medications   Medication Sig     albuterol (PROAIR HFA/PROVENTIL HFA/VENTOLIN HFA) 108 (90 Base) MCG/ACT inhaler Inhale 2 puffs into the lungs every 6 hours as needed for shortness of breath / dyspnea or wheezing     B-D TB SYRINGE 27G X 1/2\" 1 ML MISC USE FOR METHOTREXATE INJECTION     calcium carb-cholecalciferol (OS-MICHELLE) 500-200 MG-UNIT tablet Take 1 tablet by mouth 2 times daily     fluticasone-salmeterol (AIRDUO RESPICLICK) 113-14 MCG/ACT inhaler Inhale 1 puff into the lungs 2 times daily     folic acid (FOLVITE) 1 MG tablet Take 1 tablet (1 mg) by mouth daily     inFLIXimab-dyyb (INFLECTRA) 100 MG injection Inject into the vein once     losartan (COZAAR) 25 MG tablet Take 1 tablet (25 mg) by mouth daily For blood pressure     meloxicam (MOBIC) 15 MG tablet Take 15 mg by mouth daily.     methotrexate sodium, pres-free, 50 MG/2ML SOLN injection CHEMO 20 mg      Starch, " "Thickening, POWD Use per directions to thicken liquids to help with swallowing     syringe, disposable, 1 ML MISC 1 Syringe once a week To be used with Methotrexate Injections     Syringe/Needle, Disp, (SYRINGE LUER SLIP) 27G X 1/2\" 1 ML MISC USE FOR METHOTREXATE INJECTION     vitamin D3 (CHOLECALCIFEROL) 10 MCG (400 UNIT) capsule Take 1 capsule (400 Units) by mouth daily     No current facility-administered medications for this visit.        Allergies:     Allergies   Allergen Reactions     Alendronic Acid      Severe aching     Latex      Seasonal Allergies Other (See Comments)     Watery eyes, runny nose.          Physical Exam:   Vitals: /85   Pulse 83   Ht 1.575 m (5' 2\")   Wt 56.7 kg (125 lb)   SpO2 100%   BMI 22.86 kg/m       Neuro:   General Appearance: No apparent distress, well-nourished, well-groomed, pleasant      Mental Status: Alert and oriented to person, place, and time. Speech fluent and comprehension intact. Moderate dysarthria again present     Cranial Nerves:   II: Visual fields: normal  III: Pupils: L cornea cloudy from injury as a child, R pupil normal/reactive  III,IV,VI: Extraocular Movements: intact   V: Facial sensation: intact to light touch  VII: Facial strength: b/l weak to activation/smile, more on R than L   VIII: Hearing: intact grossly  IX: Palate: elevates b/l but to variable heights and quivers easily  XII: Tongue movement: normal.  I did appreciate fasciculations of the tongue      Motor Exam:   5/5 in LE.  UE confounded by RA re knuckles/some degree of changes in the hand.  5/5 proximally. 4/5  b/l, 4-/5 finger abduction b/l also worse on L slightly. Finger flexors 4+/5  Atrophy of hand muscles noted in thenar region most prominently.    Intermittent fasciculations are seen.     Sensory: intact to light touch     Coordination: no dysmetria with finger-to-nose bilaterally     Reflexes: biceps, triceps  patellar 3+, ankle jerks 2+ and symmetric. Toes are equivocal, " pratt negative.            Data: Pertinent prior to visit   Imaging:  CT ANGIOGRAM OF THE HEAD AND NECK WITH CONTRAST  8/11/2021 1:45 PM      HISTORY: Transient ischemic attack (TIA); Alteration in speech.      TECHNIQUE:  CT angiography with an injection of 70mL Isovue-370 IV  with scans through the head and neck. Images were transferred to a  separate 3-D workstation where multiplanar reformations and 3-D images  were created. Estimates of carotid stenoses are made relative to the  distal internal carotid artery diameters except as noted. Radiation  dose for this scan was reduced using automated exposure control,  adjustment of the mA and/or kV according to patient size, or iterative  reconstruction technique.     COMPARISON: None.      CT ANGIOGRAM HEAD FINDINGS:    The vertebral arteries, basilar artery, and posterior cerebral  arteries are patent. Fetal origin of the right posterior cerebral  artery. The internal carotid arteries, anterior cerebral arteries, and  middle cerebral arteries are patent. No evidence of large vessel  occlusion or high-grade stenosis. No evidence of aneurysm or vascular  malformation.      CT ANGIOGRAM NECK FINDINGS:   A three-vessel aortic arch is present. The bilateral common carotid,  internal carotid, external carotid, and vertebral arteries are patent.  Mild plaquing at the bilateral carotid bifurcations. Approximately 10%  stenosis of the proximal right internal carotid artery. No evidence of  large vessel occlusion or high-grade stenosis. No evidence of  dissection.      Moderate cervical spine degenerative change. Left greater than right  temporomandibular joint degenerative change. Fatty atrophy of the  bilateral submandibular glands.                                                                       IMPRESSION:   CTA Head: No evidence of large vessel occlusion or high-grade  stenosis.   CTA Neck: No evidence of large vessel occlusion or high-grade  stenosis.   I  personally reviewed the above imaging and agree with the findings in the report.             CT OF THE NECK WITH CONTRAST  6/14/2022 1:10 PM      COMPARISON: None     HISTORY: Paresis of right vocal cord.     TECHNIQUE:  Axial CT images of the neck were acquired after the  intravenous administration of 80mL Isovue-370 nonionic iodinated  contrast material. Coronal reconstructions were created.     FINDINGS: There is no cervical lymphadenopathy. There are no fluid  collections or abscesses in the neck. The submandibular glands  bilaterally are atrophied. The parotid glands bilaterally are  unremarkable. The thyroid gland is within normal limits. No evidence  for mucosal space lesion. The lung apices are clear.     Paramedian location of the right true vocal fold consistent with the  patient's history of right vocal cord paresis is noted. No evidence  for abnormality along the course of the 10th cranial nerve or right  recurrent laryngeal nerve from the skull base to the upper chest.                                                                      IMPRESSION:  1. Atrophy of the submandibular glands bilaterally.  2. Paramedian location of the right true vocal cord consistent with  patient's history of right vocal cord paresis. No evidence for  abnormality along the course of the right vagus or right recurrent  laryngeal nerves.  3. Otherwise, normal soft tissue neck CT.     Procedures:       Laboratory:  SSA, SSB negative        Procedures:  EMG 8/3/22    Clinical Information:  Referred by Dr. Alves for EMG evaluation.  The patient has a 15-month history of progressive dysarthria, dysphagia and weight loss of 30 pounds.     Examination revealed visible fasciculations in the quadriceps muscles bilaterally.  The patient has hand muscle atrophy bilaterally.  Fasciculations are present in the tongue.  The patient has weakness with tongue movement bilaterally.  Dysarthric speech is present.     Techniques:  Motor and  sensory conduction studies were done with surface recording electrodes. EMG was done with a concentric needle electrode.      Results:  Motor nerve conduction studies:  Fibular, tibial, ulnar motor studies on the right side are within normal limits.  Right median motor reveals normal distal latency with low amplitude and normal conduction velocity.     Sensory studies:  Right median and ulnar antidromic sensory studies are within normal limits.  Right sural study is within normal limits.     F waves:  Right median F wave is prolonged.  Tibial and ulnar are within normal limits.     Needle examination:  Needle examination was performed with a survey of muscles in the right lower extremity, right upper extremity, thoracic paraspinals and right genioglossus muscle.  There is presence of increased insertional activity in multiple divisions with presence of fibrillation potentials and fasciculations.  Full results are tabulated below.  With activation musculature is either normal or shows some chronic neurogenic changes seen with some polyphasia present.     Interpretation:  This is an abnormal EMG.  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 Results have been discussed with Dr Lu. He is out of office this week and will communicate with the patient next week.     Kajal Wright MD                 The total time of this encounter today amounted to 35 minutes. This time included time spent with the patient, prep work, ordering tests, and performing post visit documentation.        Again, thank you for allowing me to participate in the care of your patient.        Sincerely,        Mathew Lu MD

## 2022-08-12 NOTE — PROGRESS NOTES
[facility]  Section of General Neurology  Return Patient Visit    Natasha Allison MRN# [id number]   Age: 72 year old YOB: 1950     Brief history of symptoms: The patient was initially seen in neurologic consultation on 6/21/22. Please see the comprehensive neurologic consultation note from that date in the Epic records for details.          Assessment and Plan:     Natasha Allison is a very pleasant 72 year old female who presents in follow up and to review EMG findings.  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  She of course is hopeful that this is not the case.  I have recommend neuromuscular consultation/ALS clinic for review if further work up would be indicated and to further confirm/support this diagnosis.  If so I feel the multi-disciplinary ALS clinic would be the best place for her to obtain continued care.  All questions answered.  I will reach out with updates regarding their inquiries of options here at the [facility] vs at the [facility].         Nik Lu MD   of Neurology   [facility]      Interval history:   We discussed EMG results.  That they were quite suggestive of motor neuron disease and the diagnosis of ALS.   She wonders if there could be an alternative diagnosis.  She has done research about Lyme disease which I told her I did not think would be likely to be pertinent to her findings.   She is worried about her weight loss and notes she hopes she has 10 years left in her.  We discussed ALS prognosis which is variable and I would not give her an extact time line in this regard if this diagnosis is correct.  Natasha notes continued issues with taste/smell from COVID 19 as well and feels this is part of the issues with  "eating/weight potentially        Past Medical History:     Patient Active Problem List   Diagnosis     Rheumatoid arthritis involving multiple sites with positive rheumatoid factor (H)- sees DrCarlos Eduardo \"Tip\" =Dr. Benito Garcia MD - at [facility]      CARDIOVASCULAR SCREENING; LDL GOAL LESS THAN 130     Advanced directives, counseling/discussion     Essential hypertension with goal blood pressure less than 140/90- gets a little elevated for 1-2 days after MTX administration     Corneal clouding- left eye  - since childhood shovel to the eye - decreased vision secondary to that - sees [facility] physicians and surgeons      Urinary urgency     Other osteoporosis without current pathological fracture     Family history of hemochromatosis- son     Age-related osteoporosis without current pathological fracture     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 increased anxiety- since conmittant dosing of Prolia and remicade at her rheumatologist's office 10/9/2018     Vasomotor rhinitis     Vitamin D deficiency     Perioral dermatitis- at [facility] - Dr. Villanueva     Action tremor     SOB (shortness of breath) on exertion     Alteration in speech     Personal history of tobacco use, presenting hazards to health     Paresis of right vocal cord     Other dysphagia     Weight loss     CKD (chronic kidney disease) stage 2, GFR 60-89 ml/min     Past Medical History:   Diagnosis Date     Hypertension     lisinopril 10mg daily = annoying runny nose     Osteoporosis     alendronate 5mg daily = stomach upset /nausea     Rheumatoid arthritis(714.0)         Past Surgical History:     Past Surgical History:   Procedure Laterality Date     CARPAL TUNNEL RELEASE RT/LT       COLONOSCOPY N/A 3/12/2019    Procedure: COLONOSCOPY;  Surgeon: Jermaine Rock MD;  Location:  GI        Social History:     Social History " "    Tobacco Use     Smoking status: Former Smoker     Packs/day: 0.75     Years: 18.00     Pack years: 13.50     Quit date: 1978     Years since quittin.7     Smokeless tobacco: Never Used   Vaping Use     Vaping Use: Never used   Substance Use Topics     Alcohol use: Yes     Comment: 0-1 QD     Drug use: No        Family History:     Family History   Problem Relation Age of Onset     Hypertension Mother      Hypertension Father      Heart Disease Father      Intellectual Disability (Mental Retardation) Brother      Hypertension Sister      Hypertension Son      Hypertension Daughter      Colon Cancer Brother      Hypertension Brother      Hypertension Brother      Hypertension Brother      Hypertension Sister      Hypertension Sister         Medications:     Current Outpatient Medications   Medication Sig     albuterol (PROAIR HFA/PROVENTIL HFA/VENTOLIN HFA) 108 (90 Base) MCG/ACT inhaler Inhale 2 puffs into the lungs every 6 hours as needed for shortness of breath / dyspnea or wheezing     B-D TB SYRINGE 27G X 1/2\" 1 ML MISC USE FOR METHOTREXATE INJECTION     calcium carb-cholecalciferol (OS-MICHELLE) 500-200 MG-UNIT tablet Take 1 tablet by mouth 2 times daily     fluticasone-salmeterol (AIRDUO RESPICLICK) 113-14 MCG/ACT inhaler Inhale 1 puff into the lungs 2 times daily     folic acid (FOLVITE) 1 MG tablet Take 1 tablet (1 mg) by mouth daily     inFLIXimab-dyyb (INFLECTRA) 100 MG injection Inject into the vein once     losartan (COZAAR) 25 MG tablet Take 1 tablet (25 mg) by mouth daily For blood pressure     meloxicam (MOBIC) 15 MG tablet Take 15 mg by mouth daily.     methotrexate sodium, pres-free, 50 MG/2ML SOLN injection CHEMO 20 mg      Starch, Thickening, POWD Use per directions to thicken liquids to help with swallowing     syringe, disposable, 1 ML MISC 1 Syringe once a week To be used with Methotrexate Injections     Syringe/Needle, Disp, (SYRINGE LUER SLIP) 27G X 1/2\" 1 ML MISC USE FOR METHOTREXATE " "INJECTION     vitamin D3 (CHOLECALCIFEROL) 10 MCG (400 UNIT) capsule Take 1 capsule (400 Units) by mouth daily     No current facility-administered medications for this visit.        Allergies:     Allergies   Allergen Reactions     Alendronic Acid      Severe aching     Latex      Seasonal Allergies Other (See Comments)     Watery eyes, runny nose.          Physical Exam:   Vitals: /85   Pulse 83   Ht 1.575 m (5' 2\")   Wt 56.7 kg (125 lb)   SpO2 100%   BMI 22.86 kg/m       Neuro:   General Appearance: No apparent distress, well-nourished, well-groomed, pleasant      Mental Status: Alert and oriented to person, place, and time. Speech fluent and comprehension intact. Moderate dysarthria again present     Cranial Nerves:   II: Visual fields: normal  III: Pupils: L cornea cloudy from injury as a child, R pupil normal/reactive  III,IV,VI: Extraocular Movements: intact   V: Facial sensation: intact to light touch  VII: Facial strength: b/l weak to activation/smile, more on R than L   VIII: Hearing: intact grossly  IX: Palate: elevates b/l but to variable heights and quivers easily  XII: Tongue movement: normal.  I did appreciate fasciculations of the tongue      Motor Exam:   5/5 in LE.  UE confounded by RA re knuckles/some degree of changes in the hand.  5/5 proximally. 4/5  b/l, 4-/5 finger abduction b/l also worse on L slightly. Finger flexors 4+/5  Atrophy of hand muscles noted in thenar region most prominently.    Intermittent fasciculations are seen.     Sensory: intact to light touch     Coordination: no dysmetria with finger-to-nose bilaterally     Reflexes: biceps, triceps  patellar 3+, ankle jerks 2+ and symmetric. Toes are equivocal, pratt negative.            Data: Pertinent prior to visit   Imaging:  CT ANGIOGRAM OF THE HEAD AND NECK WITH CONTRAST  8/11/2021 1:45 PM      HISTORY: Transient ischemic attack (TIA); Alteration in speech.      TECHNIQUE:  CT angiography with an injection of 70mL " Isovue-370 IV  with scans through the head and neck. Images were transferred to a  separate 3-D workstation where multiplanar reformations and 3-D images  were created. Estimates of carotid stenoses are made relative to the  distal internal carotid artery diameters except as noted. Radiation  dose for this scan was reduced using automated exposure control,  adjustment of the mA and/or kV according to patient size, or iterative  reconstruction technique.     COMPARISON: None.      CT ANGIOGRAM HEAD FINDINGS:    The vertebral arteries, basilar artery, and posterior cerebral  arteries are patent. Fetal origin of the right posterior cerebral  artery. The internal carotid arteries, anterior cerebral arteries, and  middle cerebral arteries are patent. No evidence of large vessel  occlusion or high-grade stenosis. No evidence of aneurysm or vascular  malformation.      CT ANGIOGRAM NECK FINDINGS:   A three-vessel aortic arch is present. The bilateral common carotid,  internal carotid, external carotid, and vertebral arteries are patent.  Mild plaquing at the bilateral carotid bifurcations. Approximately 10%  stenosis of the proximal right internal carotid artery. No evidence of  large vessel occlusion or high-grade stenosis. No evidence of  dissection.      Moderate cervical spine degenerative change. Left greater than right  temporomandibular joint degenerative change. Fatty atrophy of the  bilateral submandibular glands.                                                                       IMPRESSION:   CTA Head: No evidence of large vessel occlusion or high-grade  stenosis.   CTA Neck: No evidence of large vessel occlusion or high-grade  stenosis.   I personally reviewed the above imaging and agree with the findings in the report.             CT OF THE NECK WITH CONTRAST  6/14/2022 1:10 PM      COMPARISON: None     HISTORY: Paresis of right vocal cord.     TECHNIQUE:  Axial CT images of the neck were acquired after  the  intravenous administration of 80mL Isovue-370 nonionic iodinated  contrast material. Coronal reconstructions were created.     FINDINGS: There is no cervical lymphadenopathy. There are no fluid  collections or abscesses in the neck. The submandibular glands  bilaterally are atrophied. The parotid glands bilaterally are  unremarkable. The thyroid gland is within normal limits. No evidence  for mucosal space lesion. The lung apices are clear.     Paramedian location of the right true vocal fold consistent with the  patient's history of right vocal cord paresis is noted. No evidence  for abnormality along the course of the 10th cranial nerve or right  recurrent laryngeal nerve from the skull base to the upper chest.                                                                      IMPRESSION:  1. Atrophy of the submandibular glands bilaterally.  2. Paramedian location of the right true vocal cord consistent with  patient's history of right vocal cord paresis. No evidence for  abnormality along the course of the right vagus or right recurrent  laryngeal nerves.  3. Otherwise, normal soft tissue neck CT.     Procedures:       Laboratory:  SSA, SSB negative        Procedures:  EMG 8/3/22    Clinical Information:  Referred by Dr. Alves for EMG evaluation.  The patient has a 15-month history of progressive dysarthria, dysphagia and weight loss of 30 pounds.     Examination revealed visible fasciculations in the quadriceps muscles bilaterally.  The patient has hand muscle atrophy bilaterally.  Fasciculations are present in the tongue.  The patient has weakness with tongue movement bilaterally.  Dysarthric speech is present.     Techniques:  Motor and sensory conduction studies were done with surface recording electrodes. EMG was done with a concentric needle electrode.      Results:  Motor nerve conduction studies:  Fibular, tibial, ulnar motor studies on the right side are within normal limits.  Right median motor  reveals normal distal latency with low amplitude and normal conduction velocity.     Sensory studies:  Right median and ulnar antidromic sensory studies are within normal limits.  Right sural study is within normal limits.     F waves:  Right median F wave is prolonged.  Tibial and ulnar are within normal limits.     Needle examination:  Needle examination was performed with a survey of muscles in the right lower extremity, right upper extremity, thoracic paraspinals and right genioglossus muscle.  There is presence of increased insertional activity in multiple divisions with presence of fibrillation potentials and fasciculations.  Full results are tabulated below.  With activation musculature is either normal or shows some chronic neurogenic changes seen with some polyphasia present.     Interpretation:  This is an abnormal EMG.  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 Results have been discussed with Dr Lu. He is out of office this week and will communicate with the patient next week.     Kaajl Wright MD                 The total time of this encounter today amounted to 35 minutes. This time included time spent with the patient, prep work, ordering tests, and performing post visit documentation.

## 2022-08-15 NOTE — TELEPHONE ENCOUNTER
Spoke with patient/family member regarding probable appointment 8/31. Pt is looking into [facility] and has asked for call-back once 8/31 is templated so they can decide on [facility] vs [facility].

## 2022-08-18 PROBLEM — Z78.9 INTOLERANCE OF ORAL BISPHOSPHONATE THERAPY: Status: ACTIVE | Noted: 2018-06-27

## 2022-08-18 PROBLEM — Z79.52 LONG TERM CURRENT USE OF SYSTEMIC STEROIDS: Status: ACTIVE | Noted: 2018-06-27

## 2022-08-18 NOTE — PROGRESS NOTES
"[facility]    OUTPATIENT OCCUPATIONAL THERAPY CLINIC NOTE  Natasha Allison  YOB: 1950  [id number]    Type of visit:  Evaluation            Date of service: 8/18/2022    Referring provider: Dr. DANIEL March     present: No  Language: english    Others present at visit:  Spouse / significant other, Jaden    Medical diagnosis:   Amyotrophic lateral sclerosis (ALS)     Date of diagnosis: 8/18/22     Pertinent medical history:  RA, weight loss, dysphagia, dysarhtira, FORDE, severe HTN, asthma    Additional Occupational Profile Information (patterns of daily living, interests, values and needs): Pt is a 73 yo  woman who is retired with FORDE and dysarthria since about 5/2021 and dysphagia, dx with bulbar onset ALS    Cardio-respiratory status:  Forced vital capacity: 57 % of predicted MIP-16  NIV recommended by Dr. March 8/18/22    Height/Weight: 5' 2\" / 112 lb    Living environment:  House  Rambler with basement  Living environment barriers:  1 in front :stairs to enter (1 railing present) and 2 from garage   Walk-in shower, higher toilet-master bath   Railing to basement   Laundry on main     Current assistance/living environment:  Lives with spouse      Current mobility equipment:  None  Spouse has walker and cane from past surgeries    Current ADL equipment:  None     Technology used: smartphone and computer    Patient concerns/goals: feels right hand most affected by arhtritis since 2004    Evaluation   Interview completed.   Pain assessment:  Pain present  R thumb MCP    Range of motion:  UE AROM is WFL with exception of hands with ulnar drift in all digits and reduced thumb AROM and PROM with enlarged R MCP joint    Manual muscle testing:  is fairly strong, L key pinch is weaker than R and palmar is is weaker on L also    Cognition:  NT, appears intact today, provides all " of own social hx    ADL:   Feeding self:  Ind  Grooming: Ind, curling iron, R MCP of thumbs hurts  UE Dressing:  Ind  LE Dressing:  Ind, pants that are tight may need help with button  Showering/bathing: Ind  Toileting/transfer:  Ind  Functional Mobility: Ind    IADL:   Home management:  Spouse cooking, cleaning and laundry with pain in joints  Driving:  drives  Occupation: retired,   Leisure: bowling occasional, be with friends, 5 grandkids  Emergency Call system:NT      Fall Risk Screen:   Has the patient fallen 2 or more times in the last year? Yes   Fell off 2 step ladder -1 fall only   Has the patient fallen and had an injury in the past year? Yes    Bruising, hit mouth   Timed Up and Go Score: NT    Is the patient a fall risk? Yes, department fall risk interventions implemented     Impairments:  Fatigue  Muscle atrophy  Coordination  Balance  Pain  Range of motion  Respiratory compromize     Treatment diagnosis:  Impaired mobility  Impaired activities of daily living  Impaired IADL    Assessment of Occupational Performance: 3-5 Performance Deficits  Identified Performance Deficits (ie: feeding, social skills): eating, writing, cleaning, home management, yard care,   Clinical Decision Making (Complexity): Moderate complexity     Recommendations/Plan of care:  Patient would benefit from interventions to enhance safety and independence.  Rehab potential good for stated goals.  Occupational therapy intervention for  self care/home management.  1 session evaluation & treatment.  Recommend referral to hand therapy for R thumb splinting and treatment of RA and hand weakness ( near [place], [place])    Goals:   Target date:   Patient, family and/or caregiver will verbalize understanding of evaluation results and implications for functional performance.  Patient, family and/or caregiver will verbalize/demonstrate understanding of compensatory methods /equipment to enhance functional independence and  safety.  Patient, family and/or caregiver will verbalize/demonstrate understanding of positioning techniques/equipment.      Educational assessment/barriers to learning:  No barriers noted      Treatment provided this date:   Self care/home management, 15 minutes of training in AE to compensate for joint pain, joint changes and intrinsic hand weakness related to ALS:    Rocker knife  Button hook and zipper pull  Adaptive scissors and gardening tools from [facility]  Purpose of custom CMC/MCP R Thumb splint/hand therapy purpose for manual therapy/stretches to hands-requested orders  Built up foam for eating and writing utensils to reduce joint stress  ALSA AE program to loan AE at no charge     Response to treatment/recommendations: receptive    Goal attainment:  All goals met    Risks and benefits of evaluation/treatment have been explained.  Patient, family and/or caregiver are in agreement with Plan of Care.   ALS FRS-R (ALS Functional Rating Scale-Revised) completed today. Please see separate note.  Timed Code Treatment Minutes:   Total Treatment Time (sum of timed and untimed services): 31 min    Signature: KORY Knight/CITLALY  Date: 8/18/2022         Certification:  Onset date: 8/18/22  Start of care date: 8/18/2022  Certification date from 8/18/2022 to 8/18/22    I CERTIFY THE NEED FOR THESE SERVICES FURNISHED UNDER        THIS PLAN OF TREATMENT AND WHILE UNDER MY CARE     (Physician attestation of this document indicates review and certification of the therapy plan).

## 2022-08-18 NOTE — PATIENT INSTRUCTIONS
I agree that the most likely diagnosis in your case is indeed bulbar onset ALS    You may try riluzole 50 mg twice a day - this drug can slow down progression of ALS and make people live longer by 3-6 months but it does not change day-to-day function or quality of life. If you decide to take it, checking liver function tests monthly for the first 3 months of treatment is recommended.    Repeat breathing test today.     I would strongly consider a feeding tube in your case. How to place it (radiology vs surgery) depends on how abnormal your breathing is.    I would also strongly recommend starting using BiPAP machine at night to support your breathing. We will order it.     MRI brain and cervical spine with contrast to rule out complications of rheumatoid arthritis causing your neurologic symptoms. It is unlikely but should be ruled out. I will refer you to [facility] (former [facility]) who have an open scanner.    Follow up 2 months

## 2022-08-18 NOTE — LETTER
2022       RE: Natasha Allison  [address] [facility] [address]     Dear Colleague,    Thank you for referring your patient, Natasha Allison, to the [facility]UR[facility] [place] at [facility]. Please see a copy of my visit note below.    Service Date: 2022    Mathew Lu MD  [facility]  [address], [address]  [place]  [address]    RE:  Natasha Allison  MRN:  [id number]  :  1950    Dear Nik:    I had the pleasure to see Ms. Allison at the [facility] today.  Thank you for this referral.  As you know, she is a very pleasant 72-year-old woman, who was accompanied today by her , and who has dysarthria and dyspnea on exertion since about 2021.  The speech problem came first.  Her dysarthria has been progressive over the last year.  It takes her more effort to get the words out and has to repeat herself sometimes to others.  She does not use any augmentative communication device.  Regarding her swallowing, she is having gradually increasing problems in the last 15 months as well.  She ends up coughing or expelling liquids very frequently. She does a bit better with solids, although she has to chew slowly and carefully and her chewing strength has declined.  She denies pseudobulbar affect, although sometimes she becomes emotional when discussing the news about her diagnosis and crying.  She does have mild to moderate drooling.  It is not especially bothersome for her.  She denies pooling of thick secretions in her throat.  She has no head drop.  She has no ptosis or diplopia.  She has seen her primary care doctor, who recommended thickening of her liquids, although she has not had a dedicated speech therapy consult for this.      Importantly, she has a poor appetite and has lost over 30 pounds of weight in the last  year.  She had COVID-19 with the delta variant in 12/2021 that caused her parosmia, and this parosmia led to reduced appetite and aversion to food.  She does not describe cramps or muscle twitching.  She has no numbness, tingling or other sensory symptoms in the hands or feet.  She has had longstanding difficulties using her hands and some atrophy of the muscles, which she relates to severe rheumatoid arthritis changes, but she complains of increasing difficulty using her right hand and doing fine movements in the last year.  She has no problems with overhead motions of the shoulder.  She denies any leg weakness, per se, and has not noticed any change in her balance, walking, or getting out of a chair.    In terms of investigations, at the onset of the problem last year a TIA was considered and she had a CT angiogram of head and neck with contrast.  It was unremarkable.  The spine showed moderate degenerative changes.  There was fatty atrophy of the submandibular glands.  She had a high resolution CT scan of the chest on 05/20/2022 that was essentially unremarkable.  No interstitial lung disease- the only finding was a few small pulmonary nodules less than 6 mm.  She is on methotrexate for rheumatoid arthritis.  PFT done last in 03/2022 showed at least moderate restriction with FVC 59%, FEV1 63% and normal ratio.  MIP and MEP were not done.  She did have an EMG with Dr. Wright 08/03 that showed denervation in the thoracic paraspinals, T6, and a few muscles of the right upper extremity and right lower extremity with fasciculations and motor unit remodeling.  Sensory nerve conduction studies were largely normal in the arms and legs, and motor nerve conduction studies did not show any block or demyelinating features.  Right median CMAP from the APB was small.  This was consistent with motor neuron disease.      She has not had a brain or C-spine MRI.  In terms of labs, she had a recent CBC that was within normal  "limits.  Creatinine was normal.  SSA and SSB antibodies negative.  Vitamin B6, magnesium, TSH levels were normal.  Lipids showed borderline elevated cholesterol at 202.  Vitamin D levels were normal at 45.    PAST MEDICAL HISTORY:  Significant for RA, severe, hypertension and asthma.  She is on albuterol, losartan, AirDuo, fluticasone, vitamin D supplementation, methotrexate 20 mg weekly and folic acid.    FAMILY HISTORY:  Negative for ALS, dementia, parkinsonism or other neurodegenerative disease.    SOCIAL HISTORY:  She does not smoke.  She drinks alcohol rarely.  No illicit drug use.    BP (!) 157/97   Pulse 83   Resp 16   Ht 1.575 m (5' 2\")   Wt 50.8 kg (112 lb)   SpO2 99%   BMI 20.49 kg/m      PHYSICAL EXAMINATION:  On exam, she is awake, alert, oriented x3.  She is able to provide a coherent history.  She has no ptosis or ophthalmoparesis.  Ductions and versions of the eyes are normal.  There is no nystagmus.  There is no weakness of the right orbicularis oculi.  There is very subtle weakness on the left.  There is moderate weakness of cheek puff bilaterally and mild lower facial atrophy.  Lip seal strength is only mildly abnormal, especially on the left.  She has a very spastic dysarthria quality.  Uvula and palate elevate at midline.  Tongue does not shows striking atrophy, but I think there are some fasciculations, especially posteriorly.  Speed of lateral tongue movements is slow.  Strength of genioglossus is probably at the lower limit of normal.  Strength of jaw opening looks normal.  Facial sensation is intact in all distributions of trigeminal nerve.  Neck flexion strength is 4+/5, a touch weak.  Neck extension is full.  Shoulder shrug is normal.  She has a positive to slightly brisk jaw jerk.      Her strength is 5/5 in the right shoulder abduction, 5- for left.  Deltoid, biceps, triceps, wrist and finger extensor strength are bilaterally 5.  FDI is right 3-, left 2.  APB right 0, left 1.  " Finger extension is bilaterally 5.  There are major arthritic deformities in both hands, but there is also striking atrophy of intrinsic hand muscles, right more than left FDI.  There are also some fasciculations in the upper extremities including hands, biceps and triceps that were readily  visible.  In the legs, she has 5/5 strength for hip flexion, hip adduction, abduction, knee extension, knee flexion, foot dorsiflexion and great toe extension.  Tone is normal in the arms or legs.  Agility of finger tapping is reduced in those both sides, but this could be because of the arthritic changes.  Agility of foot tapping is normal on the right, perhaps very slightly reduced on the left.  Reflexes are brisk throughout.  They are  3+ in biceps, triceps, brachioradialis, knees and ankles.  I would even say that the right knee reflex is 4+ clonic.  There is no sustained ankle clonus.  Toes are downgoing bilaterally.  There is positive bilateral Dilip reflex and bilateral pectoralis reflex.  There is also atrophy diffusely of the lower extremity muscles despite the lack of weakness there, with visible fasciculations in both quadriceps, and also below the knee at the gastrocnemius.  Sensory exam is intact to all modalities including vibration, light touch joint position and pinprick.  Finger-to-nose is done without dysmetria.  She could get up without significant difficulties and was able to walk on heels and toes.     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  Her presentation is classic with progressive dysarthria and dysphagia.  She has a spastic speech and slow tongue movements, which are upper motor neuron signs, along with a brisk jaw jerk.  She also has some lower motor neuron signs in the bulbar region, as there are mild tongue fasciculations and definite facial weakness.  She also has fasciculations in upper and lower limbs with some muscle atrophy and weakness of the hand intrinsics,  which could to some degree have to do with the RA changes, but I think there is probably superimposed denervation- this is supported by the EMG findings.     I think it is highly unlikely there is an alternative explanation, especially given the bulbar involvement.  However, given the history of RA, I think it is important to obtain an MRI of the brain and cervical spine with and without contrast to specifically rule out brainstem or upper cervical spinal cord compression from a pannus including basilar invagination.  This sometimes can cause dysarthria, dysphagia or lower motor neuron signs and upper cervical myelopathy, but technically it should not cause this pattern of speech (spastic dysarthria) because this implicates lesions of the bilateral corticobulbar tracts and not compression of the lower brainstem.  The patient is claustrophobic.  I think it is a bad idea to give her oral Valium because her breathing is tenuous and she could get into respiratory arrest.  I would prefer that she get the MRI in an open scanner, and we will refer her for that reason to [facility].      I think her breathlessness almost certainly has to do with the ALS.  She has a clean chest CT and a normal echocardiogram.  I will repeat the PFTs today, which I am sure will be worse than 03/2022, and I strongly recommended starting NIV at night.  This equipment is medically necessary because the patient has restrictive respiratory physiology secondary to ALS, and early initiation of NIV in affected individuals can lead to prolongation of survival, improvement of sleep quality and daytime energy and quality of life overall.    The patient has substantial weight loss and dysphagia with liquids and malnutrition.  At this juncture, I recommended and discussed the placement of a gastrostomy.  The method of placing a gastrostomy would differ depending on the results of her pulmonary function tests.  If they are similar to 03/2022 and she  is able to tolerate the flat position for 30-45 minutes, we could attempt placement through Interventional Radiology.  If she cannot tolerate it or her vital capacity is below 40%, I think it would not be a good idea to try through IR and in that case I would consider surgical placement under general anesthesia.  They want to think about the gastrostomy placement.  I told the family that this will allow her to improve her nutrition, potentially gain back a few pounds of weight, but I do not think it is going to prolong her survival if placed at this point with advanced disease.      We discussed medications that can slow down disease progression.  I recommended riluzole 50 mg b.i.d.  I went over the side effects of riluzole.  She should have AST and ALT monitored monthly for the first 3 months of therapy.  She will think about it.  I will give her a prescription.  She has some sialorrhea, but it is not especially bothersome.  We will monitor it, and she does not have pseudobulbar affect.      In terms of our team, she will see our speech therapist today, our nutritionist and our occupational therapist.  We will discuss genetics during the subsequent visit.  I did not want to do it today because there was an overwhelming amount of information to be communicated to the patient.      I do not recommend Radicava at this junction because the disease is too advanced for this drug to have a meaningful benefit.    I will also register her with the ALS Association and she will return to clinic for followup in 2 months or sooner if needed.    Total time spent on this encounter today 80 minutes including 60 face-to-face, 10 on post-visit note dictation, editing and orders, 10 on pre-visit chart review.    Sincerely,    Arnold March MD      cc:  Beverley Maloney MD  [address] [address]          D: 08/18/2022   T: 08/18/2022   MT: [email or web address]    Name:     MAC MALIK  JASON  MRN:      [id number]        Account:      [id number]   :      1950           Service Date: 2022       Document: [id number]

## 2022-08-18 NOTE — PROGRESS NOTES
Service Date: 2022    Mathew Lu MD  [facility]  [address], [address]  [place]  [address]    RE:  Natasha Allison  MRN:  [id number]  :  1950    Dear Nik:    I had the pleasure to see Ms. Allison at the [facility] today.  Thank you for this referral.  As you know, she is a very pleasant 72-year-old woman, who was accompanied today by her , and who has dysarthria and dyspnea on exertion since about 2021.  The speech problem came first.  Her dysarthria has been progressive over the last year.  It takes her more effort to get the words out and has to repeat herself sometimes to others.  She does not use any augmentative communication device.  Regarding her swallowing, she is having gradually increasing problems in the last 15 months as well.  She ends up coughing or expelling liquids very frequently. She does a bit better with solids, although she has to chew slowly and carefully and her chewing strength has declined.  She denies pseudobulbar affect, although sometimes she becomes emotional when discussing the news about her diagnosis and crying.  She does have mild to moderate drooling.  It is not especially bothersome for her.  She denies pooling of thick secretions in her throat.  She has no head drop.  She has no ptosis or diplopia.  She has seen her primary care doctor, who recommended thickening of her liquids, although she has not had a dedicated speech therapy consult for this.      Importantly, she has a poor appetite and has lost over 30 pounds of weight in the last year.  She had COVID-19 with the delta variant in 2021 that caused her parosmia, and this parosmia led to reduced appetite and aversion to food.  She does not describe cramps or muscle twitching.  She has no numbness, tingling or other sensory symptoms in the hands or feet.  She has had longstanding difficulties  using her hands and some atrophy of the muscles, which she relates to severe rheumatoid arthritis changes, but she complains of increasing difficulty using her right hand and doing fine movements in the last year.  She has no problems with overhead motions of the shoulder.  She denies any leg weakness, per se, and has not noticed any change in her balance, walking, or getting out of a chair.    In terms of investigations, at the onset of the problem last year a TIA was considered and she had a CT angiogram of head and neck with contrast.  It was unremarkable.  The spine showed moderate degenerative changes.  There was fatty atrophy of the submandibular glands.  She had a high resolution CT scan of the chest on 05/20/2022 that was essentially unremarkable.  No interstitial lung disease- the only finding was a few small pulmonary nodules less than 6 mm.  She is on methotrexate for rheumatoid arthritis.  PFT done last in 03/2022 showed at least moderate restriction with FVC 59%, FEV1 63% and normal ratio.  MIP and MEP were not done.  She did have an EMG with Dr. Wright 08/03 that showed denervation in the thoracic paraspinals, T6, and a few muscles of the right upper extremity and right lower extremity with fasciculations and motor unit remodeling.  Sensory nerve conduction studies were largely normal in the arms and legs, and motor nerve conduction studies did not show any block or demyelinating features.  Right median CMAP from the APB was small.  This was consistent with motor neuron disease.      She has not had a brain or C-spine MRI.  In terms of labs, she had a recent CBC that was within normal limits.  Creatinine was normal.  SSA and SSB antibodies negative.  Vitamin B6, magnesium, TSH levels were normal.  Lipids showed borderline elevated cholesterol at 202.  Vitamin D levels were normal at 45.    PAST MEDICAL HISTORY:  Significant for RA, severe, hypertension and asthma.  She is on albuterol, losartan,  "AirDuo, fluticasone, vitamin D supplementation, methotrexate 20 mg weekly and folic acid.    FAMILY HISTORY:  Negative for ALS, dementia, parkinsonism or other neurodegenerative disease.    SOCIAL HISTORY:  She does not smoke.  She drinks alcohol rarely.  No illicit drug use.    BP (!) 157/97   Pulse 83   Resp 16   Ht 1.575 m (5' 2\")   Wt 50.8 kg (112 lb)   SpO2 99%   BMI 20.49 kg/m      PHYSICAL EXAMINATION:  On exam, she is awake, alert, oriented x3.  She is able to provide a coherent history.  She has no ptosis or ophthalmoparesis.  Ductions and versions of the eyes are normal.  There is no nystagmus.  There is no weakness of the right orbicularis oculi.  There is very subtle weakness on the left.  There is moderate weakness of cheek puff bilaterally and mild lower facial atrophy.  Lip seal strength is only mildly abnormal, especially on the left.  She has a very spastic dysarthria quality.  Uvula and palate elevate at midline.  Tongue does not shows striking atrophy, but I think there are some fasciculations, especially posteriorly.  Speed of lateral tongue movements is slow.  Strength of genioglossus is probably at the lower limit of normal.  Strength of jaw opening looks normal.  Facial sensation is intact in all distributions of trigeminal nerve.  Neck flexion strength is 4+/5, a touch weak.  Neck extension is full.  Shoulder shrug is normal.  She has a positive to slightly brisk jaw jerk.      Her strength is 5/5 in the right shoulder abduction, 5- for left.  Deltoid, biceps, triceps, wrist and finger extensor strength are bilaterally 5.  FDI is right 3-, left 2.  APB right 0, left 1.  Finger extension is bilaterally 5.  There are major arthritic deformities in both hands, but there is also striking atrophy of intrinsic hand muscles, right more than left FDI.  There are also some fasciculations in the upper extremities including hands, biceps and triceps that were readily  visible.  In the legs, she " has 5/5 strength for hip flexion, hip adduction, abduction, knee extension, knee flexion, foot dorsiflexion and great toe extension.  Tone is normal in the arms or legs.  Agility of finger tapping is reduced in those both sides, but this could be because of the arthritic changes.  Agility of foot tapping is normal on the right, perhaps very slightly reduced on the left.  Reflexes are brisk throughout.  They are  3+ in biceps, triceps, brachioradialis, knees and ankles.  I would even say that the right knee reflex is 4+ clonic.  There is no sustained ankle clonus.  Toes are downgoing bilaterally.  There is positive bilateral Dilip reflex and bilateral pectoralis reflex.  There is also atrophy diffusely of the lower extremity muscles despite the lack of weakness there, with visible fasciculations in both quadriceps, and also below the knee at the gastrocnemius.  Sensory exam is intact to all modalities including vibration, light touch joint position and pinprick.  Finger-to-nose is done without dysmetria.  She could get up without significant difficulties and was able to walk on heels and toes.     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  Her presentation is classic with progressive dysarthria and dysphagia.  She has a spastic speech and slow tongue movements, which are upper motor neuron signs, along with a brisk jaw jerk.  She also has some lower motor neuron signs in the bulbar region, as there are mild tongue fasciculations and definite facial weakness.  She also has fasciculations in upper and lower limbs with some muscle atrophy and weakness of the hand intrinsics, which could to some degree have to do with the RA changes, but I think there is probably superimposed denervation- this is supported by the EMG findings.     I think it is highly unlikely there is an alternative explanation, especially given the bulbar involvement.  However, given the history of RA, I think it is  important to obtain an MRI of the brain and cervical spine with and without contrast to specifically rule out brainstem or upper cervical spinal cord compression from a pannus including basilar invagination.  This sometimes can cause dysarthria, dysphagia or lower motor neuron signs and upper cervical myelopathy, but technically it should not cause this pattern of speech (spastic dysarthria) because this implicates lesions of the bilateral corticobulbar tracts and not compression of the lower brainstem.  The patient is claustrophobic.  I think it is a bad idea to give her oral Valium because her breathing is tenuous and she could get into respiratory arrest.  I would prefer that she get the MRI in an open scanner, and we will refer her for that reason to [facility].      I think her breathlessness almost certainly has to do with the ALS.  She has a clean chest CT and a normal echocardiogram.  I will repeat the PFTs today, which I am sure will be worse than 03/2022, and I strongly recommended starting NIV at night.  This equipment is medically necessary because the patient has restrictive respiratory physiology secondary to ALS, and early initiation of NIV in affected individuals can lead to prolongation of survival, improvement of sleep quality and daytime energy and quality of life overall.    The patient has substantial weight loss and dysphagia with liquids and malnutrition.  At this juncture, I recommended and discussed the placement of a gastrostomy.  The method of placing a gastrostomy would differ depending on the results of her pulmonary function tests.  If they are similar to 03/2022 and she is able to tolerate the flat position for 30-45 minutes, we could attempt placement through Interventional Radiology.  If she cannot tolerate it or her vital capacity is below 40%, I think it would not be a good idea to try through IR and in that case I would consider surgical placement under general anesthesia.   They want to think about the gastrostomy placement.  I told the family that this will allow her to improve her nutrition, potentially gain back a few pounds of weight, but I do not think it is going to prolong her survival if placed at this point with advanced disease.      We discussed medications that can slow down disease progression.  I recommended riluzole 50 mg b.i.d.  I went over the side effects of riluzole.  She should have AST and ALT monitored monthly for the first 3 months of therapy.  She will think about it.  I will give her a prescription.  She has some sialorrhea, but it is not especially bothersome.  We will monitor it, and she does not have pseudobulbar affect.      In terms of our team, she will see our speech therapist today, our nutritionist and our occupational therapist.  We will discuss genetics during the subsequent visit.  I did not want to do it today because there was an overwhelming amount of information to be communicated to the patient.      I do not recommend Radicava at this junction because the disease is too advanced for this drug to have a meaningful benefit.    I will also register her with the ALS Association and she will return to clinic for followup in 2 months or sooner if needed.    Total time spent on this encounter today 80 minutes including 60 face-to-face, 10 on post-visit note dictation, editing and orders, 10 on pre-visit chart review.    Sincerely,        Arnold March MD    cc:  [place] [address]    Arnold March MD        D: 2022   T: 2022   MT:     Name:     MAC MALIK  MRN:      [id number]        Account:      [id number]   :      1950           Service Date: 2022       Document: [id number]

## 2022-08-18 NOTE — PROGRESS NOTES
"CLINICAL NUTRITION SERVICES - ASSESSMENT NOTE    Natasha Allison 72 year old referred for MNT related to ALS    Visit Type: Initial  Referring Physician: Dr. March  Pt accompanied by: , Jaden    NUTRITION HISTORY  Factors affecting nutrition intake include:decreased appetite  Current diet: Regular diet of 3-4 meals per day  Current appetite/intake: Fair appetite, has increased intake over the past month to month and a half to counter recent wt loss.  PEG Tube: N/A    Diet Recall  Breakfast Smoothie (eggs, yogurt, honey, milk, and strawberries) or rodríguez and eggs and sometimes hashbrowns   Lunch Boost, leftovers, fruit, trailmix/nuts   Dinner Meat, veggies, potatoes   Snacks Ice cream, Malt made with boost, eggs   Beverages Water, wine, coffee, OJ     ANTHROPOMETRICS  Height: 1.575 m (5' 2\")  Weight: 50.8 kg (112 lb)  BMI: 20.49 kg/m    Weight Status:  Normal BMI  IBW: 50 kg  % IBW: 102%  Weight History: 37# (25%) wt loss over past 6 months  Wt Readings from Last 10 Encounters:   08/18/22 50.8 kg (112 lb)   08/12/22 56.7 kg (125 lb)   06/21/22 56.7 kg (125 lb)   05/13/22 56.9 kg (125 lb 8 oz)   03/02/22 59.4 kg (131 lb)   08/19/21 64.9 kg (143 lb)   04/15/21 68 kg (150 lb)   02/24/21 67.6 kg (149 lb)   02/20/20 64.9 kg (143 lb)   04/03/19 66 kg (145 lb 6.4 oz)     Dosing Weight: 51 kg    ALS FRS-6 (Modification of H-B equations for ALS)   BMR = 1158 Kcal/day   1 - Speech Score - 3   4 - Handwriting -  3   6 - Dressing and Hygiene -  3   7 - Turning in Bed - 3   8 - Walking - 4   10 a - Dyspnea - 3   Total ALS FRS 6 score =  19   Kcal needs per ALS- FRS = 2053     Medications/vitamins/minerals/herbals:   Reviewed    LABS  Labs reviewed    ASSESSED NUTRITION NEEDS:  Estimated Energy Needs: 8159-1706 kcals (30-35 Kcal/Kg)  Justification: increased associated with ALS  Estimated Protein Needs: 50-60 grams protein (1-1.2 g pro/Kg)  Justification: increased associated with ALS  Estimated Fluid Needs: 1275  mL "   Justification: maintenance    MALNUTRITION:  % Weight Loss:  > 10% in 6 months (severe malnutrition)  % Intake:  </= 75% for >/= 1 month (severe malnutrition)  Subcutaneous Fat Loss:  None observed  Muscle Loss:  None observed  Fluid Retention:  None noted    Malnutrition Diagnosis: Severe malnutrition  In Context of:  Chronic illness or disease    NUTRITION DIAGNOSIS:  Inadequate oral intake related to decreased appetite and increased needs associated with ALS as evidenced by 37# (25%) wt loss over past 6 months.    INTERVENTIONS  Recommendations / Nutrition Prescription  1. Continue Regular diet  2. Encouraged 5-6 small meals  3. Recommend 2 Boost or other ONS per day    Nutrition Education     Provided written & verbal education on:   - Ways to maximize kcal and protein intake. Discussed calorie and protein needs for maintenance of weight and nutrition status.  Advised pt to aim for at least 1500kcal and 50g protein via 5-6 small frequent meals.  Discussed that as ALS progresses, eating may become more difficult and discussed ways to cope with this.   - Discussed potential need for G-tube.  Described placement surgery and how it is used. Discussed formula and administration methods (gravity and bolus).  Discussed that this would be able to provide full nutrition prn.    - ONS (Ensure Enlive, Ensure Plus/Boost Plus, CIB, Benecalorie, Scandi shake etc). Suggested ways to incorporate these supplements to avoid flavor fatigue. Encouraged pt to start consuming 2 ONS daily.       Provided pt with corresponding education materials/handouts on:  Six Small Meals a Day, Tips to Increase the Calories in Your Diet, Tips to Increase the Protein in Your Diet and Protein Sources.      Pt verbalize understanding of materials provided during consult.   Patient Understanding: Excellent  Expected Compliance: Excellent     Implementation  Composition of Meals and Snacks, Medical Food Supplement and Nutrition Education - PEG  placement    Goals  1.  Aim for 5-6 small frequent meals  2.  Aim for 1500kcal and 50g protein/day  3. Weight maintenance    Follow-Up Plans: Pt has RD contact information for questions.    Pt encouraged to follow up with RD at next clinic visit in 3-6 months.    MONITORING AND EVALUATION:  -Food intake  -Fluid/beverage intake  -Liquid meal replacement or supplement  -Weight trends    Mirtha Montez RDN, LD

## 2022-08-18 NOTE — PROGRESS NOTES
[facility]                                                                                   OUTPATIENT SPEECH LANGUAGE PATHOLOGY    PLAN OF TREATMENT FOR OUTPATIENT REHABILITATION   Patient's Last Name, First Name, Natasha Shepard YOB: 1950   Provider's Name   [facility]   Medical Record No.  [id number]     Onset Date:  8/18/22 Start of Care Date:    8/18/22   Medical Diagnosis:   Amyotrophic lateral sclerosis (ALS)      SLP Treatment Diagnosis:   Oropharyngeal Dysphagia and Dysarthia  Plan of Treatment  Frequency/Duration:  1 session evaluation and treatment.   /       Certification date from 8/18/22     To     8/18/22       See note for plan of treatment details and functional goals     Shama Kwon, SLP                         I CERTIFY THE NEED FOR THESE SERVICES FURNISHED UNDER        THIS PLAN OF TREATMENT AND WHILE UNDER MY CARE     (Physician attestation of this document indicates review and certification of the therapy plan).              Referring Provider:  Arnold March    Initial Assessment  See Epic Evaluation-                                                                                         [facility]      Outpatient Speech Language Pathology Neurology Clinic Evaluation  Natasha Allison YOB: 1950 [id number]    Visit Date: 8/18/2022    Age: 72 year old    Medical Diagnosis: Amyotrophic lateral sclerosis (ALS)    Date of Diagnosis: confirmed today 8/18/22    Referring MD: Dr March     present: No    PMH: Refer to Medical Chart    Fall Risk:Refer to physician report.    Others at Clinic Visit: Spouse Arnoldo    Living Situation: With others    Patient Concerns/Goals: Increased swallowing difficulty, Increased speech deficits, Augmentative / Alternative communication needs    Observations: She was added to the clinic list just  this morning and diagnosis was confirmed. Despite hectic morning both are very pleasant and cooperative with all clinic visits.     Current Mode of Nutrition: Oral diet of regular solids and thin liquids, avoids some items with grain or seeds    Weight: 112lbs    Cardio-Respiratory Status: Forced vital capacity: 59% on 3/31/22, will be reassessed after clinic visit today    Functional Rating Scale (ALS-FRS): 39/48      Oral Motor/Swallowing  Oral Motor Function: Anomalies present:    Mandibular anomaly- none  Labial anomaly- adequate  Lingual anomaly- ROM (adequate), Strength (mild), Rate (mild)  Velar elevation- hypernasal  Buccal strength- difficult to elicit    Volitional Abilities:  Cough- Weak  Throat Clear- Functional but weak  Swallow- Present    Feeding Assistance: No assistance needed   Oral Cares: adequate    Swallow Function:   Thin Liquids - reduced bolus control with oral holding and reduced A-P propulsion with premature spillage suggested. Mildly reduced laryngeal elevation but no overt s/s aspiration.  Regular Solids - increased mastication time, oral residue, reduced bolus control and propulsion with reduced laryngeal elevation.    Dysphagia Diagnosis:  Mild to Mild-moderate oral pharyngeal dysphagia      Dysphagia Intervention: SLP educated in detail regarding swallowing anatomy and physiology including aspiration. Trained safe swallow strategies to reduce aspiration risks (small bites/sips, slow rate of intake, chin tuck/neutral head position, pills in purees if needed (currently taking with Boost), mindful swallowing, use caution with straws, alternate consistencies). Also trained diet modifications to reduce risk of aspiration and ease intake (avoid hard dry foods with particles and choosing soft moist solids). PCP had prescribed thickener however they have not yet been using. SLP educated in thickened liquids including rationale and possible benefit in reducing aspiration. Educated in naturally  thicker liquids (Boost, Smoothies, etc) Provided training in thickening options including powders vs gels and provided samples of gel (they have ordered powder). She appears to be tolerating thin liquids adequately but provided education in anticipation that it may be needed prior to next clinic visit.      Speech Intelligibility/Functional Communication   Methods of communication: Verbal    Dysarthria: Moderate    Speech:   Deficits in phonation- Strained-strangled quality (spastic)  Deficits in articulation- Decreased precision of articulation and Slow effortful rate  Deficits in resonance- Hypernasal  Intelligibility- 100% to this skilled listener in quiet environment.  reports 90% when focusing and facing her.    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 SLP introduced concept of Augmentative-Alternative Communication including voice amplifier and demonstrated use of iPad with AAC airam (Predictable). SLP also educated in detail regarding Voice and Message Banking including reasoning, benefit, and process. She does not wish to pursue at this time.      Clinical Impression/Plan of Care  Treatment Diagnosis: Oropharyngeal Dysphagia and Dysarthia     Recommendations:  Oral diet.  Soft, moist solid with no particulate.  Continue use of compensatory strategies.  Continue compensatory speech strategies.  Consider Voice Preservation.   Consider use of thickener if  indicated.    Plan of Care:  1 session evaluation and treatment.    Goals: Based on today's evaluation session patient and/or caregiver will have understanding of current communication and/or swallowing status and recommendations for management.    Educational Assessment:  Learners- Patient and Significant other  Barriers to Learning- No barriers.    Education provided/response:   Speech  Swallowing  AAC  Diet  Verbalized understanding    Treatment provided this date: (see above for details)  Swallow intervention, 12 minutes  Communication intervention, 7 minutes    Response to recommendations/treatment: verbalized understanding, ask appropriate questions, agree to recommendations    Goal attainment: All goals met    Risks and benefits of evaluation/treatment have been explained.  Patient, family and/or caregiver are in agreement with Plan of Care.    Timed Code Treatment Minutes: 0  Total Treatment Time (sum of timed and untimed services): 42 minutes    Certification:  Onset date: 8/18/2022  Start of care date: 8/18/2022  Certification date from 8/18/2022 to 8/18/2022    I CERTIFY THE NEED FOR THESE SERVICES FURNISHED UNDER        THIS PLAN OF TREATMENT AND WHILE UNDER MY CARE     (Physician attestation of this document indicates review and certification of the therapy plan).

## 2022-08-19 NOTE — TELEPHONE ENCOUNTER
[facility] Call Center    Phone Message    May a detailed message be left on voicemail: yes     Reason for Call: Other: Patient's  called stating [facility] doesnt have the MRI order yet. Please refax to [facility].     Action Taken: Message routed to:  [facility] neurology     Travel Screening: Not Applicable

## 2022-09-02 NOTE — TELEPHONE ENCOUNTER
Called and spoke to patient, she requested we send dulera to the [facility] pharmacy in [place]. She will let us know if this is still unaffordable.     Ann Chowdhury RN

## 2022-09-12 NOTE — PROGRESS NOTES
HOME VISIT APPT DATE: 9/9/22    S: MET AT DOOR BY PATIENT.   B: PT NEWLY DIAGNOSED WITH ALS  A: PT REQUESTED I COME OUT TO SETUP HUMIDITY AND GET EVERYTHING READY FOR WHEN PATIENT IS AT THE POINT THAT SHE FEELS SHE WILL NEED IT. SHE DID WEAR THE VENT FOR ABOUT 15 MINUTES, MASK WAS ADJUSTED FOR THE PATIENT.   R: BROUGHT OUT NON INVASIVE HUMIDIFIER, TANK AND ALSO LEFT THEM WITH AN EXTRA SET OF TUBING.   LET THEM KNOW THAT I WOULD REACH OUT TO THEM DURING THE WEEK TO SEE HOW EVERYTHING WAS GOING.     AFTER CHECKING AIRVIEW, PATIENT DID NOW WEAR HER ASTRAL OVER THE WEEKEND.     DX: ALS  VITALS: VITALS: PULSE: BP: O2 SATS: R/A - O2 LPM RR: WGT: YES OR NO  ETCO2: ON VENTILATOR OFF VENTILATOR N/A  BREATH SOUNDS: CLEAR AND DIMINISHED  SECRETIONS: COLOR: CONSISTENCY: COUGH EFFECTIVENESS: MODERATE COUGH  LOC: ALERT ORIENTED     SETUP DATE: 8/31/22  DEVICE TYPE: ASTRAL  SETTINGS (include mode):ST/IPAP-8/EPAP-4/RATE-8  COMFORT SETTINGS:TRIGGER-MED/Ti MIN/MAX- 0.2-1.5/ RISE 200MS  INTERFACE: F20 AIRTOUCH FOR HER  CIRCUIT/HUMIDITY: HTD  ALARMS: NONE  O2 BLEED IN (YES/NO): NO    VENTILATOR INSPECTION DONE: YES    SETTINGS CHECK: YES    ALARM CHECK: YES   VENTILATOR SETTINGS VERIFIED: YES   CIRCUIT CHECK: YES   CHANGED:  NO   CIRCUIT SUPPLIES GIVEN: YES   MASK: TYPE: CONDITION: REPLACED:  NEW  FILTERS: EXTERNAL CHANGED W/CIRCUIT  NO   LEFT WITH SUPPLIES YES    INLET FILTER CHECKED  YES   OXYGEN EQUIPMENT REVIEWED IF APPLICABLE: FILTERS: SUPPLIES: NA    MD NAME/PHONE/FAX: DR ROQUE Phone: [phone]/Fax: [phone]  CAREGIVER NAME/PHONE:    MERCEDES ARTHUR [facility] MEDICAL EQUIPMENT  [phone]

## 2022-09-16 NOTE — TELEPHONE ENCOUNTER
PA Initiation    Medication: Radicava ORS 105MG/5ML suspension (PA PENDING)  Insurance Company: [employer] Part D - Phone [phone] Fax [phone]  Pharmacy Filling the Rx: [facility] - [place], [place] - [address]  Filling Pharmacy Phone:    Filling Pharmacy Fax:    Start Date: 9/16/2022      [facility] Key: [id number]         Thank you,    Asha Coley [facility]  Specialty Pharmacy Clinic Liaison - CardiologyNeurologyMultiple Sclerosis  [facility] [address]  [address] [place] [address]  Ph: [phone] Fax: [phone]  [email or web address]

## 2022-09-22 NOTE — TELEPHONE ENCOUNTER
Prior Authorization Approval    Authorization Effective Date: 9/16/2022  Authorization Expiration Date: 12/31/2022  Medication: Radicava ORS 105MG/5ML suspension (PA APPROVED)  Approved Dose/Quantity: 70mL  Reference #:     Insurance Company: [employer] Part D - Phone [phone] Fax [phone]  Expected CoPay: $3938.09     CoPay Card Available: No    Foundation Assistance Needed: [facility]  Which Pharmacy is filling the prescription (Not needed for infusion/clinic administered): [facility] - [place], [place] - [address] [place] [address]  Pharmacy Notified:    Patient Notified:      PA approved for Radicava, but the cost for the first fill is incredibly high $3938.09 due to the Medicare Part D donut hole. Unfortunately, all grants are closed for ALS at this time and [employer] does not provide patient assistance for patents with government funded insurance.     Start form faxed to [employer] along with PA approval.        Thank you,    Asha Coley [facility]  Specialty Pharmacy Clinic Liaison - CardiologyNeurologyMultiple Sclerosis  [facility] [address]  [address] [place] [address]  Ph: [phone] Fax: [phone]  [email or web address]

## 2022-09-30 NOTE — CONSULTS
Outpatient IR Biopsy Referral    Patient is a 73 y/o female with a PMH of SOB w/exertion, RA, HTN, CKD, asthma, ALS, she has a poor appetite and has lost over 30 pounds of weight in the last year, dysphagia with liquids, malnutrition. IR has been asked for a gastrostomy tube placement.     No imaging for review, no obvious contraindications for g tube placement, IR will assess for safe window, if none, procedure will be cancelled.     Gastrostomy in ALS patient, hospital admission to follow procedure. Please schedule for Oct 10, 11, 12 if possible.    Procedure order placed.    Primary team Dr. March made aware of IR recommendations via epic messaging.     SINCERE Silva CNP  Interventional Radiology   IR on-call pager: [phone]

## 2022-10-27 NOTE — PATIENT INSTRUCTIONS
ENT referral for the dry throat    Think about the new drug Relyvrio. I cannot exclude that this drug will help you, although I think the available data is still limited. Exactly because I cannot exclude a benefit, if you want to take the drug, I will help you get it    Continue riluzole    The dietician and speech therapist will talk to you today    Follow up 3 months

## 2022-10-27 NOTE — PROGRESS NOTES
Service Date: 10/27/2022    Beverley Maloney MD  [facility]  [address] [place], [address]    RE:  Natasha Hammond  MRN:  [id number]  :  1950    Dear Dr. Maloney:      I had the pleasure to see Natasha Hammond in followup at the [facility].  Natasha has bulbar onset ALS with symptoms beginning in 2021.  We first saw her in clinic in 2022 and established the diagnosis.  She also has dyspnea on exertion, likely due to diaphragmatic weakness from ALS.  We prescribed noninvasive ventilation with BiPAP, but she has extreme difficulty tolerating it due to a very dry throat, even when she uses the humidifier.  Of note, the patient has atrophy of the submandibular glands bilaterally seen on a head/neck CT, and I suspect she has sicca syndrome and therefore  reduced saliva production.  At the same time, she has difficulty clearing secretions from the mouth and sometimes thick secretions may accumulate, and she also has constant nasal drip and stuffiness.  She has a history of RA and was under treatment for it.     We decided to proceed with a gastrostomy placement which is scheduled for next week, 2022, at the [facility].  She has lost further weight from the last visit, her dysphagia is increasing and choking occurs with liquids a lot.  Her dysarthria is also increasing.  Although her speech is intelligible most of the time, occasionally she will have to repeat herself to others.  She does not currently use an augmentative communication device.  She does not have a head drop.  She does not have any major pseudobulbar affect.  She has some weakness in her hands, which she attributes to rheumatoid arthritis and has not changed significantly from 3 months ago.  She does not complain of any leg weakness or pain.      The patient had questions for me about Radicava ORS  "last month which she sent to me by [employer] two weeks ago.  She has already started riluzole 50 mg b.i.d.  Regarding the Radicava, her out-of-pocket cost for the oral drug will be quite high, in the range of a few thousand dollars, and she asked whether it is worth pursuing it or not.  I replied to her uSpeak message, stating that her forced vital capacity was 57% at the first appointment, and it is unclear and probably unlikely that Radicava would help patients with ALS with forced vital capacity less than 80% or those who cannot eat, excrete or ambulate on their own.  Those were required criteria for enrollment in the Radicava clinical trials that showed the drug's efficacy and led to the drug's approval in 2017 by FDA; further real-life studies done after the drug was approved in the [place] and [place], showed that the benefit of the drug is very small to none if given indiscreetly to all ALS patients regardless of their disease stage.  She elected not to take it after this communication.  We have not had a chance to talk about her Relyvrio prior to today's visit.    Medications were reviewed and are as per Epic record.  Physical exam was not repeated.    BP (!) 143/91   Pulse 87   Ht 1.575 m (5' 2\")   Wt 47.7 kg (105 lb 1.6 oz)   SpO2 96%   BMI 19.22 kg/m      PFT Latest Ref Rng & Units 10/27/2022   FVC L 1.08   FEV1 L 0.97   FVC% % 41   FEV1% % 47      In summary, Natasha has bulbar onset ALS.  We discussed a number of practical issues.  She will undergo G-tube placement next week.  I will have our dietitian do a calorie count today and estimate what her daily needs are going to be through feeding tube.  She will see our speech therapist as well given her increasing dysarthria.  She may need to start using an augmentative communication device.  Management of secretions is particularly difficult in her case and what perplexes it is the atrophy of the submandibular glands.  I think she probably has some kind of " sicca syndrome.  Therefore, anticholinergics that we would normally give for sialorrhea or excessive rhinorrhea in ALS, are not a good idea.  She may benefit from Mucinex, but she has a very dry throat and this makes it difficult for her to use the BiPAP at night, which is medically necessary due to her hypoventilation. She cannot use the BiPAP even with the humidifier on.  I would like her to have an ENT evaluation for this problem at the [facility] before deciding on the optimal medical management of this.      She will continue riluzole 50 mg b.i.d.  She did have liver function tests a week ago that were within normal limits. I discussed Relyvrio today.  This is a new drug that was FDA approved for ALS a month ago based on a phase 2 trial.  I told the patient that I have the same concerns and uncertainties I had with Radicava, specifically whether this drug will be efficient in patients with ALS who have more advanced disease than the ones that were enrolled in Relyvrio study.  For the Relyvrio, however, I do not have enough data to exclude a potential benefit of the drug for Natasha. Therefore, if the patient wants to take it, I am completely fine with it, and I will have her sign the start-up form.  She will return to clinic for followup in 3 months or sooner if needed.    TT spent on this encounter today 40 minutes of which 25 face-to-face, 10 in post-visit note dictation, editing, and orders, and 5 in pre-visit chart review.     Sincerely,    Arnold March MD        D: 10/27/2022   T: 10/27/2022   MT: yunior    Name:     NATASHA MALIK  MRN:      [id number]        Account:      [id number]   :      1950           Service Date: 10/27/2022       Document: [id number]

## 2022-10-27 NOTE — LETTER
10/27/2022       RE: Natasha Allison  [address] [facility] [address]     Dear Colleague,    Thank you for referring your patient, Natasha Allison, to the [facility]UROLOG[facility] [place] [facility]. Please see a copy of my visit note below.    CLINICAL NUTRITION SERVICES - REASSESSMENT NOTE     EVALUATION OF PREVIOUS PLAN OF CARE:   Referring Physician: Dr. March  Current diet: Regular diet  Current appetite/intake: Similar to intake listed in 8/18 note. Intake continues to decline.     Monitoring from previous assessment:   -Liquid meal replacement or supplement - 1 to 2 Boost High Calorie, High Protein per day  -Weight trends - 20# (16%) wt loss over past 2 months  Wt Readings from Last 10 Encounters:   10/27/22 47.7 kg (105 lb 1.6 oz)   08/18/22 50.8 kg (112 lb)   08/12/22 56.7 kg (125 lb)   06/21/22 56.7 kg (125 lb)   05/13/22 56.9 kg (125 lb 8 oz)   03/02/22 59.4 kg (131 lb)   08/19/21 64.9 kg (143 lb)   04/15/21 68 kg (150 lb)   02/24/21 67.6 kg (149 lb)   02/20/20 64.9 kg (143 lb)     Previous Goals:   1.  Aim for 5-6 small frequent meals  2.  Aim for 1500kcal and 50g protein/day  3. Weight maintenance   Evaluation: Not met     Previous Nutrition Diagnosis:   Inadequate oral intake related to decreased appetite and increased needs associated with ALS as evidenced by 37# (25%) wt loss over past 6 months.  Evaluation: Declining     NEW FINDINGS:   Pt scheduled for G-tube placement 11/1     CURRENT NUTRITION DIAGNOSIS   Inadequate oral intake related to decreased appetite and increased needs associated with ALS as evidenced by 20# (16%) wt loss over past 2 months.    INTERVENTIONS   Recommendations / Nutrition Prescription     UPDATED Dosing Weight: 48 kg    ASSESSED NUTRITION NEEDS:  Estimated Energy Needs: 9874-1242 kcals (30-35 Kcal/Kg)  Justification: increased associated with ALS  Estimated Protein Needs: 50-60 grams protein  (1-1.2 g pro/Kg)  Justification: increased associated with ALS  Estimated Fluid Needs: 1200  mL   Justification: maintenance    RECOMMENDATIONS FOR MD/PROVIDER TO ORDER:   Enteral Nutrition   Formula: Osmolite 1.5 pily  Volume: 4 cartons/day (1000 mL, 762 mL free water)  Provisions:  1500 kcal (31 kcal/kg), 63 g protein (1.3 g/kg), 204 g CHO, 0 g fiber    Suggested free water flush schedule   Flush with 30-60mL water before and after each feeding  Flush with additional 100 mL water once per day to meet 100% hydration      --Begin first bolus with 0.5 carton (125 ml) over ~60 minute period. Approx 4-6 hrs later, if pt without sx of GI intolerance, administer another 0.5 carton (125 ml). After 6 hours, increase volume of next bolus to 1 can (250 ml).     Recommend 1 carton in a.m., 1.5 carton around noon, 1.5 carton in evening. Pt can adjust advancement based on GI tolerance as she is used to taking in small amounts of food.    --Ensure HOB > 30 degrees with gastric feeds. Do not infuse feeds via G-tube while pt asleep/unalert.     Implementation  EN Composition, EN Schedule, Feeding Tube Flush and Nutrition Education regarding G-tube placement and use     Goals (until G tube placement)  1.  Aim for 5-6 small frequent meals  2.  Aim for 1500kcal and 50g protein/day  3. Weight maintenance     Follow up/Monitoring:   Progress towards goals will be monitored and evaluated per protocol and Practice Guidelines    Mirtha Montez RDN, LD            Again, thank you for allowing me to participate in the care of your patient.      Sincerely,    Arnold March MD

## 2022-10-27 NOTE — PROGRESS NOTES
[facility]                                                                                   OUTPATIENT SPEECH LANGUAGE PATHOLOGY    PLAN OF TREATMENT FOR OUTPATIENT REHABILITATION   Patient's Last Name, First Name, Natasha Shepard YOB: 1950   Provider's Name   [facility]   Medical Record No.  [id number]     Onset Date:  8/18/22 Start of Care Date:  10/27/22     Medical Diagnosis:   Amyotrophic lateral sclerosis (ALS)       SLP Treatment Diagnosis:   Oropharyngeal Dysphagia and Dysarthia  Plan of Treatment  Frequency/Duration:  1 session evaluation and treatment.   /       Certification date from  10/27/22   To    10/27/22        See note for plan of treatment details and functional goals     Sahma Kwon, SLP                         I CERTIFY THE NEED FOR THESE SERVICES FURNISHED UNDER        THIS PLAN OF TREATMENT AND WHILE UNDER MY CARE     (Physician attestation of this document indicates review and certification of the therapy plan).              Referring Provider:  Dr. March    Initial Assessment  See Epic Evaluation-                                                                                         [facility]      Outpatient Speech Language Pathology Neurology Clinic Evaluation  Natasha Allison YOB: 1950 [id number]    Visit Date: 10/27/2022    Age: 72 year old    Medical Diagnosis: Amyotrophic lateral sclerosis (ALS)    Date of Diagnosis: 8/18/22    Referring MD: Dr March     present: No    PMH: Refer to Medical Chart    Fall Risk:Refer to physician report.    Others at Clinic Visit: Spouse Arnoldo    Living Situation: With others    Patient Concerns/Goals: Increased swallowing difficulty, Increased speech deficits, Augmentative / Alternative communication needs    Observations: She is pleasant and cooperative. Is scheduled to get feeding tube  placed next week and reported to RD that she will likely continue PO for pleasure only due to stress and fatigue with PO intake.    Current Mode of Nutrition: Oral diet of soft/pureed solids and thin liquids with most intake being nutritional shakes, soups, etc.    Weight: 105lbs (112lbs at dx)    Cardio-Respiratory Status: Forced vital capacity: 57% on 8/18/22, will be reassessed after clinic visit today    Functional Rating Scale (ALS-FRS): 37/48      Oral Motor/Swallowing  Oral Motor Function: Anomalies present:    Mandibular anomaly- none  Labial anomaly- ROM (adequate), Strength (mild), Rate (mild)  Lingual anomaly- ROM (mild), Strength (mild), Rate (mild)    Volitional Abilities:  Cough- Weak  Throat Clear- Not functional  Swallow- Present    Feeding Assistance: No assistance needed   Oral Cares: adequate    Swallow Function:   Thin Liquids - anterior loss of bolus (requires tissue to hold in), reduced bolus control with oral holding and reduced A-P propulsion with premature spillage suggested. Mildly reduced laryngeal elevation but no overt s/s aspiration.  Regular Solids - not assessed as she consumes only softer foods and puree trials not available    Dysphagia Diagnosis: Mild-moderate oral and mild pharyngeal dysphagia    Dysphagia Intervention: SLP educated regarding swallowing anatomy and physiology including aspiration. Trained safe swallow strategies to reduce aspiration risks (small bites/sips, slow rate of intake, chin tuck/neutral head position, pills in purees if needed, mindful swallowing). She will be getting her gastrostomy next week and plans PO for pleasure after that. Recommended continue pureed solids and thin liquids however SLP again provided education regarding possible benefit of thickener in the future if s/s aspiration increase prior to next visit. (they still have samples provided as well as the powder ordered by PCP). Educated in role of oral hygiene and recommended continue oral  cares despite reduction in PO intake. When initially asked about laryngospasms she denied however at end of visit she is observed to experience one therefore educated in laryngospasms including anatomy and strategies to compensate/relieve was completed.      Speech Intelligibility/Functional Communication   Methods of communication: Verbal    Dysarthria: Moderate-severe    Speech:   Deficits in phonation- Strained-strangled quality (spastic)  Deficits in articulation- Decreased precision of articulation and Slow effortful rate  Deficits in resonance- Hypernasal  Intelligibility- 75-80% to this skilled listener in quiet environment.  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    Communication Intervention: SLP provided education regarding lingual and labial impact on speech and articulation as well as respiratory status on breath support for speech and voice projection. SLP trained use of speech strategies to improve intelligibility and reduce fatigue with speaking. SLP introduced concept of Augmentative-Alternative Communication and demonstrated use of iPad with AAC airam (Predictable). SLP also educated in detail regarding Voice Banking including reasoning, benefit, and process. Referral was made to the [employer] for equipment and support.      Clinical Impression/Plan of Care  Treatment Diagnosis: Oropharyngeal Dysphagia and Dysarthia     Recommendations:  Oral diet of pureed solids and thin liquids.   PO for pleasure after gastrostomy next week.   Consider use of thickener if  indicated.  Continue use of swallowing strategies.  Explore iPad with Predictable airam once received  Initiate voice preservation    Plan of Care:  1 session evaluation and treatment.    Goals: Based on today's evaluation session patient and/or caregiver will have understanding of current communication and/or swallowing status and recommendations for management.    Educational Assessment:  Learners- Patient and Significant other  Barriers to Learning- No barriers.    Education provided/response:   Speech  Swallowing  AAC  Diet  Verbalized understanding    Treatment provided this date: (see above for details)  Swallow intervention, 12 minutes  Communication intervention, 12 minutes    Response to recommendations/treatment: verbalized understanding, ask appropriate questions, agree to recommendations    Goal attainment: All goals met    Risks and benefits of evaluation/treatment have been explained.  Patient, family and/or caregiver are in agreement with Plan of Care.    Timed Code Treatment Minutes: 0  Total Treatment Time (sum of timed and untimed services): 40 minutes    Certification:  Onset date: 10/27/2022  Start of care date: 10/27/2022  Certification date from 10/27/2022 to 10/27/2022    I CERTIFY THE NEED FOR THESE SERVICES FURNISHED UNDER        THIS PLAN OF TREATMENT AND WHILE UNDER MY CARE     (Physician attestation of this document indicates review and certification of the therapy plan).

## 2022-10-27 NOTE — PROGRESS NOTES
CLINICAL NUTRITION SERVICES - REASSESSMENT NOTE     EVALUATION OF PREVIOUS PLAN OF CARE:   Referring Physician: Dr. March  Current diet: Regular diet  Current appetite/intake: Similar to intake listed in 8/18 note. Intake continues to decline.     Monitoring from previous assessment:   -Liquid meal replacement or supplement - 1 to 2 Boost High Calorie, High Protein per day  -Weight trends - 20# (16%) wt loss over past 2 months  Wt Readings from Last 10 Encounters:   10/27/22 47.7 kg (105 lb 1.6 oz)   08/18/22 50.8 kg (112 lb)   08/12/22 56.7 kg (125 lb)   06/21/22 56.7 kg (125 lb)   05/13/22 56.9 kg (125 lb 8 oz)   03/02/22 59.4 kg (131 lb)   08/19/21 64.9 kg (143 lb)   04/15/21 68 kg (150 lb)   02/24/21 67.6 kg (149 lb)   02/20/20 64.9 kg (143 lb)     Previous Goals:   1.  Aim for 5-6 small frequent meals  2.  Aim for 1500kcal and 50g protein/day  3. Weight maintenance   Evaluation: Not met     Previous Nutrition Diagnosis:   Inadequate oral intake related to decreased appetite and increased needs associated with ALS as evidenced by 37# (25%) wt loss over past 6 months.  Evaluation: Declining     NEW FINDINGS:   Pt scheduled for G-tube placement 11/1     CURRENT NUTRITION DIAGNOSIS   Inadequate oral intake related to decreased appetite and increased needs associated with ALS as evidenced by 20# (16%) wt loss over past 2 months.    INTERVENTIONS   Recommendations / Nutrition Prescription     UPDATED Dosing Weight: 48 kg    ASSESSED NUTRITION NEEDS:  Estimated Energy Needs: 9841-7872 kcals (30-35 Kcal/Kg)  Justification: increased associated with ALS  Estimated Protein Needs: 50-60 grams protein (1-1.2 g pro/Kg)  Justification: increased associated with ALS  Estimated Fluid Needs: 1200  mL   Justification: maintenance    RECOMMENDATIONS FOR MD/PROVIDER TO ORDER:   Enteral Nutrition   Formula: Osmolite 1.5 pily  Volume: 4 cartons/day (1000 mL, 762 mL free water)  Provisions:  1500 kcal (31 kcal/kg), 63 g protein  (1.3 g/kg), 204 g CHO, 0 g fiber    Suggested free water flush schedule   Flush with 30-60mL water before and after each feeding  Flush with additional 100 mL water once per day to meet 100% hydration      --Begin first bolus with 0.5 carton (125 ml) over ~60 minute period. Approx 4-6 hrs later, if pt without sx of GI intolerance, administer another 0.5 carton (125 ml). After 6 hours, increase volume of next bolus to 1 can (250 ml).     Recommend 1 carton in a.m., 1.5 carton around noon, 1.5 carton in evening. Pt can adjust advancement based on GI tolerance as she is used to taking in small amounts of food.    --Ensure HOB > 30 degrees with gastric feeds. Do not infuse feeds via G-tube while pt asleep/unalert.     Implementation  EN Composition, EN Schedule, Feeding Tube Flush and Nutrition Education regarding G-tube placement and use     Goals (until G tube placement)  1.  Aim for 5-6 small frequent meals  2.  Aim for 1500kcal and 50g protein/day  3. Weight maintenance     Follow up/Monitoring:   Progress towards goals will be monitored and evaluated per protocol and Practice Guidelines    Mirtha Montez RDN, LD

## 2022-10-31 NOTE — TELEPHONE ENCOUNTER
FUTURE VISIT INFORMATION      FUTURE VISIT INFORMATION:    Date: 12/20/22    Time: 2:20pm    Location: [facility]  REFERRAL INFORMATION:    Referring provider:  Arnold March MD    Referring providers clinic:  [facility] neurology     Reason for visit/diagnosis  Ref by: Dr March// Dx: ALS , Dry throat , Sicca syndrome , per maribel okay to schedule with Dr Coppola // [facility] location verified    RECORDS REQUESTED FROM:       Clinic name Comments Records Status Imaging Status   [facility] neurology  10/27/22- note from Arnold March MD Epic     Imaging  6/14/22- ct soft tissue   5/20/22- ct chest  [facility]    [facility] 5/10/22- note from Benito Garcia MD   Care everywhere

## 2022-11-01 NOTE — H&P
[facility]: [place]  Neurology History and Physical    Patient Name:  Natasha Allison  MRN:  [id number]    :  1950  Date of Admission:  2022  Date of Service:  2022  Primary care provider:  Beverley Maloney      Chief Complaint:  Admitted for PEG placement    History of Present Illness:   Natasha Allison is a 72 year old female with history of RA, HTN, Asthma, Bulbar onset ALS who presents for PEG tube placement via IR. The patient has had beginning of her symptoms since 2-3 years where very slow progression of speech and swallow difficilty was noticed. The first time it was noticed was around 2021 and was diagnosed with bulbar ALS in 2022. She also noticed cough with liquid diet more noticeable about 6 months ago. She would cough frequently and had choking with liquids. She had less difficulty with solids although with reduced chewing strength. She continued to have drooling from her mouth along with weight loss of 30 pounds over the year. She also has dyspnea on exertion likely secondary to diaphragmatic weakness from ALS. The patient also reports inability to taste food for a few months. She endorses dry mouth and throat for which she has been using a mouthwash. She says she has been recommended ENT evaluation for possible medication to help with her dry mouth and throat. Initially there was concern of TIA with weakness and CT head and neck were unremarkable. CT chest was also unremarkable.  PFT done last in 2022 showed at least moderate restriction with FVC 59%, FEV1 63% and normal ratio. She does not tolerate BiPAP.  PFT done last in 2022 showed at least moderate restriction with FVC 59%, FEV1 63% and normal ratio. She was also recommended MRI brain but the patient says she felt claustrophobic and was unable to tolerate the MRI. She has been using riluzole 50 mg BID since 2022.     She lives in [place]. She is a retired .  "She is accompanied by her  who will be the decision maker in case she cannot make her own decision.     ROS: A comprehensive ROS was performed and pertinent findings were included in HPI.     PMH:  Past Medical History:   Diagnosis Date     Hypertension     lisinopril 10mg daily = annoying runny nose     Osteoporosis     alendronate 5mg daily = stomach upset /nausea     Rheumatoid arthritis(714.0)      Past Surgical History:   Procedure Laterality Date     CARPAL TUNNEL RELEASE RT/LT       COLONOSCOPY N/A 3/12/2019    Procedure: COLONOSCOPY;  Surgeon: Jermaine Rock MD;  Location:  GI       Medications   I have personally reviewed the patient's medication list.     Allergies  I have personally reviewed the patient's allergy list.     Social History:  Denies smoking    Family History:    No family history of ALS, dementia, parkinson's, endorses stroke history in her mother    Physical Examination:   Constitutional   - Vitals: BP (!) 140/68 (BP Location: Left arm)   Pulse 75   Temp (!) 95.8  F (35.4  C) (Axillary)   Resp 18   Ht 1.575 m (5' 2\")   Wt 47.4 kg (104 lb 8 oz)   SpO2 94%   BMI 19.11 kg/m     - General: Lying in bed, NAD  Head: NC/AT  Eyes: no icterus, op pink and moist  Cardiac: RRR. Extremities warm, no edema.   Respiratory: non-labored on RA  GI: Abdomen is soft, nontender, +ve bowel sounds, tube feed area without swelling, redness or bleed and dressing is intact  Skin: No rash or lesion on exposed skin  Psych: Mood pleasant, affect congruent  Neuro:  Mental status: Awake, alert, attentive, oriented to self, time, place, and circumstance. Her comprehension, naming, reading are intact although has severe dysarthria  Cranial nerves: VFF, PERRL, conjugate gaze, EOMI, facial sensation intact, face symmetric, shoulder shrug strong, tongue/uvula midline, severe spastic dysarthria. There is moderate weakness of cheek puff bilaterally and mild lower facial atrophy. Tongue does not show atrophy " and has fasciculastions.    Motor: Atrophy in biceps, triceps, palmar hand surface, thigh muscles, Fasciculations seen in b/l upper extremity. No abnormal movements. 4+/5 strength bilaterally in deltoids, 5/5 biceps, triceps, b/l FDI 3/5, L hand  4/5, R hand  5/5, , 5/5 hip flexors, hip extensors, knee flexion, knee extension, plantarflexion, dorsiflexion.      Reflexes: They are  3+ in biceps, triceps, brachioradialis, knees and ankles, positive Juan, no clonus, toes down-going.   Sensory: Intact to light touch, pin, vibration, and proprioception  Coordination: FNF and HS without ataxia or dysmetria.   Gait: small steps, no ataxia    Investigations:    I have personally reviewed pertinent labs, tests, and radiology images. Discussion of notable findings is included under Impression.     CTA 08/11/2021  CTA Head: No evidence of large vessel occlusion or high-grade  stenosis.   CTA Neck: No evidence of large vessel occlusion or high-grade  Stenosis.    CT soft tissue neck 06/14/2022  1. Atrophy of the submandibular glands bilaterally.  2. 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 No evidence for abnormality along the course of the right vagus or right recurrent laryngeal nerves.  3. Otherwise, normal soft tissue neck CT.    EMG 8/3/2022  This is an abnormal EMG.  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 Results have been discussed with Dr Lu. He is out of office this week and will communicate with the patient next week.    Did the patient transfer from an outside hospital?   No    Assessment:  # Bulbar ALS  # s/p PEG tube   Natasha Allison is a 72 year old female with history of RA, HTN, Asthma, Bulbar onset ALS who presents for PEG tube placement via IR. Her neurological exam is significant for spastic dysarthria, fasciculations b/l UE, tongue,  atrophy facial muscles, arms, hands, thigh muscles, 4/5 deltoid strength, 3/5 FDIs, symmetric hyperreflexia, without sensory loss. She was diagnosed with bulbar ALS based on history, examination and EMG that was done in 08/2022. Since her last neurological exam in 8/2022 her dysarthria and dysphagia have worsened.     - Continue PTA riluzole 50 mg BID    # Inadequate oral intake related to decreased appetite and increased needs associated with ALS as evidenced by 20# (16%) wt loss over past 2 months.  - Appreciate IR recs   -Routine post-G tube placement with 4 hours NPO, bag to  gravity drainage. Overnight observation because of history of ALS.  - Appreciate nutrition recs 10/27/22, Tube feed orders as per nutrition   -Formula: Osmolite 1.5 pily Volume: 4 cartons/day (1000 mL, 762 mL free water)  Provisions:  1500 kcal (31 kcal/kg), 63 g protein (1.3 g/kg), 204 g CHO, 0 g fiber  - Begin first bolus with 0.5 carton (125 ml) over ~60 minute period. Approx 4-6 hrs later, if pt without sx of GI intolerance, administer another 0.5 carton (125 ml). After 6 hours, increase volume of next bolus to 1 can (250 ml).   Recommend 1 carton in a.m., 1.5 carton around noon, 1.5 carton in evening. Pt can adjust advancement based on GI tolerance as she is used to taking in small amounts of food.  - Ensure HOB > 30 degrees with gastric feeds. Do not infuse feeds via G-tube while pt asleep/unalert.   - Tube feed education 11/2/22    # RA  # Osteoperosis  - Continue PTA folic acid  - Continue PTA Meloxican 15 mg daily   - Continue PTA vit D1 capsule 400 units daily    # Asthma  - PTA inhaler if needed    FEN:PIV, PEG  Malnutrition:As per nutrition 10/27/22  PPx:Not indicated  Code: Full code, discussed with patient and  11/01/2022  Disposition: Home tomorrow after education    Patient was discussed with Dr. Alexander.    Jasson Ramesh MD  Neurology PGY-2

## 2022-11-01 NOTE — PROGRESS NOTES
Pt tolerating recovery just sleepy so not eating or drinking anything.G tube to gravity drain. at bedside.Report called to Page on 6A.Pt transported to room 2 on 6A with all of her belongings.

## 2022-11-01 NOTE — PLAN OF CARE
Arrived from: 2A at 1345  Belongings/meds: remain with patient  2 RN Skin Assessment Completed by: Nickie  Non-intact findings documented (yes/no/NA): yes    Status: s/p G-tube placement on 11/1. Hx ALS.  Vitals: hypertension, OVSS  Neuros: AO x 4. Garbled speech, difficult to understand at times.   IV: PIV SL  Resp/trach: WNL, on RA.  Diet: Clear liquids, pt not taking any PO yet d/t nausea and emesis x 1. PRN zofran given at 1412. PEG to gravity bag until 1630.   Bowel status: LBM PTA  : voiding without difficulty  Skin: PEG site  Pain: abdominal discomfort - refused pain meds d/t nausea  Activity: A1 GB  Social:  at bedside, supportive  Plan: continue to monitor per orders and follow POC.

## 2022-11-01 NOTE — PROGRESS NOTES
[id number]  POD#0 s/p PEG placement w/ history of RA, HTN, Asthma, Bulbar onset ALS. Neuros intact ex garbled speech. VSS. PIV saline locked, up with A1, clear liquid diet no PO intake, bolus TF started at 1800, patient tolerated 1/2 can. Plan for PLC tomorrow then discharge home, continue to monitor and follow POC

## 2022-11-01 NOTE — PRE-PROCEDURE
GENERAL PRE-PROCEDURE:   Procedure:  Gastrostomy tube placement    Verbal consent obtained?: Yes    Written consent obtained?: Yes    Risks and benefits: Risks, benefits and alternatives were discussed    Consent given by:  Patient  Patient states understanding of procedure being performed: Yes    Patient's understanding of procedure matches consent: Yes    Procedure consent matches procedure scheduled: Yes    Expected level of sedation:  Moderate  Appropriately NPO:  Yes  ASA Class:  2  Mallampati  :  Grade 1- soft palate, uvula, tonsillar pillars, and posterior pharyngeal wall visible  Lungs:  Lungs clear with good breath sounds bilaterally  Heart:  Normal heart sounds and rate  History & Physical reviewed:  History and physical reviewed and no updates needed  Statement of review:  I have reviewed the lab findings, diagnostic data, medications, and the plan for sedation

## 2022-11-01 NOTE — PROGRESS NOTES
Observation letter given to the patient.    Gladis Vidales, BSN, RN, PHN, CNRN  Evening Charge Nurse Unit 6A  [id number]

## 2022-11-01 NOTE — PROGRESS NOTES
Therapy: Enteral TF  Insurance: Medicare & BCBS supplement      Patient meets Medicare criteria for Enteral TF. Anticipated length of need mus tube 90 days or more for insurance to cover.    Medicare covers 80%  BCBS covers the remaining 20%    Nursing is covered if she is homebound, if not [facility] charges $90.00 per visit.      Please contact Intake with any questions, [phone] or In Basket pool,  Home Infusion ([phone]).

## 2022-11-01 NOTE — PROGRESS NOTES
Care Management Initial Consult    General Information  Assessment completed with: Patient, Spouse or significant other, Arnoldo Kaur  Type of CM/SW Visit: Initial Assessment    Primary Care Provider verified and updated as needed: Yes   Readmission within the last 30 days:     Reason for Consult: discharge planning  Advance Care Planning:          Communication Assessment  Patient's communication style: spoken language (English or Bilingual)    Hearing Difficulty or Deaf: no      Cognitive  Cognitive/Neuro/Behavioral: .WDL except, speech  Level of Consciousness: alert  Arousal Level: opens eyes spontaneously  Orientation: oriented x 4  Mood/Behavior: calm  Best Language: 0 - No aphasia  Speech: garbled    Living Environment:   People in home: spouse  Arnoldo  Current living Arrangements: house      Able to return to prior arrangements: yes     Family/Social Support:  Care provided by: self, spouse/significant other  Provides care for: no one  Marital Status:   , Sibling(s)  Arnoldo       Description of Support System: Involved, Supportive       Current Resources:   Patient receiving home care services: No     Community Resources: Other (see comment) (ALS association)  Equipment currently used at home: none  Supplies currently used at home: Other (BiPap)    Functional Status:  Prior to admission patient needed assistance:   Dependent ADLs:: Independent, Dressing (sometimes will need assistance with shoe tying and jewelry)  Dependent IADLs:: Transportation    Additional Information:  Patient with history of ALS, admitted for G-tube placement. Met with patient and patient's , Arnoldo, to complete initial assessment.  Patient lives in a house with her . She does not use any assistive devices at baseline, has a home BiPAP. Occasionally will require assistance with things like tying shoes or putting on her jewelry, otherwise independent with ADLs. She is independent with IADLs other than  "transportation which her  provides. Has sister and family available to help as needed.    Discussed process for initiating home enteral feeds. Patient agrees with referral being sent to [facility] to check benefits.     Referral sent to [facility]. PLC scheduled for tomorrow at 11 am.     1604 Addendum:  Per [facility]: \"Patient Natasha Allison meets Medicare criteria for Enteral TF, between Medicare and her BCBS supplement plan coverage is 100%. Anticipated length of need of 90 days or more must be documented in order for insurance to cover. Nursing is covered if she is homebound if not [facility] charges $90.00 per visit.\"    [facility] - referral for home enteral feeds  Ph: [phone]  Fax: [phone]    Maria Elena Hernandez, RN, BSN  6A RN Care Coordinator  Ph: [phone]   Pager: [phone]  "

## 2022-11-01 NOTE — PROCEDURES
[facility]    Procedure: G tube placement    Date/Time: 11/1/2022 12:29 PM  Performed by: Silverio Perez DO  Authorized by: Silverio Perez DO       UNIVERSAL PROTOCOL   Site Marked: NA  Prior Images Obtained and Reviewed:  Yes  Required items: Required blood products, implants, devices and special equipment available    Patient identity confirmed:  Verbally with patient, arm band, provided demographic data and hospital-assigned identification number  Patient was reevaluated immediately before administering moderate or deep sedation or anesthesia  Confirmation Checklist:  Patient's identity using two indicators, relevant allergies, procedure was appropriate and matched the consent or emergent situation and correct equipment/implants were available  Time out: Immediately prior to the procedure a time out was called    Universal Protocol: the Joint Commission Universal Protocol was followed    Preparation: Patient was prepped and draped in usual sterile fashion       ANESTHESIA    Anesthesia: Local infiltration  Local Anesthetic:  Lidocaine 1% without epinephrine      SEDATION  Patient Sedated: Yes    Vital signs: Vital signs monitored during sedation    See dictated procedure note for full details.  Findings: Successful placement of percutaneous gastrostomy tube.    Specimens: none    Complications: None    Condition: Stable    Plan: Routine post-G tube placement with 4 hours NPO, bag to gravity drainage. Overnight observation because of history of ALS.      PROCEDURE    Patient Tolerance:  Patient tolerated the procedure well with no immediate complications  Length of time physician/provider present for 1:1 monitoring during sedation: 30

## 2022-11-01 NOTE — PROGRESS NOTES
Pt prepped for a g-tube placement.PIV placed and labs sent and ABX and NS started.Consent signed and questions answered with family at bedside.

## 2022-11-01 NOTE — IR NOTE
Patient Name: Natasha Allison  Medical Record Number: [id number]  Today's Date: 11/1/2022    Procedure: image guided gastrostomy tube placement  Proceduralist: Dr JAY Shaffer, Dr REX Perez    Sedation start time: 1145  Sedation end time: 1215  Sedation medications administered: 2 mg versed, 100 mcg fentanyl  Total sedation time: 30 min  Sedation Notes: none    Procedure start time: 1145  Procedure end time: 1215    Report given to: Fatimah DÍAZ 2A  : none    Other Notes: Pt arrived to IR room 5 from 2A. Consent reviewed, pt confirmed. Pt denies any questions or concerns regarding procedure. Pt positioned supine and monitored per protocol. abdominal site cleansed and dressed per protocol. Pt tolerated procedure without any noted complications. G tube connected to drainage bag.  Pt transferred back to 2A. Pt to be admitted to 6A when room clean and available.

## 2022-11-01 NOTE — PROGRESS NOTES
Patient arrived to room via stretcher with  RN s/p G-tube placement. VSS. Denies/report pain. Pt alert and oriented x4. Left lower quadrant G-tube site to gravity drainage CDI.

## 2022-11-01 NOTE — PLAN OF CARE
Progress towards observation goals    -diagnostic tests and consults completed and resulted: in progress  -vital signs normal or at patient baseline: in progress, hypertensive  -adequate pain control on oral analgesics: in progress  -returns to baseline functional status: in progress  -safe disposition plan has been identified: in progress

## 2022-11-02 NOTE — PLAN OF CARE
Status: s/p G-tube placement on 11/1. Hx ALS.  Vitals: VSS  Neuros: AO x 4. Garbled speech, difficult to understand at times.   IV: PIV removed  Resp/trach: WNL, on RA.  Diet: Clear liquids. Bolus TF via PEG, 1.5 cans given/4 can goal.  Bowel status: LBM PTA  : voiding without difficulty  Skin: PEG site  Pain: abdominal discomfort - refused pain meds d/t nausea  Activity: A1 GB  Social:  at bedside, supportive  Plan: continue to monitor per orders and follow POC.    Discharge time/date: 11/2 - pt finishing TF bolus then will leave. Discharge summary reviewed, Charge RN watching over pt until they depart.  Walked or Wheelchair: walked with   PIV removed: yes  Reviewed AVS with patient: yes  Medication due times added to AVS in EPIC: yes  Verbalized understanding of discharge with teachback: yes  Medications retrieved from pharmacy: NA  Supplies sent home: yes  Belongings from security with patient: MARJAN

## 2022-11-02 NOTE — PLAN OF CARE
Status: s/p G-tube placement on 11/1. Hx ALS.  Vitals: VSS on 1-2L NC overnight d/t SpO2 dipping down to mid 80's. Ok for RA while awake  Neuros: AOx4. Garbled speech. 5/5 t/o with generalized weakness  IV: PIV SL  Resp/trach: Continuous pulse ox dipping down to mid 80's while sleeping so placed on 1-2L NC. SpO2 is 94-97% on RA while awake  Diet: Clear liquids + bolus TF via PEG with goal of 4 cans/day. Received 0.5 can yesterday, offered pt. To start next 0.5 can around 0000 but declined d/t abdominal discomfort. Pt takes Riluzole 2 hrs before meals, per pt. Preference scheduled for 0600 and pt. Would like to start 2nd 0.5 can at around 0800  Bowel status: LBM 11/1 AM before surgery. Per pt. Passing gas. BS+  : voiding without difficulty in bathroom  Skin: PEG site with small amount of drainage, dressing replaced  Pain: C/o 3-6/10 abdominal pain managed with PRN tylenol and cold packs   Activity: A1 GB  Social:  at bedside, supportive  Plan: PEG PLC at 11am then discharge to home. Continue to monitor per orders and follow POC.     Observation goals  PRIOR TO DISCHARGE        Comments:   -diagnostic tests and consults completed and resulted-In progress  -vital signs normal or at patient baseline-In progress, 1-2L NC while asleep  -adequate pain control on oral analgesics-Met  -returns to baseline functional status-In progress  -safe disposition plan has been identified-Met  Nurse to notify provider when observation goals have been met and patient is ready for discharge.

## 2022-11-02 NOTE — CONSULTS
Outpatient IR Referral    Patient is a 73 y/o female who had a G tube placed by IR 11/1/22. IR has been asked to manage g tube.     Typically g tube exchanges are every 8-12 months unless there is a tube malfunction. Patient should contact IR if T tacks do not fall off on their own in 2 weeks.     Primary team Dr. Romo made aware of IR recommendations via epic messaging.    SINCERE Silva CNP  Interventional Radiology   IR on-call pager: [phone]

## 2022-11-02 NOTE — CONSULTS
"11/02/22 1311 Cristel Craig RN     Patient and spouse seen at bedside for G-tube education. Observed site care and anchoring tube. Spouse RD on patient with flushing and preparing gravity feeding. Spouse will start gravity feeding when next feeding is due and then flush patients tube per orders. Spouse asked many relevant questions. State \"information is overwhelming but with home care and nursing staff review, they will be able to do skills.\" .  Also discussed  bolus feedings and what a Bob-Key button is and when it may be placed.   Literature given: Handwashing and Skin Care, Tube Feeding at Home-Bolus Method Using Syringe and Plunger, Tube Feedings at Home-[place] Method, and  Caring for Your Tube at Home.        "

## 2022-11-02 NOTE — PROGRESS NOTES
Home Infusion    Addendum: 1345 Spoke with pt's spouse. He states PLC went well. He states he was just able to hook pt up to her TF and flush her tube in hospital room. Reviewed plan for supply delivery to their home later today. He states he will be comfortable with administering bolus TF in home environment. He is aware that first SN visit is set for Monday 11/7 with AC. He has I 24/7 contact number an agrees to call with questions or concerns.     Received referral from JYOTI Arredondo for enteral TF.  Benefits verified.  Pt has has 100% coverage between her Medicare and her [employer] supplemental plans.  Spoke with patient and her spouse to review home infusion services, review benefits and offer choice of providers.  Patient would like to use [facility] for home infusion.    Pateint is expected to discharge today and will be going home on enteral TF.  She has not done home tube feedings before however she, her , and daughter will have initial teaching with the PLC today.  Met with patient and spouse at bedside and provided them with information about [facility] services.  Explained about enteral bolus feeds via syringe or gravity bag and explained that a home nurse can provide additional teaching as needed for TF administration.  Informed them about supplies and delivery of supplies, storage of formula, dosing times, plan for SNV and 24/7 availability of I staff while on IV therapy.    Patient and spouse verbalized understanding of all information and are willing and able to manage the enteral tube feeds once teaching is complete.  Will continue to follow and update pt with more details as needed.    Keri Rosen RN / Nurse Liaison  [facility]  [email or web address]  Cell [phone] M-F/ [facility] office [phone] *24/7

## 2022-11-02 NOTE — PLAN OF CARE
Progress towards observation goals     -diagnostic tests and consults completed and resulted: met  -vital signs normal or at patient baseline: met  -adequate pain control on oral analgesics: met  -returns to baseline functional status: met  -safe disposition plan has been identified: met

## 2022-11-02 NOTE — DISCHARGE SUMMARY
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  RA  Osteoperosis  Asthma     Discharge Diagnoses:    Bulbar ALS  s/p PEG tube   Inadequate oral intake related to decreased appetite and increased needs associated with ALS as evidenced by 20# (16%) wt loss over past 2 months.  RA  Osteoperosis  Asthma     Brief History of Illness:   Natasha Allison is a 72 year old female with history of RA, HTN, Asthma, Bulbar onset ALS who presents for PEG tube placement via IR. The patient has had beginning of her symptoms since 2-3 years where very slow progression of speech and swallow difficilty was noticed. The first time it was noticed was around 2021 and was diagnosed with bulbar ALS in 2022. She also noticed cough with liquid diet more noticeable about 6 months ago. She would cough frequently and had choking with liquids. She had less difficulty with solids although with reduced chewing strength. She continued to have drooling from her mouth along with weight loss of 30 pounds over the year. She also has dyspnea on exertion likely secondary to diaphragmatic weakness from ALS. The patient also reports inability to taste food for a few months. She endorses dry mouth and throat for which she has been using a mouthwash. She says she has been recommended ENT evaluation for possible  medication to help with her dry mouth and throat. Initially there was concern of TIA with weakness and CT head and neck were unremarkable. CT chest was also unremarkable.  PFT done last in 03/2022 showed at least moderate restriction with FVC 59%, FEV1 63% and normal ratio. She does not tolerate BiPAP.  PFT done last in 03/2022 showed at least moderate restriction with FVC 59%, FEV1 63% and normal ratio. She was also recommended MRI brain but the patient says she felt claustrophobic and was unable to tolerate the MRI. She has been using riluzole 50 mg BID since 8/2022.      She lives in [place]. She is a retired . She is accompanied by her  who will be the decision maker in case she cannot make her own decision.      Hospital Course:  # Bulbar ALS  # s/p PEG tube   # Inadequate oral intake related to decreased appetite and increased needs associated with ALS as evidenced by 20# (16%) wt loss over past 2 months.  # Low platelets  Natasha Allison is a 72 year old female with history of RA, HTN, Asthma, Bulbar onset ALS who presented for PEG tube placement via IR. Her neurological exam is significant for spastic dysarthria, fasciculations b/l UE and LE and tongue, atrophy facial muscles, arms, hands, thigh muscles, 4/5 deltoid strength, 3/5 FDIs, symmetric hyperreflexia, without sensory loss. She was diagnosed with bulbar ALS based on history, examination and EMG that was done in 08/2022. Since her last neurological exam in 8/2022 her dysarthria and dysphagia have worsened. She remained stable following her G tube placement and tolerated tube feed boluses well. She also received education for her tube feeds and met with home infusion services for [facility] services.     - Continue PTA riluzole 50 mg BID  - Appreciate nutrition recs 10/27/22, Tube feed orders as per nutrition              -Formula: Osmolite 1.5 pily Volume: 4 cartons/day (1000 mL, 762 mL free water)  Provisions:  1500 kcal (31 kcal/kg), 63  "g protein (1.3 g/kg), 204 g CHO, 0 g fiber  Recommend 1 carton in a.m., 1.5 carton around noon, 1.5 carton in evening. Pt can adjust advancement based on GI tolerance as she is used to taking in small amounts of food.  - Ensure HOB > 30 degrees with gastric feeds. Do not infuse feeds via G-tube while pt asleep/unalert.   - Tube feed education 11/2/22  - Anticipated length of need for tube feedings is greater than 90 days due to diagnosis of ALS  - Follow up with PCP in 7-10 days for hospital discharge and low platelets  - Follow up with out patient neurology as scheduled  - Follow up with out patient ENT as scheduled     # RA  # Osteoperosis  - Continue PTA folic acid  - Continue PTA Meloxican 15 mg daily   - Continue PTA vit D1 capsule 400 units daily     # Asthma  - PTA inhaler if needed, patient was not currently using     Pertinent Investigations:  Platelet count 137     Consultations:    Home health services  IR     Recommendations and Follow-up:  Follow up with PCP in 7-10 days for hospital discharge and follow up for platelet count  Follow up with neurology as scheduled  Follow up with ENT as scheduled      Discharge physical examination:   /70 (BP Location: Left arm)   Pulse 72   Temp 98  F (36.7  C) (Oral)   Resp 18   Ht 1.575 m (5' 2\")   Wt 47.4 kg (104 lb 8 oz)   SpO2 96%   BMI 19.11 kg/m    - General: Lying in bed, NAD  Head: NC/AT  Eyes: no icterus, op pink and moist  Cardiac: RRR. Extremities warm, no edema.   Respiratory: non-labored on RA  GI: Abdomen is soft, nontender, +ve bowel sounds, tube feed area without swelling, redness or bleed and dressing is intact  Skin: No rash or lesion on exposed skin  Psych: Mood pleasant, affect congruent  Neuro:  Mental status: Awake, alert, attentive, oriented to self, time, place, and circumstance. Her comprehension, naming, reading are intact although has severe dysarthria  Cranial nerves: VFF, PERRL, conjugate gaze, EOMI, facial sensation intact, " face symmetric, shoulder shrug strong, tongue/uvula midline, severe spastic dysarthria. There is moderate weakness of cheek puff bilaterally and mild lower facial atrophy. Tongue does not show atrophy and has fasciculastions.    Motor: Atrophy in biceps, triceps, palmar hand surface, thigh muscles, Fasciculations seen in b/l upper extremity. No abnormal movements. 4+/5 strength bilaterally in deltoids, 5/5 biceps, triceps, b/l FDI 3/5, L hand  4/5, R hand  5/5, , 5/5 hip flexors, hip extensors, knee flexion, knee extension, plantarflexion, dorsiflexion.       Reflexes: They are  3+ in biceps, triceps, brachioradialis, knees and ankles, positive Juan, no clonus, toes down-going.   Sensory: Intact to light touch, pin, vibration, and proprioception  Coordination: FNF and HS without ataxia or dysmetria.   Gait: small steps, no ataxia  Discharge Medications:  Discharge Medication List as of 11/2/2022  1:46 PM      CONTINUE these medications which have NOT CHANGED    Details   albuterol (PROAIR HFA/PROVENTIL HFA/VENTOLIN HFA) 108 (90 Base) MCG/ACT inhaler Inhale 2 puffs into the lungs every 6 hours as needed for shortness of breath / dyspnea or wheezing, Disp-18 g, R-3, E-PrescribePharmacy may dispense brand covered by insurance (Proair, or proventil or ventolin or generic albuterol inhaler)      desoximetasone (TOPICORT) 0.25 % external cream Apply 1 inch topically as neededHistorical      folic acid (FOLVITE) 1 MG tablet Take 1 tablet (1 mg) by mouth daily, Disp-100 tablet, R-3, OTC      inFLIXimab-dyyb (INFLECTRA) 100 MG injection Inject 5mg/kg into the vein every 8 weeks., Historical      meloxicam (MOBIC) 15 MG tablet Take 15 mg by mouth daily, Historical      methotrexate sodium, pres-free, 50 MG/2ML SOLN injection CHEMO Inject 20 mg Subcutaneous every 7 days On Monday, R-0, Historical      Propylene Glycol-Glycerin (ARTIFICIAL TEARS) 1-0.3 % SOLN Place 1 drop into both eyes daily as needed (Dry eyes),  "Historical      riluzole (RILUTEK) 50 MG tablet TAKE 1 TABLET BY MOUTH EVERY 12 HOURS, Disp-60 tablet, R-2, E-Prescribe      vitamin D3 (CHOLECALCIFEROL) 10 MCG (400 UNIT) capsule Take 1 capsule (400 Units) by mouth daily, OTC      B-D TB SYRINGE 27G X 1/2\" 1 ML MISC USE FOR METHOTREXATE INJECTION, DANIEL, Historical      syringe, disposable, 1 ML MISC 1 Syringe once a week To be used with Methotrexate Injections, Historical      Syringe/Needle, Disp, (SYRINGE LUER SLIP) 27G X 1/2\" 1 ML MISC USE FOR METHOTREXATE INJECTION, Historical         STOP taking these medications       mometasone-formoterol (DULERA) 200-5 MCG/ACT inhaler Comments:   Reason for Stopping:         Starch, Thickening, POWD Comments:   Reason for Stopping:               Discharge follow up and instructions:     IR Referral     Home Care Referral      Home Infusion Referral      Reason for your hospital stay    You were admitted to the hospital for G tube placement and observation.     You will continue to take your medications as you were before.    Get immediate medical attention in case of worsening abdominal pain, vomiting, G tube bleeding or malfunction, fever. In case of sudden weakness, fall, loss of consciousness contact your neurologist.     Activity    Your activity upon discharge: activity as tolerated     Follow Up and recommended labs and tests    Follow up with primary care provider within 7-10  days for hospital follow- up and evaluation of platelet count  Follow up with neurology as scheduled  Follow up with ENT as scheduled    Follow up for home care and infusion services has been placed.     Diet    Follow this diet upon discharge: Orders Placed This Encounter  Recommend 1 carton in a.m., 1.5 carton around noon, 1.5 carton in evening. Pt can adjust advancement based on GI tolerance as she is used to taking in small amounts of food.  - Ensure HOB > 30 degrees with gastric feeds. Do not infuse feeds via G-tube while pt asleep/unalert. "     Discharge follow up and instructions:  Follow up with primary care provider within 7-10  days for hospital follow- up and evaluation of platelet count  Follow up with neurology as scheduled  Follow up with ENT as scheduled  Follow up with out patient IR for G tube     Follow up for home care and infusion services has been placed.     Patient seen and discussed with Dr. Demetrius Ramesh MD  Neurology Resident, PGY-2  Pager: [phone]

## 2022-11-02 NOTE — PHARMACY-ADMISSION MEDICATION HISTORY
"  Admission Medication History Completed by Pharmacy    See [facility] Admission Navigator for allergy information, preferred outpatient pharmacy, prior to admission medications and immunization status.     Medication History Sources:     Dispense history    Care Everywhere    Patient    Changes made to PTA medication list (reason):    Added: Artificial tear eye drops    Deleted: None    Changed: added sig to infliximab, added sig to methotrexate    Additional Information:    Patient reports using albuterol inhaler two times daily on average.    Prior to Admission medications    Medication Sig Last Dose Taking? Auth Provider Long Term End Date   albuterol (PROAIR HFA/PROVENTIL HFA/VENTOLIN HFA) 108 (90 Base) MCG/ACT inhaler Inhale 2 puffs into the lungs every 6 hours as needed for shortness of breath / dyspnea or wheezing 10/31/2022 Yes Wei Cedillo MD Yes    desoximetasone (TOPICORT) 0.25 % external cream Apply 1 inch topically as needed More than a month Yes Reported, Patient     folic acid (FOLVITE) 1 MG tablet Take 1 tablet (1 mg) by mouth daily 10/30/2022 Yes Beverley Maloney MD     inFLIXimab-dyyb (INFLECTRA) 100 MG injection Inject 5mg/kg into the vein every 8 weeks. Past Month Yes Reported, Patient     meloxicam (MOBIC) 15 MG tablet Take 15 mg by mouth daily 10/30/2022 Yes Reported, Patient     methotrexate sodium, pres-free, 50 MG/2ML SOLN injection CHEMO Inject 20 mg Subcutaneous every 7 days On Monday 10/24/2022 Yes Reported, Patient     Propylene Glycol-Glycerin (ARTIFICIAL TEARS) 1-0.3 % SOLN Place 1 drop into both eyes daily as needed (Dry eyes) Past Week Yes Unknown, Entered By History     riluzole (RILUTEK) 50 MG tablet TAKE 1 TABLET BY MOUTH EVERY 12 HOURS 11/1/2022 at 0700 Yes Arnold March MD     vitamin D3 (CHOLECALCIFEROL) 10 MCG (400 UNIT) capsule Take 1 capsule (400 Units) by mouth daily 10/30/2022 Yes Beverley Maloney MD     B-D TB SYRINGE 27G X 1/2\" 1 ML " "MISC USE FOR METHOTREXATE INJECTION   Reported, Patient     mometasone-formoterol (DULERA) 200-5 MCG/ACT inhaler Inhale 2 puffs into the lungs 2 times daily  Patient not taking: Reported on 11/2/2022 Not Taking  Wei Cedillo MD Yes    Starch, Thickening, POWD Use per directions to thicken liquids to help with swallowing  Patient not taking: Reported on 8/18/2022   Beverley Maloney MD     syringe, disposable, 1 ML MISC 1 Syringe once a week To be used with Methotrexate Injections   Reported, Patient     Syringe/Needle, Disp, (SYRINGE LUER SLIP) 27G X 1/2\" 1 ML MISC USE FOR METHOTREXATE INJECTION   Reported, Patient         Date completed: 11/02/22    Medication history completed by: Hui George, PharmD            "

## 2022-11-02 NOTE — DISCHARGE SUMMARY
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  RA  Osteoperosis  Asthma    Discharge Diagnoses:    Bulbar ALS  s/p PEG tube   Inadequate oral intake related to decreased appetite and increased needs associated with ALS as evidenced by 20# (16%) wt loss over past 2 months.  RA  Osteoperosis  Asthma    Brief History of Illness:   Natasha Allison is a 72 year old female with history of RA, HTN, Asthma, Bulbar onset ALS who presents for PEG tube placement via IR. The patient has had beginning of her symptoms since 2-3 years where very slow progression of speech and swallow difficilty was noticed. The first time it was noticed was around 2021 and was diagnosed with bulbar ALS in 2022. She also noticed cough with liquid diet more noticeable about 6 months ago. She would cough frequently and had choking with liquids. She had less difficulty with solids although with reduced chewing strength. She continued to have drooling from her mouth along with weight loss of 30 pounds over the year. She also has dyspnea on exertion likely secondary to diaphragmatic weakness from ALS. The patient also reports inability to taste food for a few months. She endorses dry mouth and throat for which she has been using a mouthwash. She says she has been recommended ENT evaluation for possible medication to help with her dry mouth and throat. Initially there was concern of TIA  with weakness and CT head and neck were unremarkable. CT chest was also unremarkable.  PFT done last in 03/2022 showed at least moderate restriction with FVC 59%, FEV1 63% and normal ratio. She does not tolerate BiPAP.  PFT done last in 03/2022 showed at least moderate restriction with FVC 59%, FEV1 63% and normal ratio. She was also recommended MRI brain but the patient says she felt claustrophobic and was unable to tolerate the MRI. She has been using riluzole 50 mg BID since 8/2022.      She lives in [place]. She is a retired . She is accompanied by her  who will be the decision maker in case she cannot make her own decision.     Hospital Course:  # Bulbar ALS  # s/p PEG tube   # Inadequate oral intake related to decreased appetite and increased needs associated with ALS as evidenced by 20# (16%) wt loss over past 2 months.  # Low platelets  Natasha Allison is a 72 year old female with history of RA, HTN, Asthma, Bulbar onset ALS who presented for PEG tube placement via IR. Her neurological exam is significant for spastic dysarthria, fasciculations b/l UE and LE and tongue, atrophy facial muscles, arms, hands, thigh muscles, 4/5 deltoid strength, 3/5 FDIs, symmetric hyperreflexia, without sensory loss. She was diagnosed with bulbar ALS based on history, examination and EMG that was done in 08/2022. Since her last neurological exam in 8/2022 her dysarthria and dysphagia have worsened. She remained stable following her G tube placement and tolerated tube feed boluses well. She also received education for her tube feeds and met with home infusion services for [facility] services.     - Continue PTA riluzole 50 mg BID  - [facility] nutrition recs 10/27/22, Tube feed orders as per nutrition              -Formula: Osmolite 1.5 pily Volume: 4 cartons/day (1000 mL, 762 mL free water)  Provisions:  1500 kcal (31 kcal/kg), 63 g protein (1.3 g/kg), 204 g CHO, 0 g fiber  Recommend 1 carton in a.m., 1.5 carton  "around noon, 1.5 carton in evening. Pt can adjust advancement based on GI tolerance as she is used to taking in small amounts of food.  - Ensure HOB > 30 degrees with gastric feeds. Do not infuse feeds via G-tube while pt asleep/unalert.   - Tube feed education 11/2/22  - Follow up with PCP in 7-10 days for hospital discharge and low platelets  - Follow up with out patient neurology as scheduled  - Follow up with out patient ENT as scheduled     # RA  # Osteoperosis  - Continue PTA folic acid  - Continue PTA Meloxican 15 mg daily   - Continue PTA vit D1 capsule 400 units daily     # Asthma  - PTA inhaler if needed, patient was not currently using    Pertinent Investigations:  Platelet count 137    Consultations:    Infusion services  IR    Recommendations and Follow-up:  Follow up with PCP in 7-10 days for hospital discharge and follow up for platelet count  Follow up with neurology as scheduled  Follow up with ENT as scheduled    Discharge physical examination:   /70 (BP Location: Left arm)   Pulse 72   Temp 98  F (36.7  C) (Oral)   Resp 18   Ht 1.575 m (5' 2\")   Wt 47.4 kg (104 lb 8 oz)   SpO2 96%   BMI 19.11 kg/m     - General: Lying in bed, NAD  Head: NC/AT  Eyes: no icterus, op pink and moist  Cardiac: RRR. Extremities warm, no edema.   Respiratory: non-labored on RA  GI: Abdomen is soft, nontender, +ve bowel sounds, tube feed area without swelling, redness or bleed and dressing is intact  Skin: No rash or lesion on exposed skin  Psych: Mood pleasant, affect congruent  Neuro:  Mental status: Awake, alert, attentive, oriented to self, time, place, and circumstance. Her comprehension, naming, reading are intact although has severe dysarthria  Cranial nerves: VFF, PERRL, conjugate gaze, EOMI, facial sensation intact, face symmetric, shoulder shrug strong, tongue/uvula midline, severe spastic dysarthria. There is moderate weakness of cheek puff bilaterally and mild lower facial atrophy. Tongue does not " show atrophy and has fasciculastions.    Motor: Atrophy in biceps, triceps, palmar hand surface, thigh muscles, Fasciculations seen in b/l upper extremity. No abnormal movements. 4+/5 strength bilaterally in deltoids, 5/5 biceps, triceps, b/l FDI 3/5, L hand  4/5, R hand  5/5, , 5/5 hip flexors, hip extensors, knee flexion, knee extension, plantarflexion, dorsiflexion.       Reflexes: They are  3+ in biceps, triceps, brachioradialis, knees and ankles, positive Juan, no clonus, toes down-going.   Sensory: Intact to light touch, pin, vibration, and proprioception  Coordination: FNF and HS without ataxia or dysmetria.   Gait: small steps, no ataxia    Discharge Medications:  Current Discharge Medication List      CONTINUE these medications which have NOT CHANGED    Details   albuterol (PROAIR HFA/PROVENTIL HFA/VENTOLIN HFA) 108 (90 Base) MCG/ACT inhaler Inhale 2 puffs into the lungs every 6 hours as needed for shortness of breath / dyspnea or wheezing  Qty: 18 g, Refills: 3    Comments: Pharmacy may dispense brand covered by insurance (Proair, or proventil or ventolin or generic albuterol inhaler)  Associated Diagnoses: SOB (shortness of breath) on exertion      desoximetasone (TOPICORT) 0.25 % external cream Apply 1 inch topically as needed      folic acid (FOLVITE) 1 MG tablet Take 1 tablet (1 mg) by mouth daily  Qty: 100 tablet, Refills: 3    Associated Diagnoses: Rheumatoid arthritis involving multiple sites with positive rheumatoid factor (H)      inFLIXimab-dyyb (INFLECTRA) 100 MG injection Inject 5mg/kg into the vein every 8 weeks.      meloxicam (MOBIC) 15 MG tablet Take 15 mg by mouth daily      methotrexate sodium, pres-free, 50 MG/2ML SOLN injection CHEMO Inject 20 mg Subcutaneous every 7 days On Monday  Refills: 0      Propylene Glycol-Glycerin (ARTIFICIAL TEARS) 1-0.3 % SOLN Place 1 drop into both eyes daily as needed (Dry eyes)      riluzole (RILUTEK) 50 MG tablet TAKE 1 TABLET BY MOUTH EVERY  "12 HOURS  Qty: 60 tablet, Refills: 2    Associated Diagnoses: ALS (amyotrophic lateral sclerosis) (H)      vitamin D3 (CHOLECALCIFEROL) 10 MCG (400 UNIT) capsule Take 1 capsule (400 Units) by mouth daily  Qty:      Associated Diagnoses: Essential hypertension with goal blood pressure less than 140/90      B-D TB SYRINGE 27G X 1/2\" 1 ML MISC USE FOR METHOTREXATE INJECTION      syringe, disposable, 1 ML MISC 1 Syringe once a week To be used with Methotrexate Injections      Syringe/Needle, Disp, (SYRINGE LUER SLIP) 27G X 1/2\" 1 ML MISC USE FOR METHOTREXATE INJECTION         STOP taking these medications       mometasone-formoterol (DULERA) 200-5 MCG/ACT inhaler Comments:   Reason for Stopping:         Starch, Thickening, POWD Comments:   Reason for Stopping:               Discharge follow up and instructions:  Follow up with primary care provider within 7-10  days for hospital follow- up and evaluation of platelet count  Follow up with neurology as scheduled  Follow up with ENT as scheduled    Follow up for home care and infusion services has been placed.    Patient seen and discussed with Dr. Demetrius Ramesh MD  Neurology Resident, PGY-2  Pager: [phone]    "

## 2022-11-03 NOTE — PROGRESS NOTES
Clinic Care Coordination Contact  [facility]: Post-Discharge Note  SITUATION                                                      Admission:    Admission Date: 11/01/22   Reason for Admission: Bulbar ALS admission for G tube placement  Discharge:   Discharge Date: 11/02/22  Discharge Diagnosis: Bulbar ALS    BACKGROUND                                                      Per hospital discharge summary and inpatient provider notes:Natasha Allison is a 72 year old female with history of RA, HTN, Asthma, Bulbar onset ALS who presents for PEG tube placement via IR. The patient has had beginning of her symptoms since 2-3 years where very slow progression of speech and swallow difficilty was noticed. The first time it was noticed was around 05/2021 and was diagnosed with bulbar ALS in 08/2022. She also noticed cough with liquid diet more noticeable about 6 months ago. She would cough frequently and had choking with liquids. She had less difficulty with solids although with reduced chewing strength. She continued to have drooling from her mouth along with weight loss of 30 pounds over the year. She also has dyspnea on exertion likely secondary to diaphragmatic weakness from ALS. The patient also reports inability to taste food for a few months. She endorses dry mouth and throat for which she has been using a mouthwash. She says she has been recommended ENT evaluation for possible medication to help with her dry mouth and throat. Initially there was concern of TIA with weakness and CT head and neck were unremarkable. CT chest was also unremarkable.  PFT done last in 03/2022 showed at least moderate restriction with FVC 59%, FEV1 63% and normal ratio. She does not tolerate BiPAP.  PFT done last in 03/2022 showed at least moderate restriction with FVC 59%, FEV1 63% and normal ratio. She was also recommended MRI brain but the patient says she felt claustrophobic and was unable to tolerate the MRI. She has been using  "riluzole 50 mg BID since 8/2022      ASSESSMENT           Discharge Assessment  How are you doing now that you are home?: \" Feeling better \"  How are your symptoms? (Red Flag symptoms escalate to triage hotline per guidelines): Improved  Do you feel your condition is stable enough to be safe at home until your provider visit?: Yes  Does the patient have their discharge instructions? : Yes  Does the patient have questions regarding their discharge instructions? : No  Were you started on any new medications or were there changes to any of your previous medications? : Yes  Does the patient have all of their medications?: Yes  Do you have questions regarding any of your medications? : No  Do you have all of your needed medical supplies or equipment (DME)?  (i.e. oxygen tank, CPAP, cane, etc.): Yes  Discharge follow-up appointment scheduled within 14 calendar days? : No    Post-op ([facility] Only)  If the patient had a surgery or procedure, do they have any questions for a nurse?: No             PLAN                                                      Outpatient Plan: Follow up with PCP in 7-10 days for hospital discharge and follow up for platelet count  Follow up with neurology as scheduled  Follow up with ENT as scheduled    Future Appointments   Date Time Provider Department Center   11/11/2022 10:00 AM Wei Cedillo MD [facility]   12/20/2022  2:20 PM Axel Coppola MD [facility]   2/23/2023  9:00 AM  [facility]   2/23/2023  9:30 AM Arnold March MD [facility]   3/8/2023  7:40 AM Beverley Maloney MD [facility]         For any urgent concerns, please contact our 24 hour nurse triage line: [phone] ([phone])         Mery Sepulveda                "

## 2022-11-07 NOTE — TELEPHONE ENCOUNTER
Holli with [facility] calling requesting orders.    1.  Speech eval - new ALS diagnosis, feeding tube, dysphagia    2.  Physical therapy and occupational therapy eval - strengthening and endurance    3.  Skilled nurse visits  - 1x/wk x 3 wks then every other week x 6 wks for teaching, medication management, and respiratory assessment    Please advise, thanks.

## 2022-11-09 NOTE — TELEPHONE ENCOUNTER
Reason for Call: Request for an order or referral:    Order or referral being requested: Verbal Orders    Date needed: at your convenience    Has the patient been seen by the PCP for this problem? Not Applicable    Additional comments: Verbal Orders - Requesting 1x a wk for 3 wks // once every other wk for 4 weeks to address speech and communication    Phone number Patient can be reached at:  Other phone number:  [phone]    Best Time:  Anytime    Can we leave a detailed message on this number?  YES    Call taken on 11/9/2022 at 2:42 PM by Lauri Valdez

## 2022-11-10 NOTE — PROGRESS NOTES
Pulmonary Clinic Note    Date of Service: 11/11/2022    Chief Complaint   Patient presents with     Follow Up       A/P:  72F HTN, RA (on methotrexate and infliximab), ALS being seen for f/u dyspnea on exertion. This is certainly multifactorial. 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 This is also certainly related to respiratory muscle weakness related to ALS. I believe pulmonary rehab would be of benefit to her, but will reach out to her neurologist regarding if there is an alternative they would prefer given her ALS.     - continue prn albuterol  - continue nocturnal BiPAP  - OK to substitute medications based on formulary     History:  72F HTN, RA (on methotrexate and infliximab), ALS being seen for f/u. Last seen 5/2022. That visit, started on ICS-LABA (Dulera) and prn albuterol. Dulera was not helpful, stopped using. Using albuterol twice daily, helps some. FORDE w/ any activity. No SOB at rest. Dry cough most days. No wheezing. No throat tightness. Some hoarseness. Not triggered by odors.       Smoking: quit in 1982, < 10 pack year history       Recent travel: [place]                       Bird exposure: no             Animal exposure: no        Inhalation exposure: potential mold exposure     Occupation: retired, worked as a                            10 point review of systems negative, aside from that mentioned in HPI.    /80   Pulse 100   Wt 48.5 kg (107 lb)   SpO2 97%   BMI 19.57 kg/m    Gen: well-appearing  HEENT: Mallampati IV  Card: RRR  Pulm: clear bilaterally   Abd: soft  MSK: no edema, no acute joint abnormality   Skin: no obvious rash  Psych: normal affect  Neuro: alert and oriented     Labs:  Personally reviewed    Imaging/Studies: Personally reviewed  HRCT (5/2022) - no e/o ILD, <6mm pulmonary nodules   PFTs (3/2022) - non-specific spirometry s/o obstruction, normal TLC, e/o air-trapping, normal DLCO  CXR (8/2021) -  clear      TTE (3/2022) - normal     Past Medical History:   Diagnosis Date     Hypertension     lisinopril 10mg daily = annoying runny nose     Osteoporosis     alendronate 5mg daily = stomach upset /nausea     Rheumatoid arthritis(714.0)      Past Surgical History:   Procedure Laterality Date     CARPAL TUNNEL RELEASE RT/LT       COLONOSCOPY N/A 3/12/2019    Procedure: COLONOSCOPY;  Surgeon: Jermaine Rock MD;  Location:  GI     Family History   Problem Relation Age of Onset     Hypertension Mother      Hypertension Father      Heart Disease Father      Intellectual Disability (Mental Retardation) Brother      Hypertension Sister      Hypertension Son      Hypertension Daughter      Colon Cancer Brother      Hypertension Brother      Hypertension Brother      Hypertension Brother      Hypertension Sister      Hypertension Sister      Social History     Socioeconomic History     Marital status:      Spouse name: Jaden     Number of children: 2     Years of education: 13     Highest education level: Not on file   Occupational History     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 Not Asked   Social History Narrative    Cinda Guy's sister      Social Determinants of Health     Financial Resource Strain: Not on file   Food Insecurity: Not on file   Transportation Needs: Not on file   Physical Activity: Not on file   Stress: Not on file   Social Connections: Not on file   Intimate Partner Violence: Not on file    Housing Stability: Not on file       35 minutes spent reviewing chart, reviewing test results, talking with and examining patient, formulating plan, and documentation on the day of the encounter.    Wei Cedillo MD  Pulmonary and Critical Care Medicine  [facility]

## 2022-11-11 NOTE — PATIENT INSTRUCTIONS
Your shortness of breath is multifactorial, airway disease from rheumatoid arthritis and diaphragm weakness from ALS. Continue albuterol. I will reach out to your neurologist regarding pulmonary rehab potential. Continue BiPAP at night or while sleeping.

## 2022-11-11 NOTE — LETTER
11/11/2022         RE: Natasha Allison  [address] [facility] [address]        Dear Colleague,    Thank you for referring your patient, Natasha Allison, to the [facility] [place]. Please see a copy of my visit note below.    Pulmonary Clinic Note    Date of Service: 11/11/2022    Chief Complaint   Patient presents with     Follow Up       A/P:  72F HTN, RA (on methotrexate and infliximab), ALS being seen for f/u dyspnea on exertion. This is certainly multifactorial. 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 This is also certainly related to respiratory muscle weakness related to ALS. I believe pulmonary rehab would be of benefit to her, but will reach out to her neurologist regarding if there is an alternative they would prefer given her ALS.     - continue prn albuterol  - continue nocturnal BiPAP  - OK to substitute medications based on formulary     History:  72F HTN, RA (on methotrexate and infliximab), ALS being seen for f/u. Last seen 5/2022. That visit, started on ICS-LABA (Dulera) and prn albuterol. Dulera was not helpful, stopped using. Using albuterol twice daily, helps some. FORDE w/ any activity. No SOB at rest. Dry cough most days. No wheezing. No throat tightness. Some hoarseness. Not triggered by odors.       Smoking: quit in 1982, < 10 pack year history       Recent travel: [place]                       Bird exposure: no             Animal exposure: no        Inhalation exposure: potential mold exposure     Occupation: retired, worked as a                            10 point review of systems negative, aside from that mentioned in HPI.    /80   Pulse 100   Wt 48.5 kg (107 lb)   SpO2 97%   BMI 19.57 kg/m    Gen: well-appearing  HEENT: Mallampati IV  Card: RRR  Pulm: clear bilaterally   Abd: soft  MSK: no edema, no acute joint abnormality   Skin: no obvious rash  Psych: normal affect  Neuro:  alert and oriented     Labs:  Personally reviewed    Imaging/Studies: Personally reviewed  HRCT (2022) - no e/o ILD, <6mm pulmonary nodules   PFTs (3/2022) - non-specific spirometry s/o obstruction, normal TLC, e/o air-trapping, normal DLCO  CXR (2021) - clear      TTE (3/2022) - normal     Past Medical History:   Diagnosis Date     Hypertension     lisinopril 10mg daily = annoying runny nose     Osteoporosis     alendronate 5mg daily = stomach upset /nausea     Rheumatoid arthritis(714.0)      Past Surgical History:   Procedure Laterality Date     CARPAL TUNNEL RELEASE RT/LT       COLONOSCOPY N/A 3/12/2019    Procedure: COLONOSCOPY;  Surgeon: Jermaine Rock MD;  Location:  GI     Family History   Problem Relation Age of Onset     Hypertension Mother      Hypertension Father      Heart Disease Father      Intellectual Disability (Mental Retardation) Brother      Hypertension Sister      Hypertension Son      Hypertension Daughter      Colon Cancer Brother      Hypertension Brother      Hypertension Brother      Hypertension Brother      Hypertension Sister      Hypertension Sister      Social History     Socioeconomic History     Marital status:      Spouse name: Jaden     Number of children: 2     Years of education: 13     Highest education level: Not on file   Occupational History     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 Not Asked   Social History Narrative    Cinda Ynaestanesha's sister      Social  Determinants of Health     Financial Resource Strain: Not on file   Food Insecurity: Not on file   Transportation Needs: Not on file   Physical Activity: Not on file   Stress: Not on file   Social Connections: Not on file   Intimate Partner Violence: Not on file   Housing Stability: Not on file       35 minutes spent reviewing chart, reviewing test results, talking with and examining patient, formulating plan, and documentation on the day of the encounter.    Wei Cedillo MD  Pulmonary and Critical Care Medicine  [facility]         Again, thank you for allowing me to participate in the care of your patient.        Sincerely,        Wei Cedillo MD

## 2022-11-11 NOTE — TELEPHONE ENCOUNTER
Home care called to follow up on orders     Advised of provider note below.     Vida WARD RN   [employer] [employer] Triage

## 2022-11-15 NOTE — TELEPHONE ENCOUNTER
Reason for Call:  Form, our goal is to have forms completed with 72 hours, however, some forms may require a visit or additional information.    Type of letter, form or note:  medical    Who is the form from?: Home care    Where did the form come from: form was faxed in    What clinic location was the form placed at?: [facility]    Where the form was placed: Dr Maloney Box/Folder    What number is listed as a contact on the form?: [phone]           Call taken on 11/14/2022 at 6:21 PM by Cat Neri

## 2022-11-15 NOTE — TELEPHONE ENCOUNTER
Reason for Call:  Form, our goal is to have forms completed with 72 hours, however, some forms may require a visit or additional information.    Type of letter, form or note:  medical    Who is the form from?: Home care    Where did the form come from: form was faxed in    What clinic location was the form placed at?: [facility]    Where the form was placed: Dr Maloney Box/Folder    What number is listed as a contact on the form?: [phone]           Call taken on 11/14/2022 at 6:14 PM by Cat Neri

## 2022-11-16 NOTE — TELEPHONE ENCOUNTER
Completed forms faxed back to [facility] at [phone].   Originals sent to be scanned.       Delmis Noonan

## 2022-11-16 NOTE — TELEPHONE ENCOUNTER
Completed forms faxed back to [facility]  at [phone].   Originals sent to be scanned.       Delmis Noonan

## 2022-11-16 NOTE — TELEPHONE ENCOUNTER
Called home care at [phone]      Gave verbal ok in detailed VM     Riddhi Barkley RN, BSN  [facility]

## 2022-11-18 NOTE — TELEPHONE ENCOUNTER
Reason for Call:  Form, our goal is to have forms completed with 72 hours, however, some forms may require a visit or additional information.    Type of letter, form or note:  medical    Who is the form from?: Home care    Where did the form come from: form was faxed in    What clinic location was the form placed at?: [facility]    Where the form was placed: Dr Maloney Box/Folder    What number is listed as a contact on the form?: [phone]           Call taken on 11/17/2022 at 7:10 PM by Cat Neri

## 2022-11-18 NOTE — TELEPHONE ENCOUNTER
Forms/Letter Request    Type of form/letter: home care    Have you been seen for this request: No    Do we have the form/letter: Yes:     When is form/letter needed by:     How would you like the form/letter returned: Fax    Patient Notified form requests are processed in 3-5 business days:Yes    Could we send this information to you in [employer] or would you prefer to receive a phone call?:   No preference   Okay to leave a detailed message?: No     Bandar Rodriguez

## 2022-11-22 NOTE — TELEPHONE ENCOUNTER
Reason for Call:  Form, our goal is to have forms completed with 72 hours, however, some forms may require a visit or additional information.    Type of letter, form or note:  medical    Who is the form from?: Home care    Where did the form come from: form was faxed in    What clinic location was the form placed at?: [facility]    Where the form was placed: Dr Maloney Box/Folder    What number is listed as a contact on the form?: [phone]           Call taken on 11/22/2022 at 10:32 AM by Cat Neri

## 2022-11-29 NOTE — TELEPHONE ENCOUNTER
MED REC DONE     Discrepancies:     NA      Meds on Epic but NOT  on Form:       *NA       Meds on Form but NOT on Epic :     Dulera 200 mcg- 5 mcg/actuation hfa aerosol inhaler - 2 puffs 2 times a day      Losartan 25 mg tab - 1 tab daily      Oyster shell calcium- vitamin d3 500 mg 5 mcg (200 unit) - 1 tab 2 times a day       Routing to PCP for further review/recommendations/orders      Attempt # 1    Called #   Telephone Information:   Mobile [phone]         Left a non detailed VM     Riddhi Barkley RN, BSN  [facility]

## 2022-11-30 NOTE — TELEPHONE ENCOUNTER
Called #   Telephone Information:   Mobile [phone]     Spoke with pt and a male with her - she is not taking the following:      Dulera 200 mcg- 5 mcg/actuation hfa aerosol inhaler - 2 puffs 2 times a day      Losartan 25 mg tab - 1 tab daily      Oyster shell calcium- vitamin d3 500 mg 5 mcg (200 unit) - 1 tab 2 times a day     Form in JV,MDs basket for review       Riddhi Barkley RN, BSN  [facility]

## 2022-12-06 NOTE — TELEPHONE ENCOUNTER
Message received from [facility] that several attempts have been made to contact patient regarding filling Radicava ORS script.

## 2022-12-08 NOTE — PROGRESS NOTES
CLINICAL NUTRITION SERVICES - BRIEF NOTE    12/8/22: Sent pt my chart message    I received an update from Aliyah that you are experiencing constipation.  I would recommend adding benefiber or miralax to your daily regimen.     Here are the options:  - Follow the instructions on the Benefiber container and add it with water directly in the G-tube.     OR    - Mix 1/2 capful of Miralax with water and drink or add to G-tube daily. Once you have a bowel movement, you can switch to 1/2 capful mixed with water every other day.    I am also wondering how much water you are getting in a day?  I would recommend 75 mL before and after feeding = 450 mL.    Please confirm receipt of this message.     Thanks,  Mirtha Montez RDN

## 2022-12-08 NOTE — TELEPHONE ENCOUNTER
Reason for Call:  Form, our goal is to have forms completed with 72 hours, however, some forms may require a visit or additional information.    Type of letter, form or note:  medical    Who is the form from?: Home care    Where did the form come from: form was faxed in    What clinic location was the form placed at?: [facility]    Where the form was placed: Dr. Maloney's Box/Folder    What number is listed as a contact on the form?: [phone]        Call taken on 12/8/2022 at 1:06 PM by Blanca Whiteside

## 2022-12-13 NOTE — TELEPHONE ENCOUNTER
Reason for Call:  Form, our goal is to have forms completed with 72 hours, however, some forms may require a visit or additional information.    Type of letter, form or note:  medical    Who is the form from?: Home care    Where did the form come from: form was faxed in    What clinic location was the form placed at?: [facility]    Where the form was placed: Dr Maloney Box/Folder    What number is listed as a contact on the form?: [phone]           Call taken on 12/13/2022 at 5:18 PM by Cat Neri

## 2022-12-13 NOTE — TELEPHONE ENCOUNTER
This pt sees Dr. March at the [facility]. Routing to care team.     Jean Marie Kramer, RN, BSN  [facility] Neurology

## 2022-12-15 NOTE — TELEPHONE ENCOUNTER
Completed forms faxed back to [facility] at [phone].   Originals sent to be scanned.       Delmis Noonan

## 2022-12-15 NOTE — TELEPHONE ENCOUNTER
Completed forms faxed back to [facility] at [phone].   Originals sent to be scanned.       Delmis Noonan

## 2022-12-22 NOTE — PROGRESS NOTES
CLINICAL NUTRITION SERVICES - BRIEF NOTE  (See RD note on 10/27 for full assessment)    Reason for RD note: Pt requesting increase of TF    Enteral Nutrition   Formula: Osmolite 1.5 pily  Volume: 4-5  cartons/day (1000 mL, 762 mL free water)  4 Carton Provisions:  1500 kcal (31 kcal/kg), 63 g protein (1.3 g/kg), 204 g CHO, 0 g fiber     Implementation  EN Composition and EN Schedule     Future/Additional Recommendations:  Pt may want to consider switching to a 2 calorie/mL formula if volume of TF becomes too much.     Nutrition will continue to follow per protocol.     Mirtha Montez, RD, LD

## 2022-12-29 NOTE — TELEPHONE ENCOUNTER
Cinda from [facility] calling for verbal orders.      Speech therapy evaluation within next cert period(did not get out within the expected time frame.   OCCUPATIONAL THERAPY Evaluation due to increasing activity intolerance affecting her breathing ability and needs some tips on energy conservation and breathing techniques.      SKILLED NURSE VISIT every other week  for next 2 months + 3 PRN visits    Verbal order given as patient well established in home care.     [phone]

## 2023-01-01 ENCOUNTER — TELEPHONE (OUTPATIENT)
Dept: FAMILY MEDICINE | Facility: CLINIC | Age: 73
End: 2023-01-01

## 2023-01-01 ENCOUNTER — DOCUMENTATION ONLY (OUTPATIENT)
Dept: RESPIRATORY THERAPY | Facility: CLINIC | Age: 73
End: 2023-01-01
Payer: MEDICARE

## 2023-01-01 ENCOUNTER — APPOINTMENT (OUTPATIENT)
Dept: GENERAL RADIOLOGY | Facility: CLINIC | Age: 73
DRG: 308 | End: 2023-01-01
Attending: EMERGENCY MEDICINE
Payer: MEDICARE

## 2023-01-01 ENCOUNTER — HOSPITAL ENCOUNTER (INPATIENT)
Facility: CLINIC | Age: 73
LOS: 22 days | DRG: 308 | End: 2023-03-01
Attending: EMERGENCY MEDICINE | Admitting: INTERNAL MEDICINE
Payer: MEDICARE

## 2023-01-01 ENCOUNTER — MYC MEDICAL ADVICE (OUTPATIENT)
Dept: PULMONOLOGY | Facility: CLINIC | Age: 73
End: 2023-01-01

## 2023-01-01 ENCOUNTER — APPOINTMENT (OUTPATIENT)
Dept: CT IMAGING | Facility: CLINIC | Age: 73
End: 2023-01-01
Attending: STUDENT IN AN ORGANIZED HEALTH CARE EDUCATION/TRAINING PROGRAM
Payer: MEDICARE

## 2023-01-01 ENCOUNTER — MEDICAL CORRESPONDENCE (OUTPATIENT)
Dept: HEALTH INFORMATION MANAGEMENT | Facility: CLINIC | Age: 73
End: 2023-01-01
Payer: MEDICARE

## 2023-01-01 ENCOUNTER — APPOINTMENT (OUTPATIENT)
Dept: GENERAL RADIOLOGY | Facility: CLINIC | Age: 73
DRG: 308 | End: 2023-01-01
Attending: INTERNAL MEDICINE
Payer: MEDICARE

## 2023-01-01 ENCOUNTER — APPOINTMENT (OUTPATIENT)
Dept: GENERAL RADIOLOGY | Facility: CLINIC | Age: 73
DRG: 308 | End: 2023-01-01
Attending: HOSPITALIST
Payer: MEDICARE

## 2023-01-01 ENCOUNTER — APPOINTMENT (OUTPATIENT)
Dept: CARDIOLOGY | Facility: CLINIC | Age: 73
DRG: 308 | End: 2023-01-01
Attending: STUDENT IN AN ORGANIZED HEALTH CARE EDUCATION/TRAINING PROGRAM
Payer: MEDICARE

## 2023-01-01 ENCOUNTER — TELEPHONE (OUTPATIENT)
Dept: NEUROLOGY | Facility: CLINIC | Age: 73
End: 2023-01-01

## 2023-01-01 ENCOUNTER — MEDICAL CORRESPONDENCE (OUTPATIENT)
Dept: HEALTH INFORMATION MANAGEMENT | Facility: CLINIC | Age: 73
End: 2023-01-01

## 2023-01-01 ENCOUNTER — TELEPHONE (OUTPATIENT)
Dept: FAMILY MEDICINE | Facility: CLINIC | Age: 73
End: 2023-01-01
Payer: MEDICARE

## 2023-01-01 ENCOUNTER — MYC MEDICAL ADVICE (OUTPATIENT)
Dept: FAMILY MEDICINE | Facility: CLINIC | Age: 73
End: 2023-01-01

## 2023-01-01 ENCOUNTER — APPOINTMENT (OUTPATIENT)
Dept: CT IMAGING | Facility: CLINIC | Age: 73
DRG: 308 | End: 2023-01-01
Attending: EMERGENCY MEDICINE
Payer: MEDICARE

## 2023-01-01 ENCOUNTER — HOSPITAL ENCOUNTER (OUTPATIENT)
Facility: CLINIC | Age: 73
Setting detail: OBSERVATION
Discharge: HOME | DRG: 308 | End: 2023-02-06
Attending: EMERGENCY MEDICINE | Admitting: STUDENT IN AN ORGANIZED HEALTH CARE EDUCATION/TRAINING PROGRAM
Payer: MEDICARE

## 2023-01-01 ENCOUNTER — HOSPITAL ENCOUNTER (EMERGENCY)
Facility: CLINIC | Age: 73
Discharge: HOME | End: 2023-02-03
Attending: EMERGENCY MEDICINE | Admitting: EMERGENCY MEDICINE
Payer: MEDICARE

## 2023-01-01 ENCOUNTER — APPOINTMENT (OUTPATIENT)
Dept: CT IMAGING | Facility: CLINIC | Age: 73
DRG: 308 | End: 2023-01-01
Attending: HOSPITALIST
Payer: MEDICARE

## 2023-01-01 VITALS
HEART RATE: 118 BPM | OXYGEN SATURATION: 88 % | RESPIRATION RATE: 24 BRPM | DIASTOLIC BLOOD PRESSURE: 84 MMHG | SYSTOLIC BLOOD PRESSURE: 153 MMHG | BODY MASS INDEX: 22.34 KG/M2 | WEIGHT: 118.3 LBS | HEIGHT: 61 IN | TEMPERATURE: 99.3 F

## 2023-01-01 VITALS
HEART RATE: 107 BPM | SYSTOLIC BLOOD PRESSURE: 142 MMHG | TEMPERATURE: 98.1 F | OXYGEN SATURATION: 93 % | DIASTOLIC BLOOD PRESSURE: 101 MMHG | RESPIRATION RATE: 20 BRPM

## 2023-01-01 VITALS
OXYGEN SATURATION: 93 % | DIASTOLIC BLOOD PRESSURE: 86 MMHG | BODY MASS INDEX: 21.26 KG/M2 | HEART RATE: 98 BPM | HEIGHT: 61 IN | TEMPERATURE: 98.1 F | RESPIRATION RATE: 20 BRPM | WEIGHT: 112.6 LBS | SYSTOLIC BLOOD PRESSURE: 153 MMHG

## 2023-01-01 DIAGNOSIS — G12.21 ALS (AMYOTROPHIC LATERAL SCLEROSIS) (H): ICD-10-CM

## 2023-01-01 DIAGNOSIS — I48.91 ATRIAL FIBRILLATION WITH RVR (H): ICD-10-CM

## 2023-01-01 DIAGNOSIS — R07.9 CHEST PAIN: ICD-10-CM

## 2023-01-01 DIAGNOSIS — I95.9 HYPOTENSION, UNSPECIFIED HYPOTENSION TYPE: ICD-10-CM

## 2023-01-01 DIAGNOSIS — R00.0 TACHYCARDIA INDUCED CARDIOMYOPATHY (H): ICD-10-CM

## 2023-01-01 DIAGNOSIS — R41.89 UNRESPONSIVE: ICD-10-CM

## 2023-01-01 DIAGNOSIS — I5A MYOCARDIAL INJURY: ICD-10-CM

## 2023-01-01 DIAGNOSIS — R79.89 ELEVATED TROPONIN: ICD-10-CM

## 2023-01-01 DIAGNOSIS — R06.02 SOB (SHORTNESS OF BREATH) ON EXERTION: ICD-10-CM

## 2023-01-01 DIAGNOSIS — Z53.9 DIAGNOSIS NOT YET DEFINED: Primary | ICD-10-CM

## 2023-01-01 DIAGNOSIS — I43 TACHYCARDIA INDUCED CARDIOMYOPATHY (H): ICD-10-CM

## 2023-01-01 DIAGNOSIS — R06.02 SHORTNESS OF BREATH: ICD-10-CM

## 2023-01-01 DIAGNOSIS — I48.0 PAROXYSMAL ATRIAL FIBRILLATION (H): ICD-10-CM

## 2023-01-01 DIAGNOSIS — I48.92 ATRIAL FLUTTER WITH RAPID VENTRICULAR RESPONSE (H): Primary | ICD-10-CM

## 2023-01-01 LAB
ALBUMIN SERPL BCG-MCNC: 2.5 G/DL (ref 3.5–5.2)
ALBUMIN SERPL BCG-MCNC: 2.6 G/DL (ref 3.5–5.2)
ALBUMIN SERPL BCG-MCNC: 3.7 G/DL (ref 3.5–5.2)
ALBUMIN UR-MCNC: 10 MG/DL
ALBUMIN UR-MCNC: 20 MG/DL
ALBUMIN UR-MCNC: NEGATIVE MG/DL
ALLEN'S TEST: YES
ALP SERPL-CCNC: 46 U/L (ref 35–104)
ALP SERPL-CCNC: 49 U/L (ref 35–104)
ALP SERPL-CCNC: 68 U/L (ref 35–104)
ALT SERPL W P-5'-P-CCNC: 13 U/L (ref 10–35)
ALT SERPL W P-5'-P-CCNC: 14 U/L (ref 10–35)
ALT SERPL W P-5'-P-CCNC: 20 U/L (ref 10–35)
AMORPH CRY #/AREA URNS HPF: ABNORMAL /HPF
AMORPH CRY #/AREA URNS HPF: ABNORMAL /HPF
ANION GAP SERPL CALCULATED.3IONS-SCNC: 10 MMOL/L (ref 7–15)
ANION GAP SERPL CALCULATED.3IONS-SCNC: 11 MMOL/L (ref 7–15)
ANION GAP SERPL CALCULATED.3IONS-SCNC: 11 MMOL/L (ref 7–15)
ANION GAP SERPL CALCULATED.3IONS-SCNC: 2 MMOL/L (ref 7–15)
ANION GAP SERPL CALCULATED.3IONS-SCNC: 4 MMOL/L (ref 7–15)
ANION GAP SERPL CALCULATED.3IONS-SCNC: 6 MMOL/L (ref 7–15)
ANION GAP SERPL CALCULATED.3IONS-SCNC: 6 MMOL/L (ref 7–15)
ANION GAP SERPL CALCULATED.3IONS-SCNC: 8 MMOL/L (ref 7–15)
ANION GAP SERPL CALCULATED.3IONS-SCNC: 8 MMOL/L (ref 7–15)
APPEARANCE UR: CLEAR
APTT PPP: 30 SECONDS (ref 22–38)
AST SERPL W P-5'-P-CCNC: 19 U/L (ref 10–35)
AST SERPL W P-5'-P-CCNC: 27 U/L (ref 10–35)
AST SERPL W P-5'-P-CCNC: 34 U/L (ref 10–35)
ATRIAL RATE - MUSE: 104 BPM
ATRIAL RATE - MUSE: 105 BPM
ATRIAL RATE - MUSE: 158 BPM
ATRIAL RATE - MUSE: 182 BPM
ATRIAL RATE - MUSE: 302 BPM
ATRIAL RATE - MUSE: 72 BPM
ATRIAL RATE - MUSE: 78 BPM
BACTERIA BLD CULT: NO GROWTH
BASE EXCESS BLDA CALC-SCNC: -1.7 MMOL/L (ref -9–1.8)
BASE EXCESS BLDV CALC-SCNC: 3.1 MMOL/L (ref -7.7–1.9)
BASE EXCESS BLDV CALC-SCNC: 8.8 MMOL/L (ref -7.7–1.9)
BASOPHILS # BLD AUTO: 0 10E3/UL (ref 0–0.2)
BASOPHILS NFR BLD AUTO: 0 %
BILIRUB SERPL-MCNC: 0.3 MG/DL
BILIRUB SERPL-MCNC: 0.5 MG/DL
BILIRUB SERPL-MCNC: 0.6 MG/DL
BILIRUB UR QL STRIP: NEGATIVE
BUN SERPL-MCNC: 12.5 MG/DL (ref 8–23)
BUN SERPL-MCNC: 12.6 MG/DL (ref 8–23)
BUN SERPL-MCNC: 12.8 MG/DL (ref 8–23)
BUN SERPL-MCNC: 14.8 MG/DL (ref 8–23)
BUN SERPL-MCNC: 17.4 MG/DL (ref 8–23)
BUN SERPL-MCNC: 17.5 MG/DL (ref 8–23)
BUN SERPL-MCNC: 19.4 MG/DL (ref 8–23)
BUN SERPL-MCNC: 20.2 MG/DL (ref 8–23)
BUN SERPL-MCNC: 24.3 MG/DL (ref 8–23)
CALCIUM SERPL-MCNC: 7.7 MG/DL (ref 8.8–10.2)
CALCIUM SERPL-MCNC: 7.9 MG/DL (ref 8.8–10.2)
CALCIUM SERPL-MCNC: 8.3 MG/DL (ref 8.8–10.2)
CALCIUM SERPL-MCNC: 8.3 MG/DL (ref 8.8–10.2)
CALCIUM SERPL-MCNC: 8.4 MG/DL (ref 8.8–10.2)
CALCIUM SERPL-MCNC: 8.5 MG/DL (ref 8.8–10.2)
CALCIUM SERPL-MCNC: 8.9 MG/DL (ref 8.8–10.2)
CALCIUM SERPL-MCNC: 9.5 MG/DL (ref 8.8–10.2)
CALCIUM SERPL-MCNC: 9.7 MG/DL (ref 8.8–10.2)
CHLORIDE SERPL-SCNC: 100 MMOL/L (ref 98–107)
CHLORIDE SERPL-SCNC: 102 MMOL/L (ref 98–107)
CHLORIDE SERPL-SCNC: 106 MMOL/L (ref 98–107)
CHLORIDE SERPL-SCNC: 107 MMOL/L (ref 98–107)
CHLORIDE SERPL-SCNC: 109 MMOL/L (ref 98–107)
CHLORIDE SERPL-SCNC: 109 MMOL/L (ref 98–107)
CHLORIDE SERPL-SCNC: 96 MMOL/L (ref 98–107)
COLOR UR AUTO: ABNORMAL
COLOR UR AUTO: ABNORMAL
COLOR UR AUTO: YELLOW
CREAT SERPL-MCNC: 0.41 MG/DL (ref 0.51–0.95)
CREAT SERPL-MCNC: 0.42 MG/DL (ref 0.51–0.95)
CREAT SERPL-MCNC: 0.45 MG/DL (ref 0.51–0.95)
CREAT SERPL-MCNC: 0.49 MG/DL (ref 0.51–0.95)
CREAT SERPL-MCNC: 0.5 MG/DL (ref 0.51–0.95)
CREAT SERPL-MCNC: 0.5 MG/DL (ref 0.51–0.95)
CREAT SERPL-MCNC: 0.51 MG/DL (ref 0.51–0.95)
CREAT SERPL-MCNC: 0.57 MG/DL (ref 0.51–0.95)
CREAT SERPL-MCNC: 0.59 MG/DL (ref 0.51–0.95)
DEPRECATED HCO3 PLAS-SCNC: 21 MMOL/L (ref 22–29)
DEPRECATED HCO3 PLAS-SCNC: 24 MMOL/L (ref 22–29)
DEPRECATED HCO3 PLAS-SCNC: 26 MMOL/L (ref 22–29)
DEPRECATED HCO3 PLAS-SCNC: 29 MMOL/L (ref 22–29)
DEPRECATED HCO3 PLAS-SCNC: 30 MMOL/L (ref 22–29)
DEPRECATED HCO3 PLAS-SCNC: 30 MMOL/L (ref 22–29)
DEPRECATED HCO3 PLAS-SCNC: 32 MMOL/L (ref 22–29)
DEPRECATED HCO3 PLAS-SCNC: 34 MMOL/L (ref 22–29)
DEPRECATED HCO3 PLAS-SCNC: 34 MMOL/L (ref 22–29)
DIASTOLIC BLOOD PRESSURE - MUSE: NORMAL MMHG
EOSINOPHIL # BLD AUTO: 0 10E3/UL (ref 0–0.7)
EOSINOPHIL NFR BLD AUTO: 0 %
ERYTHROCYTE [DISTWIDTH] IN BLOOD BY AUTOMATED COUNT: 12 % (ref 10–15)
ERYTHROCYTE [DISTWIDTH] IN BLOOD BY AUTOMATED COUNT: 12.2 % (ref 10–15)
ERYTHROCYTE [DISTWIDTH] IN BLOOD BY AUTOMATED COUNT: 12.3 % (ref 10–15)
ERYTHROCYTE [DISTWIDTH] IN BLOOD BY AUTOMATED COUNT: 12.5 % (ref 10–15)
FLUAV RNA SPEC QL NAA+PROBE: NEGATIVE
FLUAV RNA SPEC QL NAA+PROBE: NEGATIVE
FLUBV RNA RESP QL NAA+PROBE: NEGATIVE
FLUBV RNA RESP QL NAA+PROBE: NEGATIVE
GFR SERPL CREATININE-BSD FRML MDRD: >90 ML/MIN/1.73M2
GLUCOSE BLDC GLUCOMTR-MCNC: 115 MG/DL (ref 70–99)
GLUCOSE BLDC GLUCOMTR-MCNC: 116 MG/DL (ref 70–99)
GLUCOSE BLDC GLUCOMTR-MCNC: 125 MG/DL (ref 70–99)
GLUCOSE BLDC GLUCOMTR-MCNC: 127 MG/DL (ref 70–99)
GLUCOSE BLDC GLUCOMTR-MCNC: 134 MG/DL (ref 70–99)
GLUCOSE BLDC GLUCOMTR-MCNC: 138 MG/DL (ref 70–99)
GLUCOSE BLDC GLUCOMTR-MCNC: 141 MG/DL (ref 70–99)
GLUCOSE BLDC GLUCOMTR-MCNC: 148 MG/DL (ref 70–99)
GLUCOSE BLDC GLUCOMTR-MCNC: 148 MG/DL (ref 70–99)
GLUCOSE BLDC GLUCOMTR-MCNC: 150 MG/DL (ref 70–99)
GLUCOSE BLDC GLUCOMTR-MCNC: 157 MG/DL (ref 70–99)
GLUCOSE BLDC GLUCOMTR-MCNC: 160 MG/DL (ref 70–99)
GLUCOSE BLDC GLUCOMTR-MCNC: 161 MG/DL (ref 70–99)
GLUCOSE BLDC GLUCOMTR-MCNC: 173 MG/DL (ref 70–99)
GLUCOSE BLDC GLUCOMTR-MCNC: 176 MG/DL (ref 70–99)
GLUCOSE BLDC GLUCOMTR-MCNC: 182 MG/DL (ref 70–99)
GLUCOSE BLDC GLUCOMTR-MCNC: 186 MG/DL (ref 70–99)
GLUCOSE BLDC GLUCOMTR-MCNC: 189 MG/DL (ref 70–99)
GLUCOSE BLDC GLUCOMTR-MCNC: 190 MG/DL (ref 70–99)
GLUCOSE BLDC GLUCOMTR-MCNC: 204 MG/DL (ref 70–99)
GLUCOSE SERPL-MCNC: 120 MG/DL (ref 70–99)
GLUCOSE SERPL-MCNC: 122 MG/DL (ref 70–99)
GLUCOSE SERPL-MCNC: 129 MG/DL (ref 70–99)
GLUCOSE SERPL-MCNC: 132 MG/DL (ref 70–99)
GLUCOSE SERPL-MCNC: 138 MG/DL (ref 70–99)
GLUCOSE SERPL-MCNC: 143 MG/DL (ref 70–99)
GLUCOSE SERPL-MCNC: 143 MG/DL (ref 70–99)
GLUCOSE SERPL-MCNC: 166 MG/DL (ref 70–99)
GLUCOSE SERPL-MCNC: 89 MG/DL (ref 70–99)
GLUCOSE UR STRIP-MCNC: NEGATIVE MG/DL
HCO3 BLD-SCNC: 26 MMOL/L (ref 21–28)
HCO3 BLDV-SCNC: 31 MMOL/L (ref 21–28)
HCO3 BLDV-SCNC: 36 MMOL/L (ref 21–28)
HCT VFR BLD AUTO: 33.8 % (ref 35–47)
HCT VFR BLD AUTO: 38.4 % (ref 35–47)
HCT VFR BLD AUTO: 38.9 % (ref 35–47)
HCT VFR BLD AUTO: 44.5 % (ref 35–47)
HCT VFR BLD AUTO: 45.8 % (ref 35–47)
HCT VFR BLD AUTO: 49.3 % (ref 35–47)
HGB BLD-MCNC: 11.2 G/DL (ref 11.7–15.7)
HGB BLD-MCNC: 12.4 G/DL (ref 11.7–15.7)
HGB BLD-MCNC: 12.7 G/DL (ref 11.7–15.7)
HGB BLD-MCNC: 14 G/DL (ref 11.7–15.7)
HGB BLD-MCNC: 14.8 G/DL (ref 11.7–15.7)
HGB BLD-MCNC: 15.8 G/DL (ref 11.7–15.7)
HGB UR QL STRIP: ABNORMAL
HGB UR QL STRIP: ABNORMAL
HGB UR QL STRIP: NEGATIVE
HOLD SPECIMEN: NORMAL
HOLD SPECIMEN: NORMAL
IMM GRANULOCYTES # BLD: 0 10E3/UL
IMM GRANULOCYTES NFR BLD: 0 %
INR PPP: 1.04 (ref 0.85–1.15)
INR PPP: 1.18 (ref 0.85–1.15)
INTERPRETATION ECG - MUSE: NORMAL
KETONES UR STRIP-MCNC: 40 MG/DL
KETONES UR STRIP-MCNC: ABNORMAL MG/DL
KETONES UR STRIP-MCNC: NEGATIVE MG/DL
LACTATE SERPL-SCNC: 1.3 MMOL/L (ref 0.7–2)
LACTATE SERPL-SCNC: 1.7 MMOL/L (ref 0.7–2)
LEUKOCYTE ESTERASE UR QL STRIP: NEGATIVE
LVEF ECHO: NORMAL
LYMPHOCYTES # BLD AUTO: 1.1 10E3/UL (ref 0.8–5.3)
LYMPHOCYTES # BLD AUTO: 1.3 10E3/UL (ref 0.8–5.3)
LYMPHOCYTES # BLD AUTO: 2 10E3/UL (ref 0.8–5.3)
LYMPHOCYTES NFR BLD AUTO: 10 %
LYMPHOCYTES NFR BLD AUTO: 12 %
LYMPHOCYTES NFR BLD AUTO: 19 %
MAGNESIUM SERPL-MCNC: 1.6 MG/DL (ref 1.7–2.3)
MAGNESIUM SERPL-MCNC: 1.8 MG/DL (ref 1.7–2.3)
MAGNESIUM SERPL-MCNC: 1.8 MG/DL (ref 1.7–2.3)
MAGNESIUM SERPL-MCNC: 1.9 MG/DL (ref 1.7–2.3)
MAGNESIUM SERPL-MCNC: 2 MG/DL (ref 1.7–2.3)
MAGNESIUM SERPL-MCNC: 2.1 MG/DL (ref 1.7–2.3)
MAGNESIUM SERPL-MCNC: 2.2 MG/DL (ref 1.7–2.3)
MAGNESIUM SERPL-MCNC: 2.4 MG/DL (ref 1.7–2.3)
MCH RBC QN AUTO: 31 PG (ref 26.5–33)
MCH RBC QN AUTO: 31 PG (ref 26.5–33)
MCH RBC QN AUTO: 31.1 PG (ref 26.5–33)
MCH RBC QN AUTO: 31.2 PG (ref 26.5–33)
MCH RBC QN AUTO: 31.4 PG (ref 26.5–33)
MCH RBC QN AUTO: 31.6 PG (ref 26.5–33)
MCHC RBC AUTO-ENTMCNC: 31.5 G/DL (ref 31.5–36.5)
MCHC RBC AUTO-ENTMCNC: 31.9 G/DL (ref 31.5–36.5)
MCHC RBC AUTO-ENTMCNC: 32 G/DL (ref 31.5–36.5)
MCHC RBC AUTO-ENTMCNC: 32.3 G/DL (ref 31.5–36.5)
MCHC RBC AUTO-ENTMCNC: 33.1 G/DL (ref 31.5–36.5)
MCHC RBC AUTO-ENTMCNC: 33.1 G/DL (ref 31.5–36.5)
MCV RBC AUTO: 94 FL (ref 78–100)
MCV RBC AUTO: 96 FL (ref 78–100)
MCV RBC AUTO: 97 FL (ref 78–100)
MCV RBC AUTO: 99 FL (ref 78–100)
MONOCYTES # BLD AUTO: 0.6 10E3/UL (ref 0–1.3)
MONOCYTES # BLD AUTO: 0.8 10E3/UL (ref 0–1.3)
MONOCYTES # BLD AUTO: 1.1 10E3/UL (ref 0–1.3)
MONOCYTES NFR BLD AUTO: 7 %
MONOCYTES NFR BLD AUTO: 8 %
MONOCYTES NFR BLD AUTO: 8 %
MUCOUS THREADS #/AREA URNS LPF: PRESENT /LPF
NEUTROPHILS # BLD AUTO: 11 10E3/UL (ref 1.6–8.3)
NEUTROPHILS # BLD AUTO: 7 10E3/UL (ref 1.6–8.3)
NEUTROPHILS # BLD AUTO: 7.6 10E3/UL (ref 1.6–8.3)
NEUTROPHILS NFR BLD AUTO: 73 %
NEUTROPHILS NFR BLD AUTO: 81 %
NEUTROPHILS NFR BLD AUTO: 82 %
NITRATE UR QL: NEGATIVE
NRBC # BLD AUTO: 0 10E3/UL
NRBC BLD AUTO-RTO: 0 /100
NT-PROBNP SERPL-MCNC: 4742 PG/ML (ref 0–900)
O2/TOTAL GAS SETTING VFR VENT: 0 %
O2/TOTAL GAS SETTING VFR VENT: 0 %
O2/TOTAL GAS SETTING VFR VENT: 30 %
OXYHGB MFR BLDV: 35 % (ref 70–75)
OXYHGB MFR BLDV: 63 % (ref 70–75)
P AXIS - MUSE: 36 DEGREES
P AXIS - MUSE: 68 DEGREES
P AXIS - MUSE: 69 DEGREES
P AXIS - MUSE: 75 DEGREES
P AXIS - MUSE: NORMAL DEGREES
PCO2 BLD: 54 MM HG (ref 35–45)
PCO2 BLDV: 56 MM HG (ref 40–50)
PCO2 BLDV: 62 MM HG (ref 40–50)
PH BLD: 7.28 [PH] (ref 7.35–7.45)
PH BLDV: 7.31 [PH] (ref 7.32–7.43)
PH BLDV: 7.41 [PH] (ref 7.32–7.43)
PH UR STRIP: 7 [PH] (ref 5–7)
PH UR STRIP: 7 [PH] (ref 5–7)
PH UR STRIP: 8 [PH] (ref 5–7)
PHOSPHATE SERPL-MCNC: 1.8 MG/DL (ref 2.5–4.5)
PHOSPHATE SERPL-MCNC: 1.9 MG/DL (ref 2.5–4.5)
PHOSPHATE SERPL-MCNC: 2.1 MG/DL (ref 2.5–4.5)
PHOSPHATE SERPL-MCNC: 2.5 MG/DL (ref 2.5–4.5)
PHOSPHATE SERPL-MCNC: 2.7 MG/DL (ref 2.5–4.5)
PHOSPHATE SERPL-MCNC: 2.7 MG/DL (ref 2.5–4.5)
PHOSPHATE SERPL-MCNC: 3.2 MG/DL (ref 2.5–4.5)
PHOSPHATE SERPL-MCNC: 3.3 MG/DL (ref 2.5–4.5)
PHOSPHATE SERPL-MCNC: 3.4 MG/DL (ref 2.5–4.5)
PHOSPHATE SERPL-MCNC: 3.6 MG/DL (ref 2.5–4.5)
PHOSPHATE SERPL-MCNC: 3.6 MG/DL (ref 2.5–4.5)
PHOSPHATE SERPL-MCNC: 3.7 MG/DL (ref 2.5–4.5)
PHOSPHATE SERPL-MCNC: 3.7 MG/DL (ref 2.5–4.5)
PHOSPHATE SERPL-MCNC: 3.8 MG/DL (ref 2.5–4.5)
PHOSPHATE SERPL-MCNC: 3.8 MG/DL (ref 2.5–4.5)
PHOSPHATE SERPL-MCNC: 4.2 MG/DL (ref 2.5–4.5)
PHOSPHATE SERPL-MCNC: 4.7 MG/DL (ref 2.5–4.5)
PLATELET # BLD AUTO: 115 10E3/UL (ref 150–450)
PLATELET # BLD AUTO: 137 10E3/UL (ref 150–450)
PLATELET # BLD AUTO: 139 10E3/UL (ref 150–450)
PLATELET # BLD AUTO: 152 10E3/UL (ref 150–450)
PLATELET # BLD AUTO: 173 10E3/UL (ref 150–450)
PLATELET # BLD AUTO: 184 10E3/UL (ref 150–450)
PO2 BLD: 101 MM HG (ref 80–105)
PO2 BLDV: 21 MM HG (ref 25–47)
PO2 BLDV: 39 MM HG (ref 25–47)
POTASSIUM SERPL-SCNC: 3.1 MMOL/L (ref 3.4–5.3)
POTASSIUM SERPL-SCNC: 3.9 MMOL/L (ref 3.4–5.3)
POTASSIUM SERPL-SCNC: 4 MMOL/L (ref 3.4–5.3)
POTASSIUM SERPL-SCNC: 4.2 MMOL/L (ref 3.4–5.3)
POTASSIUM SERPL-SCNC: 4.3 MMOL/L (ref 3.4–5.3)
POTASSIUM SERPL-SCNC: 4.3 MMOL/L (ref 3.4–5.3)
POTASSIUM SERPL-SCNC: 4.4 MMOL/L (ref 3.4–5.3)
POTASSIUM SERPL-SCNC: 4.6 MMOL/L (ref 3.4–5.3)
POTASSIUM SERPL-SCNC: 4.6 MMOL/L (ref 3.4–5.3)
POTASSIUM SERPL-SCNC: 4.7 MMOL/L (ref 3.4–5.3)
POTASSIUM SERPL-SCNC: 4.8 MMOL/L (ref 3.4–5.3)
POTASSIUM SERPL-SCNC: 4.8 MMOL/L (ref 3.4–5.3)
POTASSIUM SERPL-SCNC: 4.9 MMOL/L (ref 3.4–5.3)
POTASSIUM SERPL-SCNC: 5.3 MMOL/L (ref 3.4–5.3)
PR INTERVAL - MUSE: 106 MS
PR INTERVAL - MUSE: 112 MS
PR INTERVAL - MUSE: 112 MS
PR INTERVAL - MUSE: 116 MS
PR INTERVAL - MUSE: NORMAL MS
PROCALCITONIN SERPL IA-MCNC: 0.06 NG/ML
PROT SERPL-MCNC: 5.2 G/DL (ref 6.4–8.3)
PROT SERPL-MCNC: 5.5 G/DL (ref 6.4–8.3)
PROT SERPL-MCNC: 7.5 G/DL (ref 6.4–8.3)
QRS DURATION - MUSE: 132 MS
QRS DURATION - MUSE: 74 MS
QRS DURATION - MUSE: 74 MS
QRS DURATION - MUSE: 76 MS
QRS DURATION - MUSE: 78 MS
QRS DURATION - MUSE: 78 MS
QRS DURATION - MUSE: 80 MS
QT - MUSE: 228 MS
QT - MUSE: 238 MS
QT - MUSE: 320 MS
QT - MUSE: 332 MS
QT - MUSE: 336 MS
QT - MUSE: 342 MS
QT - MUSE: 408 MS
QTC - MUSE: 369 MS
QTC - MUSE: 374 MS
QTC - MUSE: 377 MS
QTC - MUSE: 438 MS
QTC - MUSE: 441 MS
QTC - MUSE: 452 MS
QTC - MUSE: 465 MS
R AXIS - MUSE: -27 DEGREES
R AXIS - MUSE: 11 DEGREES
R AXIS - MUSE: 14 DEGREES
R AXIS - MUSE: 44 DEGREES
R AXIS - MUSE: 5 DEGREES
R AXIS - MUSE: 52 DEGREES
R AXIS - MUSE: 55 DEGREES
RBC # BLD AUTO: 3.6 10E6/UL (ref 3.8–5.2)
RBC # BLD AUTO: 4 10E6/UL (ref 3.8–5.2)
RBC # BLD AUTO: 4.02 10E6/UL (ref 3.8–5.2)
RBC # BLD AUTO: 4.51 10E6/UL (ref 3.8–5.2)
RBC # BLD AUTO: 4.71 10E6/UL (ref 3.8–5.2)
RBC # BLD AUTO: 5.07 10E6/UL (ref 3.8–5.2)
RBC URINE: 20 /HPF
RBC URINE: 3 /HPF
RBC URINE: 5 /HPF
RSV RNA SPEC NAA+PROBE: NEGATIVE
RSV RNA SPEC NAA+PROBE: NEGATIVE
SARS-COV-2 RNA RESP QL NAA+PROBE: NEGATIVE
SARS-COV-2 RNA RESP QL NAA+PROBE: NEGATIVE
SODIUM SERPL-SCNC: 138 MMOL/L (ref 136–145)
SODIUM SERPL-SCNC: 139 MMOL/L (ref 136–145)
SODIUM SERPL-SCNC: 139 MMOL/L (ref 136–145)
SODIUM SERPL-SCNC: 140 MMOL/L (ref 136–145)
SODIUM SERPL-SCNC: 141 MMOL/L (ref 136–145)
SP GR UR STRIP: 1 (ref 1–1.03)
SP GR UR STRIP: 1.01 (ref 1–1.03)
SP GR UR STRIP: 1.03 (ref 1–1.03)
SQUAMOUS EPITHELIAL: <1 /HPF
SYSTOLIC BLOOD PRESSURE - MUSE: NORMAL MMHG
T AXIS - MUSE: -10 DEGREES
T AXIS - MUSE: -3 DEGREES
T AXIS - MUSE: -45 DEGREES
T AXIS - MUSE: -63 DEGREES
T AXIS - MUSE: 11 DEGREES
T AXIS - MUSE: 45 DEGREES
T AXIS - MUSE: 47 DEGREES
TROPONIN T SERPL HS-MCNC: 111 NG/L
TROPONIN T SERPL HS-MCNC: 16 NG/L
TROPONIN T SERPL HS-MCNC: 17 NG/L
TROPONIN T SERPL HS-MCNC: 82 NG/L
TSH SERPL DL<=0.005 MIU/L-ACNC: 3.38 UIU/ML (ref 0.3–4.2)
UROBILINOGEN UR STRIP-MCNC: 3 MG/DL
UROBILINOGEN UR STRIP-MCNC: NORMAL MG/DL
UROBILINOGEN UR STRIP-MCNC: NORMAL MG/DL
VENTRICULAR RATE- MUSE: 104 BPM
VENTRICULAR RATE- MUSE: 105 BPM
VENTRICULAR RATE- MUSE: 120 BPM
VENTRICULAR RATE- MUSE: 151 BPM
VENTRICULAR RATE- MUSE: 158 BPM
VENTRICULAR RATE- MUSE: 72 BPM
VENTRICULAR RATE- MUSE: 78 BPM
WBC # BLD AUTO: 10.5 10E3/UL (ref 4–11)
WBC # BLD AUTO: 13.1 10E3/UL (ref 4–11)
WBC # BLD AUTO: 13.4 10E3/UL (ref 4–11)
WBC # BLD AUTO: 18.8 10E3/UL (ref 4–11)
WBC # BLD AUTO: 7.3 10E3/UL (ref 4–11)
WBC # BLD AUTO: 8.7 10E3/UL (ref 4–11)
WBC URINE: 1 /HPF
WBC URINE: 3 /HPF
WBC URINE: <1 /HPF

## 2023-01-01 PROCEDURE — 258N000003 HC RX IP 258 OP 636: Performed by: ANESTHESIOLOGY

## 2023-01-01 PROCEDURE — 99231 SBSQ HOSP IP/OBS SF/LOW 25: CPT | Performed by: HOSPITALIST

## 2023-01-01 PROCEDURE — 250N000013 HC RX MED GY IP 250 OP 250 PS 637: Performed by: INTERNAL MEDICINE

## 2023-01-01 PROCEDURE — 250N000013 HC RX MED GY IP 250 OP 250 PS 637: Performed by: STUDENT IN AN ORGANIZED HEALTH CARE EDUCATION/TRAINING PROGRAM

## 2023-01-01 PROCEDURE — 250N000011 HC RX IP 250 OP 636: Performed by: NURSE PRACTITIONER

## 2023-01-01 PROCEDURE — 94660 CPAP INITIATION&MGMT: CPT

## 2023-01-01 PROCEDURE — 250N000011 HC RX IP 250 OP 636: Performed by: ANESTHESIOLOGY

## 2023-01-01 PROCEDURE — 36415 COLL VENOUS BLD VENIPUNCTURE: CPT | Performed by: STUDENT IN AN ORGANIZED HEALTH CARE EDUCATION/TRAINING PROGRAM

## 2023-01-01 PROCEDURE — 99285 EMERGENCY DEPT VISIT HI MDM: CPT | Mod: 25,CS

## 2023-01-01 PROCEDURE — 250N000013 HC RX MED GY IP 250 OP 250 PS 637: Performed by: NURSE PRACTITIONER

## 2023-01-01 PROCEDURE — 200N000001 HC R&B ICU

## 2023-01-01 PROCEDURE — 36415 COLL VENOUS BLD VENIPUNCTURE: CPT | Performed by: INTERNAL MEDICINE

## 2023-01-01 PROCEDURE — 250N000011 HC RX IP 250 OP 636: Performed by: HOSPITALIST

## 2023-01-01 PROCEDURE — 99291 CRITICAL CARE FIRST HOUR: CPT | Performed by: ANESTHESIOLOGY

## 2023-01-01 PROCEDURE — 250N000011 HC RX IP 250 OP 636: Performed by: STUDENT IN AN ORGANIZED HEALTH CARE EDUCATION/TRAINING PROGRAM

## 2023-01-01 PROCEDURE — 87040 BLOOD CULTURE FOR BACTERIA: CPT | Performed by: EMERGENCY MEDICINE

## 2023-01-01 PROCEDURE — 80048 BASIC METABOLIC PNL TOTAL CA: CPT | Performed by: INTERNAL MEDICINE

## 2023-01-01 PROCEDURE — 250N000009 HC RX 250: Performed by: HOSPITALIST

## 2023-01-01 PROCEDURE — 120N000001 HC R&B MED SURG/OB

## 2023-01-01 PROCEDURE — 250N000009 HC RX 250: Performed by: EMERGENCY MEDICINE

## 2023-01-01 PROCEDURE — 84100 ASSAY OF PHOSPHORUS: CPT | Performed by: STUDENT IN AN ORGANIZED HEALTH CARE EDUCATION/TRAINING PROGRAM

## 2023-01-01 PROCEDURE — 84132 ASSAY OF SERUM POTASSIUM: CPT | Performed by: HOSPITALIST

## 2023-01-01 PROCEDURE — 99232 SBSQ HOSP IP/OBS MODERATE 35: CPT | Performed by: INTERNAL MEDICINE

## 2023-01-01 PROCEDURE — 31525 DX LARYNGOSCOPY EXCL NB: CPT

## 2023-01-01 PROCEDURE — 272N000078 HC NUTRITION PRODUCT INTERMEDIATE LITER

## 2023-01-01 PROCEDURE — 250N000013 HC RX MED GY IP 250 OP 250 PS 637: Performed by: HOSPITALIST

## 2023-01-01 PROCEDURE — 84484 ASSAY OF TROPONIN QUANT: CPT | Performed by: EMERGENCY MEDICINE

## 2023-01-01 PROCEDURE — 99231 SBSQ HOSP IP/OBS SF/LOW 25: CPT | Performed by: INTERNAL MEDICINE

## 2023-01-01 PROCEDURE — 83735 ASSAY OF MAGNESIUM: CPT | Performed by: HOSPITALIST

## 2023-01-01 PROCEDURE — C9803 HOPD COVID-19 SPEC COLLECT: HCPCS

## 2023-01-01 PROCEDURE — 258N000003 HC RX IP 258 OP 636: Performed by: EMERGENCY MEDICINE

## 2023-01-01 PROCEDURE — 87040 BLOOD CULTURE FOR BACTERIA: CPT | Performed by: INTERNAL MEDICINE

## 2023-01-01 PROCEDURE — 84443 ASSAY THYROID STIM HORMONE: CPT | Performed by: STUDENT IN AN ORGANIZED HEALTH CARE EDUCATION/TRAINING PROGRAM

## 2023-01-01 PROCEDURE — 99232 SBSQ HOSP IP/OBS MODERATE 35: CPT | Performed by: HOSPITALIST

## 2023-01-01 PROCEDURE — 82310 ASSAY OF CALCIUM: CPT | Performed by: INTERNAL MEDICINE

## 2023-01-01 PROCEDURE — 83605 ASSAY OF LACTIC ACID: CPT | Performed by: STUDENT IN AN ORGANIZED HEALTH CARE EDUCATION/TRAINING PROGRAM

## 2023-01-01 PROCEDURE — 81001 URINALYSIS AUTO W/SCOPE: CPT | Performed by: INTERNAL MEDICINE

## 2023-01-01 PROCEDURE — 999N000254 HC STATISTIC VENTILATOR TRANSFER

## 2023-01-01 PROCEDURE — 84100 ASSAY OF PHOSPHORUS: CPT | Performed by: INTERNAL MEDICINE

## 2023-01-01 PROCEDURE — C9113 INJ PANTOPRAZOLE SODIUM, VIA: HCPCS | Performed by: ANESTHESIOLOGY

## 2023-01-01 PROCEDURE — 83880 ASSAY OF NATRIURETIC PEPTIDE: CPT | Performed by: EMERGENCY MEDICINE

## 2023-01-01 PROCEDURE — 85027 COMPLETE CBC AUTOMATED: CPT | Performed by: HOSPITALIST

## 2023-01-01 PROCEDURE — 85730 THROMBOPLASTIN TIME PARTIAL: CPT | Performed by: EMERGENCY MEDICINE

## 2023-01-01 PROCEDURE — 99222 1ST HOSP IP/OBS MODERATE 55: CPT | Performed by: INTERNAL MEDICINE

## 2023-01-01 PROCEDURE — 99238 HOSP IP/OBS DSCHRG MGMT 30/<: CPT | Performed by: STUDENT IN AN ORGANIZED HEALTH CARE EDUCATION/TRAINING PROGRAM

## 2023-01-01 PROCEDURE — 80048 BASIC METABOLIC PNL TOTAL CA: CPT | Performed by: HOSPITALIST

## 2023-01-01 PROCEDURE — 87637 SARSCOV2&INF A&B&RSV AMP PRB: CPT | Performed by: EMERGENCY MEDICINE

## 2023-01-01 PROCEDURE — 36415 COLL VENOUS BLD VENIPUNCTURE: CPT | Performed by: HOSPITALIST

## 2023-01-01 PROCEDURE — 71045 X-RAY EXAM CHEST 1 VIEW: CPT | Mod: 77

## 2023-01-01 PROCEDURE — 82805 BLOOD GASES W/O2 SATURATION: CPT | Performed by: EMERGENCY MEDICINE

## 2023-01-01 PROCEDURE — 93306 TTE W/DOPPLER COMPLETE: CPT | Mod: 26 | Performed by: INTERNAL MEDICINE

## 2023-01-01 PROCEDURE — 83735 ASSAY OF MAGNESIUM: CPT | Performed by: INTERNAL MEDICINE

## 2023-01-01 PROCEDURE — 84145 PROCALCITONIN (PCT): CPT | Performed by: INTERNAL MEDICINE

## 2023-01-01 PROCEDURE — 31500 INSERT EMERGENCY AIRWAY: CPT

## 2023-01-01 PROCEDURE — 250N000013 HC RX MED GY IP 250 OP 250 PS 637: Performed by: EMERGENCY MEDICINE

## 2023-01-01 PROCEDURE — 999N000157 HC STATISTIC RCP TIME EA 10 MIN

## 2023-01-01 PROCEDURE — G1010 CDSM STANSON: HCPCS

## 2023-01-01 PROCEDURE — 99418 PROLNG IP/OBS E/M EA 15 MIN: CPT | Performed by: NURSE PRACTITIONER

## 2023-01-01 PROCEDURE — 99232 SBSQ HOSP IP/OBS MODERATE 35: CPT | Performed by: NURSE PRACTITIONER

## 2023-01-01 PROCEDURE — 80053 COMPREHEN METABOLIC PANEL: CPT | Performed by: EMERGENCY MEDICINE

## 2023-01-01 PROCEDURE — 99222 1ST HOSP IP/OBS MODERATE 55: CPT | Performed by: NURSE PRACTITIONER

## 2023-01-01 PROCEDURE — 83735 ASSAY OF MAGNESIUM: CPT | Performed by: STUDENT IN AN ORGANIZED HEALTH CARE EDUCATION/TRAINING PROGRAM

## 2023-01-01 PROCEDURE — 999N000253 HC STATISTIC WEANING TRIALS

## 2023-01-01 PROCEDURE — 96365 THER/PROPH/DIAG IV INF INIT: CPT

## 2023-01-01 PROCEDURE — 36600 WITHDRAWAL OF ARTERIAL BLOOD: CPT

## 2023-01-01 PROCEDURE — 99223 1ST HOSP IP/OBS HIGH 75: CPT | Mod: AI | Performed by: STUDENT IN AN ORGANIZED HEALTH CARE EDUCATION/TRAINING PROGRAM

## 2023-01-01 PROCEDURE — 84100 ASSAY OF PHOSPHORUS: CPT | Performed by: HOSPITALIST

## 2023-01-01 PROCEDURE — 84450 TRANSFERASE (AST) (SGOT): CPT | Performed by: INTERNAL MEDICINE

## 2023-01-01 PROCEDURE — 71045 X-RAY EXAM CHEST 1 VIEW: CPT

## 2023-01-01 PROCEDURE — 93005 ELECTROCARDIOGRAM TRACING: CPT

## 2023-01-01 PROCEDURE — G0378 HOSPITAL OBSERVATION PER HR: HCPCS

## 2023-01-01 PROCEDURE — 3E043XZ INTRODUCTION OF VASOPRESSOR INTO CENTRAL VEIN, PERCUTANEOUS APPROACH: ICD-10-PCS | Performed by: ANESTHESIOLOGY

## 2023-01-01 PROCEDURE — 999N000185 HC STATISTIC TRANSPORT TIME EA 15 MIN

## 2023-01-01 PROCEDURE — 99233 SBSQ HOSP IP/OBS HIGH 50: CPT | Performed by: HOSPITALIST

## 2023-01-01 PROCEDURE — 99223 1ST HOSP IP/OBS HIGH 75: CPT | Mod: AI | Performed by: INTERNAL MEDICINE

## 2023-01-01 PROCEDURE — 85025 COMPLETE CBC W/AUTO DIFF WBC: CPT | Performed by: STUDENT IN AN ORGANIZED HEALTH CARE EDUCATION/TRAINING PROGRAM

## 2023-01-01 PROCEDURE — 71275 CT ANGIOGRAPHY CHEST: CPT | Mod: MA

## 2023-01-01 PROCEDURE — 96361 HYDRATE IV INFUSION ADD-ON: CPT

## 2023-01-01 PROCEDURE — 96375 TX/PRO/DX INJ NEW DRUG ADDON: CPT

## 2023-01-01 PROCEDURE — 250N000011 HC RX IP 250 OP 636: Performed by: EMERGENCY MEDICINE

## 2023-01-01 PROCEDURE — 85041 AUTOMATED RBC COUNT: CPT | Performed by: EMERGENCY MEDICINE

## 2023-01-01 PROCEDURE — 87637 SARSCOV2&INF A&B&RSV AMP PRB: CPT | Performed by: STUDENT IN AN ORGANIZED HEALTH CARE EDUCATION/TRAINING PROGRAM

## 2023-01-01 PROCEDURE — 81003 URINALYSIS AUTO W/O SCOPE: CPT | Performed by: EMERGENCY MEDICINE

## 2023-01-01 PROCEDURE — 36415 COLL VENOUS BLD VENIPUNCTURE: CPT | Performed by: EMERGENCY MEDICINE

## 2023-01-01 PROCEDURE — 258N000003 HC RX IP 258 OP 636: Performed by: STUDENT IN AN ORGANIZED HEALTH CARE EDUCATION/TRAINING PROGRAM

## 2023-01-01 PROCEDURE — 5A1945Z RESPIRATORY VENTILATION, 24-96 CONSECUTIVE HOURS: ICD-10-PCS | Performed by: EMERGENCY MEDICINE

## 2023-01-01 PROCEDURE — 84132 ASSAY OF SERUM POTASSIUM: CPT | Performed by: INTERNAL MEDICINE

## 2023-01-01 PROCEDURE — 85610 PROTHROMBIN TIME: CPT | Performed by: EMERGENCY MEDICINE

## 2023-01-01 PROCEDURE — 94640 AIRWAY INHALATION TREATMENT: CPT | Mod: 76

## 2023-01-01 PROCEDURE — 250N000009 HC RX 250: Performed by: INTERNAL MEDICINE

## 2023-01-01 PROCEDURE — G0179 MD RECERTIFICATION HHA PT: HCPCS | Performed by: FAMILY MEDICINE

## 2023-01-01 PROCEDURE — 82962 GLUCOSE BLOOD TEST: CPT

## 2023-01-01 PROCEDURE — 999N000259 HC STATISTIC EXTUBATION

## 2023-01-01 PROCEDURE — 99233 SBSQ HOSP IP/OBS HIGH 50: CPT | Performed by: NURSE PRACTITIONER

## 2023-01-01 PROCEDURE — 85025 COMPLETE CBC W/AUTO DIFF WBC: CPT | Performed by: EMERGENCY MEDICINE

## 2023-01-01 PROCEDURE — 250N000013 HC RX MED GY IP 250 OP 250 PS 637: Performed by: ANESTHESIOLOGY

## 2023-01-01 PROCEDURE — P9045 ALBUMIN (HUMAN), 5%, 250 ML: HCPCS | Performed by: STUDENT IN AN ORGANIZED HEALTH CARE EDUCATION/TRAINING PROGRAM

## 2023-01-01 PROCEDURE — 3E0G76Z INTRODUCTION OF NUTRITIONAL SUBSTANCE INTO UPPER GI, VIA NATURAL OR ARTIFICIAL OPENING: ICD-10-PCS | Performed by: EMERGENCY MEDICINE

## 2023-01-01 PROCEDURE — 250N000013 HC RX MED GY IP 250 OP 250 PS 637

## 2023-01-01 PROCEDURE — 96368 THER/DIAG CONCURRENT INF: CPT

## 2023-01-01 PROCEDURE — 99291 CRITICAL CARE FIRST HOUR: CPT | Mod: 25

## 2023-01-01 PROCEDURE — 94003 VENT MGMT INPAT SUBQ DAY: CPT

## 2023-01-01 PROCEDURE — 999N000105 HC STATISTIC NO DOCUMENTATION TO SUPPORT CHARGE

## 2023-01-01 PROCEDURE — 84132 ASSAY OF SERUM POTASSIUM: CPT | Performed by: STUDENT IN AN ORGANIZED HEALTH CARE EDUCATION/TRAINING PROGRAM

## 2023-01-01 PROCEDURE — 272N000240 HC CARDIOVERT/DEFIB/PACER SUPP

## 2023-01-01 PROCEDURE — 93306 TTE W/DOPPLER COMPLETE: CPT

## 2023-01-01 PROCEDURE — 80048 BASIC METABOLIC PNL TOTAL CA: CPT | Performed by: STUDENT IN AN ORGANIZED HEALTH CARE EDUCATION/TRAINING PROGRAM

## 2023-01-01 PROCEDURE — 250N000009 HC RX 250: Performed by: STUDENT IN AN ORGANIZED HEALTH CARE EDUCATION/TRAINING PROGRAM

## 2023-01-01 PROCEDURE — 81001 URINALYSIS AUTO W/SCOPE: CPT | Performed by: EMERGENCY MEDICINE

## 2023-01-01 PROCEDURE — 84484 ASSAY OF TROPONIN QUANT: CPT | Mod: 91 | Performed by: EMERGENCY MEDICINE

## 2023-01-01 PROCEDURE — 85014 HEMATOCRIT: CPT | Performed by: INTERNAL MEDICINE

## 2023-01-01 PROCEDURE — 999N000158 HC STATISTIC RCP TIME ED VENT EA 10 MIN

## 2023-01-01 PROCEDURE — 84484 ASSAY OF TROPONIN QUANT: CPT | Performed by: STUDENT IN AN ORGANIZED HEALTH CARE EDUCATION/TRAINING PROGRAM

## 2023-01-01 PROCEDURE — 99497 ADVNCD CARE PLAN 30 MIN: CPT | Mod: 25 | Performed by: NURSE PRACTITIONER

## 2023-01-01 PROCEDURE — 258N000003 HC RX IP 258 OP 636: Performed by: HOSPITALIST

## 2023-01-01 PROCEDURE — 83605 ASSAY OF LACTIC ACID: CPT | Performed by: EMERGENCY MEDICINE

## 2023-01-01 PROCEDURE — 96374 THER/PROPH/DIAG INJ IV PUSH: CPT | Mod: 59

## 2023-01-01 PROCEDURE — 5A09557 ASSISTANCE WITH RESPIRATORY VENTILATION, GREATER THAN 96 CONSECUTIVE HOURS, CONTINUOUS POSITIVE AIRWAY PRESSURE: ICD-10-PCS | Performed by: ANESTHESIOLOGY

## 2023-01-01 PROCEDURE — 258N000003 HC RX IP 258 OP 636: Performed by: INTERNAL MEDICINE

## 2023-01-01 PROCEDURE — 94640 AIRWAY INHALATION TREATMENT: CPT

## 2023-01-01 PROCEDURE — 82803 BLOOD GASES ANY COMBINATION: CPT | Performed by: INTERNAL MEDICINE

## 2023-01-01 PROCEDURE — 999N000127 HC STATISTIC PERIPHERAL IV START W US GUIDANCE

## 2023-01-01 PROCEDURE — 92960 CARDIOVERSION ELECTRIC EXT: CPT

## 2023-01-01 PROCEDURE — 94002 VENT MGMT INPAT INIT DAY: CPT

## 2023-01-01 PROCEDURE — 83735 ASSAY OF MAGNESIUM: CPT | Performed by: ANESTHESIOLOGY

## 2023-01-01 PROCEDURE — 99232 SBSQ HOSP IP/OBS MODERATE 35: CPT | Performed by: STUDENT IN AN ORGANIZED HEALTH CARE EDUCATION/TRAINING PROGRAM

## 2023-01-01 PROCEDURE — 5A2204Z RESTORATION OF CARDIAC RHYTHM, SINGLE: ICD-10-PCS | Performed by: EMERGENCY MEDICINE

## 2023-01-01 PROCEDURE — 99207 PR NO BILLABLE SERVICE THIS VISIT: CPT | Performed by: HOSPITALIST

## 2023-01-01 RX ORDER — METOPROLOL TARTRATE 1 MG/ML
5 INJECTION, SOLUTION INTRAVENOUS EVERY 5 MIN PRN
Status: DISCONTINUED | OUTPATIENT
Start: 2023-01-01 | End: 2023-01-01

## 2023-01-01 RX ORDER — ACETAMINOPHEN 650 MG/1
650 SUPPOSITORY RECTAL EVERY 6 HOURS PRN
Status: DISCONTINUED | OUTPATIENT
Start: 2023-01-01 | End: 2023-01-01 | Stop reason: HOSPADM

## 2023-01-01 RX ORDER — DEXTROSE MONOHYDRATE 25 G/50ML
25-50 INJECTION, SOLUTION INTRAVENOUS
Status: DISCONTINUED | OUTPATIENT
Start: 2023-01-01 | End: 2023-01-01 | Stop reason: HOSPADM

## 2023-01-01 RX ORDER — NALOXONE HYDROCHLORIDE 0.4 MG/ML
0.2 INJECTION, SOLUTION INTRAMUSCULAR; INTRAVENOUS; SUBCUTANEOUS
Status: DISCONTINUED | OUTPATIENT
Start: 2023-01-01 | End: 2023-01-01 | Stop reason: HOSPADM

## 2023-01-01 RX ORDER — LORAZEPAM 2 MG/ML
1-2 CONCENTRATE ORAL EVERY 4 HOURS PRN
Status: DISCONTINUED | OUTPATIENT
Start: 2023-01-01 | End: 2023-01-01 | Stop reason: HOSPADM

## 2023-01-01 RX ORDER — MORPHINE SULFATE 10 MG/5ML
5 SOLUTION ORAL
Status: DISCONTINUED | OUTPATIENT
Start: 2023-01-01 | End: 2023-01-01

## 2023-01-01 RX ORDER — OLANZAPINE 5 MG/1
5 TABLET, ORALLY DISINTEGRATING ORAL
Status: COMPLETED | OUTPATIENT
Start: 2023-01-01 | End: 2023-01-01

## 2023-01-01 RX ORDER — MAGNESIUM SULFATE HEPTAHYDRATE 40 MG/ML
2 INJECTION, SOLUTION INTRAVENOUS ONCE
Status: COMPLETED | OUTPATIENT
Start: 2023-01-01 | End: 2023-01-01

## 2023-01-01 RX ORDER — LIDOCAINE 4 G/G
1 PATCH TOPICAL
Status: DISCONTINUED | OUTPATIENT
Start: 2023-01-01 | End: 2023-01-01 | Stop reason: HOSPADM

## 2023-01-01 RX ORDER — FENTANYL CITRATE 50 UG/ML
INJECTION, SOLUTION INTRAMUSCULAR; INTRAVENOUS DAILY PRN
Status: COMPLETED | OUTPATIENT
Start: 2023-01-01 | End: 2023-01-01

## 2023-01-01 RX ORDER — MAGNESIUM OXIDE 400 MG/1
400 TABLET ORAL EVERY 4 HOURS
Status: COMPLETED | OUTPATIENT
Start: 2023-01-01 | End: 2023-01-01

## 2023-01-01 RX ORDER — LIDOCAINE 40 MG/G
CREAM TOPICAL
Status: DISCONTINUED | OUTPATIENT
Start: 2023-01-01 | End: 2023-01-01 | Stop reason: HOSPADM

## 2023-01-01 RX ORDER — TRAZODONE HYDROCHLORIDE 50 MG/1
50 TABLET, FILM COATED ORAL AT BEDTIME
Status: DISCONTINUED | OUTPATIENT
Start: 2023-01-01 | End: 2023-01-01

## 2023-01-01 RX ORDER — LORAZEPAM 0.5 MG/1
.5-1 TABLET ORAL
Status: DISCONTINUED | OUTPATIENT
Start: 2023-01-01 | End: 2023-01-01

## 2023-01-01 RX ORDER — METOPROLOL TARTRATE 25 MG/1
25 TABLET, FILM COATED ORAL 2 TIMES DAILY
Status: DISCONTINUED | OUTPATIENT
Start: 2023-01-01 | End: 2023-01-01 | Stop reason: HOSPADM

## 2023-01-01 RX ORDER — FAMOTIDINE 20 MG/1
20 TABLET, FILM COATED ORAL 2 TIMES DAILY
Status: DISCONTINUED | OUTPATIENT
Start: 2023-01-01 | End: 2023-01-01 | Stop reason: HOSPADM

## 2023-01-01 RX ORDER — AMIODARONE HYDROCHLORIDE 200 MG/1
400 TABLET ORAL 3 TIMES DAILY
Status: DISCONTINUED | OUTPATIENT
Start: 2023-01-01 | End: 2023-01-01

## 2023-01-01 RX ORDER — AMOXICILLIN 250 MG
1 CAPSULE ORAL 2 TIMES DAILY
Status: DISCONTINUED | OUTPATIENT
Start: 2023-01-01 | End: 2023-01-01 | Stop reason: HOSPADM

## 2023-01-01 RX ORDER — PROPOFOL 10 MG/ML
10 INJECTION, EMULSION INTRAVENOUS
Status: DISCONTINUED | OUTPATIENT
Start: 2023-01-01 | End: 2023-01-01

## 2023-01-01 RX ORDER — METOPROLOL TARTRATE 1 MG/ML
2.5 INJECTION, SOLUTION INTRAVENOUS
Status: COMPLETED | OUTPATIENT
Start: 2023-01-01 | End: 2023-01-01

## 2023-01-01 RX ORDER — GUAIFENESIN 200 MG/10ML
10 LIQUID ORAL EVERY 6 HOURS
Status: DISCONTINUED | OUTPATIENT
Start: 2023-01-01 | End: 2023-01-01

## 2023-01-01 RX ORDER — POTASSIUM CHLORIDE 20MEQ/15ML
40 LIQUID (ML) ORAL ONCE
Status: COMPLETED | OUTPATIENT
Start: 2023-01-01 | End: 2023-01-01

## 2023-01-01 RX ORDER — PROPOFOL 10 MG/ML
INJECTION, EMULSION INTRAVENOUS DAILY PRN
Status: COMPLETED | OUTPATIENT
Start: 2023-01-01 | End: 2023-01-01

## 2023-01-01 RX ORDER — AMOXICILLIN 250 MG
1 CAPSULE ORAL 2 TIMES DAILY PRN
Status: DISCONTINUED | OUTPATIENT
Start: 2023-01-01 | End: 2023-01-01

## 2023-01-01 RX ORDER — LORAZEPAM 0.5 MG/1
0.25 TABLET ORAL EVERY 6 HOURS
Status: DISCONTINUED | OUTPATIENT
Start: 2023-01-01 | End: 2023-01-01

## 2023-01-01 RX ORDER — NALOXONE HYDROCHLORIDE 0.4 MG/ML
0.4 INJECTION, SOLUTION INTRAMUSCULAR; INTRAVENOUS; SUBCUTANEOUS
Status: DISCONTINUED | OUTPATIENT
Start: 2023-01-01 | End: 2023-01-01 | Stop reason: HOSPADM

## 2023-01-01 RX ORDER — FENTANYL CITRATE 50 UG/ML
25 INJECTION, SOLUTION INTRAMUSCULAR; INTRAVENOUS ONCE
Status: COMPLETED | OUTPATIENT
Start: 2023-01-01 | End: 2023-01-01

## 2023-01-01 RX ORDER — ETOMIDATE 2 MG/ML
30 INJECTION INTRAVENOUS ONCE
Status: COMPLETED | OUTPATIENT
Start: 2023-01-01 | End: 2023-01-01

## 2023-01-01 RX ORDER — IOPAMIDOL 755 MG/ML
500 INJECTION, SOLUTION INTRAVASCULAR ONCE
Status: COMPLETED | OUTPATIENT
Start: 2023-01-01 | End: 2023-01-01

## 2023-01-01 RX ORDER — ACETAMINOPHEN 325 MG/1
650 TABLET ORAL EVERY 4 HOURS PRN
Status: DISCONTINUED | OUTPATIENT
Start: 2023-01-01 | End: 2023-01-01 | Stop reason: ALTCHOICE

## 2023-01-01 RX ORDER — NICOTINE POLACRILEX 4 MG
15-30 LOZENGE BUCCAL
Status: DISCONTINUED | OUTPATIENT
Start: 2023-01-01 | End: 2023-01-01 | Stop reason: HOSPADM

## 2023-01-01 RX ORDER — METOPROLOL TARTRATE 25 MG/1
25 TABLET, FILM COATED ORAL 2 TIMES DAILY
Qty: 60 TABLET | Refills: 1 | Status: SHIPPED | OUTPATIENT
Start: 2023-01-01

## 2023-01-01 RX ORDER — AMOXICILLIN 250 MG
1 CAPSULE ORAL 2 TIMES DAILY PRN
Status: DISCONTINUED | OUTPATIENT
Start: 2023-01-01 | End: 2023-01-01 | Stop reason: HOSPADM

## 2023-01-01 RX ORDER — METHYLPREDNISOLONE SODIUM SUCCINATE 125 MG/2ML
125 INJECTION, POWDER, LYOPHILIZED, FOR SOLUTION INTRAMUSCULAR; INTRAVENOUS ONCE
Status: COMPLETED | OUTPATIENT
Start: 2023-01-01 | End: 2023-01-01

## 2023-01-01 RX ORDER — RILUZOLE 50 MG/1
50 TABLET, FILM COATED ORAL EVERY 12 HOURS
Qty: 60 TABLET | Refills: 2 | Status: SHIPPED | OUTPATIENT
Start: 2023-01-01

## 2023-01-01 RX ORDER — AMIODARONE HYDROCHLORIDE 200 MG/1
400 TABLET ORAL 2 TIMES DAILY
Status: DISCONTINUED | OUTPATIENT
Start: 2023-01-01 | End: 2023-01-01

## 2023-01-01 RX ORDER — QUETIAPINE FUMARATE 25 MG/1
25 TABLET, FILM COATED ORAL AT BEDTIME
Status: DISCONTINUED | OUTPATIENT
Start: 2023-01-01 | End: 2023-01-01

## 2023-01-01 RX ORDER — METOPROLOL TARTRATE 1 MG/ML
5 INJECTION, SOLUTION INTRAVENOUS EVERY 4 HOURS PRN
Status: DISCONTINUED | OUTPATIENT
Start: 2023-01-01 | End: 2023-01-01

## 2023-01-01 RX ORDER — DEXMEDETOMIDINE HYDROCHLORIDE 4 UG/ML
.1-1.2 INJECTION, SOLUTION INTRAVENOUS CONTINUOUS
Status: DISCONTINUED | OUTPATIENT
Start: 2023-01-01 | End: 2023-01-01

## 2023-01-01 RX ORDER — HEPARIN SODIUM 10000 [USP'U]/100ML
0-5000 INJECTION, SOLUTION INTRAVENOUS CONTINUOUS
Status: DISCONTINUED | OUTPATIENT
Start: 2023-01-01 | End: 2023-01-01

## 2023-01-01 RX ORDER — PROCHLORPERAZINE 25 MG
12.5 SUPPOSITORY, RECTAL RECTAL EVERY 12 HOURS PRN
Status: DISCONTINUED | OUTPATIENT
Start: 2023-01-01 | End: 2023-01-01 | Stop reason: HOSPADM

## 2023-01-01 RX ORDER — AMOXICILLIN 250 MG
2 CAPSULE ORAL 2 TIMES DAILY PRN
Status: DISCONTINUED | OUTPATIENT
Start: 2023-01-01 | End: 2023-01-01 | Stop reason: HOSPADM

## 2023-01-01 RX ORDER — LORAZEPAM 2 MG/ML
0.5 INJECTION INTRAMUSCULAR
Status: DISCONTINUED | OUTPATIENT
Start: 2023-01-01 | End: 2023-01-01 | Stop reason: HOSPADM

## 2023-01-01 RX ORDER — MIDAZOLAM HCL IN 0.9 % NACL/PF 1 MG/ML
1-8 PLASTIC BAG, INJECTION (ML) INTRAVENOUS CONTINUOUS
Status: DISCONTINUED | OUTPATIENT
Start: 2023-01-01 | End: 2023-01-01

## 2023-01-01 RX ORDER — ONDANSETRON 4 MG/1
4 TABLET, ORALLY DISINTEGRATING ORAL EVERY 6 HOURS PRN
Status: DISCONTINUED | OUTPATIENT
Start: 2023-01-01 | End: 2023-01-01 | Stop reason: HOSPADM

## 2023-01-01 RX ORDER — ALBUTEROL SULFATE 90 UG/1
2 AEROSOL, METERED RESPIRATORY (INHALATION) EVERY 6 HOURS PRN
Status: DISCONTINUED | OUTPATIENT
Start: 2023-01-01 | End: 2023-01-01 | Stop reason: HOSPADM

## 2023-01-01 RX ORDER — IOPAMIDOL 755 MG/ML
120 INJECTION, SOLUTION INTRAVASCULAR ONCE
Status: COMPLETED | OUTPATIENT
Start: 2023-01-01 | End: 2023-01-01

## 2023-01-01 RX ORDER — PROPOFOL 10 MG/ML
5-75 INJECTION, EMULSION INTRAVENOUS CONTINUOUS
Status: DISCONTINUED | OUTPATIENT
Start: 2023-01-01 | End: 2023-01-01

## 2023-01-01 RX ORDER — HYDROXYZINE HYDROCHLORIDE 25 MG/1
25 TABLET, FILM COATED ORAL 3 TIMES DAILY
Status: DISCONTINUED | OUTPATIENT
Start: 2023-01-01 | End: 2023-01-01 | Stop reason: HOSPADM

## 2023-01-01 RX ORDER — AMIODARONE HYDROCHLORIDE 200 MG/1
200 TABLET ORAL DAILY
Status: DISCONTINUED | OUTPATIENT
Start: 2023-01-01 | End: 2023-01-01

## 2023-01-01 RX ORDER — ACETAMINOPHEN 325 MG/1
650 TABLET ORAL EVERY 6 HOURS PRN
Status: DISCONTINUED | OUTPATIENT
Start: 2023-01-01 | End: 2023-01-01 | Stop reason: HOSPADM

## 2023-01-01 RX ORDER — NALOXONE HYDROCHLORIDE 0.4 MG/ML
0.2 INJECTION, SOLUTION INTRAMUSCULAR; INTRAVENOUS; SUBCUTANEOUS
Status: DISCONTINUED | OUTPATIENT
Start: 2023-01-01 | End: 2023-01-01

## 2023-01-01 RX ORDER — METOPROLOL SUCCINATE 25 MG/1
25 TABLET, EXTENDED RELEASE ORAL DAILY
Status: DISCONTINUED | OUTPATIENT
Start: 2023-01-01 | End: 2023-01-01

## 2023-01-01 RX ORDER — SALIVA STIMULANT COMB. NO.3
1 SPRAY, NON-AEROSOL (ML) MUCOUS MEMBRANE 4 TIMES DAILY
Status: DISCONTINUED | OUTPATIENT
Start: 2023-01-01 | End: 2023-01-01 | Stop reason: HOSPADM

## 2023-01-01 RX ORDER — AMOXICILLIN 250 MG
2 CAPSULE ORAL 2 TIMES DAILY
Status: DISCONTINUED | OUTPATIENT
Start: 2023-01-01 | End: 2023-01-01 | Stop reason: HOSPADM

## 2023-01-01 RX ORDER — TRAZODONE HYDROCHLORIDE 50 MG/1
25-50 TABLET, FILM COATED ORAL
Qty: 30 TABLET | Refills: 0 | Status: SHIPPED | OUTPATIENT
Start: 2023-01-01

## 2023-01-01 RX ORDER — MORPHINE SULFATE 2 MG/ML
1 INJECTION, SOLUTION INTRAMUSCULAR; INTRAVENOUS
Status: DISCONTINUED | OUTPATIENT
Start: 2023-01-01 | End: 2023-01-01

## 2023-01-01 RX ORDER — LORAZEPAM 0.5 MG/1
0.25 TABLET ORAL 2 TIMES DAILY
Status: DISCONTINUED | OUTPATIENT
Start: 2023-01-01 | End: 2023-01-01

## 2023-01-01 RX ORDER — GUAIFENESIN 600 MG/1
15 TABLET, EXTENDED RELEASE ORAL DAILY
Status: DISCONTINUED | OUTPATIENT
Start: 2023-01-01 | End: 2023-01-01 | Stop reason: HOSPADM

## 2023-01-01 RX ORDER — QUETIAPINE FUMARATE 25 MG/1
25 TABLET, FILM COATED ORAL DAILY
Status: DISCONTINUED | OUTPATIENT
Start: 2023-01-01 | End: 2023-01-01 | Stop reason: HOSPADM

## 2023-01-01 RX ORDER — PROCHLORPERAZINE MALEATE 5 MG
5 TABLET ORAL EVERY 6 HOURS PRN
Status: DISCONTINUED | OUTPATIENT
Start: 2023-01-01 | End: 2023-01-01 | Stop reason: HOSPADM

## 2023-01-01 RX ORDER — LORAZEPAM 0.5 MG/1
0.5 TABLET ORAL
Status: DISCONTINUED | OUTPATIENT
Start: 2023-01-01 | End: 2023-01-01

## 2023-01-01 RX ORDER — RILUZOLE 50 MG/1
50 TABLET, FILM COATED ORAL 2 TIMES DAILY
Status: DISCONTINUED | OUTPATIENT
Start: 2023-01-01 | End: 2023-01-01 | Stop reason: HOSPADM

## 2023-01-01 RX ORDER — QUETIAPINE FUMARATE 25 MG/1
25 TABLET, FILM COATED ORAL DAILY
Status: DISCONTINUED | OUTPATIENT
Start: 2023-01-01 | End: 2023-01-01

## 2023-01-01 RX ORDER — METOPROLOL TARTRATE 25 MG/1
25 TABLET, FILM COATED ORAL 2 TIMES DAILY
Status: DISCONTINUED | OUTPATIENT
Start: 2023-01-01 | End: 2023-01-01

## 2023-01-01 RX ORDER — NALOXONE HYDROCHLORIDE 0.4 MG/ML
0.1 INJECTION, SOLUTION INTRAMUSCULAR; INTRAVENOUS; SUBCUTANEOUS
Status: DISCONTINUED | OUTPATIENT
Start: 2023-01-01 | End: 2023-01-01 | Stop reason: HOSPADM

## 2023-01-01 RX ORDER — LORAZEPAM 2 MG/ML
.5-1 INJECTION INTRAMUSCULAR
Status: DISCONTINUED | OUTPATIENT
Start: 2023-01-01 | End: 2023-01-01

## 2023-01-01 RX ORDER — PROPOFOL 10 MG/ML
20 INJECTION, EMULSION INTRAVENOUS
Status: DISCONTINUED | OUTPATIENT
Start: 2023-01-01 | End: 2023-01-01

## 2023-01-01 RX ORDER — MORPHINE SULFATE 2 MG/ML
2-4 INJECTION, SOLUTION INTRAMUSCULAR; INTRAVENOUS
Status: DISCONTINUED | OUTPATIENT
Start: 2023-01-01 | End: 2023-01-01 | Stop reason: HOSPADM

## 2023-01-01 RX ORDER — LORAZEPAM 2 MG/ML
1 INJECTION INTRAMUSCULAR
Status: DISCONTINUED | OUTPATIENT
Start: 2023-01-01 | End: 2023-01-01

## 2023-01-01 RX ORDER — HYDROXYZINE HYDROCHLORIDE 25 MG/1
25 TABLET, FILM COATED ORAL EVERY 6 HOURS PRN
Status: DISCONTINUED | OUTPATIENT
Start: 2023-01-01 | End: 2023-01-01

## 2023-01-01 RX ORDER — LORAZEPAM 1 MG/1
1 TABLET ORAL
Status: DISCONTINUED | OUTPATIENT
Start: 2023-01-01 | End: 2023-01-01

## 2023-01-01 RX ORDER — MORPHINE SULFATE 20 MG/ML
5 SOLUTION ORAL
Status: DISCONTINUED | OUTPATIENT
Start: 2023-01-01 | End: 2023-01-01

## 2023-01-01 RX ORDER — HYDROMORPHONE HCL IN WATER/PF 6 MG/30 ML
0.2 PATIENT CONTROLLED ANALGESIA SYRINGE INTRAVENOUS
Status: DISCONTINUED | OUTPATIENT
Start: 2023-01-01 | End: 2023-01-01 | Stop reason: HOSPADM

## 2023-01-01 RX ORDER — LIDOCAINE 50 MG/G
1 PATCH TOPICAL EVERY 24 HOURS
Qty: 15 PATCH | Refills: 0 | Status: SHIPPED | OUTPATIENT
Start: 2023-01-01

## 2023-01-01 RX ORDER — DEXTROSE MONOHYDRATE 100 MG/ML
INJECTION, SOLUTION INTRAVENOUS CONTINUOUS PRN
Status: DISCONTINUED | OUTPATIENT
Start: 2023-01-01 | End: 2023-01-01 | Stop reason: HOSPADM

## 2023-01-01 RX ORDER — METHYLPREDNISOLONE SODIUM SUCCINATE 125 MG/2ML
60 INJECTION, POWDER, LYOPHILIZED, FOR SOLUTION INTRAMUSCULAR; INTRAVENOUS EVERY 8 HOURS
Status: DISCONTINUED | OUTPATIENT
Start: 2023-01-01 | End: 2023-01-01

## 2023-01-01 RX ORDER — LORAZEPAM 2 MG/ML
0.5 INJECTION INTRAMUSCULAR ONCE
Status: COMPLETED | OUTPATIENT
Start: 2023-01-01 | End: 2023-01-01

## 2023-01-01 RX ORDER — ALBUTEROL SULFATE 90 UG/1
2 AEROSOL, METERED RESPIRATORY (INHALATION) EVERY 6 HOURS PRN
Qty: 18 G | Refills: 3 | Status: SHIPPED | OUTPATIENT
Start: 2023-01-01

## 2023-01-01 RX ORDER — PREDNISONE 5 MG/1
10 TABLET ORAL DAILY PRN
COMMUNITY

## 2023-01-01 RX ORDER — METOPROLOL TARTRATE 1 MG/ML
2.5 INJECTION, SOLUTION INTRAVENOUS ONCE
Status: DISCONTINUED | OUTPATIENT
Start: 2023-01-01 | End: 2023-01-01

## 2023-01-01 RX ORDER — QUETIAPINE FUMARATE 50 MG/1
50 TABLET, FILM COATED ORAL AT BEDTIME
Status: DISCONTINUED | OUTPATIENT
Start: 2023-01-01 | End: 2023-01-01

## 2023-01-01 RX ORDER — HYDROXYZINE HYDROCHLORIDE 25 MG/1
25 TABLET, FILM COATED ORAL EVERY 6 HOURS PRN
Status: DISCONTINUED | OUTPATIENT
Start: 2023-01-01 | End: 2023-01-01 | Stop reason: HOSPADM

## 2023-01-01 RX ORDER — LORAZEPAM 0.5 MG/1
0.25 TABLET ORAL 3 TIMES DAILY
Status: DISCONTINUED | OUTPATIENT
Start: 2023-01-01 | End: 2023-01-01 | Stop reason: HOSPADM

## 2023-01-01 RX ORDER — KETOROLAC TROMETHAMINE 15 MG/ML
15 INJECTION, SOLUTION INTRAMUSCULAR; INTRAVENOUS ONCE
Status: COMPLETED | OUTPATIENT
Start: 2023-01-01 | End: 2023-01-01

## 2023-01-01 RX ORDER — AMIODARONE HYDROCHLORIDE 200 MG/1
200 TABLET ORAL 2 TIMES DAILY
Status: DISPENSED | OUTPATIENT
Start: 2023-01-01 | End: 2023-01-01

## 2023-01-01 RX ORDER — ONDANSETRON 2 MG/ML
4 INJECTION INTRAMUSCULAR; INTRAVENOUS EVERY 6 HOURS PRN
Status: DISCONTINUED | OUTPATIENT
Start: 2023-01-01 | End: 2023-01-01 | Stop reason: HOSPADM

## 2023-01-01 RX ORDER — NALOXONE HYDROCHLORIDE 0.4 MG/ML
0.1 INJECTION, SOLUTION INTRAMUSCULAR; INTRAVENOUS; SUBCUTANEOUS
Status: DISCONTINUED | OUTPATIENT
Start: 2023-01-01 | End: 2023-01-01

## 2023-01-01 RX ORDER — LORAZEPAM 0.5 MG/1
0.5 TABLET ORAL AT BEDTIME
Status: DISCONTINUED | OUTPATIENT
Start: 2023-01-01 | End: 2023-01-01 | Stop reason: HOSPADM

## 2023-01-01 RX ORDER — MORPHINE SULFATE 10 MG/5ML
7.5 SOLUTION ORAL
Status: DISCONTINUED | OUTPATIENT
Start: 2023-01-01 | End: 2023-01-01 | Stop reason: HOSPADM

## 2023-01-01 RX ORDER — AMIODARONE HYDROCHLORIDE 200 MG/1
200 TABLET ORAL DAILY
Status: DISCONTINUED | OUTPATIENT
Start: 2023-01-01 | End: 2023-01-01 | Stop reason: HOSPADM

## 2023-01-01 RX ORDER — CALCIUM CARBONATE 500 MG/1
500 TABLET, CHEWABLE ORAL DAILY PRN
Status: DISCONTINUED | OUTPATIENT
Start: 2023-01-01 | End: 2023-01-01 | Stop reason: HOSPADM

## 2023-01-01 RX ORDER — RILUZOLE 50 MG/1
50 TABLET, FILM COATED ORAL EVERY 12 HOURS
Status: DISCONTINUED | OUTPATIENT
Start: 2023-01-01 | End: 2023-01-01 | Stop reason: HOSPADM

## 2023-01-01 RX ORDER — AMOXICILLIN 250 MG
2 CAPSULE ORAL 2 TIMES DAILY PRN
Status: DISCONTINUED | OUTPATIENT
Start: 2023-01-01 | End: 2023-01-01

## 2023-01-01 RX ORDER — MORPHINE SULFATE 20 MG/ML
7.5 SOLUTION ORAL
Status: DISCONTINUED | OUTPATIENT
Start: 2023-01-01 | End: 2023-01-01 | Stop reason: HOSPADM

## 2023-01-01 RX ADMIN — LORAZEPAM 0.25 MG: 0.5 TABLET ORAL at 08:50

## 2023-01-01 RX ADMIN — PANTOPRAZOLE SODIUM 40 MG: 40 INJECTION, POWDER, FOR SOLUTION INTRAVENOUS at 08:25

## 2023-01-01 RX ADMIN — GUAIFENESIN 10 ML: 200 SOLUTION ORAL at 07:56

## 2023-01-01 RX ADMIN — LORAZEPAM 0.25 MG: 0.5 TABLET ORAL at 13:05

## 2023-01-01 RX ADMIN — LORAZEPAM 0.25 MG: 0.5 TABLET ORAL at 12:22

## 2023-01-01 RX ADMIN — HYDROXYZINE HYDROCHLORIDE 25 MG: 25 TABLET ORAL at 23:02

## 2023-01-01 RX ADMIN — MAGNESIUM OXIDE 400 MG (241.3 MG MAGNESIUM) TABLET 400 MG: at 06:53

## 2023-01-01 RX ADMIN — SODIUM CHLORIDE 70 ML: 9 INJECTION, SOLUTION INTRAVENOUS at 22:27

## 2023-01-01 RX ADMIN — METOPROLOL TARTRATE 5 MG: 5 INJECTION INTRAVENOUS at 19:15

## 2023-01-01 RX ADMIN — GUAIFENESIN 10 ML: 200 SOLUTION ORAL at 21:24

## 2023-01-01 RX ADMIN — Medication 1 SPRAY: at 11:58

## 2023-01-01 RX ADMIN — APIXABAN 5 MG: 5 TABLET, FILM COATED ORAL at 11:03

## 2023-01-01 RX ADMIN — GUAIFENESIN 10 ML: 200 SOLUTION ORAL at 20:05

## 2023-01-01 RX ADMIN — Medication 5 MG: at 22:17

## 2023-01-01 RX ADMIN — APIXABAN 5 MG: 5 TABLET, FILM COATED ORAL at 21:04

## 2023-01-01 RX ADMIN — Medication 40 MG: at 10:11

## 2023-01-01 RX ADMIN — AMIODARONE HYDROCHLORIDE 400 MG: 200 TABLET ORAL at 20:00

## 2023-01-01 RX ADMIN — APIXABAN 5 MG: 5 TABLET, FILM COATED ORAL at 08:35

## 2023-01-01 RX ADMIN — Medication 40 MG: at 08:35

## 2023-01-01 RX ADMIN — MULTIVITAMIN 15 ML: LIQUID ORAL at 08:25

## 2023-01-01 RX ADMIN — QUETIAPINE FUMARATE 25 MG: 25 TABLET ORAL at 11:02

## 2023-01-01 RX ADMIN — GUAIFENESIN 10 ML: 200 SOLUTION ORAL at 22:14

## 2023-01-01 RX ADMIN — AMIODARONE HYDROCHLORIDE 400 MG: 200 TABLET ORAL at 20:33

## 2023-01-01 RX ADMIN — SODIUM CHLORIDE, POTASSIUM CHLORIDE, SODIUM LACTATE AND CALCIUM CHLORIDE 500 ML: 600; 310; 30; 20 INJECTION, SOLUTION INTRAVENOUS at 20:37

## 2023-01-01 RX ADMIN — MORPHINE SULFATE 5 MG: 10 SOLUTION ORAL at 13:31

## 2023-01-01 RX ADMIN — AMIODARONE HYDROCHLORIDE 400 MG: 200 TABLET ORAL at 20:58

## 2023-01-01 RX ADMIN — Medication 1 SPRAY: at 14:54

## 2023-01-01 RX ADMIN — SALINE NASAL SPRAY 1 SPRAY: 1.5 SOLUTION NASAL at 08:41

## 2023-01-01 RX ADMIN — PANTOPRAZOLE SODIUM 40 MG: 40 INJECTION, POWDER, FOR SOLUTION INTRAVENOUS at 07:40

## 2023-01-01 RX ADMIN — QUETIAPINE FUMARATE 25 MG: 25 TABLET ORAL at 12:22

## 2023-01-01 RX ADMIN — GUAIFENESIN 10 ML: 200 SOLUTION ORAL at 08:33

## 2023-01-01 RX ADMIN — FENTANYL CITRATE 25 MCG: 50 INJECTION, SOLUTION INTRAMUSCULAR; INTRAVENOUS at 21:23

## 2023-01-01 RX ADMIN — TOPICAL ANESTHETIC 0.5 ML: 200 SPRAY DENTAL; PERIODONTAL at 13:36

## 2023-01-01 RX ADMIN — AMIODARONE HYDROCHLORIDE 400 MG: 200 TABLET ORAL at 22:13

## 2023-01-01 RX ADMIN — MULTIVITAMIN 15 ML: LIQUID ORAL at 08:51

## 2023-01-01 RX ADMIN — LORAZEPAM 0.25 MG: 0.5 TABLET ORAL at 11:02

## 2023-01-01 RX ADMIN — MORPHINE SULFATE 2 MG: 2 INJECTION, SOLUTION INTRAMUSCULAR; INTRAVENOUS at 02:42

## 2023-01-01 RX ADMIN — SALINE NASAL SPRAY 1 SPRAY: 1.5 SOLUTION NASAL at 09:16

## 2023-01-01 RX ADMIN — MORPHINE SULFATE 2 MG: 2 INJECTION, SOLUTION INTRAMUSCULAR; INTRAVENOUS at 04:42

## 2023-01-01 RX ADMIN — FAMOTIDINE 20 MG: 20 TABLET ORAL at 08:23

## 2023-01-01 RX ADMIN — AMIODARONE HYDROCHLORIDE 200 MG: 200 TABLET ORAL at 08:29

## 2023-01-01 RX ADMIN — AMIODARONE HYDROCHLORIDE 200 MG: 200 TABLET ORAL at 07:57

## 2023-01-01 RX ADMIN — APIXABAN 5 MG: 5 TABLET, FILM COATED ORAL at 09:03

## 2023-01-01 RX ADMIN — MORPHINE SULFATE 2 MG: 2 INJECTION, SOLUTION INTRAMUSCULAR; INTRAVENOUS at 14:35

## 2023-01-01 RX ADMIN — POTASSIUM & SODIUM PHOSPHATES POWDER PACK 280-160-250 MG 1 PACKET: 280-160-250 PACK at 17:27

## 2023-01-01 RX ADMIN — Medication 40 MG: at 08:34

## 2023-01-01 RX ADMIN — PANTOPRAZOLE SODIUM 40 MG: 40 INJECTION, POWDER, FOR SOLUTION INTRAVENOUS at 08:55

## 2023-01-01 RX ADMIN — LORAZEPAM 0.25 MG: 0.5 TABLET ORAL at 17:47

## 2023-01-01 RX ADMIN — GUAIFENESIN 10 ML: 200 SOLUTION ORAL at 02:58

## 2023-01-01 RX ADMIN — METOPROLOL TARTRATE 25 MG: 25 TABLET, FILM COATED ORAL at 20:00

## 2023-01-01 RX ADMIN — PANTOPRAZOLE SODIUM 40 MG: 40 INJECTION, POWDER, FOR SOLUTION INTRAVENOUS at 08:06

## 2023-01-01 RX ADMIN — MORPHINE SULFATE 5 MG: 10 SOLUTION ORAL at 07:30

## 2023-01-01 RX ADMIN — Medication 0.25 MG: at 19:56

## 2023-01-01 RX ADMIN — LORAZEPAM 1 MG: 2 INJECTION INTRAMUSCULAR; INTRAVENOUS at 00:35

## 2023-01-01 RX ADMIN — POTASSIUM & SODIUM PHOSPHATES POWDER PACK 280-160-250 MG 1 PACKET: 280-160-250 PACK at 13:02

## 2023-01-01 RX ADMIN — FAMOTIDINE 20 MG: 20 TABLET ORAL at 09:24

## 2023-01-01 RX ADMIN — GUAIFENESIN 10 ML: 200 SOLUTION ORAL at 16:21

## 2023-01-01 RX ADMIN — APIXABAN 5 MG: 5 TABLET, FILM COATED ORAL at 19:57

## 2023-01-01 RX ADMIN — LORAZEPAM 0.25 MG: 0.5 TABLET ORAL at 19:07

## 2023-01-01 RX ADMIN — APIXABAN 5 MG: 5 TABLET, FILM COATED ORAL at 09:19

## 2023-01-01 RX ADMIN — LORAZEPAM 0.5 MG: 0.5 TABLET ORAL at 21:19

## 2023-01-01 RX ADMIN — HYDROXYZINE HYDROCHLORIDE 25 MG: 25 TABLET ORAL at 11:19

## 2023-01-01 RX ADMIN — AMIODARONE HYDROCHLORIDE 400 MG: 200 TABLET ORAL at 08:47

## 2023-01-01 RX ADMIN — QUETIAPINE FUMARATE 75 MG: 50 TABLET ORAL at 20:48

## 2023-01-01 RX ADMIN — METOPROLOL TARTRATE 5 MG: 5 INJECTION INTRAVENOUS at 18:04

## 2023-01-01 RX ADMIN — ETOMIDATE 30 MG: 20 INJECTION, SOLUTION INTRAVENOUS at 06:16

## 2023-01-01 RX ADMIN — LORAZEPAM 0.25 MG: 0.5 TABLET ORAL at 14:48

## 2023-01-01 RX ADMIN — MORPHINE SULFATE 2 MG: 2 INJECTION, SOLUTION INTRAMUSCULAR; INTRAVENOUS at 17:04

## 2023-01-01 RX ADMIN — Medication 0.25 MG: at 09:11

## 2023-01-01 RX ADMIN — MAGNESIUM OXIDE TAB 400 MG (241.3 MG ELEMENTAL MG) 400 MG: 400 (241.3 MG) TAB at 06:42

## 2023-01-01 RX ADMIN — LORAZEPAM 0.5 MG: 2 INJECTION INTRAMUSCULAR; INTRAVENOUS at 04:35

## 2023-01-01 RX ADMIN — MAGNESIUM OXIDE 400 MG (241.3 MG MAGNESIUM) TABLET 400 MG: at 08:47

## 2023-01-01 RX ADMIN — MORPHINE SULFATE 2 MG: 2 INJECTION, SOLUTION INTRAMUSCULAR; INTRAVENOUS at 14:02

## 2023-01-01 RX ADMIN — MULTIVITAMIN 15 ML: LIQUID ORAL at 08:06

## 2023-01-01 RX ADMIN — MORPHINE SULFATE 7.5 MG: 100 SOLUTION ORAL at 02:01

## 2023-01-01 RX ADMIN — MORPHINE SULFATE 2 MG: 2 INJECTION, SOLUTION INTRAMUSCULAR; INTRAVENOUS at 22:03

## 2023-01-01 RX ADMIN — HYDROXYZINE HYDROCHLORIDE 25 MG: 25 TABLET ORAL at 19:54

## 2023-01-01 RX ADMIN — ACETAMINOPHEN 650 MG: 325 SUSPENSION ORAL at 13:00

## 2023-01-01 RX ADMIN — QUETIAPINE FUMARATE 25 MG: 25 TABLET ORAL at 08:24

## 2023-01-01 RX ADMIN — QUETIAPINE FUMARATE 75 MG: 50 TABLET ORAL at 20:51

## 2023-01-01 RX ADMIN — AMIODARONE HYDROCHLORIDE 400 MG: 200 TABLET ORAL at 08:25

## 2023-01-01 RX ADMIN — MORPHINE SULFATE 2 MG: 2 INJECTION, SOLUTION INTRAMUSCULAR; INTRAVENOUS at 19:16

## 2023-01-01 RX ADMIN — SODIUM CHLORIDE 65 ML: 9 INJECTION, SOLUTION INTRAVENOUS at 15:00

## 2023-01-01 RX ADMIN — MAGNESIUM OXIDE TAB 400 MG (241.3 MG ELEMENTAL MG) 400 MG: 400 (241.3 MG) TAB at 11:47

## 2023-01-01 RX ADMIN — ACETAMINOPHEN 650 MG: 325 SUSPENSION ORAL at 14:35

## 2023-01-01 RX ADMIN — QUETIAPINE FUMARATE 50 MG: 50 TABLET ORAL at 21:56

## 2023-01-01 RX ADMIN — LORAZEPAM 1 MG: 2 INJECTION INTRAMUSCULAR; INTRAVENOUS at 11:49

## 2023-01-01 RX ADMIN — GUAIFENESIN 10 ML: 200 SOLUTION ORAL at 08:36

## 2023-01-01 RX ADMIN — LORAZEPAM 0.25 MG: 0.5 TABLET ORAL at 13:41

## 2023-01-01 RX ADMIN — TRAZODONE HYDROCHLORIDE 50 MG: 50 TABLET ORAL at 22:09

## 2023-01-01 RX ADMIN — MORPHINE SULFATE 2 MG: 2 INJECTION, SOLUTION INTRAMUSCULAR; INTRAVENOUS at 04:11

## 2023-01-01 RX ADMIN — HYDROXYZINE HYDROCHLORIDE 25 MG: 25 TABLET ORAL at 08:34

## 2023-01-01 RX ADMIN — Medication 1 SPRAY: at 21:15

## 2023-01-01 RX ADMIN — LORAZEPAM 0.25 MG: 0.5 TABLET ORAL at 13:46

## 2023-01-01 RX ADMIN — GUAIFENESIN 10 ML: 200 SOLUTION ORAL at 22:15

## 2023-01-01 RX ADMIN — FAMOTIDINE 20 MG: 20 TABLET ORAL at 10:07

## 2023-01-01 RX ADMIN — Medication 5 MG: at 19:52

## 2023-01-01 RX ADMIN — RILUZOLE 50 MG: 50 TABLET ORAL at 07:55

## 2023-01-01 RX ADMIN — GUAIFENESIN 10 ML: 200 SOLUTION ORAL at 16:26

## 2023-01-01 RX ADMIN — AMIODARONE HYDROCHLORIDE 300 MG: 1.5 INJECTION, SOLUTION INTRAVENOUS at 06:36

## 2023-01-01 RX ADMIN — Medication 1 SPRAY: at 17:12

## 2023-01-01 RX ADMIN — FAMOTIDINE 20 MG: 20 TABLET ORAL at 21:14

## 2023-01-01 RX ADMIN — SODIUM CHLORIDE, POTASSIUM CHLORIDE, SODIUM LACTATE AND CALCIUM CHLORIDE 500 ML: 600; 310; 30; 20 INJECTION, SOLUTION INTRAVENOUS at 09:27

## 2023-01-01 RX ADMIN — LORAZEPAM 0.5 MG: 2 INJECTION INTRAMUSCULAR; INTRAVENOUS at 09:55

## 2023-01-01 RX ADMIN — METOPROLOL TARTRATE 25 MG: 25 TABLET, FILM COATED ORAL at 21:30

## 2023-01-01 RX ADMIN — Medication 0.25 MG: at 02:52

## 2023-01-01 RX ADMIN — HYDROXYZINE HYDROCHLORIDE 25 MG: 25 TABLET ORAL at 21:24

## 2023-01-01 RX ADMIN — AMIODARONE HYDROCHLORIDE 400 MG: 200 TABLET ORAL at 08:14

## 2023-01-01 RX ADMIN — LORAZEPAM 0.5 MG: 0.5 TABLET ORAL at 22:14

## 2023-01-01 RX ADMIN — Medication 40 MG: at 07:45

## 2023-01-01 RX ADMIN — GUAIFENESIN 10 ML: 200 SOLUTION ORAL at 09:25

## 2023-01-01 RX ADMIN — FAMOTIDINE 20 MG: 20 TABLET ORAL at 20:52

## 2023-01-01 RX ADMIN — Medication 0.25 MG: at 08:46

## 2023-01-01 RX ADMIN — HYDROXYZINE HYDROCHLORIDE 25 MG: 25 TABLET ORAL at 13:01

## 2023-01-01 RX ADMIN — Medication 0.25 MG: at 02:14

## 2023-01-01 RX ADMIN — LORAZEPAM 0.25 MG: 0.5 TABLET ORAL at 08:29

## 2023-01-01 RX ADMIN — APIXABAN 5 MG: 5 TABLET, FILM COATED ORAL at 20:05

## 2023-01-01 RX ADMIN — MORPHINE SULFATE 2 MG: 2 INJECTION, SOLUTION INTRAMUSCULAR; INTRAVENOUS at 08:35

## 2023-01-01 RX ADMIN — Medication 0.25 MG: at 13:01

## 2023-01-01 RX ADMIN — HYDROXYZINE HYDROCHLORIDE 25 MG: 25 TABLET ORAL at 02:38

## 2023-01-01 RX ADMIN — Medication 0.25 MG: at 08:35

## 2023-01-01 RX ADMIN — HEPARIN SODIUM 600 UNITS/HR: 10000 INJECTION, SOLUTION INTRAVENOUS at 19:35

## 2023-01-01 RX ADMIN — MORPHINE SULFATE 5 MG: 10 SOLUTION ORAL at 14:41

## 2023-01-01 RX ADMIN — AMIODARONE HYDROCHLORIDE 400 MG: 200 TABLET ORAL at 08:54

## 2023-01-01 RX ADMIN — ACETAMINOPHEN 650 MG: 325 SUSPENSION ORAL at 20:19

## 2023-01-01 RX ADMIN — MAGNESIUM OXIDE 400 MG (241.3 MG MAGNESIUM) TABLET 400 MG: at 06:39

## 2023-01-01 RX ADMIN — LORAZEPAM 0.25 MG: 0.5 TABLET ORAL at 14:10

## 2023-01-01 RX ADMIN — HYDROXYZINE HYDROCHLORIDE 25 MG: 25 TABLET ORAL at 16:46

## 2023-01-01 RX ADMIN — APIXABAN 5 MG: 5 TABLET, FILM COATED ORAL at 22:47

## 2023-01-01 RX ADMIN — AMIODARONE HYDROCHLORIDE 400 MG: 200 TABLET ORAL at 20:02

## 2023-01-01 RX ADMIN — GUAIFENESIN 10 ML: 200 SOLUTION ORAL at 13:23

## 2023-01-01 RX ADMIN — Medication 1 SPRAY: at 08:54

## 2023-01-01 RX ADMIN — Medication 0.25 MG: at 17:11

## 2023-01-01 RX ADMIN — GUAIFENESIN 10 ML: 200 SOLUTION ORAL at 16:46

## 2023-01-01 RX ADMIN — METHYLPREDNISOLONE SODIUM SUCCINATE 62.5 MG: 125 INJECTION, POWDER, FOR SOLUTION INTRAMUSCULAR; INTRAVENOUS at 19:58

## 2023-01-01 RX ADMIN — GUAIFENESIN 10 ML: 200 SOLUTION ORAL at 09:00

## 2023-01-01 RX ADMIN — RACEPINEPHRINE HYDROCHLORIDE 0.5 ML: 11.25 SOLUTION RESPIRATORY (INHALATION) at 09:58

## 2023-01-01 RX ADMIN — APIXABAN 5 MG: 5 TABLET, FILM COATED ORAL at 22:15

## 2023-01-01 RX ADMIN — APIXABAN 5 MG: 5 TABLET, FILM COATED ORAL at 20:00

## 2023-01-01 RX ADMIN — TRAZODONE HYDROCHLORIDE 50 MG: 50 TABLET ORAL at 22:17

## 2023-01-01 RX ADMIN — APIXABAN 5 MG: 5 TABLET, FILM COATED ORAL at 11:58

## 2023-01-01 RX ADMIN — APIXABAN 5 MG: 5 TABLET, FILM COATED ORAL at 12:57

## 2023-01-01 RX ADMIN — GUAIFENESIN 10 ML: 200 SOLUTION ORAL at 10:07

## 2023-01-01 RX ADMIN — GUAIFENESIN 10 ML: 200 SOLUTION ORAL at 16:55

## 2023-01-01 RX ADMIN — MORPHINE SULFATE 2 MG: 2 INJECTION, SOLUTION INTRAMUSCULAR; INTRAVENOUS at 20:49

## 2023-01-01 RX ADMIN — PROPOFOL 20 MG: 10 INJECTION, EMULSION INTRAVENOUS at 21:31

## 2023-01-01 RX ADMIN — HYDROXYZINE HYDROCHLORIDE 25 MG: 25 TABLET ORAL at 17:59

## 2023-01-01 RX ADMIN — MAGNESIUM OXIDE 400 MG (241.3 MG MAGNESIUM) TABLET 400 MG: at 16:21

## 2023-01-01 RX ADMIN — FAMOTIDINE 20 MG: 20 TABLET ORAL at 09:20

## 2023-01-01 RX ADMIN — Medication 0.25 MG: at 14:55

## 2023-01-01 RX ADMIN — APIXABAN 5 MG: 5 TABLET, FILM COATED ORAL at 10:06

## 2023-01-01 RX ADMIN — GUAIFENESIN 10 ML: 200 SOLUTION ORAL at 13:00

## 2023-01-01 RX ADMIN — GUAIFENESIN 10 ML: 200 SOLUTION ORAL at 02:14

## 2023-01-01 RX ADMIN — LORAZEPAM 0.25 MG: 0.5 TABLET ORAL at 12:45

## 2023-01-01 RX ADMIN — LORAZEPAM 1 MG: 2 INJECTION INTRAMUSCULAR; INTRAVENOUS at 22:25

## 2023-01-01 RX ADMIN — HYDROXYZINE HYDROCHLORIDE 25 MG: 25 TABLET ORAL at 21:52

## 2023-01-01 RX ADMIN — TOPICAL ANESTHETIC 1 ML: 200 SPRAY DENTAL; PERIODONTAL at 13:42

## 2023-01-01 RX ADMIN — MULTIVITAMIN 15 ML: LIQUID ORAL at 09:02

## 2023-01-01 RX ADMIN — APIXABAN 5 MG: 5 TABLET, FILM COATED ORAL at 21:20

## 2023-01-01 RX ADMIN — FAMOTIDINE 20 MG: 20 TABLET ORAL at 21:20

## 2023-01-01 RX ADMIN — Medication 1 SPRAY: at 21:23

## 2023-01-01 RX ADMIN — POTASSIUM & SODIUM PHOSPHATES POWDER PACK 280-160-250 MG 1 PACKET: 280-160-250 PACK at 08:25

## 2023-01-01 RX ADMIN — HYDROXYZINE HYDROCHLORIDE 25 MG: 25 TABLET ORAL at 20:29

## 2023-01-01 RX ADMIN — METOPROLOL TARTRATE 25 MG: 25 TABLET, FILM COATED ORAL at 09:03

## 2023-01-01 RX ADMIN — SODIUM CHLORIDE 2000 ML: 9 INJECTION, SOLUTION INTRAVENOUS at 06:39

## 2023-01-01 RX ADMIN — FAMOTIDINE 20 MG: 20 TABLET ORAL at 20:48

## 2023-01-01 RX ADMIN — Medication 1 SPRAY: at 17:16

## 2023-01-01 RX ADMIN — MAGNESIUM OXIDE TAB 400 MG (241.3 MG ELEMENTAL MG) 400 MG: 400 (241.3 MG) TAB at 08:31

## 2023-01-01 RX ADMIN — SALINE NASAL SPRAY 1 SPRAY: 1.5 SOLUTION NASAL at 23:19

## 2023-01-01 RX ADMIN — IOPAMIDOL 50 ML: 755 INJECTION, SOLUTION INTRAVENOUS at 22:27

## 2023-01-01 RX ADMIN — GUAIFENESIN 10 ML: 200 SOLUTION ORAL at 03:29

## 2023-01-01 RX ADMIN — AMIODARONE HYDROCHLORIDE 400 MG: 200 TABLET ORAL at 17:26

## 2023-01-01 RX ADMIN — AMIODARONE HYDROCHLORIDE 400 MG: 200 TABLET ORAL at 21:06

## 2023-01-01 RX ADMIN — LORAZEPAM 0.5 MG: 0.5 TABLET ORAL at 20:49

## 2023-01-01 RX ADMIN — MULTIVITAMIN 15 ML: LIQUID ORAL at 08:49

## 2023-01-01 RX ADMIN — APIXABAN 5 MG: 5 TABLET, FILM COATED ORAL at 19:11

## 2023-01-01 RX ADMIN — AMIODARONE HYDROCHLORIDE 200 MG: 200 TABLET ORAL at 22:14

## 2023-01-01 RX ADMIN — DEXMEDETOMIDINE HYDROCHLORIDE 0.5 MCG/KG/HR: 400 INJECTION INTRAVENOUS at 00:03

## 2023-01-01 RX ADMIN — MORPHINE SULFATE 7.5 MG: 100 SOLUTION ORAL at 07:43

## 2023-01-01 RX ADMIN — Medication 5 MG: at 22:54

## 2023-01-01 RX ADMIN — LORAZEPAM 0.5 MG: 2 INJECTION INTRAMUSCULAR; INTRAVENOUS at 17:56

## 2023-01-01 RX ADMIN — APIXABAN 5 MG: 5 TABLET, FILM COATED ORAL at 08:23

## 2023-01-01 RX ADMIN — MULTIVITAMIN 15 ML: LIQUID ORAL at 09:11

## 2023-01-01 RX ADMIN — HYDROXYZINE HYDROCHLORIDE 25 MG: 25 TABLET ORAL at 08:31

## 2023-01-01 RX ADMIN — AMIODARONE HYDROCHLORIDE 200 MG: 200 TABLET ORAL at 20:47

## 2023-01-01 RX ADMIN — QUETIAPINE FUMARATE 75 MG: 50 TABLET ORAL at 19:05

## 2023-01-01 RX ADMIN — MULTIVITAMIN 15 ML: LIQUID ORAL at 08:14

## 2023-01-01 RX ADMIN — GUAIFENESIN 10 ML: 200 SOLUTION ORAL at 14:06

## 2023-01-01 RX ADMIN — APIXABAN 5 MG: 5 TABLET, FILM COATED ORAL at 19:52

## 2023-01-01 RX ADMIN — IOPAMIDOL 100 ML: 755 INJECTION, SOLUTION INTRAVENOUS at 15:00

## 2023-01-01 RX ADMIN — LORAZEPAM 0.25 MG: 0.5 TABLET ORAL at 07:56

## 2023-01-01 RX ADMIN — MULTIVITAMIN 15 ML: LIQUID ORAL at 09:23

## 2023-01-01 RX ADMIN — QUETIAPINE FUMARATE 25 MG: 25 TABLET ORAL at 10:05

## 2023-01-01 RX ADMIN — Medication 0.25 MG: at 02:38

## 2023-01-01 RX ADMIN — HYDROXYZINE HYDROCHLORIDE 25 MG: 25 TABLET ORAL at 18:15

## 2023-01-01 RX ADMIN — Medication 1 SPRAY: at 08:36

## 2023-01-01 RX ADMIN — ACETAMINOPHEN 650 MG: 325 SUSPENSION ORAL at 23:02

## 2023-01-01 RX ADMIN — SODIUM CHLORIDE, POTASSIUM CHLORIDE, SODIUM LACTATE AND CALCIUM CHLORIDE 500 ML: 600; 310; 30; 20 INJECTION, SOLUTION INTRAVENOUS at 17:43

## 2023-01-01 RX ADMIN — Medication 1 SPRAY: at 17:49

## 2023-01-01 RX ADMIN — METOPROLOL TARTRATE 25 MG: 25 TABLET, FILM COATED ORAL at 12:57

## 2023-01-01 RX ADMIN — AMIODARONE HYDROCHLORIDE 200 MG: 200 TABLET ORAL at 20:50

## 2023-01-01 RX ADMIN — SODIUM CHLORIDE 250 ML: 9 INJECTION, SOLUTION INTRAVENOUS at 20:15

## 2023-01-01 RX ADMIN — DEXMEDETOMIDINE HYDROCHLORIDE 0.2 MCG/KG/HR: 400 INJECTION INTRAVENOUS at 11:06

## 2023-01-01 RX ADMIN — MULTIVITAMIN 15 ML: LIQUID ORAL at 16:37

## 2023-01-01 RX ADMIN — LORAZEPAM 1 MG: 2 INJECTION INTRAMUSCULAR; INTRAVENOUS at 04:02

## 2023-01-01 RX ADMIN — LORAZEPAM 0.25 MG: 0.5 TABLET ORAL at 10:06

## 2023-01-01 RX ADMIN — LORAZEPAM 0.25 MG: 0.5 TABLET ORAL at 16:57

## 2023-01-01 RX ADMIN — MORPHINE SULFATE 5 MG: 10 SOLUTION ORAL at 11:02

## 2023-01-01 RX ADMIN — GUAIFENESIN 10 ML: 200 SOLUTION ORAL at 14:04

## 2023-01-01 RX ADMIN — APIXABAN 5 MG: 5 TABLET, FILM COATED ORAL at 21:14

## 2023-01-01 RX ADMIN — SODIUM PHOSPHATE, MONOBASIC, MONOHYDRATE AND SODIUM PHOSPHATE, DIBASIC, ANHYDROUS 15 MMOL: 142; 276 INJECTION, SOLUTION INTRAVENOUS at 18:01

## 2023-01-01 RX ADMIN — AMIODARONE HYDROCHLORIDE 400 MG: 200 TABLET ORAL at 09:12

## 2023-01-01 RX ADMIN — Medication 1 SPRAY: at 21:53

## 2023-01-01 RX ADMIN — MORPHINE SULFATE 5 MG: 10 SOLUTION ORAL at 17:46

## 2023-01-01 RX ADMIN — Medication 40 MG: at 09:02

## 2023-01-01 RX ADMIN — Medication 0.25 MG: at 14:06

## 2023-01-01 RX ADMIN — OLANZAPINE 5 MG: 5 TABLET, ORALLY DISINTEGRATING ORAL at 23:54

## 2023-01-01 RX ADMIN — MORPHINE SULFATE 2 MG: 2 INJECTION, SOLUTION INTRAMUSCULAR; INTRAVENOUS at 21:20

## 2023-01-01 RX ADMIN — AMIODARONE HYDROCHLORIDE 400 MG: 200 TABLET ORAL at 09:03

## 2023-01-01 RX ADMIN — QUETIAPINE FUMARATE 75 MG: 50 TABLET ORAL at 21:20

## 2023-01-01 RX ADMIN — METOPROLOL TARTRATE 25 MG: 25 TABLET, FILM COATED ORAL at 20:38

## 2023-01-01 RX ADMIN — MULTIVITAMIN 15 ML: LIQUID ORAL at 07:58

## 2023-01-01 RX ADMIN — Medication 1 SPRAY: at 22:00

## 2023-01-01 RX ADMIN — QUETIAPINE FUMARATE 75 MG: 50 TABLET ORAL at 21:13

## 2023-01-01 RX ADMIN — MAGNESIUM OXIDE 400 MG (241.3 MG MAGNESIUM) TABLET 400 MG: at 11:28

## 2023-01-01 RX ADMIN — GUAIFENESIN 10 ML: 200 SOLUTION ORAL at 02:38

## 2023-01-01 RX ADMIN — Medication 40 MG: at 11:10

## 2023-01-01 RX ADMIN — LORAZEPAM 0.25 MG: 0.5 TABLET ORAL at 13:10

## 2023-01-01 RX ADMIN — AMIODARONE HYDROCHLORIDE 1 MG/MIN: 50 INJECTION, SOLUTION INTRAVENOUS at 10:07

## 2023-01-01 RX ADMIN — MAGNESIUM SULFATE HEPTAHYDRATE 2 G: 40 INJECTION, SOLUTION INTRAVENOUS at 11:36

## 2023-01-01 RX ADMIN — GUAIFENESIN 10 ML: 200 SOLUTION ORAL at 16:44

## 2023-01-01 RX ADMIN — QUETIAPINE FUMARATE 75 MG: 50 TABLET ORAL at 22:47

## 2023-01-01 RX ADMIN — AMIODARONE HYDROCHLORIDE 400 MG: 200 TABLET ORAL at 17:12

## 2023-01-01 RX ADMIN — ACETAMINOPHEN 650 MG: 325 SUSPENSION ORAL at 13:27

## 2023-01-01 RX ADMIN — APIXABAN 5 MG: 5 TABLET, FILM COATED ORAL at 08:25

## 2023-01-01 RX ADMIN — GUAIFENESIN 10 ML: 200 SOLUTION ORAL at 20:58

## 2023-01-01 RX ADMIN — LORAZEPAM 0.25 MG: 0.5 TABLET ORAL at 09:19

## 2023-01-01 RX ADMIN — Medication 5 MG: at 23:30

## 2023-01-01 RX ADMIN — AMIODARONE HYDROCHLORIDE 400 MG: 200 TABLET ORAL at 08:32

## 2023-01-01 RX ADMIN — GUAIFENESIN 10 ML: 200 SOLUTION ORAL at 20:00

## 2023-01-01 RX ADMIN — HYDROXYZINE HYDROCHLORIDE 25 MG: 25 TABLET ORAL at 11:01

## 2023-01-01 RX ADMIN — MULTIVITAMIN 15 ML: LIQUID ORAL at 11:03

## 2023-01-01 RX ADMIN — LORAZEPAM 1 MG: 2 INJECTION INTRAMUSCULAR; INTRAVENOUS at 04:39

## 2023-01-01 RX ADMIN — LORAZEPAM 0.5 MG: 0.5 TABLET ORAL at 21:13

## 2023-01-01 RX ADMIN — AMIODARONE HYDROCHLORIDE 200 MG: 200 TABLET ORAL at 11:02

## 2023-01-01 RX ADMIN — Medication 1 SPRAY: at 08:35

## 2023-01-01 RX ADMIN — HYDROXYZINE HYDROCHLORIDE 25 MG: 25 TABLET ORAL at 16:49

## 2023-01-01 RX ADMIN — Medication 5 MG: at 20:19

## 2023-01-01 RX ADMIN — GUAIFENESIN 10 ML: 200 SOLUTION ORAL at 10:24

## 2023-01-01 RX ADMIN — LORAZEPAM 0.5 MG: 0.5 TABLET ORAL at 01:54

## 2023-01-01 RX ADMIN — TRAZODONE HYDROCHLORIDE 25 MG: 50 TABLET ORAL at 23:06

## 2023-01-01 RX ADMIN — GUAIFENESIN 10 ML: 200 SOLUTION ORAL at 02:52

## 2023-01-01 RX ADMIN — QUETIAPINE FUMARATE 75 MG: 50 TABLET ORAL at 20:28

## 2023-01-01 RX ADMIN — AMIODARONE HYDROCHLORIDE 200 MG: 200 TABLET ORAL at 08:34

## 2023-01-01 RX ADMIN — HYDROXYZINE HYDROCHLORIDE 25 MG: 25 TABLET ORAL at 20:50

## 2023-01-01 RX ADMIN — APIXABAN 5 MG: 5 TABLET, FILM COATED ORAL at 07:57

## 2023-01-01 RX ADMIN — MAGNESIUM HYDROXIDE 30 ML: 400 SUSPENSION ORAL at 14:30

## 2023-01-01 RX ADMIN — APIXABAN 5 MG: 5 TABLET, FILM COATED ORAL at 19:54

## 2023-01-01 RX ADMIN — HYDROXYZINE HYDROCHLORIDE 25 MG: 25 TABLET ORAL at 08:51

## 2023-01-01 RX ADMIN — AMIODARONE HYDROCHLORIDE 200 MG: 200 TABLET ORAL at 22:06

## 2023-01-01 RX ADMIN — IOPAMIDOL 50 ML: 755 INJECTION, SOLUTION INTRAVENOUS at 18:43

## 2023-01-01 RX ADMIN — Medication 1 SPRAY: at 21:56

## 2023-01-01 RX ADMIN — APIXABAN 5 MG: 5 TABLET, FILM COATED ORAL at 20:52

## 2023-01-01 RX ADMIN — GUAIFENESIN 10 ML: 200 SOLUTION ORAL at 14:55

## 2023-01-01 RX ADMIN — LORAZEPAM 0.25 MG: 0.5 TABLET ORAL at 08:34

## 2023-01-01 RX ADMIN — METHYLPREDNISOLONE SODIUM SUCCINATE 62.5 MG: 125 INJECTION, POWDER, FOR SOLUTION INTRAMUSCULAR; INTRAVENOUS at 12:18

## 2023-01-01 RX ADMIN — Medication 1 SPRAY: at 22:17

## 2023-01-01 RX ADMIN — LORAZEPAM 0.5 MG: 0.5 TABLET ORAL at 21:25

## 2023-01-01 RX ADMIN — APIXABAN 5 MG: 5 TABLET, FILM COATED ORAL at 08:56

## 2023-01-01 RX ADMIN — RILUZOLE 50 MG: 50 TABLET ORAL at 20:34

## 2023-01-01 RX ADMIN — GUAIFENESIN 10 ML: 200 SOLUTION ORAL at 04:19

## 2023-01-01 RX ADMIN — Medication 1 SPRAY: at 09:18

## 2023-01-01 RX ADMIN — SALINE NASAL SPRAY 1 SPRAY: 1.5 SOLUTION NASAL at 18:00

## 2023-01-01 RX ADMIN — SODIUM CHLORIDE 500 ML: 9 INJECTION, SOLUTION INTRAVENOUS at 18:07

## 2023-01-01 RX ADMIN — METHYLPREDNISOLONE SODIUM SUCCINATE 62.5 MG: 125 INJECTION, POWDER, FOR SOLUTION INTRAMUSCULAR; INTRAVENOUS at 04:40

## 2023-01-01 RX ADMIN — ACETAMINOPHEN 650 MG: 325 SUSPENSION ORAL at 19:52

## 2023-01-01 RX ADMIN — LORAZEPAM 1 MG: 2 INJECTION INTRAMUSCULAR; INTRAVENOUS at 20:20

## 2023-01-01 RX ADMIN — SENNOSIDES AND DOCUSATE SODIUM 2 TABLET: 50; 8.6 TABLET ORAL at 19:52

## 2023-01-01 RX ADMIN — MAGNESIUM OXIDE 400 MG (241.3 MG MAGNESIUM) TABLET 400 MG: at 21:19

## 2023-01-01 RX ADMIN — LORAZEPAM 0.25 MG: 0.5 TABLET ORAL at 09:23

## 2023-01-01 RX ADMIN — HYDROXYZINE HYDROCHLORIDE 25 MG: 25 TABLET ORAL at 09:20

## 2023-01-01 RX ADMIN — Medication 40 MG: at 08:29

## 2023-01-01 RX ADMIN — MORPHINE SULFATE 5 MG: 10 SOLUTION ORAL at 12:49

## 2023-01-01 RX ADMIN — METHYLPREDNISOLONE SODIUM SUCCINATE 125 MG: 125 INJECTION, POWDER, FOR SOLUTION INTRAMUSCULAR; INTRAVENOUS at 10:25

## 2023-01-01 RX ADMIN — MULTIVITAMIN 15 ML: LIQUID ORAL at 08:34

## 2023-01-01 RX ADMIN — APIXABAN 5 MG: 5 TABLET, FILM COATED ORAL at 20:11

## 2023-01-01 RX ADMIN — APIXABAN 5 MG: 5 TABLET, FILM COATED ORAL at 08:54

## 2023-01-01 RX ADMIN — Medication 1 SPRAY: at 14:10

## 2023-01-01 RX ADMIN — MAGNESIUM OXIDE 400 MG (241.3 MG MAGNESIUM) TABLET 400 MG: at 10:04

## 2023-01-01 RX ADMIN — MIDAZOLAM 5 MG: 1 INJECTION, SOLUTION INTRAMUSCULAR; INTRAVENOUS at 08:26

## 2023-01-01 RX ADMIN — ROCURONIUM BROMIDE 50 MG: 50 INJECTION, SOLUTION INTRAVENOUS at 06:17

## 2023-01-01 RX ADMIN — APIXABAN 5 MG: 5 TABLET, FILM COATED ORAL at 20:30

## 2023-01-01 RX ADMIN — MORPHINE SULFATE 2 MG: 2 INJECTION, SOLUTION INTRAMUSCULAR; INTRAVENOUS at 09:23

## 2023-01-01 RX ADMIN — GUAIFENESIN 10 ML: 200 SOLUTION ORAL at 08:48

## 2023-01-01 RX ADMIN — GUAIFENESIN 10 ML: 200 SOLUTION ORAL at 17:02

## 2023-01-01 RX ADMIN — AMIODARONE HYDROCHLORIDE 400 MG: 200 TABLET ORAL at 20:05

## 2023-01-01 RX ADMIN — RACEPINEPHRINE HYDROCHLORIDE 0.5 ML: 11.25 SOLUTION RESPIRATORY (INHALATION) at 12:47

## 2023-01-01 RX ADMIN — AMIODARONE HYDROCHLORIDE 200 MG: 200 TABLET ORAL at 08:22

## 2023-01-01 RX ADMIN — GUAIFENESIN 10 ML: 200 SOLUTION ORAL at 19:58

## 2023-01-01 RX ADMIN — MORPHINE SULFATE 2 MG: 2 INJECTION, SOLUTION INTRAMUSCULAR; INTRAVENOUS at 00:38

## 2023-01-01 RX ADMIN — TRAZODONE HYDROCHLORIDE 50 MG: 50 TABLET ORAL at 23:55

## 2023-01-01 RX ADMIN — Medication 1 SPRAY: at 08:40

## 2023-01-01 RX ADMIN — RILUZOLE 50 MG: 50 TABLET ORAL at 08:25

## 2023-01-01 RX ADMIN — AMIODARONE HYDROCHLORIDE 200 MG: 200 TABLET ORAL at 08:50

## 2023-01-01 RX ADMIN — APIXABAN 5 MG: 5 TABLET, FILM COATED ORAL at 09:25

## 2023-01-01 RX ADMIN — HYDROXYZINE HYDROCHLORIDE 25 MG: 25 TABLET ORAL at 16:29

## 2023-01-01 RX ADMIN — AMIODARONE HYDROCHLORIDE 200 MG: 200 TABLET ORAL at 21:06

## 2023-01-01 RX ADMIN — AMIODARONE HYDROCHLORIDE 200 MG: 200 TABLET ORAL at 08:56

## 2023-01-01 RX ADMIN — APIXABAN 5 MG: 5 TABLET, FILM COATED ORAL at 07:45

## 2023-01-01 RX ADMIN — MULTIVITAMIN 15 ML: LIQUID ORAL at 08:54

## 2023-01-01 RX ADMIN — MORPHINE SULFATE 2 MG: 2 INJECTION, SOLUTION INTRAMUSCULAR; INTRAVENOUS at 06:52

## 2023-01-01 RX ADMIN — QUETIAPINE FUMARATE 75 MG: 50 TABLET ORAL at 22:07

## 2023-01-01 RX ADMIN — MULTIVITAMIN 15 ML: LIQUID ORAL at 08:29

## 2023-01-01 RX ADMIN — POTASSIUM CHLORIDE 40 MEQ: 20 SOLUTION ORAL at 09:01

## 2023-01-01 RX ADMIN — HYDROXYZINE HYDROCHLORIDE 25 MG: 25 TABLET ORAL at 09:24

## 2023-01-01 RX ADMIN — GUAIFENESIN 10 ML: 200 SOLUTION ORAL at 03:19

## 2023-01-01 RX ADMIN — Medication 0.25 MG: at 19:52

## 2023-01-01 RX ADMIN — GUAIFENESIN 10 ML: 200 SOLUTION ORAL at 02:50

## 2023-01-01 RX ADMIN — AMIODARONE HYDROCHLORIDE 400 MG: 200 TABLET ORAL at 21:31

## 2023-01-01 RX ADMIN — Medication 5 MG: at 19:57

## 2023-01-01 RX ADMIN — LORAZEPAM 0.5 MG: 0.5 TABLET ORAL at 22:47

## 2023-01-01 RX ADMIN — ACETAMINOPHEN 650 MG: 325 SUSPENSION ORAL at 16:56

## 2023-01-01 RX ADMIN — HYDROXYZINE HYDROCHLORIDE 25 MG: 25 TABLET ORAL at 10:05

## 2023-01-01 RX ADMIN — APIXABAN 5 MG: 5 TABLET, FILM COATED ORAL at 20:34

## 2023-01-01 RX ADMIN — FAMOTIDINE 20 MG: 20 TABLET ORAL at 20:50

## 2023-01-01 RX ADMIN — APIXABAN 5 MG: 5 TABLET, FILM COATED ORAL at 21:06

## 2023-01-01 RX ADMIN — APIXABAN 5 MG: 5 TABLET, FILM COATED ORAL at 20:18

## 2023-01-01 RX ADMIN — Medication 0.25 MG: at 20:58

## 2023-01-01 RX ADMIN — LORAZEPAM 0.25 MG: 0.5 TABLET ORAL at 08:24

## 2023-01-01 RX ADMIN — Medication 0.25 MG: at 08:32

## 2023-01-01 RX ADMIN — APIXABAN 5 MG: 5 TABLET, FILM COATED ORAL at 07:55

## 2023-01-01 RX ADMIN — FAMOTIDINE 20 MG: 20 TABLET ORAL at 20:30

## 2023-01-01 RX ADMIN — MIDAZOLAM 5 MG: 1 INJECTION INTRAMUSCULAR; INTRAVENOUS at 09:18

## 2023-01-01 RX ADMIN — FAMOTIDINE 20 MG: 20 TABLET ORAL at 08:51

## 2023-01-01 RX ADMIN — MORPHINE SULFATE 2 MG: 2 INJECTION, SOLUTION INTRAMUSCULAR; INTRAVENOUS at 14:49

## 2023-01-01 RX ADMIN — APIXABAN 5 MG: 5 TABLET, FILM COATED ORAL at 08:14

## 2023-01-01 RX ADMIN — Medication 40 MG: at 09:14

## 2023-01-01 RX ADMIN — AMIODARONE HYDROCHLORIDE 200 MG: 200 TABLET ORAL at 21:20

## 2023-01-01 RX ADMIN — LIDOCAINE PATCH 4% 1 PATCH: 40 PATCH TOPICAL at 08:54

## 2023-01-01 RX ADMIN — GUAIFENESIN 10 ML: 200 SOLUTION ORAL at 11:02

## 2023-01-01 RX ADMIN — SODIUM CHLORIDE 500 ML: 9 INJECTION, SOLUTION INTRAVENOUS at 13:17

## 2023-01-01 RX ADMIN — ACETAMINOPHEN 650 MG: 325 SUSPENSION ORAL at 08:48

## 2023-01-01 RX ADMIN — Medication 0.25 MG: at 02:50

## 2023-01-01 RX ADMIN — HYDROXYZINE HYDROCHLORIDE 25 MG: 25 TABLET ORAL at 08:24

## 2023-01-01 RX ADMIN — FAMOTIDINE 20 MG: 20 TABLET ORAL at 07:57

## 2023-01-01 RX ADMIN — MIDAZOLAM (PF) 1 MG/ML IN 0.9 % SODIUM CHLORIDE INTRAVENOUS SOLUTION 1 MG/HR: at 06:37

## 2023-01-01 RX ADMIN — Medication 1 SPRAY: at 12:16

## 2023-01-01 RX ADMIN — LORAZEPAM 0.5 MG: 0.5 TABLET ORAL at 21:06

## 2023-01-01 RX ADMIN — FAMOTIDINE 20 MG: 20 TABLET ORAL at 22:14

## 2023-01-01 RX ADMIN — MORPHINE SULFATE 5 MG: 10 SOLUTION ORAL at 16:44

## 2023-01-01 RX ADMIN — MAGNESIUM OXIDE TAB 400 MG (241.3 MG ELEMENTAL MG) 400 MG: 400 (241.3 MG) TAB at 12:35

## 2023-01-01 RX ADMIN — ACETAMINOPHEN 650 MG: 325 SUSPENSION ORAL at 16:38

## 2023-01-01 RX ADMIN — Medication 40 MG: at 08:30

## 2023-01-01 RX ADMIN — QUETIAPINE FUMARATE 75 MG: 50 TABLET ORAL at 21:06

## 2023-01-01 RX ADMIN — Medication 0.25 MG: at 09:25

## 2023-01-01 RX ADMIN — KETOROLAC TROMETHAMINE 15 MG: 15 INJECTION, SOLUTION INTRAMUSCULAR; INTRAVENOUS at 23:06

## 2023-01-01 RX ADMIN — ACETAMINOPHEN 650 MG: 325 SUSPENSION ORAL at 08:54

## 2023-01-01 RX ADMIN — MORPHINE SULFATE 2 MG: 2 INJECTION, SOLUTION INTRAMUSCULAR; INTRAVENOUS at 02:24

## 2023-01-01 RX ADMIN — MORPHINE SULFATE 2 MG: 2 INJECTION, SOLUTION INTRAMUSCULAR; INTRAVENOUS at 18:12

## 2023-01-01 RX ADMIN — APIXABAN 5 MG: 5 TABLET, FILM COATED ORAL at 08:44

## 2023-01-01 RX ADMIN — TRAZODONE HYDROCHLORIDE 50 MG: 50 TABLET ORAL at 21:56

## 2023-01-01 RX ADMIN — QUETIAPINE FUMARATE 75 MG: 50 TABLET ORAL at 21:24

## 2023-01-01 RX ADMIN — MORPHINE SULFATE 2 MG: 2 INJECTION, SOLUTION INTRAMUSCULAR; INTRAVENOUS at 21:08

## 2023-01-01 RX ADMIN — SODIUM CHLORIDE 250 ML: 9 INJECTION, SOLUTION INTRAVENOUS at 22:04

## 2023-01-01 RX ADMIN — LORAZEPAM 1 MG: 2 INJECTION INTRAMUSCULAR; INTRAVENOUS at 00:01

## 2023-01-01 RX ADMIN — Medication 1 SPRAY: at 13:14

## 2023-01-01 RX ADMIN — HYDROXYZINE HYDROCHLORIDE 25 MG: 25 TABLET ORAL at 17:47

## 2023-01-01 RX ADMIN — MULTIVITAMIN 15 ML: LIQUID ORAL at 08:55

## 2023-01-01 RX ADMIN — QUETIAPINE FUMARATE 50 MG: 50 TABLET ORAL at 21:03

## 2023-01-01 RX ADMIN — Medication 5 MG: at 23:55

## 2023-01-01 RX ADMIN — Medication 5 MG: at 20:00

## 2023-01-01 RX ADMIN — MULTIVITAMIN 15 ML: LIQUID ORAL at 09:25

## 2023-01-01 RX ADMIN — QUETIAPINE FUMARATE 25 MG: 25 TABLET ORAL at 22:14

## 2023-01-01 RX ADMIN — ACETAMINOPHEN 650 MG: 325 SUSPENSION ORAL at 17:27

## 2023-01-01 RX ADMIN — APIXABAN 5 MG: 5 TABLET, FILM COATED ORAL at 20:38

## 2023-01-01 RX ADMIN — HYDROXYZINE HYDROCHLORIDE 25 MG: 25 TABLET ORAL at 16:47

## 2023-01-01 RX ADMIN — Medication 0.25 MG: at 20:05

## 2023-01-01 RX ADMIN — Medication 1 SPRAY: at 09:29

## 2023-01-01 RX ADMIN — TOPICAL ANESTHETIC 0.5 ML: 200 SPRAY DENTAL; PERIODONTAL at 20:09

## 2023-01-01 RX ADMIN — LORAZEPAM 1 MG: 2 INJECTION INTRAMUSCULAR; INTRAVENOUS at 02:16

## 2023-01-01 RX ADMIN — MAGNESIUM OXIDE 400 MG (241.3 MG MAGNESIUM) TABLET 400 MG: at 11:25

## 2023-01-01 RX ADMIN — LORAZEPAM 0.5 MG: 0.5 TABLET ORAL at 20:29

## 2023-01-01 RX ADMIN — APIXABAN 5 MG: 5 TABLET, FILM COATED ORAL at 09:12

## 2023-01-01 RX ADMIN — ACETAMINOPHEN 650 MG: 325 SUSPENSION ORAL at 06:39

## 2023-01-01 RX ADMIN — Medication 40 MG: at 08:49

## 2023-01-01 RX ADMIN — APIXABAN 5 MG: 5 TABLET, FILM COATED ORAL at 20:33

## 2023-01-01 RX ADMIN — ACETAMINOPHEN 650 MG: 325 SUSPENSION ORAL at 08:34

## 2023-01-01 RX ADMIN — QUETIAPINE FUMARATE 25 MG: 25 TABLET ORAL at 09:19

## 2023-01-01 RX ADMIN — MORPHINE SULFATE 2 MG: 2 INJECTION, SOLUTION INTRAMUSCULAR; INTRAVENOUS at 22:17

## 2023-01-01 RX ADMIN — Medication 40 MG: at 08:56

## 2023-01-01 RX ADMIN — HYDROXYZINE HYDROCHLORIDE 25 MG: 25 TABLET ORAL at 22:47

## 2023-01-01 RX ADMIN — HYDROXYZINE HYDROCHLORIDE 25 MG: 25 TABLET ORAL at 21:47

## 2023-01-01 RX ADMIN — APIXABAN 5 MG: 5 TABLET, FILM COATED ORAL at 08:29

## 2023-01-01 RX ADMIN — Medication 40 MG: at 06:52

## 2023-01-01 RX ADMIN — LORAZEPAM 0.25 MG: 0.5 TABLET ORAL at 13:31

## 2023-01-01 RX ADMIN — TOPICAL ANESTHETIC 0.5 ML: 200 SPRAY DENTAL; PERIODONTAL at 14:06

## 2023-01-01 RX ADMIN — HYDROXYZINE HYDROCHLORIDE 25 MG: 25 TABLET ORAL at 20:47

## 2023-01-01 RX ADMIN — LORAZEPAM 0.5 MG: 0.5 TABLET ORAL at 05:50

## 2023-01-01 RX ADMIN — FAMOTIDINE 20 MG: 20 TABLET ORAL at 20:11

## 2023-01-01 RX ADMIN — HYDROXYZINE HYDROCHLORIDE 25 MG: 25 TABLET ORAL at 08:46

## 2023-01-01 RX ADMIN — MORPHINE SULFATE 2 MG: 2 INJECTION, SOLUTION INTRAMUSCULAR; INTRAVENOUS at 16:25

## 2023-01-01 RX ADMIN — SODIUM PHOSPHATE, MONOBASIC, MONOHYDRATE AND SODIUM PHOSPHATE, DIBASIC, ANHYDROUS 15 MMOL: 142; 276 INJECTION, SOLUTION INTRAVENOUS at 08:03

## 2023-01-01 RX ADMIN — MORPHINE SULFATE 2 MG: 2 INJECTION, SOLUTION INTRAMUSCULAR; INTRAVENOUS at 12:03

## 2023-01-01 RX ADMIN — FAMOTIDINE 20 MG: 20 TABLET ORAL at 22:47

## 2023-01-01 RX ADMIN — SENNOSIDES AND DOCUSATE SODIUM 2 TABLET: 50; 8.6 TABLET ORAL at 08:35

## 2023-01-01 RX ADMIN — APIXABAN 5 MG: 5 TABLET, FILM COATED ORAL at 07:30

## 2023-01-01 RX ADMIN — SODIUM CHLORIDE 71 ML: 9 INJECTION, SOLUTION INTRAVENOUS at 18:43

## 2023-01-01 RX ADMIN — PROPOFOL 5 MCG/KG/MIN: 10 INJECTION, EMULSION INTRAVENOUS at 07:25

## 2023-01-01 RX ADMIN — MORPHINE SULFATE 2 MG: 2 INJECTION, SOLUTION INTRAMUSCULAR; INTRAVENOUS at 14:37

## 2023-01-01 RX ADMIN — SENNOSIDES AND DOCUSATE SODIUM 2 TABLET: 50; 8.6 TABLET ORAL at 19:57

## 2023-01-01 RX ADMIN — Medication 1 SPRAY: at 21:21

## 2023-01-01 RX ADMIN — RILUZOLE 50 MG: 50 TABLET ORAL at 19:57

## 2023-01-01 RX ADMIN — APIXABAN 5 MG: 5 TABLET, FILM COATED ORAL at 20:48

## 2023-01-01 RX ADMIN — APIXABAN 5 MG: 5 TABLET, FILM COATED ORAL at 08:47

## 2023-01-01 RX ADMIN — QUETIAPINE FUMARATE 75 MG: 50 TABLET ORAL at 21:52

## 2023-01-01 RX ADMIN — Medication 0.25 MG: at 09:00

## 2023-01-01 RX ADMIN — HYDROXYZINE HYDROCHLORIDE 25 MG: 25 TABLET ORAL at 00:21

## 2023-01-01 RX ADMIN — ACETAMINOPHEN 650 MG: 325 SUSPENSION ORAL at 22:50

## 2023-01-01 RX ADMIN — MORPHINE SULFATE 7.5 MG: 10 SOLUTION ORAL at 18:43

## 2023-01-01 RX ADMIN — LORAZEPAM 0.25 MG: 0.5 TABLET ORAL at 13:44

## 2023-01-01 RX ADMIN — MAGNESIUM SULFATE HEPTAHYDRATE 2 G: 40 INJECTION, SOLUTION INTRAVENOUS at 08:14

## 2023-01-01 RX ADMIN — LORAZEPAM 1 MG: 2 INJECTION INTRAMUSCULAR; INTRAVENOUS at 23:47

## 2023-01-01 RX ADMIN — GUAIFENESIN 10 ML: 200 SOLUTION ORAL at 09:23

## 2023-01-01 RX ADMIN — APIXABAN 5 MG: 5 TABLET, FILM COATED ORAL at 07:41

## 2023-01-01 RX ADMIN — MORPHINE SULFATE 2 MG: 2 INJECTION, SOLUTION INTRAMUSCULAR; INTRAVENOUS at 19:55

## 2023-01-01 RX ADMIN — RILUZOLE 50 MG: 50 TABLET ORAL at 11:59

## 2023-01-01 RX ADMIN — AMIODARONE HYDROCHLORIDE 200 MG: 200 TABLET ORAL at 09:23

## 2023-01-01 RX ADMIN — GUAIFENESIN 10 ML: 200 SOLUTION ORAL at 16:49

## 2023-01-01 RX ADMIN — GUAIFENESIN 10 ML: 200 SOLUTION ORAL at 19:53

## 2023-01-01 RX ADMIN — Medication 40 MG: at 08:33

## 2023-01-01 RX ADMIN — LORAZEPAM 0.5 MG: 0.5 TABLET ORAL at 20:48

## 2023-01-01 RX ADMIN — SENNOSIDES AND DOCUSATE SODIUM 2 TABLET: 50; 8.6 TABLET ORAL at 17:59

## 2023-01-01 RX ADMIN — MORPHINE SULFATE 7.5 MG: 100 SOLUTION ORAL at 01:54

## 2023-01-01 RX ADMIN — AMIODARONE HYDROCHLORIDE 400 MG: 200 TABLET ORAL at 20:06

## 2023-01-01 RX ADMIN — HYDROXYZINE HYDROCHLORIDE 25 MG: 25 TABLET ORAL at 16:27

## 2023-01-01 RX ADMIN — HYDROXYZINE HYDROCHLORIDE 25 MG: 25 TABLET ORAL at 08:35

## 2023-01-01 RX ADMIN — Medication 0.25 MG: at 02:58

## 2023-01-01 RX ADMIN — Medication 1 SPRAY: at 13:23

## 2023-01-01 RX ADMIN — AMIODARONE HYDROCHLORIDE 200 MG: 200 TABLET ORAL at 09:20

## 2023-01-01 RX ADMIN — QUETIAPINE FUMARATE 25 MG: 25 TABLET ORAL at 08:51

## 2023-01-01 RX ADMIN — HYDROXYZINE HYDROCHLORIDE 25 MG: 25 TABLET ORAL at 09:11

## 2023-01-01 RX ADMIN — LORAZEPAM 0.25 MG: 0.5 TABLET ORAL at 16:27

## 2023-01-01 RX ADMIN — Medication 1 SPRAY: at 18:57

## 2023-01-01 RX ADMIN — MORPHINE SULFATE 2 MG: 2 INJECTION, SOLUTION INTRAMUSCULAR; INTRAVENOUS at 15:03

## 2023-01-01 RX ADMIN — MULTIVITAMIN 15 ML: LIQUID ORAL at 08:36

## 2023-01-01 RX ADMIN — Medication 0.25 MG: at 14:05

## 2023-01-01 RX ADMIN — Medication 0.25 MG: at 13:22

## 2023-01-01 RX ADMIN — HYDROXYZINE HYDROCHLORIDE 25 MG: 25 TABLET ORAL at 16:58

## 2023-01-01 RX ADMIN — GUAIFENESIN 10 ML: 200 SOLUTION ORAL at 21:13

## 2023-01-01 RX ADMIN — LORAZEPAM 0.25 MG: 0.5 TABLET ORAL at 16:47

## 2023-01-01 RX ADMIN — FAMOTIDINE 20 MG: 20 TABLET ORAL at 11:02

## 2023-01-01 RX ADMIN — MAGNESIUM SULFATE HEPTAHYDRATE 2 G: 40 INJECTION, SOLUTION INTRAVENOUS at 07:55

## 2023-01-01 RX ADMIN — AMIODARONE HYDROCHLORIDE 150 MG: 1.5 INJECTION, SOLUTION INTRAVENOUS at 20:47

## 2023-01-01 RX ADMIN — LORAZEPAM 0.25 MG: 0.5 TABLET ORAL at 16:46

## 2023-01-01 RX ADMIN — ALBUMIN HUMAN 12.5 G: 0.05 INJECTION, SOLUTION INTRAVENOUS at 22:35

## 2023-01-01 RX ADMIN — QUETIAPINE FUMARATE 25 MG: 25 TABLET ORAL at 22:17

## 2023-01-01 RX ADMIN — MORPHINE SULFATE 1 MG: 2 INJECTION, SOLUTION INTRAMUSCULAR; INTRAVENOUS at 11:31

## 2023-01-01 RX ADMIN — LORAZEPAM 0.25 MG: 0.5 TABLET ORAL at 18:45

## 2023-01-01 RX ADMIN — GUAIFENESIN 10 ML: 200 SOLUTION ORAL at 09:11

## 2023-01-01 RX ADMIN — Medication 40 MG: at 09:25

## 2023-01-01 RX ADMIN — Medication 5 MG: at 20:59

## 2023-01-01 RX ADMIN — MORPHINE SULFATE 7.5 MG: 10 SOLUTION ORAL at 09:44

## 2023-01-01 RX ADMIN — Medication 0.25 MG: at 20:01

## 2023-01-01 RX ADMIN — Medication 40 MG: at 07:30

## 2023-01-01 RX ADMIN — LORAZEPAM 1 MG: 2 INJECTION INTRAMUSCULAR; INTRAVENOUS at 11:56

## 2023-01-01 RX ADMIN — Medication 1 SPRAY: at 19:08

## 2023-01-01 RX ADMIN — MORPHINE SULFATE 2 MG: 2 INJECTION, SOLUTION INTRAMUSCULAR; INTRAVENOUS at 00:46

## 2023-01-01 RX ADMIN — Medication 1 SPRAY: at 22:13

## 2023-01-01 RX ADMIN — MORPHINE SULFATE 2 MG: 2 INJECTION, SOLUTION INTRAMUSCULAR; INTRAVENOUS at 14:56

## 2023-01-01 RX ADMIN — FAMOTIDINE 20 MG: 20 TABLET ORAL at 08:34

## 2023-01-01 RX ADMIN — AMIODARONE HYDROCHLORIDE 1 MG/MIN: 50 INJECTION, SOLUTION INTRAVENOUS at 07:17

## 2023-01-01 RX ADMIN — Medication 5 MG: at 23:29

## 2023-01-01 RX ADMIN — Medication 1 SPRAY: at 18:30

## 2023-01-01 RX ADMIN — Medication 1 SPRAY: at 14:55

## 2023-01-01 RX ADMIN — QUETIAPINE FUMARATE 25 MG: 25 TABLET ORAL at 08:34

## 2023-01-01 RX ADMIN — APIXABAN 5 MG: 5 TABLET, FILM COATED ORAL at 20:59

## 2023-01-01 RX ADMIN — METOPROLOL TARTRATE 25 MG: 25 TABLET, FILM COATED ORAL at 08:14

## 2023-01-01 RX ADMIN — SENNOSIDES AND DOCUSATE SODIUM 1 TABLET: 50; 8.6 TABLET ORAL at 08:54

## 2023-01-01 RX ADMIN — LORAZEPAM 0.5 MG: 0.5 TABLET ORAL at 21:52

## 2023-01-01 RX ADMIN — MULTIVITAMIN 15 ML: LIQUID ORAL at 07:41

## 2023-01-01 RX ADMIN — MORPHINE SULFATE 2 MG: 2 INJECTION, SOLUTION INTRAMUSCULAR; INTRAVENOUS at 05:18

## 2023-01-01 RX ADMIN — LORAZEPAM 0.5 MG: 2 INJECTION INTRAMUSCULAR; INTRAVENOUS at 19:06

## 2023-01-01 RX ADMIN — TOPICAL ANESTHETIC 0.5 ML: 200 SPRAY DENTAL; PERIODONTAL at 05:24

## 2023-01-01 RX ADMIN — Medication 40 MG: at 06:20

## 2023-01-01 RX ADMIN — Medication 5 MG: at 20:05

## 2023-01-01 ASSESSMENT — ACTIVITIES OF DAILY LIVING (ADL)
ADLS_ACUITY_SCORE: 50
ADLS_ACUITY_SCORE: 49
ADLS_ACUITY_SCORE: 47
ADLS_ACUITY_SCORE: 51
ADLS_ACUITY_SCORE: 47
ADLS_ACUITY_SCORE: 47
ADLS_ACUITY_SCORE: 45
ADLS_ACUITY_SCORE: 51
ADLS_ACUITY_SCORE: 45
ADLS_ACUITY_SCORE: 47
ADLS_ACUITY_SCORE: 44
ADLS_ACUITY_SCORE: 44
ADLS_ACUITY_SCORE: 47
ADLS_ACUITY_SCORE: 45
ADLS_ACUITY_SCORE: 59
ADLS_ACUITY_SCORE: 51
ADLS_ACUITY_SCORE: 55
ADLS_ACUITY_SCORE: 44
ADLS_ACUITY_SCORE: 51
ADLS_ACUITY_SCORE: 47
ADLS_ACUITY_SCORE: 47
ADLS_ACUITY_SCORE: 35
ADLS_ACUITY_SCORE: 45
ADLS_ACUITY_SCORE: 51
ADLS_ACUITY_SCORE: 47
ADLS_ACUITY_SCORE: 45
ADLS_ACUITY_SCORE: 51
ADLS_ACUITY_SCORE: 51
ADLS_ACUITY_SCORE: 43
ADLS_ACUITY_SCORE: 46
ADLS_ACUITY_SCORE: 47
ADLS_ACUITY_SCORE: 35
ADLS_ACUITY_SCORE: 51
ADLS_ACUITY_SCORE: 45
ADLS_ACUITY_SCORE: 47
ADLS_ACUITY_SCORE: 45
ADLS_ACUITY_SCORE: 47
ADLS_ACUITY_SCORE: 51
ADLS_ACUITY_SCORE: 47
ADLS_ACUITY_SCORE: 50
ADLS_ACUITY_SCORE: 63
ADLS_ACUITY_SCORE: 59
ADLS_ACUITY_SCORE: 55
ADLS_ACUITY_SCORE: 51
ADLS_ACUITY_SCORE: 47
ADLS_ACUITY_SCORE: 45
ADLS_ACUITY_SCORE: 51
ADLS_ACUITY_SCORE: 47
ADLS_ACUITY_SCORE: 55
ADLS_ACUITY_SCORE: 59
ADLS_ACUITY_SCORE: 47
ADLS_ACUITY_SCORE: 50
ADLS_ACUITY_SCORE: 47
ADLS_ACUITY_SCORE: 51
ADLS_ACUITY_SCORE: 45
ADLS_ACUITY_SCORE: 63
ADLS_ACUITY_SCORE: 49
ADLS_ACUITY_SCORE: 47
ADLS_ACUITY_SCORE: 51
ADLS_ACUITY_SCORE: 47
ADLS_ACUITY_SCORE: 51
ADLS_ACUITY_SCORE: 47
ADLS_ACUITY_SCORE: 47
ADLS_ACUITY_SCORE: 45
ADLS_ACUITY_SCORE: 47
ADLS_ACUITY_SCORE: 47
ADLS_ACUITY_SCORE: 45
ADLS_ACUITY_SCORE: 51
ADLS_ACUITY_SCORE: 47
ADLS_ACUITY_SCORE: 59
ADLS_ACUITY_SCORE: 45
ADLS_ACUITY_SCORE: 55
ADLS_ACUITY_SCORE: 51
ADLS_ACUITY_SCORE: 45
ADLS_ACUITY_SCORE: 50
ADLS_ACUITY_SCORE: 59
ADLS_ACUITY_SCORE: 47
ADLS_ACUITY_SCORE: 47
ADLS_ACUITY_SCORE: 45
ADLS_ACUITY_SCORE: 45
ADLS_ACUITY_SCORE: 47
ADLS_ACUITY_SCORE: 45
ADLS_ACUITY_SCORE: 47
ADLS_ACUITY_SCORE: 45
ADLS_ACUITY_SCORE: 59
ADLS_ACUITY_SCORE: 55
ADLS_ACUITY_SCORE: 45
ADLS_ACUITY_SCORE: 47
ADLS_ACUITY_SCORE: 45
ADLS_ACUITY_SCORE: 55
ADLS_ACUITY_SCORE: 45
ADLS_ACUITY_SCORE: 47
ADLS_ACUITY_SCORE: 54
ADLS_ACUITY_SCORE: 47
ADLS_ACUITY_SCORE: 59
ADLS_ACUITY_SCORE: 55
ADLS_ACUITY_SCORE: 47
ADLS_ACUITY_SCORE: 35
ADLS_ACUITY_SCORE: 44
ADLS_ACUITY_SCORE: 51
ADLS_ACUITY_SCORE: 47
ADLS_ACUITY_SCORE: 44
ADLS_ACUITY_SCORE: 47
ADLS_ACUITY_SCORE: 47
ADLS_ACUITY_SCORE: 55
ADLS_ACUITY_SCORE: 51
ADLS_ACUITY_SCORE: 47
ADLS_ACUITY_SCORE: 55
ADLS_ACUITY_SCORE: 45
ADLS_ACUITY_SCORE: 47
ADLS_ACUITY_SCORE: 51
ADLS_ACUITY_SCORE: 45
ADLS_ACUITY_SCORE: 44
ADLS_ACUITY_SCORE: 59
ADLS_ACUITY_SCORE: 47
ADLS_ACUITY_SCORE: 50
ADLS_ACUITY_SCORE: 47
ADLS_ACUITY_SCORE: 45
ADLS_ACUITY_SCORE: 46
ADLS_ACUITY_SCORE: 49
ADLS_ACUITY_SCORE: 54
ADLS_ACUITY_SCORE: 45
ADLS_ACUITY_SCORE: 47
ADLS_ACUITY_SCORE: 47
ADLS_ACUITY_SCORE: 51
ADLS_ACUITY_SCORE: 47
ADLS_ACUITY_SCORE: 51
ADLS_ACUITY_SCORE: 47
ADLS_ACUITY_SCORE: 45
ADLS_ACUITY_SCORE: 47
ADLS_ACUITY_SCORE: 51
ADLS_ACUITY_SCORE: 47
ADLS_ACUITY_SCORE: 51
ADLS_ACUITY_SCORE: 45
ADLS_ACUITY_SCORE: 45
ADLS_ACUITY_SCORE: 55
ADLS_ACUITY_SCORE: 35
ADLS_ACUITY_SCORE: 44
ADLS_ACUITY_SCORE: 45
ADLS_ACUITY_SCORE: 55
ADLS_ACUITY_SCORE: 47
ADLS_ACUITY_SCORE: 47
ADLS_ACUITY_SCORE: 55
ADLS_ACUITY_SCORE: 43
ADLS_ACUITY_SCORE: 45
ADLS_ACUITY_SCORE: 47
ADLS_ACUITY_SCORE: 51
ADLS_ACUITY_SCORE: 59
ADLS_ACUITY_SCORE: 47
ADLS_ACUITY_SCORE: 47
ADLS_ACUITY_SCORE: 35
ADLS_ACUITY_SCORE: 47
ADLS_ACUITY_SCORE: 47
ADLS_ACUITY_SCORE: 35
ADLS_ACUITY_SCORE: 44
ADLS_ACUITY_SCORE: 45
ADLS_ACUITY_SCORE: 45
ADLS_ACUITY_SCORE: 51
ADLS_ACUITY_SCORE: 55
ADLS_ACUITY_SCORE: 55
ADLS_ACUITY_SCORE: 47
ADLS_ACUITY_SCORE: 51
ADLS_ACUITY_SCORE: 47
ADLS_ACUITY_SCORE: 49
ADLS_ACUITY_SCORE: 51
ADLS_ACUITY_SCORE: 47
ADLS_ACUITY_SCORE: 45
ADLS_ACUITY_SCORE: 51
ADLS_ACUITY_SCORE: 51
ADLS_ACUITY_SCORE: 50
ADLS_ACUITY_SCORE: 55
ADLS_ACUITY_SCORE: 47
ADLS_ACUITY_SCORE: 45
ADLS_ACUITY_SCORE: 51
ADLS_ACUITY_SCORE: 59
ADLS_ACUITY_SCORE: 51
ADLS_ACUITY_SCORE: 51
ADLS_ACUITY_SCORE: 50
DEPENDENT_IADLS:: TRANSPORTATION
ADLS_ACUITY_SCORE: 47
ADLS_ACUITY_SCORE: 50
ADLS_ACUITY_SCORE: 45
ADLS_ACUITY_SCORE: 51
ADLS_ACUITY_SCORE: 51
ADLS_ACUITY_SCORE: 50
ADLS_ACUITY_SCORE: 51
ADLS_ACUITY_SCORE: 43
ADLS_ACUITY_SCORE: 51
ADLS_ACUITY_SCORE: 55
ADLS_ACUITY_SCORE: 63
ADLS_ACUITY_SCORE: 45
ADLS_ACUITY_SCORE: 47
ADLS_ACUITY_SCORE: 46
ADLS_ACUITY_SCORE: 51
ADLS_ACUITY_SCORE: 45
ADLS_ACUITY_SCORE: 47
ADLS_ACUITY_SCORE: 50
ADLS_ACUITY_SCORE: 44
ADLS_ACUITY_SCORE: 45
ADLS_ACUITY_SCORE: 51
ADLS_ACUITY_SCORE: 50
ADLS_ACUITY_SCORE: 51
ADLS_ACUITY_SCORE: 49
ADLS_ACUITY_SCORE: 46
ADLS_ACUITY_SCORE: 47
ADLS_ACUITY_SCORE: 45
ADLS_ACUITY_SCORE: 59
ADLS_ACUITY_SCORE: 47
ADLS_ACUITY_SCORE: 51
ADLS_ACUITY_SCORE: 47
ADLS_ACUITY_SCORE: 51
ADLS_ACUITY_SCORE: 50
ADLS_ACUITY_SCORE: 47
ADLS_ACUITY_SCORE: 47
ADLS_ACUITY_SCORE: 45
ADLS_ACUITY_SCORE: 46
ADLS_ACUITY_SCORE: 47
ADLS_ACUITY_SCORE: 47
ADLS_ACUITY_SCORE: 55
ADLS_ACUITY_SCORE: 47
ADLS_ACUITY_SCORE: 45
ADLS_ACUITY_SCORE: 50
ADLS_ACUITY_SCORE: 59
ADLS_ACUITY_SCORE: 47
ADLS_ACUITY_SCORE: 45
ADLS_ACUITY_SCORE: 55
ADLS_ACUITY_SCORE: 47
ADLS_ACUITY_SCORE: 48
ADLS_ACUITY_SCORE: 45
ADLS_ACUITY_SCORE: 50
ADLS_ACUITY_SCORE: 51
ADLS_ACUITY_SCORE: 47
ADLS_ACUITY_SCORE: 55
ADLS_ACUITY_SCORE: 47
ADLS_ACUITY_SCORE: 45
ADLS_ACUITY_SCORE: 47
ADLS_ACUITY_SCORE: 47
ADLS_ACUITY_SCORE: 45
ADLS_ACUITY_SCORE: 45
ADLS_ACUITY_SCORE: 47
ADLS_ACUITY_SCORE: 55
ADLS_ACUITY_SCORE: 35
ADLS_ACUITY_SCORE: 45
ADLS_ACUITY_SCORE: 45
ADLS_ACUITY_SCORE: 47
ADLS_ACUITY_SCORE: 48
ADLS_ACUITY_SCORE: 51
ADLS_ACUITY_SCORE: 63
ADLS_ACUITY_SCORE: 47
ADLS_ACUITY_SCORE: 51
ADLS_ACUITY_SCORE: 47
ADLS_ACUITY_SCORE: 45
ADLS_ACUITY_SCORE: 45
ADLS_ACUITY_SCORE: 51
ADLS_ACUITY_SCORE: 47
ADLS_ACUITY_SCORE: 55
ADLS_ACUITY_SCORE: 47
ADLS_ACUITY_SCORE: 59
ADLS_ACUITY_SCORE: 63
ADLS_ACUITY_SCORE: 51

## 2023-01-01 ASSESSMENT — ENCOUNTER SYMPTOMS
COUGH: 0
SORE THROAT: 0
VOMITING: 0
SHORTNESS OF BREATH: 1
DIARRHEA: 0
FEVER: 0

## 2023-01-02 NOTE — TELEPHONE ENCOUNTER
Prior Authorization Approval    Authorization Effective Date: 1/1/2023  Authorization Expiration Date: 12/31/2023  Medication: Vyndamax 61MG Capsules ([place] APPROVED)  Approved Dose/Quantity: 28 days  Reference #: CMM KEY: [id number]   Insurance Company: [employer] Part D - Phone [phone] Fax [phone]  Expected CoPay:       CoPay Card Available: No    Foundation Assistance Needed:    Which Pharmacy is filling the prescription (Not needed for infusion/clinic administered): [facility] [address]  Pharmacy Notified:    Patient Notified:      There is yaakov open at this time through the [employer] for ALS. I enrolled her in the yaakov which can be used for her out of pocket expenses for Radicava:        PA approved for maintenance dose:        Thank you,    Asha [facility]  Specialty Pharmacy Clinic Liaison - CardiologyNeurologyMultiple Sclerosis  [facility] [address]  [address] [place] [address]  Ph: [phone] Fax: [phone]  [email or web address]

## 2023-01-05 NOTE — TELEPHONE ENCOUNTER
General Call    Contacts       Type Contact Phone/Fax    01/05/2023 03:08 PM CST Phone (Incoming) Kajal [phone]      with [facility]        Reason for Call:  Speech Therapy Eval    What are your questions or concerns:  LISA  Pushing speech therapy eval to 1/10    Date of last appointment with provider:     Could we send this information to you in [place] or would you prefer to receive a phone call?:   Patient would prefer a phone call   Okay to leave a detailed message?: Yes at Other phone number:  Kajal UNDERWOOD from [facility]  [phone]

## 2023-01-06 NOTE — TELEPHONE ENCOUNTER
Reason for Call:  Form, our goal is to have forms completed with 72 hours, however, some forms may require a visit or additional information.    Type of letter, form or note:  Written Order for Speech Generating Device    Who is the form from?: [employer] (if other please explain)    Where did the form come from: form was faxed in    What clinic location was the form placed at?: [facility]    Where the form was placed: [facility] Box/Folder    What number is listed as a contact on the form?: [phone]; fax [phone]       Additional comments:    Call taken on 1/6/2023 at 12:56 PM by Yo Rodriges

## 2023-01-11 NOTE — TELEPHONE ENCOUNTER
Reason for Call:  Form, our goal is to have forms completed with 72 hours, however, some forms may require a visit or additional information.    Type of letter, form or note:  [facility]    Who is the form from?: [facility]    Where did the form come from: form was faxed in    What clinic location was the form placed at?: [facility]    Where the form was placed: Dr. Maloney's Box/Folder    What number is listed as a contact on the form?: [phone]         Call taken on 1/11/2023 at 9:17 AM by Blanca Whiteside

## 2023-01-11 NOTE — TELEPHONE ENCOUNTER
Completed forms faxed back to [facility] at [phone].   Originals sent to be scanned.       Delmis Noonan

## 2023-01-11 NOTE — TELEPHONE ENCOUNTER
Reason for Call:  Other call back    Detailed comments: Dia from [facility] called requesting verbal orders. 1 time every other week for 7 weeks.   Please give Dia a call back.     Phone Number Patient can be reached at: Other phone number:  [phone]    Best Time: ANYTIME    Can we leave a detailed message on this number? YES    Call taken on 1/11/2023 at 2:24 PM by Karen Miller

## 2023-01-11 NOTE — TELEPHONE ENCOUNTER
Completed forms and office visit notes from 3/2/2022faxed back to [facility] at [phone].   Originals sent to be scanned.       Delmis Noonan

## 2023-01-11 NOTE — TELEPHONE ENCOUNTER
Forms/Letter Request    Type of form/letter:  [facility] #[id number]    Have you been seen for this request: N/A    Do we have the form/letter: Yes:     When is form/letter needed by:  When Able    How would you like the form/letter returned: Fax back to [phone].     Placed in JV box for signature.

## 2023-01-11 NOTE — TELEPHONE ENCOUNTER
Confidential voicemail of Dia-left verbal orders -see previous message     Vida WARD RN   [facility] Triage

## 2023-01-11 NOTE — TELEPHONE ENCOUNTER
Forms/Letter Request    Type of form/letter:   [facility]  [id number]   Home Health Cert and Plan of Care.  01/03/2023 to 03/03/2023.    Have you been seen for this request: N/A    Do we have the form/letter: Yes: place in the traige folder before giving to NATALIYA Maloney.    When is form/letter needed by:  asap    How would you like the form/letter returned: Fax back to [phone]

## 2023-01-12 NOTE — TELEPHONE ENCOUNTER
Reason for Call:  Form, our goal is to have forms completed with 72 hours, however, some forms may require a visit or additional information.    Type of letter, form or note:  medical    Who is the form from?: Home care    Where did the form come from: form was faxed in    What clinic location was the form placed at?: [facility]    Where the form was placed: Dr. Maloney's Box/Folder    What number is listed as a contact on the form?: F [phone]         Call taken on 1/12/2023 at 1:15 PM by Blanca Whiteside

## 2023-01-12 NOTE — TELEPHONE ENCOUNTER
Completed forms faxed back to [facility] at [phone].   Originals sent to be scanned.       Delmis Noonan

## 2023-01-12 NOTE — TELEPHONE ENCOUNTER
Last Written Prescription Date:  5/13/2022  Last Fill Quantity: 18 g ,  # refills: 3   Last office visit: 11/11/2022 with prescribing provider:  Dr. Cedillo   Future Office Visit:   Next 5 appointments (look out 90 days)    Mar 08, 2023 11:00 AM  (Arrive by 10:40 AM)  Annual Wellness Visit with Beverley Maloney MD  [facility] ([facility] ) [address] [place] [id number] [phone]               Requested Prescriptions   Pending Prescriptions Disp Refills     albuterol (PROAIR HFA/PROVENTIL HFA/VENTOLIN HFA) 108 (90 Base) MCG/ACT inhaler 18 g 3     Sig: Inhale 2 puffs into the lungs every 6 hours as needed for shortness of breath or wheezing       There is no refill protocol information for this order

## 2023-01-12 NOTE — TELEPHONE ENCOUNTER
Left a message for [employer].  Have tried both fax numbers multiple times and theydo not go through     Have tried  and     Please get new number or find out what we need to do for the documentation they are requesting

## 2023-01-17 NOTE — TELEPHONE ENCOUNTER
Form is for OT, no med rec needed.     Form placed in providers bin.     CAN JEFFREY RN on 1/17/2023 at 3:04 PM   [facility]

## 2023-01-17 NOTE — TELEPHONE ENCOUNTER
MED RECONCILIATION DONE    Discrepancies:    NONE    Meds on Epic but NOT  on Form:         vitamin D3 (CHOLECALCIFEROL) 10 MCG (400 UNIT) capsule     2/24/2021  --   Sig - Route: Take 1 capsule (400 Units) by mouth daily - Oral   Class: OTC   NOT TAKING - discontinued      Meds on Form but NOT on Epic :     NONE      Spoke with son. She is not taking Vitamin D3. States she is not taking methotrexate and folic acid right now, but are going to have her restart soon.     Routing to PCP for further review/recommendations/orders.      Ambika Conde RN  [facility]

## 2023-01-20 NOTE — TELEPHONE ENCOUNTER
Completed forms sent back to [facility] at [phone].  Originals sent to be scanned   Elsa Christensen   [employer]

## 2023-01-20 NOTE — TELEPHONE ENCOUNTER
Completed forms back from provider and faxe to [facility] at [phone].  Originals sent to be scanned.  Elsa Christensen   [employer]

## 2023-01-27 NOTE — TELEPHONE ENCOUNTER
Reason for Call:  Form, our goal is to have forms completed with 72 hours, however, some forms may require a visit or additional information.    Type of letter, form or note:  medical    Who is the form from?: Home care    Where did the form come from: form was faxed in    What clinic location was the form placed at?: [facility]    Where the form was placed: Dr. Maloney's Box/Folder    What number is listed as a contact on the form?: F [phone]        Call taken on 1/27/2023 at 11:28 AM by Blanca Whiteside

## 2023-01-30 NOTE — TELEPHONE ENCOUNTER
Completed forms faxed back to [facility] at [phone].   Originals sent to be scanned.       Delmis Noonan

## 2023-01-31 NOTE — PROGRESS NOTES
HOME VISIT APPT DATE: 1/31/23    S: MET AT DOOR BY PT'S  STEPHEN  B: PT HAS HISTORY OF ALS  A: PT CONTINUES TO GET WEAKER FROM ALS. HER SPEACH HAS BEEN GREATLY AFFECTED BY THIS CRUEL DISEASE. SAID SHE MAINLY USES HER VENT WHEN SHE IS DOING HER TUBE FEEDINGS. SOMETIMES SHE ACTUALLY NAPS WHILE IT IS RUNNING. MAC ASKED HOW LONG SHE NEEDED TO WEAR HER VENT. I TOLD HER AS LONG AS SHE CAN TOLERATE IT. IDEALLY MORE THAN 4 HOURS. STEPHEN SAID THEY HAD TO GET HER A WHEEL CHAIR DUE TO PT GETTING SHORT OF BREATH WALKING EVEN SHORT DISTANCES.  BROUGHT MAC OUT ONE OF THE NEW FP YAN MASKS FOR HER TO TRY. HER HANDS HAVE BECOME VERY WEAK SO SHE DID HAVE SOME DIFFICULTY REMOVING THE CLIPS BUT HER  SAID THEY WOULD WORK ON IT TOGETHER.  STEPHEN IS VERY INVOLVED WITH HIS WIFES CARE. ALSO BROUGHT OUT THE 4G MODEM, 4 BACTERIA FILTERS, AND A SET OF HEATED TUBING. THEY ARE CURRENTLY NOT USING THE HUMIDIFIER SINCE THE VENT IS BEING USED IN THE KITCHEN, SITTING AREA.   R: LET MAC NKFRANKOW THAT I WOULD REACH OUT TO HER IN A COUPLE OF WEEKS VIA TEXT TO CHECK IN.     DX: ALS G12.21  VITALS: NO TAKEN  ETCO2: ON VENTILATOR OFF VENTILATOR; NA  BREATH SOUNDS: CLEAR (PTS VOICE SEEMED VERY WEAK TODAY)  SECRETIONS: COLOR: CONSISTENCY: COUGH EFFECTIVENESS: NOT WITNESSED  LOC: ALERT ORIENTED     SETUP DATE: 8/31/22  DEVICE TYPE: ASTRAL  SETTINGS (include mode):ST/IPAP-8/EPAP-4/RATE-8  COMFORT SETTINGS:TRIGGER-MED/Ti MIN/MAX- 0.2-1.5/ RISE 200MS  INTERFACE: F20 AIRTOUCH FOR HER  CIRCUIT/HUMIDITY: HTD  ALARMS: NONE  O2 BLEED IN (YES/NO): NO    VENTILATOR INSPECTION DONE: YES   SETTINGS CHECK: YES    ALARM CHECK: YES   VENTILATOR SETTINGS VERIFIED: YES   CIRCUIT CHECK:  NO   CHANGED:  NO   CIRCUIT SUPPLIES GIVEN: YES   MASK: TYPE: CONDITION: REPLACED: REPLACED  FILTERS: EXTERNAL FILTERS LEFT WITH SUPPLIES YES  INLET FILTER CHECKED: YES   OXYGEN EQUIPMENT REVIEWED IF APPLICABLE: FILTERS: SUPPLIES: NA    MD NAME/PHONE/FAX: DR ROQUE  Phone: [phone]/Fax: [phone]    [facility] MEDICAL EQUIPMENT  [phone]

## 2023-02-02 NOTE — TELEPHONE ENCOUNTER
Completed forms faxed back to [facility] at [phone].   Originals sent to be scanned.       Delmis Noonan

## 2023-02-02 NOTE — TELEPHONE ENCOUNTER
Reason for Call:  Form, our goal is to have forms completed with 72 hours, however, some forms may require a visit or additional information.    Type of letter, form or note:  medical    Who is the form from?: Home care Order # [id number]    Where did the form come from: form was faxed in    What clinic location was the form placed at?: [facility]    Where the form was placed: Dr Maloney Box/Folder    What number is listed as a contact on the form?: [phone] fax           Call taken on 2/2/2023 at 12:18 PM by Cat Neri

## 2023-02-04 NOTE — ED PROVIDER NOTES
"    History     Chief Complaint:  Chest Pain       HPI   Natasha Allison is a 72 year old female who presents with shortness of breath and chest pain. HPI is difficult to obtain due to patient's speech difficulties but from what was possible to gather, patient states symptoms started this morning and that chest pain began on right side and has now radiated to left. SOB with cough. Patient denies nausea, vomiting, abdominal pain.     Upon chart review, note that patient has had SOB complaint for several months and is worsening. Has plans for pulmonary function testing in a few weeks to determine cause (ALS vs something else). Had CXR completed today with the following findings:  Two views were obtained. The patient is rotated on the frontal view. The lungs and costophrenic angles appear clear.  Heart size and pulmonary vascularity are within normal limits.  There is no evidence of pneumothorax or pleural effusion. Percutaneous gastrostomy tube partially visualized. Degenerative changes in the spine.      Independent Historian: None    Review of External Notes: Reviewed today's office visit note with Rheumatology    ROS:  Review of Systems   Unable to perform ROS: Patient nonverbal (Difficult speech with inability to move mouth)       Allergies:  Alendronic Acid  Latex  Seasonal Allergies     Medications:    albuterol (PROAIR HFA/PROVENTIL HFA/VENTOLIN HFA) 108 (90 Base) MCG/ACT inhaler  desoximetasone (TOPICORT) 0.25 % external cream  folic acid (FOLVITE) 1 MG tablet  predniSONE (DELTASONE) 5 MG tablet  riluzole (RILUTEK) 50 MG tablet  B-D TB SYRINGE 27G X 1/2\" 1 ML MISC  inFLIXimab-dyyb (INFLECTRA) 100 MG injection  methotrexate sodium, pres-free, 50 MG/2ML SOLN injection CHEMO  omeprazole (PRILOSEC) 2 mg/mL suspension  syringe, disposable, 1 ML MISC        Past Medical History:    Past Medical History:   Diagnosis Date     Hypertension      Osteoporosis      Rheumatoid arthritis(714.0)        Past Surgical History: "    Past Surgical History:   Procedure Laterality Date     CARPAL TUNNEL RELEASE RT/LT       COLONOSCOPY N/A 3/12/2019    Procedure: COLONOSCOPY;  Surgeon: Jermaine Rock MD;  Location: [facility]     IR GASTROSTOMY TUBE PERCUTANEOUS PLCMNT  11/1/2022        Family History:    family history includes Colon Cancer in her brother; Heart Disease in her father; Hypertension in her brother, brother, brother, daughter, father, mother, sister, sister, sister, and son; Intellectual Disability (Mental Retardation) in her brother.    Social History:   reports that she quit smoking about 44 years ago. Her smoking use included cigarettes. She has a 13.50 pack-year smoking history. She has never used smokeless tobacco. She reports current alcohol use. She reports that she does not use drugs.  PCP: Beverley Maloney     Physical Exam     Patient Vitals for the past 24 hrs:   BP Temp Temp src Pulse Resp SpO2   02/03/23 2012 -- 98.1  F (36.7  C) Temporal -- -- --   02/03/23 2005 (!) 131/106 -- -- 106 20 96 %        Physical Exam  Cardiovascular:      Rate and Rhythm: Tachycardia present.      Heart sounds: Normal heart sounds.   Pulmonary:      Effort: Tachypnea present.      Breath sounds: Normal breath sounds.   Neurological:      Mental Status: She is oriented to person, place, and time.         Emergency Department Course   ECG:  ECG results from 02/03/23   EKG 12-lead, tracing only     Value    Systolic Blood Pressure     Diastolic Blood Pressure     Ventricular Rate 105    Atrial Rate 105    FL Interval 112    QRS Duration 78        QTc 438    P Axis 75    R AXIS 44    T Axis 45    Interpretation ECG      Sinus tachycardia  Otherwise normal ECG  No previous ECGs available         Imaging:  CT Chest Pulmonary Embolism w Contrast    (Results Pending)      Report per radiology    Laboratory:  Labs Ordered and Resulted from Time of ED Arrival to Time of ED Departure   BASIC METABOLIC PANEL - Abnormal       Result Value     Sodium 141      Potassium 4.0      Chloride 100      Carbon Dioxide (CO2) 30 (*)     Anion Gap 11      Urea Nitrogen 19.4      Creatinine 0.49 (*)     Calcium 9.5      Glucose 89      GFR Estimate >90     TROPONIN T, HIGH SENSITIVITY - Abnormal    Troponin T, High Sensitivity 16 (*)    CBC WITH PLATELETS AND DIFFERENTIAL - Abnormal    WBC Count 13.4 (*)     RBC Count 4.71      Hemoglobin 14.8      Hematocrit 45.8      MCV 97      MCH 31.4      MCHC 32.3      RDW 12.0      Platelet Count 152      % Neutrophils 82      % Lymphocytes 10      % Monocytes 8      % Eosinophils 0      % Basophils 0      % Immature Granulocytes 0      NRBCs per 100 WBC 0      Absolute Neutrophils 11.0 (*)     Absolute Lymphocytes 1.3      Absolute Monocytes 1.1      Absolute Eosinophils 0.0      Absolute Basophils 0.0      Absolute Immature Granulocytes 0.0      Absolute NRBCs 0.0     INFLUENZA A/B & SARS-COV2 PCR MULTIPLEX - Normal    Influenza A PCR Negative      Influenza B PCR Negative      RSV PCR Negative      SARS CoV2 PCR Negative     BLOOD GAS VENOUS WITH OXYHEMOGLOBIN        Emergency Department Course & Assessments:    Interventions:  Medications - No data to display     Independent Interpretation (X-rays, CTs, rhythm strip):  None    Consultations/Discussion of Management or Tests:   None    Social Determinants of Health affecting care:  None    Disposition:  Patient will likely require OBS admission    Impression & Plan      Medical Decision Making:  Natasha Allison is a 72 year old female who presents with SOB and chest pain. Due to patient's ALS progression, difficulty to ascertain reliable history of symptoms.  states that chest pain started this morning with SOB being an ongoing problem. High concern for ACS or PE. CT angio completed - results pending at time of sign-off. ACS workup completed - EKG with NSR, however troponin mildly elevated. Delta trop pending at time of sign-off. Patient was signed out to  colleague.       Diagnosis:    ICD-10-CM    1. Chest pain  R07.9       2. Shortness of breath  R06.02       3. Elevated troponin  R77.8            Discharge Medications:  New Prescriptions    No medications on file          2/3/2023   Ken Wen MD Lauren R. Snell, PA-C           Angelita Akins PA-C  02/03/23 2159

## 2023-02-04 NOTE — ED NOTES
Bed: [facility]  Expected date: 2/3/23  Expected time: 7:46 PM  Means of arrival: Ambulance  Comments:  [place] 75 yo chest pain

## 2023-02-04 NOTE — ED PROVIDER NOTES
ED ATTENDING PHYSICIAN NOTE:   I evaluated this patient in conjunction with Angelita Akins PA-C  I have participated in the care of the patient and personally performed key elements of the history, exam, and medical decision making.      HPI:     Natasha Allison is a 72 year old female who presented with chest pain and shortness of breath.  This morning the patient started to develop primarily right-sided chest pain with radiation to her left as well as a cough and some shortness of breath.   is present who is assisting with history as patient's speech is quite altered secondary to ALS.  He reports that symptoms have been present since at least 2 PM and has been constant since onset.  There has been no obvious alleviating or aggravating factors to symptoms.  She has no history of DVT, PE, unilateral leg swelling, or hemoptysis, malignancy or estrogen use.  She has not had any nausea, vomiting, or abdominal pain.      EXAM:     Eyes:    Left corneal clouding     Right cornea normal  Neck:    Supple, no meningismus.     CV:     Regular rate and rhythm.      No murmurs, rubs or gallops.       No unilateral leg swelling.       2+ radial pulses bilateral.    PULM:    Clear to auscultation bilateral.       No respiratory distress.      Good air exchange.     No rales or wheezing.  ABD:    Soft, non-tender, non-distended.       No pulsatile masses.       No rebound, guarding or rigidity.  MSK:     No gross deformity to all four extremities.   LYMPH:   No cervical lymphadenopathy.  NEURO:   Alert.      Speech is weak and dysarthric  Skin:    Warm, dry and intact.    Psych:    Anxious      MEDICAL DECISION MAKING/ASSESSMENT AND PLAN:     72-year-old female with history of ALS presents with atypical chest pain and shortness of breath.  EKG without ischemic changes.  Initial troponin is mildly elevated.  2-hour delta troponin undertaken which was also done greater than 6 hours after onset of symptoms and is unchanged thus  not consistent with ACS.  CT scan performed to rule out pulmonary embolism given immobilization secondary to ALS and presenting tachycardia.  CT scan is negative for PE as well as aortic pathology.  The remainder of her evaluation was unremarkable.  Differential diagnosis would include costochondritis, pleurisy, atypical reflux, esophageal spasm, anxiety.  She is safe for discharge home with supportive measures.  She requested something as a sleep aid which I was agreeable to trial of trazodone.  Close follow-up with PCP and return to ED for worsening symptoms    DIAGNOSIS:     ICD-10-CM    1. Chest pain  R07.9       2. Shortness of breath  R06.02                DISPOSITION:   Discharge to home       2/3/2023  [facility] EMERGENCY DEPT   I, Bc Durant, am serving as a scribe at 8:54 PM on 2/3/2023 to document services personally performed by Dr. Wen, based on my observations and the provider's statements to me.      Ken Wen MD  02/04/23 1527

## 2023-02-04 NOTE — PHARMACY-ADMISSION MEDICATION HISTORY
"Admission medication history interview status for this patient is complete. See [facility] admission navigator for allergy information, prior to admission medications and immunization status.     Medication history interview done, indicate source(s): Patient and Family  Medication history resources (including written lists, pill bottles, clinic record):None  Pharmacy: [facility] ([facility])    Changes made to PTA medication list:  Added: prednisone  Changed: -  Reported as Not Taking: -  Removed:     Actions taken by pharmacist (provider contacted, etc):None   Additional medication history information: All meds given via feeding tube, infliximab ON HOLD, methotrexate has also been on hold but meant to restart next week. Omeprazole is a new med, has not started per patient & family.   Medication reconciliation/reorder completed by provider prior to medication history?  no      Medication Affordability: n/a    Prior to Admission medications    Medication Sig Last Dose Taking? Auth Provider Long Term End Date   albuterol (PROAIR HFA/PROVENTIL HFA/VENTOLIN HFA) 108 (90 Base) MCG/ACT inhaler Inhale 2 puffs into the lungs every 6 hours as needed for shortness of breath or wheezing More than a month Yes Wei Cedillo MD Yes    desoximetasone (TOPICORT) 0.25 % external cream Apply 1 inch topically as needed More than a month Yes Reported, Patient     folic acid (FOLVITE) 1 MG tablet Take 1 tablet (1 mg) by mouth daily 2/2/2023 at am Yes Beverley Maloney MD     predniSONE (DELTASONE) 5 MG tablet Take 10 mg by mouth daily as needed (as needed for flares ups) NEW RX at PRN Yes Unknown, Entered By History     riluzole (RILUTEK) 50 MG tablet Take 1 tablet (50 mg) by mouth every 12 hours 2/3/2023 at x 1 Yes Arnold March MD     B-D TB SYRINGE 27G X 1/2\" 1 ML MISC USE FOR METHOTREXATE INJECTION   Reported, Patient     inFLIXimab-dyyb (INFLECTRA) 100 MG injection Inject 5mg/kg into the vein every 8 " weeks. ON HOLD  Reported, Patient     methotrexate sodium, pres-free, 50 MG/2ML SOLN injection CHEMO Inject 20 mg Subcutaneous every 7 days On Monday hasn't had at meant to restart MONDAY  Reported, Patient     omeprazole (PRILOSEC) 2 mg/mL suspension Take 10 mLs (20 mg) by mouth 2 times daily NEW RX at hasn't started  Arnold March MD     syringe, disposable, 1 ML MISC 1 Syringe once a week To be used with Methotrexate Injections   Reported, Patient

## 2023-02-04 NOTE — ED TRIAGE NOTES
Pt has hx of ALS. Pt has feeding tube. Pt has felt short of breath for a few days and chest pain started today

## 2023-02-04 NOTE — ED NOTES
Pt ambulated in the room with SBA pt reports left side chest pain and shortness of breath with activity. Pt SAO2 93% on room air after walking

## 2023-02-05 PROBLEM — R00.0 TACHYCARDIA: Status: ACTIVE | Noted: 2023-01-01

## 2023-02-05 PROBLEM — I21.4 NSTEMI (NON-ST ELEVATED MYOCARDIAL INFARCTION) (H): Status: ACTIVE | Noted: 2023-01-01

## 2023-02-05 PROBLEM — I50.9 ACUTE CONGESTIVE HEART FAILURE, UNSPECIFIED HEART FAILURE TYPE (H): Status: ACTIVE | Noted: 2023-01-01

## 2023-02-05 NOTE — ED TRIAGE NOTES
Pt has hx ALS. Early this morning pt developed difficulty breathing, chest pain that has become worse throughout the day.  counted heart rate at 160. Pt appears uncomfortable and short of breath. Was seen 2 days ago for similar symptoms.     unablt to obtain accurate temp in triage due to cold temps outside and pt unable to close mouth

## 2023-02-05 NOTE — ED PROVIDER NOTES
History     Chief Complaint:  Shortness of Breath and Chest Pain       HPI   Natasha Allison is a 72 year old female s/p gastrostomy tube placement with a history of HTN and Bulbar ALS who presents with shortness of breath and chest pain. Patient was seen in the ED two days ago for similar symptoms and had a relatively unremarkable workup at this time (see results below).   notes that symptoms started this morning abruptly and her heart rate was in the 150s.  He states that they have not done anything different or unusual.  She has been eating and drinking normally through her G-tube as well.  She does have bulbar ALS therefore she cannot give history.    Laboratory Results (2/3/2023):   CBC: WBC Count (13.4), Absolute Neutrophils (11.0), otherwise normal  BMP: CO2 (30), Creatinine (0.49), otherwise normal  Troponin: 16  Repeat troponin: 17  Influenza/COVID: Negative  BGV & oxyhgb: pCO2 venous (56), pO2 venous (21), bicarbonate venous (36), oxyhemoglobin venous (35), base excess/deficit (8.8)    CT Chest PE w Contrast Results (2/3/2023):   1.  No pulmonary embolism. No acute abnormality in the chest.    Independent Historian:   Spouse/Partner - They report the patient developed chest pain and shortness of breath after she got up to go to the bathroom this morning. There was no clear trigger of symptom onset, per . He adds that she was prescribed Trazodone for sleeping difficulties at her most recent ED visit and took the first dose of this pill around 0900 this morning shortly before her symptoms began. He denies any recent fevers, cough, sore throat, diarrhea, and vomiting. She does not have a past history of asthma or other cardiac/lung problems. No known sick contacts, per . He reports that the patient has not followed up with her PCP or other specialities since her most recent ED visit.     Review of External Notes: I reviewed patient's ED note from 2/3 and echocardiogram results from May  of 2022 (results below).     Echo cardiogram Results (5/5/2022):   Normal transthoracic echocardiogram.     ROS:  Review of Systems   Constitutional: Negative for fever.   HENT: Negative for sore throat.    Respiratory: Positive for shortness of breath. Negative for cough.    Cardiovascular: Positive for chest pain.   Gastrointestinal: Negative for diarrhea and vomiting.   All other systems reviewed and are negative.    Allergies:  Alendronic Acid  Latex    Medications:    Pro-air  Inflectra   Methotrexate sodium  Prilosec  Deltasone  Rilutek   Desyrel    Past Medical History:    HTN  Osteoporosis  RA  Corneal clouding  Vitamin D deficiency  Action tremor   CKD, stage 2  Psoriasis   Bulbar ALS  Paresis of right vocal cord    Past Surgical History:    Carpal tunnel release, bilateral  Colonoscopy  IR gastrostomy tube percutaneous placement     Family History:   Mother: HTN  Father: HTN, heart disease  Brother (s): Mental retardation, HTN  Sister(s): HTN  Son: Hemochromatosis, HTN  Daughter: HTN    Social History:  Patient reports that she quit smoking about 44 years ago. Her smoking use included cigarettes. She has a 13.50 pack-year smoking history. She has never used smokeless tobacco. She reports current alcohol use. She reports that she does not use drugs.  Arrives via private vehicle with her .  PCP: Beverley Maloney MD, Family Medicine     Physical Exam     Patient Vitals for the past 24 hrs:   BP Pulse Resp SpO2   02/05/23 1800 114/85 -- -- 94 %   02/05/23 1710 100/67 (!) 160 (!) 32 99 %        Physical Exam  Constitutional:       Appearance: She is well-developed.   HENT:      Right Ear: External ear normal.      Left Ear: External ear normal.      Mouth/Throat:      Mouth: Mucous membranes are moist.      Pharynx: Oropharynx is clear. No oropharyngeal exudate or posterior oropharyngeal erythema.   Eyes:      General: No scleral icterus.     Conjunctiva/sclera: Conjunctivae normal.      Pupils:  Pupils are equal, round, and reactive to light.   Cardiovascular:      Rate and Rhythm: Regular rhythm. Tachycardia present.      Heart sounds: Normal heart sounds. No murmur heard.    No friction rub. No gallop.   Pulmonary:      Effort: No respiratory distress.      Breath sounds: Normal breath sounds. No stridor. No wheezing, rhonchi or rales.      Comments: Tachypnea on exam  Abdominal:      General: Bowel sounds are normal. There is no distension.      Palpations: Abdomen is soft. There is no mass.      Tenderness: There is no abdominal tenderness.      Comments: G tube in place without signs of infection   Musculoskeletal:         General: Normal range of motion.      Cervical back: Normal range of motion and neck supple.   Lymphadenopathy:      Cervical: No cervical adenopathy.   Skin:     General: Skin is warm and dry.      Capillary Refill: Capillary refill takes less than 2 seconds.      Findings: No rash.   Neurological:      General: No focal deficit present.      Mental Status: She is alert.         Emergency Department Course   ECG  ECG taken at 1801, ECG read at 1806  Wide QRS tachycardia  Nonspecific intraventricular block  Nonspecific T wave abnormality  Abnormal ECG   Rate 158 bpm. MI interval * ms. QRS duration 132 ms. QT/QTc 228/369 ms. P-R-T axes * 5 -63.       Imaging:  CT Chest Pulmonary Embolism w Contrast   Final Result   IMPRESSION:   1.  No evidence for pulmonary emboli.   2.  No evidence for acute pulmonary disease.         Report per radiology    Laboratory:  Labs Ordered and Resulted from Time of ED Arrival to Time of ED Departure   COMPREHENSIVE METABOLIC PANEL - Abnormal       Result Value    Sodium 140      Potassium 4.4      Chloride 100      Carbon Dioxide (CO2) 30 (*)     Anion Gap 10      Urea Nitrogen 24.3 (*)     Creatinine 0.59      Calcium 9.7      Glucose 132 (*)     Alkaline Phosphatase 68      AST 34      ALT 20      Protein Total 7.5      Albumin 3.7      Bilirubin Total  0.3      GFR Estimate >90     TROPONIN T, HIGH SENSITIVITY - Abnormal    Troponin T, High Sensitivity 111 (*)    NT PROBNP INPATIENT - Abnormal    N terminal Pro BNP Inpatient 4,742 (*)    CBC WITH PLATELETS AND DIFFERENTIAL - Abnormal    WBC Count 10.5      RBC Count 5.07      Hemoglobin 15.8 (*)     Hematocrit 49.3 (*)     MCV 97      MCH 31.2      MCHC 32.0      RDW 12.5      Platelet Count 173      % Neutrophils 73      % Lymphocytes 19      % Monocytes 8      % Eosinophils 0      % Basophils 0      % Immature Granulocytes 0      NRBCs per 100 WBC 0      Absolute Neutrophils 7.6      Absolute Lymphocytes 2.0      Absolute Monocytes 0.8      Absolute Eosinophils 0.0      Absolute Basophils 0.0      Absolute Immature Granulocytes 0.0      Absolute NRBCs 0.0     INR - Normal    INR 1.04     ROUTINE UA WITH MICROSCOPIC REFLEX TO CULTURE   INFLUENZA A/B & SARS-COV2 PCR MULTIPLEX        Emergency Department Course & Assessments:       Interventions:  Medications   metoprolol (LOPRESSOR) injection 5 mg (5 mg Intravenous Given 2/5/23 1915)   heparin loading dose for LOW INTENSITY TREATMENT * Give BEFORE starting heparin infusion (has no administration in time range)   heparin 25,000 units in 0.45% NaCl 250 mL ANTICOAGULANT infusion (has no administration in time range)   LORazepam (ATIVAN) injection 0.5 mg (0.5 mg Intravenous Given 2/5/23 1756)   0.9% sodium chloride BOLUS (500 mLs Intravenous New Bag 2/5/23 1807)   iopamidol (ISOVUE-370) solution 500 mL (50 mLs Intravenous Given 2/5/23 1843)   for CT scan flush use (71 mLs Intravenous Given 2/5/23 1843)        Independent Interpretation (X-rays, CTs, rhythm strip):  None    Consultations/Discussion of Management or Tests:  1920 Hopitalist       Social Determinants of Health affecting care:   Supportive , hx speech difficulty due to bulbar ALS    Assessments:  1733 I obtained history and examined the patient as noted above.  1845 Discussed labs with  and  need for admission    Disposition:  The patient was admitted to the hospital under the care of Dr. Richardson.     Impression & Plan        Medical Decision Making:  Patient presents today for evaluation of shortness of breath and chest pain.  Patient was seen here 2 days ago and fairly unremarkable work-up other than mild troponin bump 17.  She had a CT scan that was negative.   indicates that her heart rate was not this high at the time of discharge.  It appears that her symptoms started abruptly this morning.  She was noted to be in sinus tachycardia on the 150s to 160s.  Initially she was quite tachypneic however that seem to have resolved after give her small dose of Ativan.  Blood pressure stable.  EKG did not show any STEMI although it was sinus tachycardia.  Troponin was 111 which indicates that this is likely a non-STEMI.  There is acute myocardial injury.  She also has a BNP that is elevated.  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  It is uncertain what is causing all this other than likely a non-STEMI.  We are giving IV metoprolol to try to slow down her heart rate repeat EKG. After a first dose, patient's heart rate did dip down somewhat and we are able to see that it looks more consistent with atrial flutter.  to take over and continue management of her heart rate. He will discuss further management with  to determine best monitored bed for this patient. Currently we will place her on IMC unit.     Diagnosis:    ICD-10-CM    1. NSTEMI (non-ST elevated myocardial infarction) (H)  I21.4       2. Acute congestive heart failure, unspecified heart failure type (H)  I50.9       3. Tachycardia  R00.0                Scribe Disclosure:  Jade CRANE, am serving as a scribe at 5:30 PM on 2/5/2023 to document services personally performed by Kavon Campoverde MD based on my observations and the provider's statements to me.     2/5/2023   Kavon Campoverde MD Cheng  MD Kavon  02/05/23 7432

## 2023-02-06 NOTE — PHARMACY-ADMISSION MEDICATION HISTORY
Admission medication history interview status for this patient is complete. See [facility] admission navigator for allergy information, prior to admission medications and immunization status.     Medication history interview done, indicate source(s): Family  Medication history resources (including written lists, pill bottles, clinic record):None  Pharmacy: [facility] [address] [place], [place] - [address]    Changes made to PTA medication list:  Added: none  Changed: none  Reported as Not Taking: none  Removed: none    Actions taken by pharmacist (provider contacted, etc): I spoke with Natasha's , Jaden.     Additional medication history information: Jaden noted Natasha just started trazodone Friday. She takes the folic acid only while on methotrexate but hasn't been taking it recently; planned to restart both tomorrow. He also noted the Inflextra has been on hold. And Natasha has not started the licodaine patch or omperazole which were just recently prescribed.    Medication reconciliation/reorder completed by provider prior to medication history?  N    Prior to Admission medications    Medication Sig Last Dose Taking? Auth Provider Long Term End Date   albuterol (PROAIR HFA/PROVENTIL HFA/VENTOLIN HFA) 108 (90 Base) MCG/ACT inhaler Inhale 2 puffs into the lungs every 6 hours as needed for shortness of breath or wheezing Past Week Yes Wei Cedillo MD Yes    desoximetasone (TOPICORT) 0.25 % external cream Apply 1 inch topically as needed Unknown Yes Reported, Patient     folic acid (FOLVITE) 1 MG tablet Take 1 tablet (1 mg) by mouth daily Unknown Yes Beverley Maloney MD     methotrexate sodium, pres-free, 50 MG/2ML SOLN injection CHEMO Inject 20 mg Subcutaneous every 7 days On Monday Unknown Yes Reported, Patient     riluzole (RILUTEK) 50 MG tablet Take 1 tablet (50 mg) by mouth every 12 hours 2/4/2023 Yes Arnold March MD     traZODone (DESYREL) 50 MG tablet Take 0.5-1  "tablets (25-50 mg) by mouth nightly as needed for sleep 2/4/2023 at PM Yes Ken Wen MD Yes    B-D TB SYRINGE 27G X 1/2\" 1 ML MISC USE FOR METHOTREXATE INJECTION   Reported, Patient     inFLIXimab-dyyb (INFLECTRA) 100 MG injection Inject 5mg/kg into the vein every 8 weeks. on hold  Reported, Patient     lidocaine (LIDODERM) 5 % patch Place 1 patch onto the skin every 24 hours To prevent lidocaine toxicity, patient should be patch free for 12 hrs daily. not started  Ken Wen MD     omeprazole (PRILOSEC) 2 mg/mL suspension Take 10 mLs (20 mg) by mouth 2 times daily not started  Arnold March MD     predniSONE (DELTASONE) 5 MG tablet Take 10 mg by mouth daily as needed (as needed for flares ups)   Unknown, Entered By History     syringe, disposable, 1 ML MISC 1 Syringe once a week To be used with Methotrexate Injections   Reported, Patient           "

## 2023-02-06 NOTE — PROGRESS NOTES
Patient Transfer Information  Patient connected to monitoring equipment on arrival: yes Vital signs monitor     Patient connected to wall oxygen on arrival: Yes    Belongings: Transferred with patient    Safety check completed: Yes

## 2023-02-06 NOTE — PLAN OF CARE
Goal Outcome Evaluation:    TOMMY Kaur has been awake off and on since arriving on the unit. Denies pain, chest pain, pressure or dizziness. VSS. Indep in bed repositioning. Removed 02 twice. Staff replaced it and explained why she needs to leave it on. NPO. Mouth swabbed by herself once writer moistened swab. No cough or choking. Was incontinent urine and also continent urine. No bm this shift. Uses call light. Able to get her needs across to staff. Safety checks completed.

## 2023-02-06 NOTE — ED NOTES
ED patient signout note    72-year-old female with a history of ALS and here with a tacky dysrhythmia and tachycardia induced cardiomyopathy and secondary demand ischemia.    Trial of IV metoprolol did not provide sustained adequate rate control.  She did develop some mild hypotension with this and so was given amnio bolus with a plan to go to amnio drip.  We were able to have better rates after any bolus and repeat EKG showed atrial fibrillation with rapid ventricular response.  Ultimately we elected to go ahead with cardioversion given the soft blood pressures and continued symptoms.  Procedural sedation done no significant complications from this.  1 shock restored normal sinus rhythm.  150 J, pads in the posterior anterior position.  Continue with plan to admit to the hospital service overnight.  Heparin drip was stopped and she was placed on Eliquis.        ICD-10-CM    1. Paroxysmal atrial fibrillation (H)  I48.0       2. Myocardial injury  I5A       3. Tachycardia induced cardiomyopathy (H)  R00.0     [address]    -Cardioversion External    Date/Time: 2/5/2023 10:38 PM  Performed by: Neo Waite MD  Authorized by: Neo Waite MD     Risks, benefits and alternatives discussed.    ED EVALUATION:      I have performed an Emergency Department Evaluation including taking a history and physical examination, this evaluation will be documented in the electronic medical record for this ED encounter.     Indication: Atrial fibrillation with rapid ventricular response    ASA Class: Class 3- Severe systemic disease, definite functional limitations    Mallampati: Grade 2- soft palate, base of uvula, tonsillar pillars, and portion of posterior pharyngeal wall visible    NPO Status: appropriately NPO for procedure and not NPO, emergent situation    UNIVERSAL PROTOCOL   Site Marked: NA  Prior Images Obtained and Reviewed:  NA  Required items: Required blood  products, implants, devices and special equipment available    Patient identity confirmed:  Verbally with patient, arm band, provided demographic data and hospital-assigned identification number  Patient was reevaluated immediately before administering moderate or deep sedation or anesthesia  Confirmation Checklist:  Patient's identity using two indicators, relevant allergies, procedure was appropriate and matched the consent or emergent situation and correct equipment/implants were available  Time out: Immediately prior to the procedure a time out was called    Universal Protocol: the Joint Commission Universal Protocol was followed    Preparation: Patient was prepped and draped in usual sterile fashion      SEDATION  Patient Sedated: Yes    Sedation Type:  Moderate (conscious) sedation  Sedation:  Propofol  Vital signs: Vital signs monitored during sedation      PRE-PROCEDURE DETAILS:     Cardioversion basis:  Emergent    Rhythm:  Atrial fibrillation    Electrode placement:  Anterior-posterior  Attempt one:     Cardioversion mode:  Synchronous    Waveform:  Biphasic    Shock (Joules):  150    Shock outcome:  Conversion to normal sinus rhythm  Post-procedure details:     Patient status:  Awake      PROCEDURE    Patient Tolerance:  Patient tolerated the procedure well with no immediate complications  Length of time physician/provider present for 1:1 monitoring during sedation: 15       Neo Waite MD  02/05/23 [id number]

## 2023-02-06 NOTE — TELEPHONE ENCOUNTER
[facility] Call Center    Phone Message    May a detailed message be left on voicemail: yes     Reason for Call: Other: Pharmacy has questions regarding the medication omeprazole (PRILOSEC) 2 mg/mL suspension and does it need to be compounded. If so, the compounding kit will not be covered. Please call pharmacist Aroldo at [phone].    Action Taken: Message routed to:  [facility] ([facility]): neurology    Travel Screening: Not Applicable

## 2023-02-06 NOTE — H&P
[facility]    History and Physical - Hospitalist Service       Date of Admission:  2/5/2023    Assessment & Plan      Natasha Allison is a 72 year old female admitted on 2/5/2023.     She has history of amyotrophic lateral sclerosis and her speech is quite altered due to ALS.  She was seen in ER on 2/3 with right-sided chest pain radiating to the left, cough and shortness of breath.  CT scan was negative for PE or any other lung changes and was discharged home.    She now returns to ED with worsening nonradiating central chest pain and shortness of breath.  No cough, fever or expectoration. Vitals on presentation: Heart rate 160, blood pressure 100/67, respiratory rate of 32 and oxygen saturation of 99% on room air.    CT chest was negative for PE or pulmonary disease.  Troponin was 17, 2 days ago and now it is 111.  proBNP is 4742.  VBG shows compensated hypercarbia with pH/PCO2 of 7.41/56 and HCO3 36.  CBC was within normal limits except hemoglobin 15.8    EKG showed atrial flutter with 2:1 conduction and heart rate of 150.  She was given metoprolol 5 mg IV and blood pressure dropped to systolic 90s but later recovered but heart rate did not improve.  She was started on lower dose of amiodarone but she continues to have chest pain and shortness of breath and feels dizzy.      1. Atrial flutter with RVR (2:1 conduction)    This is likely causing chest pain and shortness of breath due to NSTEMI type II (demand ischemia).  Troponin elevated at 111.    As patient's blood pressure dropped with metoprolol and she was having chest pain/shortness of breath, she underwent emergent DC cardioversion in the ER after explaining the risk of stroke and had successful conversion to sinus rhythm at 75/min.  She is very sensitive to sedation and is still heavily sedated and barely arousable and will keep in the ER till she is more awake.  She is breathing spontaneously, protecting her airway and maintaining  her oxygen saturations.  Monitor for symptoms of stroke.    Transition from heparin drip to Eliquis.  May add a beta-blocker upon discharge in the morning if blood pressure allows.    Check echocardiogram and TSH.    2. Bulbar ALS    Patient has vocal cord paralysis causing difficulty speaking and has dysphagia, needing tube feeding.    Prior to admission on ditazole, continued on discharge.    3. History of rheumatoid arthritis.    Prior to admission on infliximab and methotrexate and as needed prednisone.         Diet:  N.p.o., can start tube feeding in the morning.  DVT Prophylaxis: Heparin drip  Sharp Catheter: Not present  Lines: None     Cardiac Monitoring: None  Code Status:  Full code.  It was discussed by me with the .    Clinically Significant Risk Factors Present on Admission                               Disposition Plan      Expected Discharge Date: 02/07/2023                  Nicolás Richardson MD  Hospitalist Service  [facility]  Securely message with [employer] (more info)  Text page via [facility] Paging/Directory     ______________________________________________________________________    Chief Complaint   Shortness of breath and chest pain    History is obtained from the patient's     History of Present Illness   Natasha Allison is a 72 year old female admitted on 2/5/2023.     She has history of amyotrophic lateral sclerosis and her speech is quite altered due to ALS.  She was seen in ER on 2/3 with right-sided chest pain radiating to the left, cough and shortness of breath.  CT scan was negative for PE or any other lung changes and was discharged home.    She now returns to ED with worsening nonradiating central chest pain and shortness of breath.  No cough, fever or expectoration. Vitals on presentation: Heart rate 160, blood pressure 100/67, respiratory rate of 32 and oxygen saturation of 99% on room air.    CT chest was negative for PE or pulmonary disease.  Troponin  "was 17, 2 days ago and now it is 111.  proBNP is 4742.  VBG shows compensated hypercarbia with pH/PCO2 of 7.41/56 and HCO3 36.  CBC was within normal limits except hemoglobin 15.8    EKG showed atrial flutter with 2:1 conduction and heart rate of 150.  She was given metoprolol 5 mg IV and blood pressure dropped to systolic 90s but later recovered but heart rate did not improve.  She was started on lower dose of amiodarone but she continues to have chest pain and shortness of breath and feels dizzy.      Past Medical History    Past Medical History:   Diagnosis Date     Hypertension     lisinopril 10mg daily = annoying runny nose     Osteoporosis     alendronate 5mg daily = stomach upset /nausea     Rheumatoid arthritis(714.0)        Past Surgical History   Past Surgical History:   Procedure Laterality Date     CARPAL TUNNEL RELEASE RT/LT       COLONOSCOPY N/A 3/12/2019    Procedure: COLONOSCOPY;  Surgeon: Jermaine Rock MD;  Location:  GI     IR GASTROSTOMY TUBE PERCUTANEOUS PLCMNT  11/1/2022       Prior to Admission Medications   Prior to Admission Medications   Prescriptions Last Dose Informant Patient Reported? Taking?   B-D TB SYRINGE 27G X 1/2\" 1 ML MISC  Self Yes No   Sig: USE FOR METHOTREXATE INJECTION   albuterol (PROAIR HFA/PROVENTIL HFA/VENTOLIN HFA) 108 (90 Base) MCG/ACT inhaler   No No   Sig: Inhale 2 puffs into the lungs every 6 hours as needed for shortness of breath or wheezing   desoximetasone (TOPICORT) 0.25 % external cream  Self Yes No   Sig: Apply 1 inch topically as needed   folic acid (FOLVITE) 1 MG tablet  Self No No   Sig: Take 1 tablet (1 mg) by mouth daily   inFLIXimab-dyyb (INFLECTRA) 100 MG injection  Self Yes No   Sig: Inject 5mg/kg into the vein every 8 weeks.   lidocaine (LIDODERM) 5 % patch   No No   Sig: Place 1 patch onto the skin every 24 hours To prevent lidocaine toxicity, patient should be patch free for 12 hrs daily.   methotrexate sodium, pres-free, 50 MG/2ML SOLN " injection CHEMO  Self Yes No   Sig: Inject 20 mg Subcutaneous every 7 days On Monday   omeprazole (PRILOSEC) 2 mg/mL suspension   No No   Sig: Take 10 mLs (20 mg) by mouth 2 times daily   predniSONE (DELTASONE) 5 MG tablet   Yes No   Sig: Take 10 mg by mouth daily as needed (as needed for flares ups)   riluzole (RILUTEK) 50 MG tablet   No No   Sig: Take 1 tablet (50 mg) by mouth every 12 hours   syringe, disposable, 1 ML MISC  Self Yes No   Si Syringe once a week To be used with Methotrexate Injections   traZODone (DESYREL) 50 MG tablet   No No   Sig: Take 0.5-1 tablets (25-50 mg) by mouth nightly as needed for sleep      Facility-Administered Medications: None           Physical Exam   Vital Signs:     BP: 126/88 Pulse: (!) 156   Resp: 18 SpO2: 96 % O2 Device: None (Room air)    Weight: 0 lbs 0 oz    General Appearance: Frail female who is difficult to understand due to bulbar ALS but was awake before giving sedation but now deeply sedated.  Eyes: No icterus  HEENT: Moist mucosa  Respiratory: Clear to auscultation  Cardiovascular: S1 and S2 is tachycardic  GI: Soft and nontender.  Has a gastrostomy tube.  Lymph/Hematologic: Not examined  Genitourinary: Not examined  Skin: No rash  Musculoskeletal: Muscle atrophy in the extremities.  Neurologic: Generalized weakness.        Medical Decision Making       MANAGEMENT DISCUSSED with the following over the past 24 hours: EKG, CBC and BMP.   NOTE(S)/MEDICAL RECORDS REVIEWED over the past 24 hours: EKG, CBC and BMP.    Patient course discussed with the patient, her , ER RN and ER physician.  Data     I have personally reviewed the following data over the past 24 hrs:    10.5  \   15.8 (H)   / 173     140 100 24.3 (H) /  132 (H)   4.4 30 (H) 0.59 \       ALT: 20 AST: 34 AP: 68 TBILI: 0.3   ALB: 3.7 TOT PROTEIN: 7.5 LIPASE: N/A       Trop: 111 (HH) BNP: 4,742 (H)       INR:  1.04 PTT:  N/A   D-dimer:  N/A Fibrinogen:  N/A       Imaging results reviewed over the  past 24 hrs:   Recent Results (from the past 24 hour(s))   CT Chest Pulmonary Embolism w Contrast    Narrative    EXAM: CT CHEST PULMONARY EMBOLISM W CONTRAST  LOCATION: [facility]  DATE/TIME: 2/5/2023 6:46 PM    INDICATION: sob, cp,tachy  COMPARISON: CTA chest 02/03/2023  TECHNIQUE: CT chest pulmonary angiogram during arterial phase injection of IV contrast. Multiplanar reformats and MIP reconstructions were performed. Dose reduction techniques were used.   CONTRAST: 50mL Isovue 370    FINDINGS:  ANGIOGRAM CHEST: Pulmonary arteries are normal caliber and negative for pulmonary emboli. Thoracic aorta is negative for dissection. No CT evidence of right heart strain.    LUNGS AND PLEURA: Subsegmental atelectasis both lower lobes. The lungs otherwise are clear. No acute infiltrates or effusions.    MEDIASTINUM/AXILLAE: No adenopathy. Atherosclerotic disease thoracic aorta.    CORONARY ARTERY CALCIFICATION: None.    UPPER ABDOMEN: Normal.    MUSCULOSKELETAL: Normal.      Impression    IMPRESSION:  1.  No evidence for pulmonary emboli.  2.  No evidence for acute pulmonary disease.     Recent Labs   Lab 02/05/23 1747 02/03/23 2030   WBC 10.5 13.4*   HGB 15.8* 14.8   MCV 97 97    152   INR 1.04  --     141   POTASSIUM 4.4 4.0   CHLORIDE 100 100   CO2 30* 30*   BUN 24.3* 19.4   CR 0.59 0.49*   ANIONGAP 10 11   MICHELLE 9.7 9.5   * 89   ALBUMIN 3.7  --    PROTTOTAL 7.5  --    BILITOTAL 0.3  --    ALKPHOS 68  --    ALT 20  --    AST 34  --      Most Recent 3 CBC's:Recent Labs   Lab Test 02/05/23 1747 02/03/23 2030 11/02/22 0712   WBC 10.5 13.4* 7.5   HGB 15.8* 14.8 13.5   MCV 97 97 96    152 137*     Most Recent 3 BMP's:Recent Labs   Lab Test 02/05/23 1747 02/03/23 2030 11/02/22 0712    141 138   POTASSIUM 4.4 4.0 4.3   CHLORIDE 100 100 101   CO2 30* 30* 27   BUN 24.3* 19.4 18.0   CR 0.59 0.49* 0.64   ANIONGAP 10 11 10   MICHELLE 9.7 9.5 9.1   * 89 91     Most  Recent 2 LFT's:Recent Labs   Lab Test 02/05/23  1747 03/02/22  0916   AST 34 25   ALT 20 24   ALKPHOS 68 60   BILITOTAL 0.3 0.6

## 2023-02-06 NOTE — PLAN OF CARE
Goal Outcome Evaluation:    Vitals: Temp: 98.1  F (36.7  C) Temp src: Axillary BP: (!) 153/86 Pulse: 98   Resp: 20 SpO2: 93 % O2 Device: None (Room air) Oxygen Delivery: 2 LPM  Pain: lidocaine patch  Neuro: AO4, severe aphasia  Respiratory: LS clr, denies SOB  Cardiac/tele: Tele SR, denies CP  GI/: WDL  Skin: WDL  LDAs: Piv to left, SL; PEG tube WDL  Diet: NPO   Activity: A1 with gaitbelt  Plan: Echo WDL, pt to discharge home on new medication    AVS reviewed with pt and pt's  who was present at bedside. All questions answered. Pt denies any further questions or concerns. PIV removed, no complications. Telemetry monitor removed. All belongings returned. Pt escorted to front by [facility] staff.

## 2023-02-06 NOTE — PROVIDER NOTIFICATION
Patient was cardioverted in ED prior to coming up. And was listed as IMC in ED. Can we please have IMC and tele orders. Thanks.

## 2023-02-06 NOTE — PROGRESS NOTES
[facility]  Patient is currently open to home care services with [facility]. The patient is currently receiving RN and ST services.  [facility]  and team have been notified of patient admission.  [facility] liaison will continue to follow patient during stay.

## 2023-02-06 NOTE — PLAN OF CARE
"Goal Outcome Evaluation:    OBS    Vitals: BP (!) 142/65   Pulse 87   Temp 98.4  F (36.9  C) (Axillary)   Resp 20   Ht 1.549 m (5' 1\")   Wt 51.1 kg (112 lb 9.6 oz)   SpO2 98%   BMI 21.28 kg/m      Primary DX: Tachycardia  Orientation: A&OX4. Dysphasia. Has paralyzed vocal chords. Difficult to understand but is able to speak well enough for people to understand if listen closely.  present and helped with admission questions. Patient gave permission for him to answer.   Pertinent: Was cardioverted just prior to coming up from ED  Pain: Denies any pain, chest pain, chest pressure, or dizziness   Respiratory: LS clear but diminished.   Cardiac/Tele: SR  Bowel Sounds: +X4Q. ABD soft, non-tender.   GI/: Pure-wick in place. No bm this shift.   Skin: Small amount of irritation from stickers on her chest. Peg tube in ABD. Otherwise clear and intact.   LDA: L PIV SL.   Diet: NPO  Activity: AX1-2  Education:  Educated on using call light and waiting for assist. Able to return demo.   Followed by/ Consults: Nutrition for tube feeding.   Plan: Continue current POC                          "

## 2023-02-06 NOTE — DISCHARGE SUMMARY
[facility]  Hospitalist Discharge Summary      Date of Admission:  2/5/2023  Date of Discharge:  2/6/2023  Discharging Provider: Nicolás Richardson MD  Discharge Service: Hospitalist Service    Discharge Diagnoses       Atrial flutter with rapid ventricular rate    Bulbar ALS    History of rheumatoid arthritis      Follow-ups Needed After Discharge   Follow-up Appointments     Follow-up and recommended labs and tests       Follow up with primary care provider, Beverley Maloney, within 7   days for hospital follow- up.  The following labs/tests are recommended:   CBC and BMP.      Follow-up with cardiology in 2 weeks.               Discharge Disposition   Discharged to home  Condition at discharge: Stable    Hospital Course      Natasha Allison is a 72 year old female admitted on 2/5/2023.     She has history of amyotrophic lateral sclerosis and her speech is quite altered due to ALS.  She was seen in ER on 2/3 with right-sided chest pain radiating to the left, cough and shortness of breath.  CT scan was negative for PE or any other lung changes and was discharged home.    She now returns to ED with worsening nonradiating central chest pain and shortness of breath.  No cough, fever or expectoration. Vitals on presentation: Heart rate 160, blood pressure 100/67, respiratory rate of 32 and oxygen saturation of 99% on room air.    CT chest was negative for PE or pulmonary disease.  Troponin was 17, 2 days ago and now it is 111.  proBNP is 4742.  VBG shows compensated hypercarbia with pH/PCO2 of 7.41/56 and HCO3 36.  CBC was within normal limits except hemoglobin 15.8    EKG showed atrial flutter with 2:1 conduction and heart rate of 150.  She was given metoprolol 5 mg IV and blood pressure dropped to systolic 90s but later recovered but heart rate did not improve.  She was started on lower dose of amiodarone but she continues to have chest pain and shortness of breath and feels  dizzy.      1. Atrial flutter with RVR (2:1 conduction)    This is likely causing chest pain and shortness of breath due to NSTEMI type II (demand ischemia).  Troponin elevated at 111.    As patient's blood pressure dropped with metoprolol and she was having chest pain/shortness of breath, she underwent emergent DC cardioversion in the ER after explaining the risk of stroke and had successful conversion to sinus rhythm at 75/min.  She took a little while to get out of sedation but now has been back to her baseline mental status.    Discharge on Eliquis..    Echocardiogram showed normal ejection fraction and TSH was within normal limits.    2. Bulbar ALS    Patient has vocal cord paralysis causing difficulty speaking and has dysphagia, needing tube feeding.    Prior to admission on ditazole, continued on discharge.    3. History of rheumatoid arthritis.    Prior to admission on infliximab and methotrexate and as needed prednisone.      Consultations This Hospital Stay   PHARMACY IP CONSULT  PHARMACY IP CONSULT    Code Status   Full Code    Time Spent on this Encounter   I, Nicolás Richardson MD, personally saw the patient today and spent less than or equal to 30 minutes discharging this patient.       Nicolás Richardson MD  [place] [facility] [address] [facility]  [address] [place] [address]  Phone: [phone]  Fax: [phone]  ______________________________________________________________________    Physical Exam   Vital Signs: Temp: 98.1  F (36.7  C) Temp src: Axillary BP: (!) 146/83 Pulse: 104   Resp: 20 SpO2: 94 % O2 Device: Nasal cannula Oxygen Delivery: 2 LPM  Weight: 112 lbs 9.6 oz  General Appearance: Frail elderly female in no acute distress..  Eyes: No icterus  HEENT: Moist mucosa  Respiratory: Clear to auscultation  Cardiovascular: S1 and S2 is normal  GI: Soft and nontender.  Has gastrostomy tube.  Lymph/Hematologic: Not examined  Genitourinary: Not examined  Skin: No rash  Musculoskeletal:  No edema  Neurologic: Generalized muscle atrophy.  Has severe dysphonia due to vocal cord paralysis.       Primary Care Physician   Beverley Maloney    Discharge Orders      Reason for your hospital stay    Atrial flutter with rapid ventricular rate     Follow-up and recommended labs and tests     Follow up with primary care provider, Beverley Maloney, within 7 days for hospital follow- up.  The following labs/tests are recommended: CBC and BMP.      Follow-up with cardiology in 2 weeks.     Activity    Your activity upon discharge: activity as tolerated     Diet    Follow this diet upon discharge: Continue tube feeding       Significant Results and Procedures   Most Recent 3 CBC's:Recent Labs   Lab Test 02/05/23 1747 02/03/23  2030 11/02/22  0712   WBC 10.5 13.4* 7.5   HGB 15.8* 14.8 13.5   MCV 97 97 96    152 137*     Most Recent 3 BMP's:Recent Labs   Lab Test 02/05/23 1747 02/03/23  2030 11/02/22  0712    141 138   POTASSIUM 4.4 4.0 4.3   CHLORIDE 100 100 101   CO2 30* 30* 27   BUN 24.3* 19.4 18.0   CR 0.59 0.49* 0.64   ANIONGAP 10 11 10   MICHELLE 9.7 9.5 9.1   * 89 91     Most Recent 2 LFT's:Recent Labs   Lab Test 02/05/23 1747 03/02/22  0916   AST 34 25   ALT 20 24   ALKPHOS 68 60   BILITOTAL 0.3 0.6       Discharge Medications   Current Discharge Medication List      START taking these medications    Details   apixaban ANTICOAGULANT (ELIQUIS) 5 MG tablet Take 1 tablet (5 mg) by mouth 2 times daily  Qty: 60 tablet, Refills: 1    Associated Diagnoses: Atrial flutter with rapid ventricular response (H)      metoprolol tartrate (LOPRESSOR) 25 MG tablet Take 1 tablet (25 mg) by mouth 2 times daily  Qty: 60 tablet, Refills: 1    Associated Diagnoses: Atrial flutter with rapid ventricular response (H)         CONTINUE these medications which have NOT CHANGED    Details   albuterol (PROAIR HFA/PROVENTIL HFA/VENTOLIN HFA) 108 (90 Base) MCG/ACT inhaler Inhale 2 puffs into the lungs every 6  "hours as needed for shortness of breath or wheezing  Qty: 18 g, Refills: 3    Comments: Pharmacy may dispense brand covered by insurance (Proair, or proventil or ventolin or generic albuterol inhaler)  Associated Diagnoses: SOB (shortness of breath) on exertion      desoximetasone (TOPICORT) 0.25 % external cream Apply 1 inch topically as needed      folic acid (FOLVITE) 1 MG tablet Take 1 tablet (1 mg) by mouth daily  Qty: 100 tablet, Refills: 3    Associated Diagnoses: Rheumatoid arthritis involving multiple sites with positive rheumatoid factor (H)      methotrexate sodium, pres-free, 50 MG/2ML SOLN injection CHEMO Inject 20 mg Subcutaneous every 7 days On Monday  Refills: 0      riluzole (RILUTEK) 50 MG tablet Take 1 tablet (50 mg) by mouth every 12 hours  Qty: 60 tablet, Refills: 2    Associated Diagnoses: ALS (amyotrophic lateral sclerosis) (H)      traZODone (DESYREL) 50 MG tablet Take 0.5-1 tablets (25-50 mg) by mouth nightly as needed for sleep  Qty: 30 tablet, Refills: 0      B-D TB SYRINGE 27G X 1/2\" 1 ML MISC USE FOR METHOTREXATE INJECTION      inFLIXimab-dyyb (INFLECTRA) 100 MG injection Inject 5mg/kg into the vein every 8 weeks.      lidocaine (LIDODERM) 5 % patch Place 1 patch onto the skin every 24 hours To prevent lidocaine toxicity, patient should be patch free for 12 hrs daily.  Qty: 15 patch, Refills: 0      omeprazole (PRILOSEC) 2 mg/mL suspension Take 10 mLs (20 mg) by mouth 2 times daily  Qty: 300 mL, Refills: 0    Associated Diagnoses: Chronic gastritis without bleeding, unspecified gastritis type      predniSONE (DELTASONE) 5 MG tablet Take 10 mg by mouth daily as needed (as needed for flares ups)      syringe, disposable, 1 ML MISC 1 Syringe once a week To be used with Methotrexate Injections           Allergies   Allergies   Allergen Reactions     Alendronic Acid      Severe aching     Latex      Seasonal Allergies Other (See Comments)     Watery eyes, runny nose.     "

## 2023-02-06 NOTE — ED NOTES
[facility]  ED Nurse Handoff Report    Natasha Allison is a 72 year old female   ED Chief complaint: Shortness of Breath and Chest Pain  . ED Diagnosis:   Final diagnoses:   Paroxysmal atrial fibrillation (H)   Myocardial injury   Tachycardia induced cardiomyopathy (H)     Allergies:   Allergies   Allergen Reactions     Alendronic Acid      Severe aching     Latex      Seasonal Allergies Other (See Comments)     Watery eyes, runny nose.       Code Status: Full Code  Activity level - Baseline/Home:  Total Care. Activity Level - Current:   Total Care. Lift room needed: No. Bariatric: No   Needed: No   Isolation: No. Infection: Not Applicable.     Vital Signs:   Vitals:    02/05/23 2145 02/05/23 2146 02/05/23 2147 02/05/23 2148   BP: 92/62      Pulse: 75 74 75 75   Resp: 24 25 24 24   SpO2: 95% 94% 94% 94%       Cardiac Rhythm:  ,   Cardiac  Cardiac Rhythm: Atrial fibrillation  Pain level:    Patient confused: No. Patient Falls Risk: No.   Elimination Status: Has voided   Patient Report - Initial Complaint: She has history of amyotrophic lateral sclerosis and her speech is quite altered due to ALS.  She was seen in ER on 2/3 with right-sided chest pain radiating to the left, cough and shortness of breath.  CT scan was negative for PE or any other lung changes and was discharged home.     She now returns to ED with worsening nonradiating central chest pain and shortness of breath.  No cough, fever or expectoration. Vitals on presentation: Heart rate 160, blood pressure 100/67, respiratory rate of 32 and oxygen saturation of 99% on room air.     CT chest was negative for PE or pulmonary disease.  Troponin was 17, 2 days ago and now it is 111.  proBNP is 4742.  VBG shows compensated hypercarbia with pH/PCO2 of 7.41/56 and HCO3 36.  CBC was within normal limits except hemoglobin 15.8     EKG showed atrial flutter with 2:1 conduction and heart rate of 150.  She was given metoprolol 5 mg IV and blood  pressure dropped to systolic 90s but later recovered but heart rate did not improve.  She was started on lower dose of amiodarone but she continues to have chest pain and shortness of breath and feels dizzy.   . Focused Assessment:   Pt has hx ALS. Early this morning pt developed difficulty breathing, chest pain that has become worse throughout the day.  counted heart rate at 160. Pt appears uncomfortable and short of breath. Was seen 2 days ago for similar symptoms.      unablt to obtain accurate temp in triage due to cold temps outside and pt unable to close mouth LW          1709  Triage Completed Triage Completed         Tests Performed:   CT Chest Pulmonary Embolism w Contrast   Final Result   IMPRESSION:   1.  No evidence for pulmonary emboli.   2.  No evidence for acute pulmonary disease.      POC US ECHO LIMITED    (Results Pending)    . Abnormal Results:   Labs Ordered and Resulted from Time of ED Arrival to Time of ED Departure   COMPREHENSIVE METABOLIC PANEL - Abnormal       Result Value    Sodium 140      Potassium 4.4      Chloride 100      Carbon Dioxide (CO2) 30 (*)     Anion Gap 10      Urea Nitrogen 24.3 (*)     Creatinine 0.59      Calcium 9.7      Glucose 132 (*)     Alkaline Phosphatase 68      AST 34      ALT 20      Protein Total 7.5      Albumin 3.7      Bilirubin Total 0.3      GFR Estimate >90     TROPONIN T, HIGH SENSITIVITY - Abnormal    Troponin T, High Sensitivity 111 (*)    NT PROBNP INPATIENT - Abnormal    N terminal Pro BNP Inpatient 4,742 (*)    CBC WITH PLATELETS AND DIFFERENTIAL - Abnormal    WBC Count 10.5      RBC Count 5.07      Hemoglobin 15.8 (*)     Hematocrit 49.3 (*)     MCV 97      MCH 31.2      MCHC 32.0      RDW 12.5      Platelet Count 173      % Neutrophils 73      % Lymphocytes 19      % Monocytes 8      % Eosinophils 0      % Basophils 0      % Immature Granulocytes 0      NRBCs per 100 WBC 0      Absolute Neutrophils 7.6      Absolute Lymphocytes 2.0       Absolute Monocytes 0.8      Absolute Eosinophils 0.0      Absolute Basophils 0.0      Absolute Immature Granulocytes 0.0      Absolute NRBCs 0.0     ROUTINE UA WITH MICROSCOPIC REFLEX TO CULTURE - Abnormal    Color Urine Light Yellow      Appearance Urine Clear      Glucose Urine Negative      Bilirubin Urine Negative      Ketones Urine Negative      Specific Gravity Urine 1.005      Blood Urine Negative      pH Urine 7.0      Protein Albumin Urine 10 (*)     Urobilinogen Urine Normal      Nitrite Urine Negative      Leukocyte Esterase Urine Negative      Mucus Urine Present (*)     Amorphous Crystals Urine Few (*)     RBC Urine 3 (*)     WBC Urine <1      Squamous Epithelials Urine <1     INR - Normal    INR 1.04     INFLUENZA A/B & SARS-COV2 PCR MULTIPLEX - Normal    Influenza A PCR Negative      Influenza B PCR Negative      RSV PCR Negative      SARS CoV2 PCR Negative     TSH WITH FREE T4 REFLEX    .   Treatments provided: See MAR. Cardioversion  Family Comments:   OBS brochure/video discussed/provided to patient:  Yes  ED Medications:   Medications   metoprolol (LOPRESSOR) injection 5 mg (5 mg Intravenous Given 2/5/23 1915)   metoprolol (LOPRESSOR) injection 2.5 mg (0 mg Intravenous Hold 2/5/23 1953)   metoprolol (LOPRESSOR) injection 2.5 mg (has no administration in time range)   fentaNYL (PF) (SUBLIMAZE) injection 25 mcg (has no administration in time range)   propofol (DIPRIVAN) injection 10 mg/mL vial (has no administration in time range)   propofol (DIPRIVAN) injection 10 mg/mL vial (has no administration in time range)   apixaban ANTICOAGULANT (ELIQUIS) tablet 5 mg (has no administration in time range)   fentaNYL (PF) (SUBLIMAZE) injection (25 mcg Intravenous Given 2/5/23 2123)   propofol (DIPRIVAN) injection 10 mg/mL vial (20 mg Intravenous Given 2/5/23 2131)   LORazepam (ATIVAN) injection 0.5 mg (0.5 mg Intravenous Given 2/5/23 1756)   0.9% sodium chloride BOLUS (0 mLs Intravenous Stopped 2/5/23  1953)   iopamidol (ISOVUE-370) solution 500 mL (50 mLs Intravenous Given 2/5/23 1843)   for CT scan flush use (71 mLs Intravenous Given 2/5/23 1843)   heparin loading dose for LOW INTENSITY TREATMENT * Give BEFORE starting heparin infusion (2,900 Units Intravenous Given 2/5/23 1935)   0.9% sodium chloride BOLUS (0 mLs Intravenous Stopped 2/5/23 2040)   amiodarone (NEXTERONE) bolus 150 mg (150 mg Intravenous New Bag 2/5/23 2047)     Drips infusing:  No  For the majority of the shift, the patient's behavior Green. Interventions performed were NA.    Sepsis treatment initiated: No     Patient tested for COVID 19 prior to admission: NO    ED Nurse Name/Phone Number: Cinda Tompkins RN,   9:58 PM   RECEIVING UNIT ED HANDOFF REVIEW    Above ED Nurse Handoff Report was reviewed: Yes  Reviewed by: Deanna Berry RN on February 5, 2023 at 10:06 PM

## 2023-02-07 PROBLEM — I95.9 HYPOTENSION, UNSPECIFIED HYPOTENSION TYPE: Status: ACTIVE | Noted: 2023-01-01

## 2023-02-07 PROBLEM — I48.91 ATRIAL FIBRILLATION WITH RVR (H): Status: ACTIVE | Noted: 2023-01-01

## 2023-02-07 PROBLEM — R41.89 UNRESPONSIVE: Status: ACTIVE | Noted: 2023-01-01

## 2023-02-07 NOTE — PROGRESS NOTES
formerly [facility] ICU VENTILATOR RESPIRATORY NOTE  Date of Admission: 2/7/23  Date of Intubation (most recent): 2/7/23  Reason for Mechanical Ventilation: Airway protection  Number of Days on Mechanical Ventilation: 1  Met Criteria for Pressure Support Trial: No    Reason for No Pressure Support Trial: Airway unstable, on pressors  Significant Events Today: Transferred up from ED to [facility]  ABG Results:   Recent Labs   Lab 02/07/23  0930 02/07/23  0634 02/03/23  2219   PH 7.28*  --   --    PCO2 54*  --   --    PO2 101  --   --    HCO3 26  --   --    O2PER 30 0 0       ETT appearance on chest x-ray: 7.5 ETT 24@teeth. Xray shows 3 cm above yumiko    Plan:  Continue to monitor respiratory status and wean as tolerated

## 2023-02-07 NOTE — ED PROVIDER NOTES
History     Chief Complaint:  Chest Pain      The history is provided by the EMS personnel and the spouse. The history is limited by the condition of the patient.      Natasha Allison is a 72 year old female with a history of ALS, hypertension, and atrial flutter on blood thinners who presents with chest pain. She arrives by EMS from home. Her  reports to EMS that she started experiencing chest pain and shortness of breath 1.5 hours ago. On EMS arrival she was alert but decompensated and became unresponsive over their care. EMS performed cardioversion of 70 homer which did not help reportedly but slowed her heart rate. She was shocked a total of 4 times without improvement.  She was given 50 mg of fentanyl, 10 mg versed, and 1 mg of ativan. EMS reported pulses the whole time. Her blood pressures were low. She was here 3 days ago for the same symptoms and had a cardioversion. She was recently diagnosed with ALS but she has not started her new medications for it.     Per 's report, the patient began to complain of chest pain and rapid heart rate last night but the heart rate seemed ok on pulse oximetry so they did not come in at that time. Symptoms persisted this morning and 911 was called. She was responsive at that time, becoming unresponsive at the scene reportedly after being shocked by EMS.  also notes she only took Eliquis while in hospital (has not filled outpatient Rx yet).     Independent Historian:   Spouse/Partner - They report as noted in the HPI and EMS - They report as noted in the HPI    Review of External Notes: Reviewed recent ED visit and admission for presentation consistent with new onset atrial fibrillation with recent cardioversion and initiation of anticoagulation therapy.    ROS:  Review of Systems   Unable to perform ROS: Patient unresponsive       Allergies:  Alendronic Acid  Latex  Seasonal Allergies     Medications:     Eliquis  Proair  Lidoderm  Lopressor  Prilosec  Kaylieelizabeth Witt  Inflectra  Deltasone    Past Medical History:    Atrial flutter  Hypertension  Osteoporosis  RA  ALS  Long term use of systemic steroids  Osteopenia  Psoriasis     Past Surgical History:    Carpal tunnel release  Colonoscopy  IR gastrostomy tube percutaneous    Family History:     Brother: colon cancer, hypertension, intellectual disability  Father: heart disease, hypertension  Mother: hypertension  Sister: hypertension  Son: hypertension    Social History:  Reports that she quit smoking about 44 years ago. Her smoking use included cigarettes. She has a 13.50 pack-year smoking history. She has never used smokeless tobacco. She reports current alcohol use. She reports that she does not use drugs.  PCP: Beverley Maloney     Physical Exam     Patient Vitals for the past 24 hrs:   BP Temp Temp src Pulse Resp SpO2 Height Weight   02/07/23 0911 -- 97.3  F (36.3  C) -- 70 16 99 % -- --   02/07/23 0906 114/69 97.3  F (36.3  C) -- 70 11 100 % -- --   02/07/23 0901 111/67 97.3  F (36.3  C) -- 70 12 100 % -- --   02/07/23 0900 111/67 97.3  F (36.3  C) -- 70 13 99 % -- --   02/07/23 0856 112/69 97.3  F (36.3  C) -- 69 12 100 % -- --   02/07/23 0851 110/72 97.3  F (36.3  C) -- 67 12 100 % -- --   02/07/23 0846 -- 97.3  F (36.3  C) -- 67 12 100 % -- --   02/07/23 0845 111/64 97.3  F (36.3  C) -- 68 12 100 % -- --   02/07/23 0842 -- 97.3  F (36.3  C) -- 68 (!) 9 99 % -- --   02/07/23 0837 (!) 86/57 97.3  F (36.3  C) -- 69 12 99 % -- --   02/07/23 0832 (!) 88/60 97.3  F (36.3  C) -- 71 11 99 % -- --   02/07/23 0830 (!) 88/60 97.3  F (36.3  C) -- 70 12 98 % -- --   02/07/23 0825 102/64 97.2  F (36.2  C) -- 75 20 95 % -- --   02/07/23 0820 (!) 76/42 (!) 95.9  F (35.5  C) -- 68 (!) 9 98 % -- --   02/07/23 0815 (!) 77/61 (!) 95.9  F (35.5  C) -- 67 12 99 % -- --   02/07/23 0810 (!) 81/69 (!) 95.9  F (35.5  C) -- 66 11 99 % -- --   02/07/23 0805 (!) 84/60 (!) 95.7   F (35.4  C) -- 66 (!) 9 100 % -- --   02/07/23 0800 114/59 (!) 95.7  F (35.4  C) -- 65 11 99 % -- --   02/07/23 0755 98/68 (!) 95.7  F (35.4  C) -- 65 12 99 % -- --   02/07/23 0750 105/66 (!) 95.5  F (35.3  C) -- 65 11 100 % -- --   02/07/23 0745 105/68 (!) 95.5  F (35.3  C) -- 66 12 100 % -- --   02/07/23 0740 110/67 (!) 95.4  F (35.2  C) -- 66 12 100 % -- --   02/07/23 0738 -- (!) 95.4  F (35.2  C) -- 65 11 100 % -- --   02/07/23 0735 110/69 (!) 95.4  F (35.2  C) -- 65 11 100 % -- --   02/07/23 0733 110/69 (!) 95.4  F (35.2  C) -- 66 11 100 % -- --   02/07/23 0728 126/86 97.4  F (36.3  C) Rectal 67 16 98 % -- --   02/07/23 0723 115/78 (!) 96.4  F (35.8  C) -- 66 23 98 % -- --   02/07/23 0718 127/83 (!) 94.6  F (34.8  C) -- 66 10 100 % -- --   02/07/23 0713 128/78 -- -- 67 10 99 % -- --   02/07/23 0708 127/78 -- -- 70 -- 100 % -- --   02/07/23 0703 136/80 -- -- 77 -- 100 % -- --   02/07/23 0700 (!) 151/88 -- -- 74 16 99 % -- --   02/07/23 0655 (!) 148/86 -- -- 80 17 100 % -- --   02/07/23 0650 (!) 148/97 -- -- -- -- 99 % -- --   02/07/23 0635 -- -- -- -- 13 -- -- --   02/07/23 0630 104/76 -- -- 101 (!) 9 93 % -- --   02/07/23 0625 90/66 -- -- 105 10 100 % -- --   02/07/23 0620 (!) 83/58 -- -- (!) 163 (!) 9 (!) 84 % -- --   02/07/23 0615 -- -- -- -- -- 98 % -- --   02/07/23 0609 96/79 -- -- (!) 158 (!) 4 (!) 84 % -- --        Physical Exam  General: Elderly female, obtunded, laying on stretcher  Eyes: left pupil constricted/fixed, cornea clouded. Right pupil 3-4mm, sluggish; Conjunctive within normal limits  HENT:No scalp hematoma or skull deformity. Dry mucous membranes, oropharynx clear.   CV: Normal S1S2, unable to appreciate murmur or rub. Tachycardic, irregularly irregular  Resp: Apneic breathing. Lungs CTAB.   GI: Abdomen is soft and nondistended. No palpable mass. G-tube in place.   MSK: No edema.  Skin: Cool and dry. No rashes or lesions or ecchymoses on visible skin.  Neuro: Nonverbal. No response to  noxious stimuli.   Psych: Normal mood and affect. Pleasant.    Emergency Department Course   ECG  ECG taken at 0624, ECG read at 0625  Sinus tachycardia. Possible left atrial enlargement. Abnormal QRS-T angle, consider primary T wave abnormality.  Rate 104 bpm. NH interval 116 ms. QRS duration 74 ms. QT/QTc 336/441 ms. P-R-T axes 68 52 -10.     EMS rhythm strip below:         Imaging:  Head CT w/o contrast   Final Result   IMPRESSION:   1.  No acute intracranial abnormality.      2.  Mild age-related change.      XR Chest Port 1 View   Final Result   IMPRESSION: Endotracheal tube 3 cm above yumiko. Lungs clear. Heart size normal. No pleural collections.           Report per radiology    Laboratory:  Labs Ordered and Resulted from Time of ED Arrival to Time of ED Departure   COMPREHENSIVE METABOLIC PANEL - Abnormal       Result Value    Sodium 141      Potassium 3.9      Chloride 109 (*)     Carbon Dioxide (CO2) 24      Anion Gap 8      Urea Nitrogen 12.8      Creatinine 0.41 (*)     Calcium 7.7 (*)     Glucose 138 (*)     Alkaline Phosphatase 49      AST 27      ALT 14      Protein Total 5.5 (*)     Albumin 2.5 (*)     Bilirubin Total 0.5      GFR Estimate >90     TROPONIN T, HIGH SENSITIVITY - Abnormal    Troponin T, High Sensitivity 82 (*)    MAGNESIUM - Abnormal    Magnesium 1.6 (*)    BLOOD GAS VENOUS WITH OXYHEMOGLOBIN - Abnormal    pH Venous 7.31 (*)     pCO2 Venous 62 (*)     pO2 Venous 39      Bicarbonate Venous 31 (*)     FIO2 0      Oxyhemoglobin Venous 63 (*)     Base Excess/Deficit (+/-) 3.1 (*)    INR - Abnormal    INR 1.18 (*)    ROUTINE UA WITH MICROSCOPIC REFLEX TO CULTURE - Abnormal    Color Urine Light Yellow      Appearance Urine Clear      Glucose Urine Negative      Bilirubin Urine Negative      Ketones Urine Trace (*)     Specific Gravity Urine 1.012      Blood Urine Trace (*)     pH Urine 7.0      Protein Albumin Urine Negative      Urobilinogen Urine Normal      Nitrite Urine Negative       Leukocyte Esterase Urine Negative      Mucus Urine Present (*)     Amorphous Crystals Urine Many (*)     RBC Urine 5 (*)     WBC Urine 1      Squamous Epithelials Urine <1     CBC WITH PLATELETS AND DIFFERENTIAL - Abnormal    WBC Count 8.7      RBC Count 4.51      Hemoglobin 14.0      Hematocrit 44.5      MCV 99      MCH 31.0      MCHC 31.5      RDW 12.2      Platelet Count 137 (*)     % Neutrophils 81      % Lymphocytes 12      % Monocytes 7      % Eosinophils 0      % Basophils 0      % Immature Granulocytes 0      NRBCs per 100 WBC 0      Absolute Neutrophils 7.0      Absolute Lymphocytes 1.1      Absolute Monocytes 0.6      Absolute Eosinophils 0.0      Absolute Basophils 0.0      Absolute Immature Granulocytes 0.0      Absolute NRBCs 0.0     BLOOD GAS ARTERIAL - Abnormal    pH Arterial 7.28 (*)     pCO2 Arterial 54 (*)     pO2 Arterial 101      FIO2 30      Bicarbonate Arterial 26      Base Excess/Deficit (+/-) -1.7      Dean's Test Yes     GLUCOSE BY METER - Abnormal    GLUCOSE BY METER POCT 115 (*)    LACTIC ACID WHOLE BLOOD - Normal    Lactic Acid 1.3     PARTIAL THROMBOPLASTIN TIME - Normal    aPTT 30     GLUCOSE MONITOR NURSING POCT   BLOOD CULTURE   BLOOD CULTURE        Procedures   Electrical Cardioversion         Procedure: Electrical Cardioversion        Indication: Atrial Fibrillation with hypotension     Consent: Unable/Emergent     Universal Protocol: Universal protocol was followed and time out conducted just prior to starting procedure, confirming patient identity, site/side, procedure, patient position, and availability of correct equipment and implants.      Anesthesia/Sedation: Sedation: The patient was sedated, see separate procedure note for details.      Procedure Detail:   Electrodes were placed: Anterior/posterior  Trial #1: Synchronized Cardioversion at 150 Joules   Cardioversion was successful.     Patient Status:  The patient tolerated the procedure well: Yes. There were no  complications.     Rapid Sequence Intubation      Procedure: Rapid Sequence Intubation    Consent: Unable/Emergent     Indication: Impending respiratory failure/Altered mental status    Preparation: Preoxygenation with Nasal Cannula and BVM.     Sedation: Succinylcholine    Paralytic: Rocuronium    Procedure Detail:   The patient was intubated with a 7.5 endotracheal tube using Direct Laryngoscopy.  Following intubation, the patient's breath sounds were Equal.  ET Tube placement was confirmed with Auscultation, Chest X-ray and ETCO2.    Monitoring: Monitoring consisted of heart rate, cardiac monitor, continuous pulse oximetry, blood pressure checks, level of consciousness, IV access, constant attendance by RN, and MD attendance until patient stable or transfer of care.      Patient Status: The patient tolerated the procedure well: Yes. There were no complications.      Emergency Department Course & Assessments:         Interventions:  Medications   amiodarone (CORDARONE) 900 mg in 500 mL (1.8 mg/mL) infusion (ADULT STANDARD) (1 mg/min Intravenous New Bag 2/7/23 0717)   amiodarone (CORDARONE) 900 mg in 500 mL (1.8 mg/mL) infusion (ADULT STANDARD) (has no administration in time range)   propofol (DIPRIVAN) infusion (5 mcg/kg/min × 51.1 kg Intravenous New Bag 2/7/23 0725)   apixaban ANTICOAGULANT (ELIQUIS) tablet 5 mg (has no administration in time range)   metoprolol tartrate (LOPRESSOR) tablet 25 mg (has no administration in time range)   riluzole (RILUTEK) tablet 50 mg (has no administration in time range)   glucose gel 15-30 g (has no administration in time range)     Or   dextrose 50 % injection 25-50 mL (has no administration in time range)     Or   glucagon injection 1 mg (has no administration in time range)   Patient is already receiving anticoagulation with heparin, enoxaparin (LOVENOX), warfarin (COUMADIN)  or other anticoagulant medication (has no administration in time range)   senna-docusate  (SENOKOT-S/PERICOLACE) 8.6-50 MG per tablet 1 tablet (has no administration in time range)     Or   senna-docusate (SENOKOT-S/PERICOLACE) 8.6-50 MG per tablet 2 tablet (has no administration in time range)   magnesium hydroxide (MILK OF MAGNESIA) suspension 30 mL (has no administration in time range)   pantoprazole (PROTONIX) 2 mg/mL suspension 40 mg (has no administration in time range)     Or   pantoprazole (PROTONIX) IV push injection 40 mg (has no administration in time range)   etomidate (AMIDATE) injection 30 mg (30 mg Intravenous Given 2/7/23 0616)   rocuronium injection 50 mg (50 mg Intravenous Given 2/7/23 0617)   amiodarone (NEXTERONE) bolus 150 mg (0 mg Intravenous Stopped 2/7/23 0737)   0.9% sodium chloride BOLUS (0 mLs Intravenous Stopped 2/7/23 0819)        Independent Interpretation (X-rays, CTs, rhythm strip):  I reviewed the patient's portable CXR. ETT in appropriate place. No pneumothorax. No infiltrate. No appreciable pulmonary edema.     Consultations/Discussion of Management or Tests:  ED Course as of 02/07/23 0824   Tue Feb 07, 2023   0710 I spoke with Dr. Kimble, Hospitalist, regarding the patient.        Social Determinants of Health affecting care:   None    Assessments:  0604 I assessed the patient.   I intubated the patient.   0620 I administered the cardioversion at 150 jules.  0636 I spoke with the patient's .   0712 I rechecked the patient.   0726   I reassessed the patient.   0746 I updated the family in the room.   0838 I rechecked the patient.     Disposition:  The patient was admitted to the hospital under the care of Dr. Kimble.     Impression & Plan        Medical Decision Making:  Natasha Allison is a 73 y/o female with bulbar ALS and recent diagnosis of atrial fibrillation who presents in unstable condition with hypotension, atrial fibrillation with rvr, and obtunded. Due to concerns for airway stability, the patient was intubated on arrival. Although 4 attempts at  cardioversion prior to arrival in the ED were unsuccessful, cardioversion here after intubation was successful and she was in sinus rhythm with subsequent improved blood pressure suggesting that the afib rvr was the cause of the hypotension rather than sepsis, massive PE, hypovolemia, etc. As for her altered state on arrival to the point of obtundation, not entirely clear but medications used prior to cardioversion in field, respiratory difficulty with subsequent hypoxia or hypercarbia and hypotension considered as possible causes. She was on propofol for sedation after blood pressure improved and amiodarone for rhythm control but became briefly hypotensive. As it was possible that this was medication induced, propofol and then amiodarone were discontinued and her blood pressure improved. She remained in critical condition but otherwise stable over the remaining ED stay. Family was updated throughout here stay.     Critical Care time:  was 78 minutes for this patient excluding procedures.    Diagnosis:    ICD-10-CM    1. Atrial fibrillation with RVR (H)  I48.91       2. Unresponsive  R41.89       3. Hypotension, unspecified hypotension type  I95.9              Trang Yen  2/7/2023   Liz Cordoba MD Jonkman, Tracy Dianne, MD  02/07/23 1413

## 2023-02-07 NOTE — PROGRESS NOTES
Assessment: Ms. Allison is a 72 year old lady with a medical history of bulbar ALS c/b dysphagia requiring PEG, BiPAP at night however able to ambulate independently, recently hospitalized 2/5-6 with new AF with RVR discharged on apixaban who is admitted to the ICU 2/7 with another episode of AF with RVR causing SOB and requiring DCCV. Today, Ms. Allison presents critically ill with acute respiratory failure with hypercarbia and AF with RVR.     I personally reviewed vital signs, medications, labs and imaging.    Active problems and current treatments include:    1. Acute respiratory failure with hypercarbia-Continue mechanical ventilation, likely patient not maintaining a normal respiratory rate as a result of medication administered for DCCV, discontinue midazolam and initiate precedex for sedation, assess mental status and initiate pressure support trial if appropriate.  2. AF with RVR-Continue apixaban, CT-PE negative for PE and LVEF>70% on TTE during recent admission, continue apixaban, continue amiodarone infusion. Cardiology consulted for management of refractory AF with RVR necessitating DCCV, appreciate recommendations.  3. ID-No acute infectious concerns.  4. Nutrition-Continue tube feeding via PEG tube  5. Prophylaxis-PPI, apixaban as above     I personally managed the ventilator, hemodynamics, sedation, analgesia, metabolic abnormalities and nutritional status.      I spent 34 minutes of critical care time exclusive of procedures evaluating and managing this patient, discussing with the consultants, and updating the patient and family.    Najma Gallardo M.D.

## 2023-02-07 NOTE — PROGRESS NOTES
Patient was intubated at 0620 without issue, bagged prior. Patient was intubated with 7.5 ETT 24 at teeth, confirmed with ausculation and CXR. Currently ventilated 12/350/+5/30%.     Óscar Cohen RT on 2/7/2023 at 6:35 AM

## 2023-02-07 NOTE — ED TRIAGE NOTES
Pt BIBA c/o of CP and SOB. Pt became unresponsive. 5 attempts of cardioversion by EMS. Pt arrives to ER unresponsive. ACLS activated.      Triage Assessment     Row Name 02/07/23 0610       Skin Circulation/Temperature WDL    Skin Circulation/Temperature WDL X;temperature    Skin Temperature cool       Cardiac WDL    Cardiac WDL X;rhythm    Cardiac Rhythm Atrial fibrillation       Cognitive/Neuro/Behavioral WDL    Cognitive/Neuro/Behavioral WDL X    Level of Consciousness unresponsive    Arousal Level unresponsive    Row Name 02/07/23 0609       Triage Assessment (Adult)    Airway WDL X;airway symptoms    Airway Symptoms unable to maintain airway    Airway Interventions head tilt, chin lift;jaw thrust       Respiratory WDL    Respiratory WDL rhythm/pattern    Rhythm/Pattern, Respiratory shallow;shortness of breath

## 2023-02-07 NOTE — CONSULTS
CLINICAL NUTRITION SERVICES - ASSESSMENT NOTE     Nutrition Prescription      Malnutrition Status:    % Intake: Decreased intake does not meet criteria  % Weight Loss: Weight loss does not meet criteria  Subcutaneous Fat Loss: None observed--difficult to accurately assess d/t ET tube and positioning  Muscle Loss: Unable to assess--pt with ALS, but does ambulate, positioning/cuffs/lines made it very difficult to assess adequately  Fluid Accumulation/Edema: None noted  Malnutrition Diagnosis: Unable to determine due to need for thorough NFPE    Recommendations already ordered by Registered Dietitian (RD):  Enteral Nutrition - Initiate via G-tube - Osmolite 1.5 Vahid @ goal of  45ml/hr  (1080ml/day)  will provide: 1620 kcals (~32 kcal/kg), 67 g PRO (1.3 g/kg), 822 ml free H20, 219 g CHO, and 0 g fiber daily.   Order Certavite daily to meet 100% of DRIs  Flushes of 75 ml q 4 hrs = 1272 ml (25 ml/kg)    Spoke with RN regarding Phos--he will connect with MD regarding replacement as lab just resulted this afternoon    Future/Additional Recommendations:  Monitor TF tolerance, labs--adjust prn     REASON FOR ASSESSMENT  Natasha Allison is a/an 72 year old female assessed by the dietitian for Provider Order - Registered Dietitian to Assess and Order TF per Medical Nutrition Therapy Protocol    Pt admitted d/t acute respiratory failure with hypercarbia and AF with RVR. PMH significant for bulbar ALS c/b dysphagia requiring PEG, BiPAP at night however able to ambulate independently, recently hospitalized 2/5-6 with new AF with RVR    Pt currently intubated.     NUTRITION HISTORY  - Information obtained from patient's  and chart. Patient has been receiving EN at home for a few months via G-tube. She had lost weight prior to the initiation of TF, but was starting to gain some back. She has been receiving 4-5 cartons of Osmolite 1.5 at home recently divided among 4 gravity feedings per day, but she's not felt well for several  days so her  has been dividing her cartons and giving more small, frequent feedings. They have had some difficulty consistently accessing Osmolite so she has received the Nestle equivalent (assume Nutren 1.5). She receives 75 ml before and after her feedings to meet hydration needs.  - Diet at home: NPO  - Allergies: NKFA    CURRENT NUTRITION ORDERS  Diet: No diet order    LABS- .  Electrolytes  Sodium (mmol/L)   Date Value   02/07/2023 141   02/05/2023 140   02/03/2023 141   03/10/2021 138   02/20/2020 135   04/03/2019 139     Potassium (mmol/L)   Date Value   02/07/2023 3.9   02/05/2023 4.4   02/03/2023 4.0   03/02/2022 4.1   03/10/2021 4.3   02/20/2020 4.7   04/03/2019 4.3    Blood Glucose  Glucose (mg/dL)   Date Value   02/07/2023 138 (H)   02/05/2023 132 (H)   02/03/2023 89   11/02/2022 91   11/01/2022 84   03/02/2022 94   03/10/2021 91   02/20/2020 83   04/03/2019 99   10/29/2018 81   03/15/2018 93     GLUCOSE BY METER POCT (mg/dL)   Date Value   02/07/2023 127 (H)   02/07/2023 115 (H)    Inflammatory Markers  WBC (10e9/L)   Date Value   03/10/2021 6.3   03/15/2018 10.2   06/06/2003 7.3     WBC Count (10e3/uL)   Date Value   02/07/2023 8.7   02/05/2023 10.5   02/03/2023 13.4 (H)     Albumin (g/dL)   Date Value   02/07/2023 2.5 (L)   02/05/2023 3.7   03/02/2022 3.8   03/10/2021 3.3 (L)   02/20/2020 3.8   04/03/2019 4.1      Magnesium (mg/dL)   Date Value   02/07/2023 2.1   02/07/2023 1.6 (L)   11/02/2022 1.9     Phosphorus (mg/dL)   Date Value   02/07/2023 1.8 (L)   11/02/2022 3.8    Renal  Urea Nitrogen (mg/dL)   Date Value   02/07/2023 12.8   02/05/2023 24.3 (H)   02/03/2023 19.4   03/02/2022 14   03/10/2021 16   02/20/2020 22   04/03/2019 26     Creatinine (mg/dL)   Date Value   02/07/2023 0.41 (L)   02/05/2023 0.59   02/03/2023 0.49 (L)   03/10/2021 0.85   03/02/2021 0.90   02/20/2020 0.83     Additional  Triglycerides (mg/dL)   Date Value   03/02/2022 82   03/10/2021 91   02/20/2020 136   04/03/2019  "130     Ketones Urine (mg/dL)   Date Value   02/07/2023 Trace (A)   02/20/2020 Negative          MEDICATIONS- pantoprazole    amiodarone Stopped (02/07/23 1252)     dexmedetomidine 0.5 mcg/kg/hr (02/07/23 1300)     - MEDICATION INSTRUCTIONS -       propofol Stopped (02/07/23 0820)        ANTHROPOMETRICS  Height: 154.9 cm (5' 1\")  Most Recent Weight: 51.1 kg (112 lb 10.5 oz)- as of 2/5      IBW: 47.7 kg  Body mass index is 21.3 kg/m .  BMI: Normal BMI  Weight History:   Wt Readings from Last 20 Encounters:   02/07/23 51.1 kg (112 lb 10.5 oz)   02/05/23 51.1 kg (112 lb 9.6 oz)   11/11/22 48.5 kg (107 lb)   11/01/22 47.4 kg (104 lb 8 oz)   10/27/22 47.7 kg (105 lb 1.6 oz)   08/18/22 50.8 kg (112 lb)   08/12/22 56.7 kg (125 lb)   06/21/22 56.7 kg (125 lb)   05/13/22 56.9 kg (125 lb 8 oz)   03/02/22 59.4 kg (131 lb)   08/19/21 64.9 kg (143 lb)   04/15/21 68 kg (150 lb)   02/24/21 67.6 kg (149 lb)   02/20/20 64.9 kg (143 lb)   04/03/19 66 kg (145 lb 6.4 oz)   03/12/19 65.3 kg (144 lb)   10/29/18 70.8 kg (156 lb)   06/13/18 71.2 kg (157 lb)   02/09/18 72.5 kg (159 lb 12.8 oz)   12/29/16 72.6 kg (160 lb)     8.3 kg (14%) weight loss over 10 months, but patient's weight has actually trended up over past 3 months or so.    Dosing Weight: 51.1 kg    ASSESSED NUTRITION NEEDS  Estimated Energy Needs: 8414-6572 kcals/day (30 - 35 kcals/kg )  Justification: Likely increased needs r/t ALS   Estimated Protein Needs: 61-77 grams protein/day (1.2 - 1.5 grams of pro/kg)  Justification: Increased needs  Estimated Fluid Needs: 9398-7685 mL/day (25 - 30 mL/kg)   Justification: Maintenance or per MD pending fluid status    PHYSICAL FINDINGS  See malnutrition section below.    MALNUTRITION  As noted above    NUTRITION DIAGNOSIS  Swallowing difficulty related to ALS as evidenced by need for chronic enteral nutrition to meet needs      INTERVENTIONS  Implementation  Nutrition Education: Introduced self and role, discussed EN PTA and plan to " initiate continuous feeding here with eventual goal to transition back to her bolus schedule.     Enteral Nutrition - Initiate via G-tube - Osmolite 1.5 Vahid @ goal of  45ml/hr  (1080ml/day)  will provide: 1620 kcals (~32 kcal/kg), 67 g PRO (1.3 g/kg), 822 ml free H20, 219 g CHO, and 0 g fiber daily.   Order Certavite daily to meet 100% of DRIs  Flushes of 75 ml q 4 hrs = 1272 ml (25 ml/kg)    Added P baseline--Spoke with RN regarding Phos result of 1.8--he will connect with MD regarding replacement as lab just resulted this afternoon    Collaboration and Referral of Nutrition care: Patient discussed during rounds this morning    Goals  Total avg nutritional intake to meet % estimated needs     Monitoring/Evaluation  Progress toward goals will be monitored and evaluated per protocol.  Lisa Galvez, RICARDA, LD, [facility]  Pager - 3rd floor/ICU: [phone]  Pager - All other floors: [phone]  Pager - Weekend/holiday: [phone]  Office: [phone]

## 2023-02-07 NOTE — PHARMACY-ADMISSION MEDICATION HISTORY
"Admission medication history interview status for this patient is complete. See [facility] admission navigator for allergy information, prior to admission medications and immunization status.     Medication history interview done, indicate source(s):  Jaden  Medication history resources (including written lists, pill bottles, clinic record):None  Pharmacy: [facility], [place]    Changes made to PTA medication list:  Added: n/a  Changed: n/a  Reported as Not Taking: n/a  Removed: n/a    Actions taken by pharmacist (provider contacted, etc):None     Additional medication history information:   - Copied from recent admission med rec done only 2 days ago: Jaden noted Natasha just started trazodone Friday. She takes the folic acid only while on methotrexate but hasn't been taking it recently; planned to restart both tomorrow. He also noted the Inflextra has been on hold. And Natasha has not started the licodaine patch or omperazole which were just recently prescribed.    Medication reconciliation/reorder completed by provider prior to medication history?  N   (Y/N)       Medication Affordability:  Not including over the counter (OTC) medications, was there a time in the past 12 months when you did not take your medications as prescribed because of cost?: Unable to Assess    Prior to Admission medications    Medication Sig Last Dose Taking? Auth Provider Long Term End Date   albuterol (PROAIR HFA/PROVENTIL HFA/VENTOLIN HFA) 108 (90 Base) MCG/ACT inhaler Inhale 2 puffs into the lungs every 6 hours as needed for shortness of breath or wheezing Past Week Yes Wei Cedillo MD Yes    apixaban ANTICOAGULANT (ELIQUIS) 5 MG tablet Take 1 tablet (5 mg) by mouth 2 times daily More than a month at not yet started Yes Nicolás Richardson MD     B-D TB SYRINGE 27G X 1/2\" 1 ML MISC USE FOR METHOTREXATE INJECTION  Yes Reported, Patient     desoximetasone (TOPICORT) 0.25 % external cream Apply 1 inch topically as needed More than a " month Yes Reported, Patient     folic acid (FOLVITE) 1 MG tablet Take 1 tablet (1 mg) by mouth daily More than a month Yes Beverley Maloney MD     inFLIXimab-dyyb (INFLECTRA) 100 MG injection Inject 5mg/kg into the vein every 8 weeks. More than a month at been on hold Yes Reported, Patient     lidocaine (LIDODERM) 5 % patch Place 1 patch onto the skin every 24 hours To prevent lidocaine toxicity, patient should be patch free for 12 hrs daily. More than a month at not yet started Yes Ken Wen MD     methotrexate sodium, pres-free, 50 MG/2ML SOLN injection CHEMO Inject 20 mg Subcutaneous every 7 days On Monday More than a month at been on hold Yes Reported, Patient     metoprolol tartrate (LOPRESSOR) 25 MG tablet Take 1 tablet (25 mg) by mouth 2 times daily More than a month at not yet started, new 2/6 Yes Nicolás Richardson MD Yes    omeprazole (PRILOSEC) 2 mg/mL suspension Take 10 mLs (20 mg) by mouth 2 times daily More than a month at not yet started Yes Arnold March MD     predniSONE (DELTASONE) 5 MG tablet Take 10 mg by mouth daily as needed (as needed for flares ups) More than a month Yes Unknown, Entered By History     riluzole (RILUTEK) 50 MG tablet Take 1 tablet (50 mg) by mouth every 12 hours Past Week at 2/4 Yes Arnold March MD     syringe, disposable, 1 ML MISC 1 Syringe once a week To be used with Methotrexate Injections  Yes Reported, Patient     traZODone (DESYREL) 50 MG tablet Take 0.5-1 tablets (25-50 mg) by mouth nightly as needed for sleep 2/6/2023 Yes Ken Wen MD Yes

## 2023-02-07 NOTE — CONSULTS
"Cardiology    Newly diagnosed atrial flutter with RVR complicated by hypotension in 72 year old woman with structurally normal heart on TTE, but ALS. RA and HTN. She has spiked a fever which might be the \"trigger\" for her atrial arrhythmia, but practically speaking for now will require suppressive anti-arrhythmic agent and with CHADSVASC 3 anticoagulation. She has developed hypotension with attempts to maintain amiodarone, though she received bolus earlier and is NSR    1. If unable to tolerate po amiodarone, then try amiodarone 400 TID per feeding tube  2. Continue anticoagulation.   3. She will likely need at least short term anticoagulation/ammiodarone for 3 months. Whether she will need long term amiodarone/anicoagulation depends upon her course and whether the whole presentation was related to an infection.    [place]  "

## 2023-02-07 NOTE — H&P
History and Physical     Natasha Allison MRN# [id number]   YOB: 1950 Age: 72 year old      Date of Admission:  2/7/2023    Primary care provider: Beverley Maloney          Assessment and Plan:     Summary of Stay: Natasha Allison is a 72 year old female with a history of bulbar ALS with dysphagia and inadequate oral intake with feeding tube in place, on BiPAP at night and independent in ambulating, hospitalized here 2/5-2/6/2022 with new diagnosis of AF with RVR admitted on 2/7/2023 with recurrent AF with RVR complicated by hypotension/obtundation necessitating intubation     At her recent hospitalization:  CT PE negative for PE, echo with EF > 70 % with no significant valve dz (mild Ao sclerosis).  She required DCCV and stayed in NSR,  discharged with apixaban and metop 25 mg bid.     She felt well on discharge.  Around 1 am she called out to her  as was feeling SOB, he checked her BP/HR and they were fine, she put on her bipap and went to sleep.  She slept for about 30 minutes and then took her BiPAP off only to awaken around 3 am feeling very SOB.  She looked like she was in respiratory distress and so 911 called.  They found her alert but decompensated and become unresponsive with HR's in the 200's.  She was shocked 4 times without improvement.  She was given fentanyl 50 mcg, 10 mg midazolam, and 1 mg of lorazepam during the attempts at cardioversion.      On arrival ED 's and she was hypotensive, unresponsive, with apneic breathing pattern, no response to noxious stimuli.  She underwent synchronized DCCV with 150 J with restoration of NSR. And started on amiodarone gtt.  BPs improved but mental status did not.  She has a hx of prolonged response to CNS active medications.        Problem List:   AF with RVR, recurrent  Recent hospitalization with CT negative for PE, echo without significant valve issues, TSH 3.4.  Sounds like she is on BiPAP with sleeping and doesn't tolerate it  well and I suspect that could be inciting factor.   In any case has required DCCV 2x in past 3 days  -Cards consult, ? Antidysrhythmic  -cont apixiban for stroke ppx    Obtundation 2/2 medications for DCCV  Hx of prolonged response, intubated for airway protection   -vent management per intensiveist  -stop midazolam, prn propofol or dexmedetomidine for sedation     Fever  Temp to 101.2 this afternoon  BCx, cxr UA, check procal  -if recurs, low threshold for empiric abx, would cover for aspiration with pip-tazo      Bulbar ALS has vocal cord paralysis causing difficulty with speaking and dysphagia  -on tube feeds via PEG, not sure if she is a strict NPO, will need to clarify with , requested dietician to resume TFs, added in Free water q four while intubated  -resumed pta riluzole     Hx of RA  On infliximab/mtx, and prn prednisone with flares    COVID 19   S/p 3 shot moderna series 3/2022      DVT Prophylaxis: DOAC  Code Status: DNR for code blues, ok for DCCV for dysrhythmias, ok for intubation per my conversation with the    Functional Status: lives with , independent in ambulation - no cane/walker, but is a little wobbly   Sharp: placed in ER  Access:   PIV              Time spent 90 minutes reviewing epic including notes/labs/prior hx, current medications.  In addition to interviewing and examining the patient, updated patient and family regarding plan of care     Discussed plan of care with  Arnoldo Allison         Chief Complaint:     AF with RVR, obtundation due to medications for DCCV requiring intubation        History of Present Illness:   Natasha Allison is a 72 year old female with a history of bulbar ALS with dysphagia and inadequate oral intake with feeding tube in place, on BiPAP at night and independent in ambulating, hospitalized here 2/5-2/6/2022 with new diagnosis of AF with RVR admitted on 2/7/2023 with recurrent AF with RVR complicated by hypotension/obtundation  necessitating intubation     At her recent hospitalization:  CT PE negative for PE, echo with EF > 70 % with no significant valve dz (mild Ao sclerosis).  She required DCCV and stayed in NSR,  discharged with apixaban and metop 25 mg bid.     She felt well on discharge.  Around 1 am she called out to her  as was feeling SOB, he checked her BP/HR and they were fine, she put on her bipap and went to sleep.  She slept for about 30 minutes and then took her BiPAP off only to awaken around 3 am feeling very SOB.  She looked like she was in respiratory distress and so 911 called.  They found her alert but decompensated and become unresponsive with HR's in the 200's.  She was shocked 4 times without improvement.  She was given fentanyl 50 mcg, 10 mg midazolam, and 1 mg of lorazepam during the attempts at cardioversion.      On arrival ED 's and she was hypotensive, unresponsive, with apneic breathing pattern, no response to noxious stimuli.  She underwent synchronized DCCV with 150 J with restoration of NSR. And started on amiodarone gtt.  BPs improved but mental status did not.  She has a hx of prolonged response to CNS active medications.       The history is obtained in discussion with the ER providerDr [place] and the patient's , she is unable to give any history      Epic and Care everywhere were extensively reviewed        Past Medical History:     Past Medical History:   Diagnosis Date     Atrial flutter (H) 02/05/2023    Underwent DC cardioversion in the ER     Hypertension     lisinopril 10mg daily = annoying runny nose     Osteoporosis     alendronate 5mg daily = stomach upset /nausea     Rheumatoid arthritis(714.0)              Past Surgical History:     Past Surgical History:   Procedure Laterality Date     CARPAL TUNNEL RELEASE RT/LT       COLONOSCOPY N/A 3/12/2019    Procedure: COLONOSCOPY;  Surgeon: Jermaine Rock MD;  Location:  GI     IR GASTROSTOMY TUBE PERCUTANEOUS PLCMNT   2022             Social History:     Social History     Tobacco Use     Smoking status: Former     Packs/day: 0.75     Years: 18.00     Pack years: 13.50     Types: Cigarettes     Quit date: 1978     Years since quittin.2     Smokeless tobacco: Never   Substance Use Topics     Alcohol use: Yes     Comment: 0-1 QD             Family History:     Family History   Problem Relation Age of Onset     Hypertension Mother      Hypertension Father      Heart Disease Father      Intellectual Disability (Mental Retardation) Brother      Hypertension Sister      Hypertension Son      Hypertension Daughter      Colon Cancer Brother      Hypertension Brother      Hypertension Brother      Hypertension Brother      Hypertension Sister      Hypertension Sister             Allergies:     Allergies   Allergen Reactions     Alendronic Acid      Severe aching     Latex      Seasonal Allergies Other (See Comments)     Watery eyes, runny nose.             Medications:   Await med rec            Review of Systems:     A Comprehensive greater than 10 system review of systems was carried out.  Pertinent positives and negatives are noted above.  Otherwise negative for contributory information.           Physical Exam:   Blood pressure 110/67, pulse 66, temperature (!) 95.5  F (35.3  C), resp. rate 12, SpO2 100 %.  Exam:    General:  Intubated and sedated  HEENT:  Head nc/at sclera clear  Neck is supple  Lungs: coarse bs throughout  CV:  RRR no m/r/g no le edema  Abd:  S/nt/nd no r/g  Neuro:  Unable to assess  Sedated non communicative   Skin:  W/d no c/c               Data:     Results for orders placed or performed during the hospital encounter of 23   XR Chest Port 1 View     Status: None    Narrative    EXAM: XR CHEST PORT 1 VIEW  LOCATION: [facility]  DATE/TIME: 2023 6:25 AM    INDICATION: sob  COMPARISON: None.      Impression    IMPRESSION: Endotracheal tube 3 cm above yumiko. Lungs  clear. Heart size normal. No pleural collections.   Head CT w/o contrast     Status: None    Narrative    EXAM: CT HEAD W/O CONTRAST  LOCATION: [facility]  DATE/TIME: 2/7/2023 6:49 AM    INDICATION: ams, anticoagulated  COMPARISON: None.  TECHNIQUE: Routine CT Head without IV contrast. Multiplanar reformats. Dose reduction techniques were used.    FINDINGS:  INTRACRANIAL CONTENTS: No intracranial hemorrhage, extraaxial collection, or mass effect.  No CT evidence of acute infarct. Mild volume loss and mild burden presumed chronic small vessel ischemia.    VISUALIZED ORBITS/SINUSES/MASTOIDS: No intraorbital abnormality. No paranasal sinus mucosal disease. No middle ear or mastoid effusion.    BONES/SOFT TISSUES: No acute abnormality.      Impression    IMPRESSION:  1.  No acute intracranial abnormality.    2.  Mild age-related change.   Troponin T, High Sensitivity     Status: Abnormal   Result Value Ref Range    Troponin T, High Sensitivity 82 (H) <=14 ng/L   Magnesium     Status: Abnormal   Result Value Ref Range    Magnesium 1.6 (L) 1.7 - 2.3 mg/dL   Lactic acid whole blood     Status: Normal   Result Value Ref Range    Lactic Acid 1.3 0.7 - 2.0 mmol/L   Blood gas venous and oxyhgb     Status: Abnormal   Result Value Ref Range    pH Venous 7.31 (L) 7.32 - 7.43    pCO2 Venous 62 (H) 40 - 50 mm Hg    pO2 Venous 39 25 - 47 mm Hg    Bicarbonate Venous 31 (H) 21 - 28 mmol/L    FIO2 0     Oxyhemoglobin Venous 63 (L) 70 - 75 %    Base Excess/Deficit (+/-) 3.1 (H) -7.7 - 1.9 mmol/L   INR     Status: Abnormal   Result Value Ref Range    INR 1.18 (H) 0.85 - 1.15   PTT     Status: Normal   Result Value Ref Range    aPTT 30 22 - 38 Seconds   UA with Microscopic reflex to Culture     Status: Abnormal    Specimen: Urine, Catheter   Result Value Ref Range    Color Urine Light Yellow Colorless, Straw, Light Yellow, Yellow    Appearance Urine Clear Clear    Glucose Urine Negative Negative mg/dL    Bilirubin  Urine Negative Negative    Ketones Urine Trace (A) Negative mg/dL    Specific Gravity Urine 1.012 1.003 - 1.035    Blood Urine Trace (A) Negative    pH Urine 7.0 5.0 - 7.0    Protein Albumin Urine Negative Negative mg/dL    Urobilinogen Urine Normal Normal, 2.0 mg/dL    Nitrite Urine Negative Negative    Leukocyte Esterase Urine Negative Negative    Mucus Urine Present (A) None Seen /LPF    Amorphous Crystals Urine Many (A) None Seen /HPF    RBC Urine 5 (H) <=2 /HPF    WBC Urine 1 <=5 /HPF    Squamous Epithelials Urine <1 <=1 /HPF    Narrative    Urine Culture not indicated   CBC with platelets and differential     Status: Abnormal   Result Value Ref Range    WBC Count 8.7 4.0 - 11.0 10e3/uL    RBC Count 4.51 3.80 - 5.20 10e6/uL    Hemoglobin 14.0 11.7 - 15.7 g/dL    Hematocrit 44.5 35.0 - 47.0 %    MCV 99 78 - 100 fL    MCH 31.0 26.5 - 33.0 pg    MCHC 31.5 31.5 - 36.5 g/dL    RDW 12.2 10.0 - 15.0 %    Platelet Count 137 (L) 150 - 450 10e3/uL    % Neutrophils 81 %    % Lymphocytes 12 %    % Monocytes 7 %    % Eosinophils 0 %    % Basophils 0 %    % Immature Granulocytes 0 %    NRBCs per 100 WBC 0 <1 /100    Absolute Neutrophils 7.0 1.6 - 8.3 10e3/uL    Absolute Lymphocytes 1.1 0.8 - 5.3 10e3/uL    Absolute Monocytes 0.6 0.0 - 1.3 10e3/uL    Absolute Eosinophils 0.0 0.0 - 0.7 10e3/uL    Absolute Basophils 0.0 0.0 - 0.2 10e3/uL    Absolute Immature Granulocytes 0.0 <=0.4 10e3/uL    Absolute NRBCs 0.0 10e3/uL   EKG 12-lead, tracing only     Status: None (Preliminary result)   Result Value Ref Range    Systolic Blood Pressure  mmHg    Diastolic Blood Pressure  mmHg    Ventricular Rate 104 BPM    Atrial Rate 104 BPM    WA Interval 116 ms    QRS Duration 74 ms     ms    QTc 441 ms    P Axis 68 degrees    R AXIS 52 degrees    T Axis -10 degrees    Interpretation ECG       Sinus tachycardia  Possible Left atrial enlargement  Abnormal QRS-T angle, consider primary T wave abnormality  Abnormal ECG  When compared with  ECG of 05-FEB-2023 21:33,  QT has lengthened     EKG 12 lead     Status: None (Preliminary result)   Result Value Ref Range    Systolic Blood Pressure  mmHg    Diastolic Blood Pressure  mmHg    Ventricular Rate 78 BPM    Atrial Rate 78 BPM    RI Interval 112 ms    QRS Duration 78 ms     ms    QTc 465 ms    P Axis 69 degrees    R AXIS 55 degrees    T Axis 47 degrees    Interpretation ECG       Sinus rhythm  Normal ECG  When compared with ECG of 07-FEB-2023 06:24, (unconfirmed)  Non-specific change in ST segment in Inferior leads  ST no longer depressed in Anterior leads  T wave inversion no longer evident in Inferior leads     CBC with platelets differential     Status: Abnormal    Narrative    The following orders were created for panel order CBC with platelets differential.  Procedure                               Abnormality         Status                     ---------                               -----------         ------                     CBC with platelets and d...[[id number]]  Abnormal            Final result                 Please view results for these tests on the individual orders.

## 2023-02-07 NOTE — PROGRESS NOTES
An ABG was completed on the pt's right radial artery @ 0930 on an FIO2 of 30% via ventilatory with no complications noted during the procedure.

## 2023-02-08 NOTE — PROVIDER NOTIFICATION
2222- pt hypotension temporarily resolved with LR bolus. Nonpharmacologic therapies unsuccessful, called telehub. Ordered Albumin to be administered.

## 2023-02-08 NOTE — CONSULTS
Consult Date: 02/07/2023    CARDIOLOGY CONSULTATION    REFERRING PHYSICIAN:  Dr. Kimble    HISTORY OF PRESENT ILLNESS:  Natasha Allison, a 72-year-old woman with bulbar amyotrophic lateral sclerosis, rheumatoid arthritis, hypertension and osteoporosis, was evaluated in consultation at the request of Dr. Kimble for recommendations regarding management of paroxysmal atrial flutter.    Mrs. Allison is unable to provide a history at this time as she is intubated and sedated.  The patient presented on 02/05/2023 with newly diagnosed atrial fibrillation with a rapid ventricular response rate and hypotension.  She underwent cardioversion to a sinus rhythm.  An echo showed a normal ejection fraction of greater than 70% with normal cardiac chamber sizes and no significant valvular stenosis or insufficiency.  CT scan showed no evidence of pulmonary embolus or aortic dissection.  The patient was initially discharged on metoprolol and apixaban with plans for followup with Cardiology.    In the early morning hours of 02/07/2023, the patient developed dyspnea and rapid heart rates.  Emergency medical services was contacted and the patient underwent 4 attempts at cardioversion at which time she was given fentanyl, midazolam and lorazepam.  In the Emergency Room, she had atrial fibrillation with a ventricular response rate in the range of 160 and was converted to a sinus rhythm with a 150 joule shock.  The patient was then started on amiodarone.  She has remained intubated since then.    The patient is currently in a sinus rhythm.  Attempts to administer intravenous amiodarone has been complicated by hypotension, which has required withdrawing the medication.  She has also developed a fever and is being evaluated for a possible infection.    PAST MEDICAL HISTORY:     1.  Amyotrophic lateral sclerosis.   a.  History of dysphagia with difficult oral intake.  Feeding tube in place.  BiPAP at night.  2.  Rheumatoid arthritis -- on  infliximab and methotrexate with p.r.n. prednisone.  3.  Hypertension -- on lisinopril.  4.  Osteopenia.  5.  Old history of carpal tunnel release bilaterally.  6.  History of a gastrostomy tube.    SOCIAL HISTORY:  The patient is , has a supportive  and lives at home with him.  She is a former smoker, but has not smoked since 1978.  She does not abuse alcohol, but uses it occasionally.  There is no history of substance abuse.    FAMILY HISTORY:  There is hypertension in 2 sisters, 3 brothers, her mother and father.  There is colon cancer in her brother.  She has a daughter with hypertension and a son with hypertension.    ALLERGIES:  ALENDRONATE ACID, LATEX AND SEASONAL ALLERGIES.    REVIEW OF SYSTEMS:  Unable to obtain because of intubation.    Medications   IV amiodarone   Has been on infliximab and prednisone    PHYSICAL EXAMINATION:    GENERAL:  Demonstrates a sedated 72-year-old woman.  VITAL SIGNS:  Her blood pressure was in the 86/60 region at this time, but her amiodarone has just been stopped.  Heart rate is 76, her temperature is 101.1.  LUNGS:  Clear to auscultation.  CARDIOVASCULAR:  Shows a normal S1 with a normal S2.  There is no murmur, rub or click.  Her pulses are symmetrical in the carotid, radial, brachial, femoral, popliteal, dorsalis pedis and posterior tibials.  ABDOMEN:  Bowel sounds are diminished, but present.  Liver percusses to about 7 cm.  Spleen was not palpable.  The aorta is not tender.   LOWER EXTREMITIES:  There is no swelling, cyanosis or clubbing.  NEUROLOGIC:  Unable to assess as she is intubated.    LABORATORY DATA:  I have personally reviewed her echo that shows a normal ejection fraction in the range of 70% without regional wall motion abnormality.  Both RV and LV chamber size are normal with normal performance.  There is normal atrial chamber sizes.  There is no evidence of valvular stenosis or insufficiency.  Her ECG after cardioversion shows a normal sinus  rhythm with a normal axis and normal intervals.  ECGs from previous ED evaluations before her treatment today confirmed that she had atrial flutter with a rapid ventricular response rate.    ASSESSMENT:  This 72-year-old woman with no previous diagnosis of cardiac structural disease or arrhythmia presents with recurrent episodes of atrial flutter with rapid ventricular response rate accompanied by hypotension.  The fact that she has developed a fever indicates that she may have an underlying infection as a trigger for her dysrhythmia.  Unfortunately, attempts to start IV amiodarone has been met by hypotension.  As she is in a sinus rhythm at this time, it would be reasonable to try giving her amiodarone via the gastric feeding tube present at this time.  She is undergoing cultures and other testing for sepsis.    RECOMMENDATIONS:     1.  If the patient is unable to tolerate intravenous amiodarone, would give the medication per gastric tube.  We will start at 400 mg t.i.d.  2.  Continue the evaluation for sepsis.  3.  Long-term treatment will depend on whether we find some underlying etiology for her dysrhythmia.  At this point, it seems wise to continue anticoagulation and amiodarone.    We appreciate the opportunity to care for your patient, Natasha Malik.    Wei Li MD        D: 2023   T: 2023   MT: VANIA    Name:     NATASHA MALIK  MRN:      [id number]        Account:      [id number]   :      1950           Consult Date: 2023     Document: [id number]

## 2023-02-08 NOTE — PROGRESS NOTES
Right wrist and Left wrist restraints discontinued at 12:00 PM on 2/8/2023.    Restraint discontinue criteria met, patient is calm, cooperative and safe. Restraints removed.     Patient's Response: Demonstrates understanding  Family Notification: Spouse/significant other  Attending Physician Notified: Yes, Attending Physician's Name: Crescencio Coleman RN

## 2023-02-08 NOTE — PROVIDER NOTIFICATION
2025- pt hypotensive, used non-pharmacological methods, unresolved. Called tele hub. Ordered LR bolus for administration.

## 2023-02-08 NOTE — PLAN OF CARE
Goal Outcome Evaluation:  ICU End of Shift Summary.  For vital signs and complete assessments, please see documentation flowsheets.      Pertinent assessments: Continues on full vent support, Pt on sedation with Dex RASS goal 0/-1 Pt at -1, LS: clear-minimal secretions from ETT and oral. Tele: SR. BS: active no BM, Sharp intact with clear malini urine. Mg replaced recheck 2.1 recheck in AM, phos currently being replaced.    Major Shift Events: Amiodarone on/off Bp drops when infusing-cardiology started oral dose, Pt had a temp max 101.8 tylenol PRN given temp dropping slowly- BC drawn, UA done and chest xray which was negative. BP dropped at around 1700, 70's  LR bolus given with good response. TF started at 1845 at 15 ml/hr increase Q8 to goal rate 45 with 100 ml Q4 free water flush.    Plan (Upcoming Events): Wean Vent as able.    Discharge/Transfer Needs: TBD     Bedside Shift Report Completed : Y  Bedside Safety Check Completed: Y       Right wrist, left wrist restraints continued 2/7/2023    Clinical Justification: Pulling lines, pulling tubes, and pulling equipment  Less Restrictive Alternative: 1:1 patient care  Attending Physician Notified: Yes, Attending Physician's Name: Adalid   New orders placed Yes  Length of Order: 1 Day      Saul Dillard RN

## 2023-02-08 NOTE — PROGRESS NOTES
[facility] ICU VENTILATOR RESPIRATORY NOTE    Date of Admission: 2/7/23    Date of Intubation (most recent): 2/7/23    Reason for Mechanical Ventilation: Airway protection    Number of Days on Mechanical Ventilation: 2    Met Criteria for Pressure Support Trial:Yes     Length of Pressure support Trial: Ongoing    ABG Results:   Recent Labs   Lab 02/07/23  0930 02/07/23  0634 02/03/23  2219   PH 7.28*  --   --    PCO2 54*  --   --    PO2 101  --   --    HCO3 26  --   --    O2PER 30 0 0     Bs clear. Will continue to assess and monitor.    Dru Kim RT on 2/8/2023 at 6:06 AM

## 2023-02-08 NOTE — PROGRESS NOTES
SPIRITUAL HEALTH SERVICES Progress Note  [facility] ICU    Referral Source:  Palliative Care team consultation order for pt/family support.    Pt was extubated earlier today and is currently receiving respiratory support on BiPAP.    Pt indicated she would welcome a time of prayer.    Plan: Unit  will follow up with family during visit tomorrow.   Spiritual Health Services remains available for additional emotional/spiritual support.    Troy Price MA  Staff   [phone]   On Site Tu/We/Th    [facility] remains available 24/7 for emergent requests/referrals, either by having the on-call  paged or by entering an ASAP/STAT consult in Epic (this will also page the on-call ). Routine Epic consults receive an initial response within 24 hours.

## 2023-02-08 NOTE — PROGRESS NOTES
VAT called to place PIV for patient with IO access.  Discussed need for possible PICC with bedside RN if additional medications/pressors are needed. Bedside RN discussed with MD during rounds who ordered a Midline.  Per MD the patient wasn't going to need additional pressors at this time, a PIV was placed and the midline was held. Please re consult VAT if Midline or PICC is needed.

## 2023-02-08 NOTE — PROGRESS NOTES
[facility]    Hospitalist Progress Note      Assessment & Plan   Natasha Allison is a 72 year old female with a history of bulbar ALS with dysphagia and inadequate oral intake with feeding tube in place, on BiPAP at night and independent in ambulating, hospitalized here 2/5-2/6/2022 with new diagnosis of AF with RVR admitted on 2/7/2023 with recurrent AF with RVR complicated by hypotension/obtundation necessitating intubation.      At her recent hospitalization:  CT PE negative for PE, echo with EF > 70 % with no significant valve dz (mild Ao sclerosis).  She required DCCV and stayed in NSR,  discharged with apixaban and metop 25 mg bid.  She felt well on discharge.  Around 1 am she called out to her  as was feeling SOB, he checked her BP/HR and they were fine, she put on her bipap and went to sleep.  She slept for about 30 minutes and then took her BiPAP off only to awaken around 3 am feeling very SOB.  She looked like she was in respiratory distress and so 911 called.  They found her alert but decompensated and become unresponsive with HR's in the 200's.  She was shocked 4 times without improvement.  She was given fentanyl 50 mcg, 10 mg midazolam, and 1 mg of lorazepam during the attempts at cardioversion.       On arrival ED 's and she was hypotensive, unresponsive, with apneic breathing pattern, no response to noxious stimuli.  She underwent synchronized DCCV with 150 J with restoration of NSR. And started on amiodarone gtt.  She has a hx of prolonged response to CNS active medications.     #AF with RVR, recurrent: Recent hospitalization with CT negative for PE, echo without significant valve issues, TSH 3.4.  Sounds like she is on BiPAP with sleeping and doesn't tolerate it well and I suspect that could be inciting factor.  In any case has required DCCV 2x in past 3 days  -Cards consulted.  Will continue amiodarone gtt.  BP's improved and can tolerate.  -cont apixiban for  stroke ppx     #Obtundation 2/2 medications for DCCV. New stridor post-extubation on 2/8: Hx of prolonged response, intubated for airway protection.  She was on minimal vent setting AM of 2/8 and extubated. Unfortunately, had significant stridor with WOB.  -Given epi neb and methlypred, still with stridor. Will need to be re-inubated. Once stabilized from respiratory standpoint, will get CT soft tissues neck and consult ENT. Will try to get a hold of baseline neurology records to see what baseline vocal cord issues are as related to her ALS.   -vent management per intensivist, appreciate assistance.     Addendum: Goals of care conversation had between palliative care, intensivist and family. Pt and family made clear would not want to be re-intubated.  She is now DNR/DNI.      #Fever: Patient has had a low-grade fever over the last 24 hours.  Tmax was 101.2 on the a.m. of 2/7.  Since then has had low-grade fevers of around 100.  Her chest x-ray does not show clear infiltrate.  Her UA does not show infection.  Her procalcitonin is not elevated.   -Monitor off antibiotics.  She is not tachycardic.  No leukocytosis.  If clear fever again over 101 then start empiric Zosyn for possible aspiration?    #Hypokalemia: Replacement     #Bulbar ALS has vocal cord paralysis causing difficulty with speaking and dysphagia at baseline reportedly.   -On tube feeds via PEG, we will keep her strict n.p.o.  -resumed pta riluzole      #Hx of RA: On infliximab/mtx, and prn prednisone with flares    #Thrombocytopenia: Mild. Monitor. If platelets below 50 then would hold DOAC. No evidence of bleeding.     Dispo: Continue ICU level cares. Hopeful for extubation soon.  CODE STATUS: DNR/DNI.   DVT PPX: DOAC     Discussed with on-call intensivist and bedside nursing    He Prather MD    Interval History   Patient is awake and alert.  Denies any pain.  Wants the breathing tube out.    -Data reviewed today: I reviewed all new labs and imaging  results over the last 24 hours.  Physical Exam   Temp: 99.7  F (37.6  C) Temp src: Bladder BP: (!) 79/53 Pulse: 70   Resp: 22 SpO2: 97 % O2 Device: Mechanical Ventilator    Vitals:    02/07/23 1300 02/08/23 0400   Weight: 51.1 kg (112 lb 10.5 oz) 52.1 kg (114 lb 13.8 oz)     Vital Signs with Ranges  Temp:  [97.7  F (36.5  C)-101.8  F (38.8  C)] 99.7  F (37.6  C)  Pulse:  [66-81] 70  Resp:  [0-26] 22  BP: ()/(30-76) 79/53  FiO2 (%):  [30 %-35 %] 30 %  SpO2:  [92 %-100 %] 97 %  I/O last 3 completed shifts:  In: 944.2 [I.V.:109.2; NG/GT:665]  Out: 837 [Urine:837]    Constitutional: Intubated, awake.  Answers appropriately.  HEENT: Normocephalic.  ET tube in place.  No elevation of JVD.  Respiratory: Nl WOB, Clear bilaterally, No wheezes or crackles  Cardiovascular: Regular, no murmur  GI: Thin, feeding tube in place.  BS+, NT, ND  Skin/Integumen: WWP, no rash. No edema  Neuro: Awake.  Moves all extremities.  Nods and shakes head appropriately to questions.    Medications     dexmedetomidine 0.6 mcg/kg/hr (02/08/23 0800)     dextrose       - MEDICATION INSTRUCTIONS -       propofol Stopped (02/07/23 0820)       amiodarone  400 mg Oral or Feeding Tube TID     apixaban ANTICOAGULANT  5 mg Oral or NG Tube BID     lactated ringers  500 mL Intravenous Once     metoprolol tartrate  25 mg Oral or NG Tube BID     multivitamins w/minerals  15 mL Per Feeding Tube Daily     pantoprazole  40 mg Per Feeding Tube QAM AC    Or     pantoprazole  40 mg Intravenous QAM AC     riluzole  50 mg Oral or NG Tube Q12H     sodium chloride (PF)  10 mL Intracatheter Q8H     sodium phosphate  15 mmol Intravenous Once       Data   Recent Labs   Lab 02/08/23  0742 02/08/23  0555 02/08/23  0358 02/07/23  0951 02/07/23  0826 02/07/23  0721 02/07/23  0634 02/05/23  4787   WBC  --  7.3  --   --   --   --  8.7 10.5   HGB  --  11.2*  --   --   --   --  14.0 15.8*   MCV  --  94  --   --   --   --  99 97   PLT  --  115*  --   --   --   --  137* 173    INR  --   --   --   --   --  1.18*  --  1.04   NA  --  138  --   --  141  --   --  140   POTASSIUM  --  3.1*  --   --  3.9  --   --  4.4   CHLORIDE  --  109*  --   --  109*  --   --  100   CO2  --  21*  --   --  24  --   --  30*   BUN  --  12.5  --   --  12.8  --   --  24.3*   CR  --  0.50*  --   --  0.41*  --   --  0.59   ANIONGAP  --  8  --   --  8  --   --  10   MICHELLE  --  7.9*  --   --  7.7*  --   --  9.7   * 143* 160*   < > 138*  --   --  132*   ALBUMIN  --  2.6*  --   --  2.5*  --   --  3.7   PROTTOTAL  --  5.2*  --   --  5.5*  --   --  7.5   BILITOTAL  --  0.6  --   --  0.5  --   --  0.3   ALKPHOS  --  46  --   --  49  --   --  68   ALT  --  13  --   --  14  --   --  20   AST  --  19  --   --  27  --   --  34    < > = values in this interval not displayed.       Recent Results (from the past 24 hour(s))   XR Chest Port 1 View    Narrative    EXAM: XR CHEST PORT 1 VIEW  LOCATION: [facility]  DATE/TIME: 2/7/2023 4:56 PM    INDICATION: Fever.  COMPARISON: 02/07/2023      Impression    IMPRESSION:     Endotracheal tube with tip 2.9 cm above the yumiko. Enteric tube coursing into the stomach with tip out of view.    No focal airspace disease. No pleural effusion or pneumothorax.    The cardiomediastinal silhouette is unremarkable.

## 2023-02-08 NOTE — CONSULTS
Nutrition Brief Note    RD already consulted and following. Please see consult note dated 2/7.    Lisa Galvez, RICARDA, LD, [facility]  Pager - 3rd floor/ICU: [phone]  Pager - All other floors: [phone]  Pager - Weekend/holiday: [phone]  Office: [phone]

## 2023-02-08 NOTE — CONSULTS
[facility]  Palliative Care Consultation   Text Page    Assessment & Plan   Natasha Allison is a 72 year old female who was admitted on 2/7/2023.   Consulted by Dr. Najma Gallardo to assist with goals of care and development of plan of care.    Recommendations:  1. Goals of Care- No CPR- Do NOT Intubate  Hospitalization goals discussed with patient, daughter and spouse.  Goal for DNR, Do NOT intubate.  Ongoing restorative cares.  If decline occurs, Natasha would not want escalation of cares and would want to be made comfortable at end of life.   Decisional Capacity- Intact. Patient does not have an advance directive. Per  informed consent policy next of kin should be involved if patient becomes unable.  POLST none on file.  Appropriate to complete prior to discharge given change in code status.    2.  Dyspnea  Initial respiratory distress due to CNS depression, intubation x 24 hours.  Extubated with ongoing dyspnea due to stridor.    - management with Bipap therapy per primary team.    3. Generalized weakness  Progressive weakness due to ALS.  Has become less able to ambulate safely for long distances.  Bulbar weakness has presented challenges with regard to respiratory patterns when lying supine.   - Therapy recommendations per primary team  - supportive measures per nursing assessment.    4. Spiritual Care  Oriented to Spiritual Health as part of Palliative Care team. Consultation placed for  to follow.  Spiritual Background: Roman Catholic    5. Care Planning  Appreciate Care of multidisciplinary team.  Medications for discharge - pending medical improvement and disposition recommendations.    Medical Decision Making and Goals of Care:  Discussed on February 8, 2023 with SINCERE Sahu CNP:   Met with patient at the bedside.  She is utilizing bipap and unable to participate in extended conversation.  Introduced palliative care and purpose of the consult.  Reviewed code status  and offered time for processing of the family members with the patient.  Natasha deferred conversation to  Arnoldo.      Met with Arnoldo and daughter Maria Elena in the conference room.  He inquired about best and worst case scenarios.  Reviewed goal for stabilization and improvement to facilitate discharge.  Discussed possible decline and indication for de-escalation of care and support on comfort focused cares.  Outlined medical treatments provided for all care plans and the close monitoring and support by the nursing and medical teams.  They verbalized understanding.    Inquired about support systems and spirituality as a source of strength.  They indicated that they do lean on their gosia in times of difficulty but had not been intimately involved with a Anabaptist for some time.  Offered  consult and they accepted this resource.      They denied further questions or concerns.  Made plan for daily follow up by palliative care to offer support during this hospital stay.    Thank you for involving us in the patient's care.     SINCERE Sahu CNP  Pain Management and Palliative Care  [facility]  Pgr: [phone]    Medical Decision Making       MANAGEMENT DISCUSSED with the following over the past 24 hours: bipap therapy, management of airway, code status   SUPPLEMENTAL HISTORY, in addition to the patient's history, over the past 24 hours obtained from:   - Spouse or significant other  - adult children  Medical complexity over the past 24 hours:  - discussion to continue ICU level of interventions       Advance Care Planning     Advance Care Planning Discussion 2/8/2023. Kavitha CRANE APRN CNP met with Patient and their family today at the hospital to discuss Advance Care Planning. Natasha Allison does have decisional capacity and was present for this discussion.  Those present were informed of the voluntary nature of this discussion and wished to proceed.   The discussion included: goals of care, code status of DNR/Do NOT intubate, de-escalation of cares and comfort focused plan if respiratory status worsens.. This discussion began at 1130 and ended at 1205 for a total of 35 minutes.          Understanding of disease process:   This has been discussed with patient and family.    History of Present Illness   History is obtained from the electronic health record and patient's family    Natasha Allison is a 72 year old female with a past medical history of ALS, bulbar weakness, obstructive sleep pattern with intermittent use of Bipap, who presented with a-fib with RVR at a rate of >200.  She had recent presentations to the Emergency department with rapid heart rate on Friday 2/3/23 and Monday 2/5/23.  Both of those presentations resulted in rate contro and medical work up without acute findings.  She was not admitted and subsequently discharged.  She again presented on 1/7/23 with HR > 200.  She underwent synchronized shocks and was started on amiodarone gtt.  She was intubated due to CNS depression and was extubated on 2/8 in the AM.      This is in the setting of rapid progression of ALS symptoms over the past 2-3 weeks.  Per her 's report she had taken care of herself to maintain functional status prior to this decline.  She has had two recent hospital presentations. There has been a decline in daily function including decreased mobility without assistance.  There is no reported or documented weight loss.       Past Medical History   I have reviewed this patient's medical history and updated it with pertinent information if needed.   Past Medical History:   Diagnosis Date     Atrial flutter (H) 02/05/2023    Underwent DC cardioversion in the ER     Hypertension     lisinopril 10mg daily = annoying runny nose     Osteoporosis     alendronate 5mg daily = stomach upset /nausea     Rheumatoid arthritis(714.0)        Past Surgical History   I have reviewed this  "patient's surgical history and updated it with pertinent information if needed.  Past Surgical History:   Procedure Laterality Date     CARPAL TUNNEL RELEASE RT/LT       COLONOSCOPY N/A 3/12/2019    Procedure: COLONOSCOPY;  Surgeon: Jermaine Rock MD;  Location: [facility]     IR GASTROSTOMY TUBE PERCUTANEOUS PLCMNT  11/1/2022       Prior to Admission Medications   Prior to Admission Medications   Prescriptions Last Dose Informant Patient Reported? Taking?   B-D TB SYRINGE 27G X 1/2\" 1 ML MISC  Self Yes Yes   Sig: USE FOR METHOTREXATE INJECTION   albuterol (PROAIR HFA/PROVENTIL HFA/VENTOLIN HFA) 108 (90 Base) MCG/ACT inhaler Past Week  No Yes   Sig: Inhale 2 puffs into the lungs every 6 hours as needed for shortness of breath or wheezing   apixaban ANTICOAGULANT (ELIQUIS) 5 MG tablet More than a month at not yet started  No Yes   Sig: Take 1 tablet (5 mg) by mouth 2 times daily   desoximetasone (TOPICORT) 0.25 % external cream More than a month Self Yes Yes   Sig: Apply 1 inch topically as needed   folic acid (FOLVITE) 1 MG tablet More than a month Self No Yes   Sig: Take 1 tablet (1 mg) by mouth daily   inFLIXimab-dyyb (INFLECTRA) 100 MG injection More than a month at been on hold Self Yes Yes   Sig: Inject 5mg/kg into the vein every 8 weeks.   lidocaine (LIDODERM) 5 % patch More than a month at not yet started  No Yes   Sig: Place 1 patch onto the skin every 24 hours To prevent lidocaine toxicity, patient should be patch free for 12 hrs daily.   methotrexate sodium, pres-free, 50 MG/2ML SOLN injection CHEMO More than a month at been on hold Self Yes Yes   Sig: Inject 20 mg Subcutaneous every 7 days On Monday   metoprolol tartrate (LOPRESSOR) 25 MG tablet More than a month at not yet started, new 2/6  No Yes   Sig: Take 1 tablet (25 mg) by mouth 2 times daily   omeprazole (PRILOSEC) 2 mg/mL suspension More than a month at not yet started  No Yes   Sig: Take 10 mLs (20 mg) by mouth 2 times daily   predniSONE " (DELTASONE) 5 MG tablet More than a month  Yes Yes   Sig: Take 10 mg by mouth daily as needed (as needed for flares ups)   riluzole (RILUTEK) 50 MG tablet Past Week at   No Yes   Sig: Take 1 tablet (50 mg) by mouth every 12 hours   syringe, disposable, 1 ML MISC  Self Yes Yes   Si Syringe once a week To be used with Methotrexate Injections   traZODone (DESYREL) 50 MG tablet 2023  No Yes   Sig: Take 0.5-1 tablets (25-50 mg) by mouth nightly as needed for sleep      Facility-Administered Medications: None     Allergies   Allergies   Allergen Reactions     Alendronic Acid      Severe aching     Latex      Seasonal Allergies Other (See Comments)     Watery eyes, runny nose.       Social History   I have updated and reviewed the following Social History Narrative:   Social History     Social History Narrative    Cinda Guy's sister            Living situation: home       Support system: spouse, adult children       Actual/Potential Caregiver: spouse       Functional status: independent for ADLs, assist with mobilization  History of substance use/abuse: no    Family History   I have reviewed this patient's family history and updated it with pertinent information if needed.   Family History   Problem Relation Age of Onset     Hypertension Mother      Hypertension Father      Heart Disease Father      Intellectual Disability (Mental Retardation) Brother      Hypertension Sister      Hypertension Son      Hypertension Daughter      Colon Cancer Brother      Hypertension Brother      Hypertension Brother      Hypertension Brother      Hypertension Sister      Hypertension Sister        Review of Systems   The 10 point Review of Systems is negative other than noted in the HPI or here.     Physical Exam   Temp:  [98.1  F (36.7  C)-101.8  F (38.8  C)] 98.4  F (36.9  C)  Pulse:  [] 99  Resp:  [0-33] 22  BP: ()/() 108/67  FiO2 (%):  [30 %-95 %] 95 %  SpO2:  [84 %-100 %] 98 %  114 lbs 13.75  oz  Exam:  GEN:  Alert, oriented x 3, bipap in place, appears anxious.  HEENT:  Normocephalic/atraumatic, no scleral icterus, no nasal discharge, mouth moist.  CV:  RRR, S1, S2; no murmurs or other irregularities noted.  +3 DP/PT pulses bilatererally; trace edema BLE.  RESP:  Clear to auscultation bilaterally with audible stridor in upper airway.  Symmetric chest rise on inhalation noted.  increased respiratory effort. Bipap in place  ABD:  Rounded, soft, non-tender/non-distended.  +BS  EXT:  Edema & pulses as noted above.  CMS intact x 4.     SKIN:  Dry to touch, no exanthems noted in the visualized areas.    PAIN BEHAVIOR: Cooperative  Psych:  Normal affect.  Cooperative, limited conversation.    Data   Results for orders placed or performed during the hospital encounter of 02/07/23 (from the past 24 hour(s))   Magnesium   Result Value Ref Range    Magnesium 2.1 1.7 - 2.3 mg/dL   Procalcitonin   Result Value Ref Range    Procalcitonin 0.06 (H) <0.05 ng/mL   Glucose by meter   Result Value Ref Range    GLUCOSE BY METER POCT 116 (H) 70 - 99 mg/dL   Blood Culture Hand, Right    Specimen: Hand, Right; Blood   Result Value Ref Range    Culture No growth after 12 hours    Blood Culture Arm, Right    Specimen: Arm, Right; Blood   Result Value Ref Range    Culture No growth after 12 hours     Narrative    Only an Aerobic Blood Culture Bottle was collected, interpret results with caution.       UA with Microscopic reflex to Culture    Specimen: Urine, Clean Catch   Result Value Ref Range    Color Urine Yellow Colorless, Straw, Light Yellow, Yellow    Appearance Urine Clear Clear    Glucose Urine Negative Negative mg/dL    Bilirubin Urine Negative Negative    Ketones Urine 40 (A) Negative mg/dL    Specific Gravity Urine 1.026 1.003 - 1.035    Blood Urine Small (A) Negative    pH Urine 8.0 (H) 5.0 - 7.0    Protein Albumin Urine 20 (A) Negative mg/dL    Urobilinogen Urine 3.0 (A) Normal, 2.0 mg/dL    Nitrite Urine Negative  Negative    Leukocyte Esterase Urine Negative Negative    Mucus Urine Present (A) None Seen /LPF    RBC Urine 20 (H) <=2 /HPF    WBC Urine 3 <=5 /HPF    Squamous Epithelials Urine <1 <=1 /HPF    Narrative    Urine Culture not indicated   XR Chest Port 1 View    Narrative    EXAM: XR CHEST PORT 1 VIEW  LOCATION: [facility]  DATE/TIME: 2/7/2023 4:56 PM    INDICATION: Fever.  COMPARISON: 02/07/2023      Impression    IMPRESSION:     Endotracheal tube with tip 2.9 cm above the yumiko. Enteric tube coursing into the stomach with tip out of view.    No focal airspace disease. No pleural effusion or pneumothorax.    The cardiomediastinal silhouette is unremarkable.   Glucose by meter   Result Value Ref Range    GLUCOSE BY METER POCT 157 (H) 70 - 99 mg/dL   Phosphorus   Result Value Ref Range    Phosphorus 2.7 2.5 - 4.5 mg/dL   Lactic acid whole blood   Result Value Ref Range    Lactic Acid 1.7 0.7 - 2.0 mmol/L   Glucose by meter   Result Value Ref Range    GLUCOSE BY METER POCT 150 (H) 70 - 99 mg/dL   Glucose by meter   Result Value Ref Range    GLUCOSE BY METER POCT 160 (H) 70 - 99 mg/dL   Magnesium   Result Value Ref Range    Magnesium 1.8 1.7 - 2.3 mg/dL   Phosphorus   Result Value Ref Range    Phosphorus 1.9 (L) 2.5 - 4.5 mg/dL   Comprehensive metabolic panel   Result Value Ref Range    Sodium 138 136 - 145 mmol/L    Potassium 3.1 (L) 3.4 - 5.3 mmol/L    Chloride 109 (H) 98 - 107 mmol/L    Carbon Dioxide (CO2) 21 (L) 22 - 29 mmol/L    Anion Gap 8 7 - 15 mmol/L    Urea Nitrogen 12.5 8.0 - 23.0 mg/dL    Creatinine 0.50 (L) 0.51 - 0.95 mg/dL    Calcium 7.9 (L) 8.8 - 10.2 mg/dL    Glucose 143 (H) 70 - 99 mg/dL    Alkaline Phosphatase 46 35 - 104 U/L    AST 19 10 - 35 U/L    ALT 13 10 - 35 U/L    Protein Total 5.2 (L) 6.4 - 8.3 g/dL    Albumin 2.6 (L) 3.5 - 5.2 g/dL    Bilirubin Total 0.6 <=1.2 mg/dL    GFR Estimate >90 >60 mL/min/1.73m2   CBC with platelets   Result Value Ref Range    WBC Count 7.3  4.0 - 11.0 10e3/uL    RBC Count 3.60 (L) 3.80 - 5.20 10e6/uL    Hemoglobin 11.2 (L) 11.7 - 15.7 g/dL    Hematocrit 33.8 (L) 35.0 - 47.0 %    MCV 94 78 - 100 fL    MCH 31.1 26.5 - 33.0 pg    MCHC 33.1 31.5 - 36.5 g/dL    RDW 12.0 10.0 - 15.0 %    Platelet Count 115 (L) 150 - 450 10e3/uL   Glucose by meter   Result Value Ref Range    GLUCOSE BY METER POCT 134 (H) 70 - 99 mg/dL   Glucose by meter   Result Value Ref Range    GLUCOSE BY METER POCT 138 (H) 70 - 99 mg/dL

## 2023-02-08 NOTE — PROGRESS NOTES
Assessment: Ms. Allison is a 72 year old lady with a medical history of bulbar ALS c/b dysphagia requiring PEG, BiPAP at night however able to ambulate independently, recently hospitalized 2/5-6 with new AF with RVR discharged on apixaban who is admitted to the ICU 2/7 with another episode of AF with RVR causing SOB and requiring DCCV. Today, Ms. Allison presents critically ill with acute respiratory failure with hypercapnia and paroxysmal atrial fibrillation.     I personally reviewed vital signs, medications, labs and imaging.     Active problems and current treatments include:     1. Acute respiratory failure with hypercapnia-Patient requires BiPAP intermittently at home in the setting of weakness related to ALS, this morning she was extubated after passing a pressure support trial and 5 minutes after extubation was noted to have significant inspiratory stridor and increased work of breathing. Racemic epinephrine nebulizer was administered and steroids were administered for airway swelling. The patient's work of breathing improved however she continues to require BiPAP support. I discussed re-intubation with the patient and family, the patient expressed clearly that she does not wish to be re-intubated even in the setting of respiratory distress of respiratory arrest. Her family states that she has considered DNI status in the past and this is not new in the setting of the acute illness. I therefore changed her code status to DNR/DNI. We will continue to support her work of breathing on BiPAP and administer racemic epinephrine q3 hours and steroids for 24 hours to treat acute airway edema.  2. AF with RVR-Continue apixaban, CT-PE negative for PE and LVEF>70% on TTE during recent admission, continue apixaban. Cardiology consulted for management of refractory AF with RVR necessitating DCCV, appreciate recommendations.  3. ID-Febrile overnight with blood cultures sent, no growth to date, continue to monitor.    4. Nutrition-Continue tube feeding via PEG tube  5. Prophylaxis-PPI, apixaban as above    I personally managed the respiratory support, hemodynamics, metabolic derangements, and nutrition.     I spent 34 minutes of critical care time exclusive of procedures evaluating and managing this patient, discussing with the consultants, and updating the patient and family.     Najma Gallardo M.D.

## 2023-02-08 NOTE — PROGRESS NOTES
"Cardiology Progress Note  Cuca Elton POST, CNP          Assessment and Plan:     Admit (2/7/23) with shortness of breath, chest pain, palpitations and became unresponsive  She was recently diagnosed with rapid atrial fibrillation.   She now developed recurrent rapid A-fib complicated by hypotension/obtundation necessitating intubation.     PMH:  Bulbar ALS,s/p feeding tube, hypertension, atrial fibrillation, rheumatoid arthritis    Recurrent Rapid Atrial Fibrillation  -s/p multiple cardioversions in ED   -remains in SR overnight  -required intubation due to obtundation  -ECHO:  LVEF 70%, no significant valve disease  -not able to tolerate IV or oral Amiodarone OR Metoprolol due to hypotension   -ongoing hypotension treated with IVF and Albumiin  -on Eliquis  (CHADS2 Vasc score:3)    Fever  -etiology unclear  -workup per hospitalist    Bulbar ALS:  -s/p gastrostomy tube               Interval History:     On Vent  Awakened to name                Medications:       amiodarone  400 mg Oral or Feeding Tube TID     apixaban ANTICOAGULANT  5 mg Oral or NG Tube BID     magnesium sulfate  2 g Intravenous Once     metoprolol tartrate  25 mg Oral or NG Tube BID     multivitamins w/minerals  15 mL Per Feeding Tube Daily     pantoprazole  40 mg Per Feeding Tube QAM AC    Or     pantoprazole  40 mg Intravenous QAM AC     riluzole  50 mg Oral or NG Tube Q12H     sodium chloride (PF)  10 mL Intracatheter Q8H     sodium phosphate  15 mmol Intravenous Once            Physical Exam:   Blood pressure 90/60, pulse 70, temperature 100  F (37.8  C), resp. rate 19, height 1.549 m (5' 0.98\"), weight 52.1 kg (114 lb 13.8 oz), SpO2 98 %.  Wt Readings from Last 3 Encounters:   02/08/23 52.1 kg (114 lb 13.8 oz)   02/05/23 51.1 kg (112 lb 9.6 oz)   11/11/22 48.5 kg (107 lb)     I/O last 3 completed shifts:  In: 944.2 [I.V.:109.2; NG/GT:665]  Out: 837 [Urine:837]    CONST:  Sedated on vent  LUNGS:  CTA bilaterally  CARDIO:  RRR, S1, S2  No " murmurs  ABD:  Soft, thin  EXT:  No edema  2+ DP bilaterally           Data:   TELE:  SR with occasional PAC    CBC  Recent Labs   Lab 02/08/23  0555 02/07/23  0634   WBC 7.3 8.7   HGB 11.2* 14.0   * 137*       BMP  Recent Labs   Lab 02/08/23  0555 02/08/23  0358 02/08/23  0017 02/07/23  1956 02/07/23  0951 02/07/23  0826 02/05/23  1747 02/03/23  2030     --   --   --   --  141 140 141   POTASSIUM 3.1*  --   --   --   --  3.9 4.4 4.0   CHLORIDE 109*  --   --   --   --  109* 100 100   MICHELLE 7.9*  --   --   --   --  7.7* 9.7 9.5   CO2 21*  --   --   --   --  24 30* 30*   BUN 12.5  --   --   --   --  12.8 24.3* 19.4   CR 0.50*  --   --   --   --  0.41* 0.59 0.49*   * 160* 150* 157*   < > 138* 132* 89    < > = values in this interval not displayed.     Recent Labs   Lab Test 03/02/22  0916 03/10/21  0824   CHOL 202* 224*   HDL 94 89   LDL 92 117*   TRIG 82 91       TROP  No results found for: TROPI, TROPONIN, TROPR    BNP  Recent Labs   Lab 02/05/23  1747   NTBNPI 4,742*     Cardiology staff   I have personally examined the patient, reviewed her clinical course with ICU team, examined her rhythm , labs and reviewed 's note. The patient had to be re intubated, but remains NSR with no clear evidence of infection. The IV amiodarone has been resumed and we will continue load, planning transition to per NG amiodarone in near future. Her lungs are clear. Heart tones regular. She remains responsive on ventilator.     [place]

## 2023-02-09 NOTE — PLAN OF CARE
ICU End of Shift Summary.  For vital signs and complete assessments, please see documentation flowsheets.      Pertinent assessments: Nods to yes or no questions, LS clear, BS audible - BM this shift. Sharp in place. Tele - SR.  Major Shift Events:   - Extubated at 0930 to 2L oxymask   - Hypotensive, 500mL bolus LR, 500mL bolus NS  - About 5-10min post extubation, pt has inspiratory stridor, hypertensive and tachycardic. Physician notified - bipap, IV steroids, amiodarone infusion, and epi nebs started.   - Pt improved. Amiodarone infusion stopped due to hypotension. Transitioned to oxymask  on 5L.   Plan (Upcoming Events): Continue ICU cares  Discharge/Transfer Needs: TBD    Bedside Shift Report Completed : Y  Bedside Safety Check Completed: Y

## 2023-02-09 NOTE — PROGRESS NOTES
[facility]  Patient is currently open to home care services with [facility]. The patient is currently receiving SN and ST services.  [facility]  and team have been notified of patient admission.  [facility] liaison will continue to follow patient during stay.  If appropriate provide orders to resume home care at time of discharge.

## 2023-02-09 NOTE — PROGRESS NOTES
A BiPAP of 12/6 @ 30% was applied to the pt via the mask for an increase in WOB and/or SOB.  Skin is intact/fair. Barrier on bridge of nose in place. Pt is tolerating it well. Bs coarse/diminished. Will continue to monitor and assess the pt's current respiratory status and needs.      Dru Kim RT on 2/9/2023 at 5:23 AM

## 2023-02-09 NOTE — PROGRESS NOTES
Assessment: Ms. Allison is a 72 year old lady with a medical history of bulbar ALS c/b dysphagia requiring PEG, BiPAP at night however able to ambulate independently, recently hospitalized 2/5-6 with new AF with RVR discharged on apixaban who is admitted to the ICU 2/7 with another episode of AF with RVR causing SOB and requiring DCCV. Today, Ms. Allison presents critically ill with acute respiratory failure with hypercapnia and paroxysmal atrial fibrillation.     I personally reviewed vital signs, medications, labs and imaging.     Active problems and current treatments include:     1. Acute respiratory failure with hypercapnia-Patient requires BiPAP intermittently at home in the setting of weakness related to ALS, 2/8 she was extubated after passing a pressure support trial and 5 minutes after extubation was noted to have significant inspiratory stridor and increased work of breathing. Racemic epinephrine nebulizer was administered and steroids were administered for airway swelling. The patient's work of breathing improved however she continues to require BiPAP support. I discussed re-intubation with the patient and family, the patient expressed clearly that she does not wish to be re-intubated even in the setting of respiratory distress of respiratory arrest. Her family states that she has considered DNI status in the past and this is not new in the setting of the acute illness. I therefore changed her code status to DNR/DNI. We will continue to support her work of breathing on BiPAP as needed. She has completed 24 hours of steroid therapy. CT-neck revealing mild thickening of right aryepiglottic fold which likely explains inspiratory stridor post-extubation and is likely caused by intubation.  2. AF with RVR-Continue apixaban, CT-PE negative for PE and LVEF>70% on TTE during recent admission, continue apixaban. Cardiology consulted for management of refractory AF with RVR necessitating DCCV, appreciate  recommendations. Continue amiodarone as tolerated, likely patient will require 3 months of amiodarone for maintenance of sinus rhythm.  3. ID-Patient had one episode of fever 2/7, cultures sent however no growth to date, no other evidence of infection, continue to monitor.  4. Nutrition-Continue tube feeding via PEG tube  5. Prophylaxis-PPI, apixaban as above     I personally managed the respiratory support, hemodynamics, metabolic derangements, and nutrition.      I spent 34 minutes of critical care time exclusive of procedures evaluating and managing this patient, discussing with the consultants, and updating the patient and family.     Najma Gallardo M.D.

## 2023-02-09 NOTE — PROGRESS NOTES
SPIRITUAL HEALTH SERVICES Progress Note  [facility] ICU    Referral Source: Palliative Care team follow up visit.     Pt is alert and on BiPAP. Family are at the bedside.    Natasha acknowledged awareness of previous visit and welcomed a blessing today.  Pt/Family oriented to Spiritual Health Services for support during hospital stay.  No further needs expressed at this time.    Plan: Spiritual Health Services remains available for additional emotional/spiritual support.    Troy Price MA  Staff   *[phone]   On Site Tu/We/Th    [facility] remains available 24/7 for emergent requests/referrals, either by having the on-call  paged or by entering an ASAP/STAT consult in Epic (this will also page the on-call ). Routine Epic consults receive an initial response within 24 hours.

## 2023-02-09 NOTE — PLAN OF CARE
Shift Events:     Neuro: GCS 11/15, feeling disappointed,however after giving the sleeping pills went to bed, calm and comfortable.  CV:  ST-SR, off Levo, BP stable, episode of high BP when she was disappointment due to unable to go to sleep early.   ID:  Afebrile.   Pulm:  Tried to rest on BIPAP but did not tolerated, noted Tachynea and labored breathing, put On BIPAP whole night, saturation is WNL,   GI: On tube feed-PEG, tolerated, Audible BS.   : On FC- Urine output adequate.  Lines/gtts:  PIV X 3- patent.   Skin: Intact- no issue      For vital signs and complete assessments, see documentation flowsheets.     Plan:  Intermittent BIPAP & continue care.

## 2023-02-09 NOTE — PROVIDER NOTIFICATION
Informed telehub RNAroldo regarding patient unable to sleep, anxious, high BP, increased labored breathing and HR. Patient tried to off from BIPAP and shifted to oxymask/NC but doesn't help, put back to BIPAP. Commenced to review patient files.

## 2023-02-09 NOTE — PROGRESS NOTES
CROSS COVER    Paged for sleep aid.  Pt with afib with RVR and DCCV x2 over last few days.  Recommend withholding trazodone while getting treated for acute arythmias.  Limited options given her age and recent confusion.  Recommend melatonin.

## 2023-02-09 NOTE — PROGRESS NOTES
[facility]    Hospitalist Progress Note      Assessment & Plan   Natasha Allison is a 72 year old female with a history of bulbar ALS with dysphagia and inadequate oral intake with feeding tube in place, on BiPAP at night and independent in ambulating, hospitalized here 2/5-2/6/2022 with new diagnosis of AF with RVR admitted on 2/7/2023 with recurrent AF with RVR complicated by hypotension/obtundation necessitating intubation.      At her recent hospitalization:  CT PE negative for PE, echo with EF > 70 % with no significant valve dz (mild Ao sclerosis).  She required DCCV and stayed in NSR,  discharged with apixaban and metop 25 mg bid.  She felt well on discharge.  Around 1 am she called out to her  as was feeling SOB, he checked her BP/HR and they were fine, she put on her bipap and went to sleep.  She slept for about 30 minutes and then took her BiPAP off only to awaken around 3 am feeling very SOB.  She looked like she was in respiratory distress and so 911 called.  They found her alert but decompensated and become unresponsive with HR's in the 200's.  She was shocked 4 times without improvement.  She was given fentanyl 50 mcg, 10 mg midazolam, and 1 mg of lorazepam during the attempts at cardioversion.       On arrival ED 's and she was hypotensive, unresponsive, with apneic breathing pattern, no response to noxious stimuli.  She underwent synchronized DCCV with 150 J with restoration of NSR. And started on amiodarone gtt.  She has a hx of prolonged response to CNS active medications.      #AF with RVR, recurrent: Recent hospitalization with CT negative for PE, echo without significant valve issues, TSH 3.4.  Sounds like she is on BiPAP with sleeping and doesn't tolerate it well and I suspect that could be inciting factor.  In any case has required DCCV 2x in 3 days leading to admission.  -Cards consulted.    Patient has not been able to tolerate amnio drip due to blood  pressures.  She is on oral amiodarone load.  -cont apixiban for stroke ppx     #Obtundation 2/2 medications for DCCV. New stridor post-extubation on 2/8: Hx of prolonged response, intubated for airway protection.  She was on minimal vent setting AM of 2/8 and extubated.  He had stridor noted after extubation.  Plan was originally to reintubate but goals of care conversation were had with family and patient and she noted she would not want to be reintubated under any circumstance.  She was maintained on BiPAP, started on steroids and did seem to have improvement in her symptoms.  She had a CT scan of her neck that showed possible mild asymmetry in the soft tissue of the neck that could be due to mild edema from recent intubation but otherwise no gross abnormalities.  -She is now on supplemental oxygen and BiPAP at night which is her baseline.     #Fever: Patient has had a low-grade fever on 2/7.  Tmax was 101.2 on the a.m. of 2/7.  Since then has had low-grade fevers of around 100.  Her chest x-ray does not show clear infiltrate.  Her UA does not show infection.  Her procalcitonin is not elevated.   -Monitor off antibiotics.  She is not tachycardic.  If clear fever again over 101 then start empiric Zosyn for possible aspiration?  She has been afebrile over the last 24 hours on 2/9     #Hypokalemia: Replacement     #Bulbar ALS has vocal cord paralysis causing difficulty with speaking and dysphagia at baseline reportedly.   -On tube feeds via PEG, we will keep her strict n.p.o.  -resumed pta riluzole but patient and  would prefer that this be held.   -Patient needs BiPAP at night to support her respiratory status.  Pt had difficulty sleeping on 2/8. Patient's  is going to bring in her home machine and mask to see if it helps.       #Hx of RA: On infliximab/mtx, and prn prednisone with flares     #Thrombocytopenia: Mild. Monitor. If platelets below 50 then would hold DOAC. No evidence of bleeding.       Dispo: Can transfer out of the ICU.  Cardiac telemetry  CODE STATUS: DNR/DNI.   DVT PPX: DOAC      Discussed with on-call intensivist and bedside nursing.  Updated  and son at bedside.     He Prather MD    Interval History   No events. Trouble sleeping. Stridor resolved. No worsening SOB. Denies pain.     -Data reviewed today: I reviewed all new labs and imaging results over the last 24 hours.     Physical Exam   Temp: 97.5  F (36.4  C) Temp src: Bladder BP: 117/67 Pulse: 73   Resp: 16 SpO2: 99 % O2 Device: BiPAP/CPAP Oxygen Delivery: 6 LPM  Vitals:    02/07/23 1300 02/08/23 0400   Weight: 51.1 kg (112 lb 10.5 oz) 52.1 kg (114 lb 13.8 oz)     Vital Signs with Ranges  Temp:  [95.9  F (35.5  C)-100.2  F (37.9  C)] 97.5  F (36.4  C)  Pulse:  [] 73  Resp:  [0-33] 16  BP: ()/() 117/67  FiO2 (%):  [30 %-95 %] 30 %  SpO2:  [84 %-100 %] 99 %  I/O last 3 completed shifts:  In: 1644.8 [I.V.:30.8; NG/GT:674]  Out: 1355 [Urine:1355]    Constitutional: Awake. Nonverbal due to vocal cord paralysis/weakness. Answers appropriately with writing and nods.  HEENT: Normocephalic.  No elevation of JVD.  Respiratory: Nl WOB, Clear bilaterally, No wheezes or crackles  Cardiovascular: Regular, no murmur  GI: Thin, feeding tube in place.  BS+, NT, ND  Skin/Integumen: WWP, no rash. No edema  Neuro: Awake.  Moves all extremities. Nods/shakes head appropriately.     Medications     dextrose       - MEDICATION INSTRUCTIONS -         amiodarone  400 mg Oral or Feeding Tube TID     apixaban ANTICOAGULANT  5 mg Oral or NG Tube BID     multivitamins w/minerals  15 mL Per Feeding Tube Daily     pantoprazole  40 mg Per Feeding Tube QAM AC    Or     pantoprazole  40 mg Intravenous QAM AC     potassium & sodium phosphates  1 packet Oral or Feeding Tube Q4H     riluzole  50 mg Oral or NG Tube Q12H     sodium chloride (PF)  10 mL Intracatheter Q8H       Data   Recent Labs   Lab 02/09/23  0557 02/09/23  0357 02/08/23  3965  02/08/23  1610 02/08/23  1343 02/08/23  0742 02/08/23  0555 02/07/23  0951 02/07/23  0826 02/07/23  0721 02/07/23  0634 02/05/23  1747   WBC 13.1*  --   --   --   --   --  7.3  --   --   --  8.7 10.5   HGB 12.7  --   --   --   --   --  11.2*  --   --   --  14.0 15.8*   MCV 96  --   --   --   --   --  94  --   --   --  99 97   *  --   --   --   --   --  115*  --   --   --  137* 173   INR  --   --   --   --   --   --   --   --   --  1.18*  --  1.04     --   --   --   --   --  138  --  141  --   --  140   POTASSIUM 4.3  --   --   --  4.3  --  3.1*  --  3.9  --   --  4.4   CHLORIDE 107  --   --   --   --   --  109*  --  109*  --   --  100   CO2 26  --   --   --   --   --  21*  --  24  --   --  30*   BUN 12.6  --   --   --   --   --  12.5  --  12.8  --   --  24.3*   CR 0.42*  --   --   --   --   --  0.50*  --  0.41*  --   --  0.59   ANIONGAP 6*  --   --   --   --   --  8  --  8  --   --  10   MICHELLE 8.4*  --   --   --   --   --  7.9*  --  7.7*  --   --  9.7   * 176* 182*   < >  --    < > 143*   < > 138*  --   --  132*   ALBUMIN  --   --   --   --   --   --  2.6*  --  2.5*  --   --  3.7   PROTTOTAL  --   --   --   --   --   --  5.2*  --  5.5*  --   --  7.5   BILITOTAL  --   --   --   --   --   --  0.6  --  0.5  --   --  0.3   ALKPHOS  --   --   --   --   --   --  46  --  49  --   --  68   ALT  --   --   --   --   --   --  13  --  14  --   --  20   AST  --   --   --   --   --   --  19  --  27  --   --  34    < > = values in this interval not displayed.       Recent Results (from the past 24 hour(s))   CT Soft Tissue Neck w Contrast    Narrative    CT SCAN OF THE NECK WITH CONTRAST  2/8/2023 3:14 PM     HISTORY: Stridor post-extubation.    TECHNIQUE: Axial CT images of the neck following administration of  intravenous contrast with reformations. Radiation dose for this scan  was reduced using automated exposure control, adjustment of the mA  and/or kV according to patient size, or iterative  reconstruction  technique. 100mL Isovue-370 IV.     COMPARISON: 6/14/2022.     FINDINGS:   Visualized paranasal sinuses, skull base and orbits: Unremarkable.     Oral cavity, pharynx, larynx and visualized trachea: Evaluation of the  oral cavity/oropharynx is mildly limited by streak artifact from the  patient's dental amalgam. Normal appearance of the nasopharynx. No  gross abnormality of the oral cavity/floor of mouth structures. The  oropharynx also appears grossly unremarkable.    Evaluation of the larynx and upper trachea is somewhat limited due to  motion-related artifacts. Possible mild asymmetric soft tissue  thickening of the right aryepiglottic fold (series 2 image 73, series  4 images 37-38). While this could be artifactual, given the patient's  history, it also could potentially represent mild right aryepiglottic  fold edema, potentially in the setting of/as a result of recent  intubation. The epiglottis is not thickened. The larynx otherwise  appears grossly unremarkable. No obvious significant subglottic/upper  tracheal stenosis identified, although again evaluation is mildly  limited by motion artifact.     and parapharyngeal spaces: Unremarkable.    Thyroid: No dominant thyroid nodule or mass is identified.     Submandibular glands: Prominent fatty atrophy of the bilateral  submandibular glands, as before.     Parotid glands: Unremarkable.       Lymph nodes: No suspicious enlarged or necrotic cervical lymph nodes.     Vasculature: Right carotid bifurcation atherosclerosis with probable  less than 50% stenosis by NASCET criteria, unchanged. Otherwise, the  bilateral cervical carotid and vertebral arteries appear grossly  patent.     Visualized upper mediastinum and lungs: Unremarkable.     Bones: Advanced left temporomandibular joint degenerative changes, as  before. Multilevel degenerative changes in the cervical and partially  visualized upper to mid thoracic spine.      Impression     IMPRESSION:   1. Evaluation of the larynx/upper trachea is somewhat limited by  motion artifact.  2. Findings concerning for possible mild asymmetric soft tissue  thickening of the right aryepiglottic fold, which could be due to mild  edema in the setting of recent intubation. Consider correlation with  direct inspection.  3. Otherwise, no gross abnormality of the larynx or visualized trachea  is identified.  4. Otherwise stable chronic findings, as described.    NEREYDA CHEN MD         SYSTEM ID:  [id number]

## 2023-02-09 NOTE — PROGRESS NOTES
Pt has remained on BIPAP for respiratory support, with her home mask.    Pt is currently on BIPAP 12/6 30% FIO2.    Pt's BS are diminished with sat's in the mid to high 90's.    No skin issues were noted.    Will continue to follow and assess and make changes as needed.    Myrna Hinds, RT on 2/9/2023 at 5:44 PM

## 2023-02-09 NOTE — PROGRESS NOTES
Cardiology Progress Note          Assessment and Plan:     Admit 2/7/23 with shortness of breath, chest pain, palpitations and became unresponsive  She was recently diagnosed with rapid atrial fibrillation.   She now developed recurrent rapid A-fib complicated by hypotension/obtundation necessitating intubation.     PMH:  Bulbar ALS,s/p feeding tube, hypertension, atrial fibrillation, rheumatoid arthritis      Recurrent Rapid Atrial Fibrillation  -recently hospitalized 2/5-6 with new AF with RVR discharged on apixaban who is admitted to the ICU 2/7 with another episode of AF with RVR causing SOB and requiring DCCV   -ECHO 2/6/23:  LVEF 70%, no significant valve disease  -BP has limited IV amio and metoprolol.  currently on PO/tube amio and tolerating  -In SR overnight  -on Eliquis  (CHADS2 Vasc score:3)    Fever  -etiology unclear  -workup per hospitalist    Bulbar ALS  -s/p gastrostomy tube      Nory Alvarez PA-C      Cardiology staff  40  Minutes reviewing course and discussing with family  I have examined the patient and with the assistance of her family and nursing staff obtained her interim history. She remains NSR. Although she has been tenuous, has been able to remain extubated. Her family is concerned that the increased tremor she experienced this AM was a side effect of her riluzole and have requested this drug be held. She has confirmed she is DNI so will not be intubated again. I explained that PDR indicates potential interaction between riluzole and amiodarone in that the drugs share common liver enzymatic metabolic pathways and potentially amiodarone could increase riluzole metabolites and increase risk of toxicity. All in all they are not convinced riluzole has made any difference and want to hold it. We would hope to use amiiodarone short term and are hesitant to stop it now due to the extreme hemodynamic instability when she was in atrial fibrillation.  I agree with 's excellent note.  "    [place]                   Interval History:   Extubated yesterday AM, then required BiPAP   at bedside reporting more tremors today                   Medications:       amiodarone  400 mg Oral or Feeding Tube TID     apixaban ANTICOAGULANT  5 mg Oral or NG Tube BID     multivitamins w/minerals  15 mL Per Feeding Tube Daily     pantoprazole  40 mg Per Feeding Tube QAM AC    Or     pantoprazole  40 mg Intravenous QAM AC     potassium & sodium phosphates  1 packet Oral or Feeding Tube Q4H     riluzole  50 mg Oral or NG Tube Q12H     sodium chloride (PF)  10 mL Intracatheter Q8H            Physical Exam:   Blood pressure 118/78, pulse 71, temperature 97.6  F (36.4  C), temperature source Temporal, resp. rate 16, height 1.549 m (5' 0.98\"), weight 52.1 kg (114 lb 13.8 oz), SpO2 94 %.  Wt Readings from Last 3 Encounters:   02/08/23 52.1 kg (114 lb 13.8 oz)   02/05/23 51.1 kg (112 lb 9.6 oz)   11/11/22 48.5 kg (107 lb)     I/O last 3 completed shifts:  In: 1644.8 [I.V.:30.8; NG/GT:674]  Out: 1355 [Urine:1355]    CONST: BiPAP  LUNGS:  CTA anterior  CARDIO:  RRR, S1, S2  No murmurs  ABD:  Soft, thin  EXT:  No edema  2+ DP bilaterally           Data:   TELE:  SR      CBC  Recent Labs   Lab 02/09/23  0557 02/08/23  0555   WBC 13.1* 7.3   HGB 12.7 11.2*   * 115*       BMP  Recent Labs   Lab 02/09/23  0749 02/09/23  0557 02/09/23  0357 02/08/23  2313 02/08/23  1610 02/08/23  1343 02/08/23  0742 02/08/23  0555 02/07/23  0951 02/07/23  0826 02/05/23  1747   NA  --  139  --   --   --   --   --  138  --  141 140   POTASSIUM  --  4.3  --   --   --  4.3  --  3.1*  --  3.9 4.4   CHLORIDE  --  107  --   --   --   --   --  109*  --  109* 100   MICHELLE  --  8.4*  --   --   --   --   --  7.9*  --  7.7* 9.7   CO2  --  26  --   --   --   --   --  21*  --  24 30*   BUN  --  12.6  --   --   --   --   --  12.5  --  12.8 24.3*   CR  --  0.42*  --   --   --   --   --  0.50*  --  0.41* 0.59   * 166* 176* 182*   < >  --    < > " 143*   < > 138* 132*    < > = values in this interval not displayed.     Recent Labs   Lab Test 03/02/22  0916 03/10/21  0824   CHOL 202* 224*   HDL 94 89   LDL 92 117*   TRIG 82 91       TROP  No results found for: TROPI, TROPONIN, TROPR    BNP  Recent Labs   Lab 02/05/23  1747   NTBNPI 4,742*

## 2023-02-09 NOTE — PROGRESS NOTES
RT note:    Pt extubated at 930 this am. Extubation went well with no issues. Post extubation about 5-10 min got called patient was having difficulty and bring a BIPAP. Pt placed on BIPAP for respiratory distress but audible stridor noticed. Contacted MD and started running Racemic Epi in line with BIPAP. Remained on BIPAP and started to Ventilate better and more aeration achieved with 2 doses of steriods and 2 racemic epi's. AT 1600 attempted patient off Bipap. Now doing well on Oxy mask 5L.Bipap on Standby if needs more support again.

## 2023-02-10 NOTE — PROGRESS NOTES
Pt has remained on BIPAP for respiratory support, with her home mask due to advanced ALS.     Pt is currently on BIPAP 12/6 30% FIO2.     Pt's BS are diminished with sat's in the mid to high 90's.     No skin issues were noted.     Will continue to follow and assess and make changes as needed.    Myrna Hinds, RT on 2/10/2023 at 5:29 PM

## 2023-02-10 NOTE — PROGRESS NOTES
A BiPAP of 12/6 @ 30% was applied to the pt via the mask for an increase in WOB and/or SOB.  Skin is intact/fair. Barrier on bridge of nose in place. Pt is tolerating it well. Bs coarse/diminished. Will continue to monitor and assess the pt's current respiratory status and needs.    Dru Kim RT on 2/10/2023 at 5:28 AM

## 2023-02-10 NOTE — PROGRESS NOTES
[facility]    Hospitalist Progress Note      Assessment & Plan   Natasha Allison is a 72 year old female with a history of bulbar ALS with dysphagia and inadequate oral intake with feeding tube in place, on BiPAP at night and independent in ambulating, hospitalized here 2/5-2/6/2022 with new diagnosis of AF with RVR admitted on 2/7/2023 with recurrent AF with RVR complicated by hypotension/obtundation necessitating intubation.      At her recent hospitalization:  CT PE negative for PE, echo with EF > 70 % with no significant valve dz (mild Ao sclerosis).  She required DCCV and stayed in NSR,  discharged with apixaban and metop 25 mg bid.  She felt well on discharge.  Around 1 am she called out to her  as was feeling SOB, he checked her BP/HR and they were fine, she put on her bipap and went to sleep.  She slept for about 30 minutes and then took her BiPAP off only to awaken around 3 am feeling very SOB.  She looked like she was in respiratory distress and so 911 called.  They found her alert but decompensated and become unresponsive with HR's in the 200's.  She was shocked 4 times without improvement.  She was given fentanyl 50 mcg, 10 mg midazolam, and 1 mg of lorazepam during the attempts at cardioversion.       On arrival ED 's and she was hypotensive, unresponsive, with apneic breathing pattern, no response to noxious stimuli.  She underwent synchronized DCCV with 150 J with restoration of NSR. And started on amiodarone gtt.  She has a hx of prolonged response to CNS active medications.     Pt has remained in sinus rhythm and has been on amiodarone through PEG tube. She has had hypoxemia that I suspect is related to her ALS, respiratory muscle weakness and difficulty managing her secretions with bulbar weakness.  I had a discussion with the patient,  at bedside on 2/10 that this is not something that we will be able to solve given progressive nature of ALS and both did  "voice understanding.  Patient and  are clear that they do not want intubation or any further shocks moving forward.     ADDENDUM: I had further goals of care conversation with Mrs. Allison, her , and her son at bedside.  Patient notes that her goal at this point is to go home with hospice.  We had continued discussion regarding secretions and difficulty managing these due to her ALS.  She has trialed medications for secretions in the past but does not want to use these as they \"dry out my mouth\" and cause discomfort.  We discussed tracheostomy but patient is absolutely adamant against this.  She writes that she knows she is dying and has thought a lot about this.   -She tells us she is ready to die and her goal is to get home with hospice care.  She is OK with maintaining status quo this weekend but no escalation of care.  -If she were to decline suddenly, she voices she would want to have comfort medications available which I have ordered.  I have also ordered home trazodone at night per her request.  -Given her stated goals, we will aim for home early next week with hospice support.      #AF with RVR, recurrent: Recent hospitalization with CT negative for PE, echo without significant valve issues, TSH 3.4.  Sounds like she is on BiPAP with sleeping and doesn't tolerate it well and I suspect that could be inciting factor.  In any case has required DCCV 2x in 3 days leading to admission.  -Cards consulted.    Patient has not been able to tolerate amnio drip due to blood pressures.  She is on oral amiodarone load.  -cont apixiban for stroke ppx     #Obtundation 2/2 medications for DCCV. New stridor post-extubation on 2/8: Hx of prolonged response, intubated for airway protection.  She was on minimal vent setting AM of 2/8 and extubated.  He had stridor noted after extubation.  Plan was originally to reintubate but goals of care conversation were had with family and patient and she noted she would not want " to be reintubated under any circumstance.  She was maintained on BiPAP, started on steroids and did seem to have improvement in her symptoms.  She had a CT scan of her neck that showed possible mild asymmetry in the soft tissue of the neck that could be due to mild edema from recent intubation but otherwise no gross abnormalities.  -Pt with hypoxemia requiring anywhere from 5-10L via oxymask while awake intermittently with bipap.  On 2/10, patient had noted difficulty with secretions at bedside that needed to be suctioned.  This is secondary to her ALS and weakness.    -Will obtain CXR to r/o gross lung abnormalities but aspiration is going to be a recurrent issue.  She is clear for only medical therapy and no aggressive interventions including intubation, shocks, CPR.   -She is now at mental status baseline. Stridor resolved     #Fever: Patient has had a low-grade fever on 2/7.  Tmax was 101.2 on the a.m. of 2/7.  Since then has had low-grade fevers of around 100.  Her chest x-ray does not show clear infiltrate.  Her UA does not show infection.  Her procalcitonin is not elevated.   -Monitor off antibiotics. No fevers. She has obvious difficulty with secretions which is going to be a chronic issue with her ALS.     #Leukocytosis: Pt with leukocytosis from 2/9-2/10.  She was started on steroids as above for her stridor so suspect etiology.  She has not had concurrent fever and infectious w/u negative as above.  She has been having difficulty with secretions with weak cough, poor swallow due to ALS.      #Hypokalemia: Replacement     #Bulbar ALS has vocal cord paralysis causing difficulty with speaking and dysphagia at baseline reportedly.   -On tube feeds via PEG, we will keep her strict n.p.o.  -resumed pta riluzole but patient and  would prefer that this be held.   -Patient needs BiPAP at night to support her respiratory status. Using her home mask which is helping.      #Hx of RA: On infliximab/mtx, and prn  prednisone with flares     #Thrombocytopenia: Mild. Monitor. If platelets below 50 then would hold DOAC. No evidence of bleeding.      Dispo: Can transfer out of the ICU.  Cardiac telemetry  CODE STATUS: DNR/DNI.   DVT PPX: DOAC      Discussed with on-call intensivist and bedside nursing.  Updated  and son at bedside.     He Prather MD    Interval History   No events. Pt with difficulty with secretions this AM.  Had to be suctioned and put back on bipap. No fevers. Slept OK overnight and this AM.     -Data reviewed today: I reviewed all new labs and imaging results over the last 24 hours.    Physical Exam   Temp: 97.7  F (36.5  C) Temp src: Bladder BP: (!) 151/105 Pulse: 63   Resp: (!) 33 SpO2: 92 % O2 Device: Nasal cannula with humidification Oxygen Delivery: 5 LPM  Vitals:    02/07/23 1300 02/08/23 0400 02/10/23 0400   Weight: 51.1 kg (112 lb 10.5 oz) 52.1 kg (114 lb 13.8 oz) 54.4 kg (119 lb 14.9 oz)     Vital Signs with Ranges  Temp:  [97.5  F (36.4  C)-98.4  F (36.9  C)] 97.7  F (36.5  C)  Pulse:  [] 63  Resp:  [9-33] 33  BP: (124-186)/() 151/105  FiO2 (%):  [30 %] 30 %  SpO2:  [87 %-99 %] 92 %  I/O last 3 completed shifts:  In: 1430 [NG/GT:890]  Out: 1235 [Urine:1235]    Constitutional: Awake. Nonverbal due to vocal cord paralysis/weakness. Answers appropriately with writing and nods.  Can mouth words.   HEENT: Normocephalic.  No elevation of JVD.  Respiratory: Home bipap in place. Some coarseness at bases. No wheeze.   Cardiovascular: Regular, no murmur  GI: Thin, feeding tube in place.  BS+, NT, ND  Skin/Integumen: WWP, no rash. No edema  Neuro: Awake.  Moves all extremities. Nods/shakes head appropriately.     Medications     dextrose       - MEDICATION INSTRUCTIONS -         amiodarone  400 mg Oral or Feeding Tube TID     apixaban ANTICOAGULANT  5 mg Oral or NG Tube BID     metoprolol tartrate  25 mg Per Feeding Tube BID     multivitamins w/minerals  15 mL Per Feeding Tube Daily      pantoprazole  40 mg Per Feeding Tube QAM AC    Or     pantoprazole  40 mg Intravenous QAM AC     [Held by provider] riluzole  50 mg Oral or NG Tube Q12H     sodium chloride (PF)  10 mL Intracatheter Q8H       Data   Recent Labs   Lab 02/10/23  0817 02/10/23  0555 02/10/23  0549 02/10/23  0405 02/09/23  0749 02/09/23  0557 02/08/23  1610 02/08/23  1343 02/08/23  0742 02/08/23  0555 02/07/23  0951 02/07/23  0826 02/07/23  0721 02/07/23  0634 02/05/23  1747   WBC  --  18.8*  --   --   --  13.1*  --   --   --  7.3  --   --   --    < > 10.5   HGB  --  12.4  --   --   --  12.7  --   --   --  11.2*  --   --   --    < > 15.8*   MCV  --  97  --   --   --  96  --   --   --  94  --   --   --    < > 97   PLT  --  184  --   --   --  139*  --   --   --  115*  --   --   --    < > 173   INR  --   --   --   --   --   --   --   --   --   --   --   --  1.18*  --  1.04   NA  --   --  141  --   --  139  --   --   --  138  --  141  --   --  140   POTASSIUM  --   --  4.7  --   --  4.3  --  4.3  --  3.1*  --  3.9  --   --  4.4   CHLORIDE  --   --  106  --   --  107  --   --   --  109*  --  109*  --   --  100   CO2  --   --  29  --   --  26  --   --   --  21*  --  24  --   --  30*   BUN  --   --  17.4  --   --  12.6  --   --   --  12.5  --  12.8  --   --  24.3*   CR  --   --  0.45*  --   --  0.42*  --   --   --  0.50*  --  0.41*  --   --  0.59   ANIONGAP  --   --  6*  --   --  6*  --   --   --  8  --  8  --   --  10   MICHELLE  --   --  8.3*  --   --  8.4*  --   --   --  7.9*  --  7.7*  --   --  9.7   *  --  143* 173*   < > 166*   < >  --    < > 143*   < > 138*  --   --  132*   ALBUMIN  --   --   --   --   --   --   --   --   --  2.6*  --  2.5*  --   --  3.7   PROTTOTAL  --   --   --   --   --   --   --   --   --  5.2*  --  5.5*  --   --  7.5   BILITOTAL  --   --   --   --   --   --   --   --   --  0.6  --  0.5  --   --  0.3   ALKPHOS  --   --   --   --   --   --   --   --   --  46  --  49  --   --  68   ALT  --   --   --   --   --   --    --   --   --  13  --  14  --   --  20   AST  --   --   --   --   --   --   --   --   --  19  --  27  --   --  34    < > = values in this interval not displayed.       No results found for this or any previous visit (from the past 24 hour(s)).

## 2023-02-10 NOTE — PLAN OF CARE
Goal Outcome Evaluation:   Pt is a/o, makes needs known by writing. VS- continues on bipap or oxymask. Lungs diminished. Tele is SR. Bp elevated 178/108- paged md. Gave metoprolol prn x1. Gave atarax x1. Sharp patent. Tube feed patent. Phos replaced orally - recheck in am. Family at bedside and updated.

## 2023-02-10 NOTE — PROGRESS NOTES
"Cardiology Progress Note          Assessment and Plan:     Admit 2/7/23 with shortness of breath, chest pain, palpitations and became unresponsive  She was recently diagnosed with rapid atrial fibrillation.   She now developed recurrent rapid A-fib complicated by hypotension/obtundation necessitating intubation.     PMH:  Bulbar ALS,s/p feeding tube, hypertension, atrial fibrillation, rheumatoid arthritis      Recurrent Rapid Atrial Fibrillation  -recently hospitalized 2/5-6 with new AF with RVR discharged on apixaban who is admitted to the ICU 2/7 with another episode of AF with RVR causing SOB and requiring DCCV   -ECHO 2/6/23:  LVEF 70%, no significant valve disease  -BP has limited IV amio and metoprolol.  currently on PO/tube amio and tolerating  -In SR overnight  -on Eliquis  (CHADS2 Vasc score:3)    Fever  -etiology unclear  -workup per hospitalist    Bulbar ALS  -s/p gastrostomy tube        Cardiology will sign off.        Nory Alvarez PA-C                       Interval History:   On BiPAP               Medications:       amiodarone  400 mg Oral or Feeding Tube TID     apixaban ANTICOAGULANT  5 mg Oral or NG Tube BID     magnesium oxide  400 mg Oral Q4H     metoprolol tartrate  25 mg Per Feeding Tube BID     multivitamins w/minerals  15 mL Per Feeding Tube Daily     pantoprazole  40 mg Per Feeding Tube QAM AC    Or     pantoprazole  40 mg Intravenous QAM AC     [Held by provider] riluzole  50 mg Oral or NG Tube Q12H     sodium chloride (PF)  10 mL Intracatheter Q8H            Physical Exam:   Blood pressure 135/79, pulse 59, temperature 97.7  F (36.5  C), resp. rate 16, height 1.549 m (5' 0.98\"), weight 54.4 kg (119 lb 14.9 oz), SpO2 99 %.  Wt Readings from Last 3 Encounters:   02/10/23 54.4 kg (119 lb 14.9 oz)   02/05/23 51.1 kg (112 lb 9.6 oz)   11/11/22 48.5 kg (107 lb)     I/O last 3 completed shifts:  In: 1430 [NG/GT:890]  Out: 1235 [Urine:1235]    CONST: BiPAP  LUNGS:  CTA anterior  CARDIO:  RRR, " S1, S2  No murmurs  ABD:  Soft, thin  EXT:  No edema  2+ DP bilaterally           Data:   TELE:  SR      CBC  Recent Labs   Lab 02/10/23  0555 02/09/23  0557   WBC 18.8* 13.1*   HGB 12.4 12.7    139*       BMP  Recent Labs   Lab 02/10/23  0817 02/10/23  0549 02/10/23  0405 02/09/23  2342 02/09/23  0749 02/09/23  0557 02/08/23  1610 02/08/23  1343 02/08/23  0742 02/08/23  0555 02/07/23  0951 02/07/23  0826   NA  --  141  --   --   --  139  --   --   --  138  --  141   POTASSIUM  --  4.7  --   --   --  4.3  --  4.3  --  3.1*  --  3.9   CHLORIDE  --  106  --   --   --  107  --   --   --  109*  --  109*   MICHELLE  --  8.3*  --   --   --  8.4*  --   --   --  7.9*  --  7.7*   CO2  --  29  --   --   --  26  --   --   --  21*  --  24   BUN  --  17.4  --   --   --  12.6  --   --   --  12.5  --  12.8   CR  --  0.45*  --   --   --  0.42*  --   --   --  0.50*  --  0.41*   * 143* 173* 148*   < > 166*   < >  --    < > 143*   < > 138*    < > = values in this interval not displayed.     Recent Labs   Lab Test 03/02/22  0916 03/10/21  0824   CHOL 202* 224*   HDL 94 89   LDL 92 117*   TRIG 82 91       TROP  No results found for: TROPI, TROPONIN, TROPR    BNP  Recent Labs   Lab 02/05/23  1747   NTBNPI 4,742*       Cardiology staff  I have personally examined the patient, reviewed her course with  and the patient's , examined her labs and rhythm. She remains NSR. Lungs clear. On amiodarone.  I  Agree with Ms. Antonio note    Plan  1) load 200 mg bid for 2 weeks, then 200 daily for 3 months  2) we will sign off    [place]

## 2023-02-10 NOTE — PLAN OF CARE
Shift Events:     Neuro: GCS 11/15, alert and cooperative, able to communicate by writing.  CV: Tele- SR. BP is on the high side on regular metoprolol.   ID: Afebrile, not on any antibiotics.  Pulm:  On BIPAP FIO2 30%- barely can tolerate without it.  GI: PEG in placed, on tube feeding tolerated well. + BS, No BM in 24hrs.   : FC in place- adequate UO.  Lines/gtts: PIV x 3- saline locked.  Skin: Intact and unchanged.      For vital signs and complete assessments, see documentation flowsheets.     Plan:  ?Transfer to the ruiz, otherwise continue care.      Plan of Care Reviewed With: patient

## 2023-02-10 NOTE — PLAN OF CARE
Goal Outcome Evaluation:  Alert and oriented. Short break from Bipap but became more hypoxic and dyspneic without it. Npo d/T dysphagia. [place] TF through PEG tube. Moves self in bed. Pt and family discussed with Dr Prather no escalation of cares and transition to hospice care next week. Cont to support. Monitoring showing SR 1degree

## 2023-02-11 NOTE — PROGRESS NOTES
A BiPAP of 15/6 @ 30% was applied to the pt via the mask for an increase in WOB and/or SOB.  Skin is intact/fair. Barrier on bridge of nose in place. Pt is tolerating it well. Will continue to monitor and assess the pt's current respiratory status and needs.    Marguerite Lilly, RT

## 2023-02-11 NOTE — PROGRESS NOTES
ICU End of Shift Summary.  For vital signs and complete assessments, please see documentation flowsheets.      Pertinent assessments:   Neuro: A & O X 4, difficult to speak, writes to answer questions.  Cardiac: SR  Resp: LS diminished. BIPAP continuously.   GI: NPO, TF 45 ml/hr via peg tube  : Sharp  Skin: Bruising on arms- scattered, intacted  Lines: PIV x 3 SL  Drips:none    Major Shift Events: Pt slept most the night.       Plan (Upcoming Events): Cont strict NPO, can be   transferred off unit when bed available.  No escalation   of cares and transition to hospice next week.    Discharge/Transfer Needs: TBD     Bedside Shift Report Completed : yes  Bedside Safety Check Completed: yes

## 2023-02-11 NOTE — PROGRESS NOTES
A BIPAP of  15/6 @ 30%  was applied to the pt via the mask for an increase in WOB and/or SOB.  The bridge of the nose skin intact with no breakdown noted.  Pt is tolerating it well. Will continue to monitor and assess the pt's current respiratory status and needs.    Pt was off BIPAP for sometime today per RN for break. On 7L oxymask when off BIPAP.

## 2023-02-11 NOTE — PROGRESS NOTES
[facility]    Hospitalist Progress Note      Assessment & Plan   Natasha Allison is a 72 year old female with a history of bulbar ALS with dysphagia and inadequate oral intake with feeding tube in place, on BiPAP at night and independent in ambulating, hospitalized here 2/5-2/6/2022 with new diagnosis of AF with RVR admitted on 2/7/2023 with recurrent AF with RVR complicated by hypotension/obtundation necessitating intubation.      At her recent hospitalization:  CT PE negative for PE, echo with EF > 70 % with no significant valve dz (mild Ao sclerosis).  She required DCCV and stayed in NSR,  discharged with apixaban and metop 25 mg bid.  She felt well on discharge.  Around 1 am she called out to her  as was feeling SOB, he checked her BP/HR and they were fine, she put on her bipap and went to sleep.  She slept for about 30 minutes and then took her BiPAP off only to awaken around 3 am feeling very SOB.  She looked like she was in respiratory distress and so 911 called.  They found her alert but decompensated and become unresponsive with HR's in the 200's.  She was shocked 4 times without improvement.  She was given fentanyl 50 mcg, 10 mg midazolam, and 1 mg of lorazepam during the attempts at cardioversion.       On arrival ED 's and she was hypotensive, unresponsive, with apneic breathing pattern, no response to noxious stimuli.  She underwent synchronized DCCV with 150 J with restoration of NSR. And started on amiodarone gtt.  She has a hx of prolonged response to CNS active medications.  Pt has remained in sinus rhythm and has been on amiodarone through PEG tube.     #Goals of care, no escalation of care: She has had hypoxemia that is related to her ALS, respiratory muscle weakness and difficulty managing her secretions with bulbar weakness.  I had an extensive discussion with the patient,  and son at bedside on 2/10.  Patient notes that her goal at this point is to get  home and be comfortable.  Her chest x-ray done showed retrocardiac opacity secondary to aspiration of secretions.  We discussed that this is likely to recur in the setting of her ALS.  We discussed options including medications to help dry out secretions but patient has been on these before and is adamant she does not want these again.  She is absolutely adamant against any sort of mechanical intervention including tracheostomy.  She wants to move toward comfort cares and go home with hospice.  -She tells us she is ready to die and her goal is to get home with hospice care.  She is OK with maintaining status quo this weekend but no escalation of care.  -If she were to decline suddenly, she voices she would want to have comfort medications available which I have ordered.  I have also ordered home trazodone at night per her request.  -Given her stated goals, we will aim for home early next week with hospice support.   Social work consulted for establishment with hospice.  Palliative care to see the patient again on Monday.     #AF with RVR, recurrent: Recent hospitalization with CT negative for PE, echo without significant valve issues, TSH 3.4.  Sounds like she is on BiPAP with sleeping and doesn't tolerate it well and I suspect that could be inciting factor.  In any case has required DCCV 2x in 3 days leading to admission.  -Cards consulted.    Patient has not been able to tolerate amnio drip due to blood pressures.  She is on oral amiodarone load.  -cont apixiban for stroke ppx     #Obtundation 2/2 medications for DCCV. New stridor post-extubation on 2/8: Hx of prolonged response, intubated for airway protection.  She was on minimal vent setting AM of 2/8 and extubated.  He had stridor noted after extubation.  Plan was originally to reintubate but goals of care conversation were had with family and patient and she noted she would not want to be reintubated under any circumstance.  She was maintained on BiPAP,  started on steroids and did seem to have improvement in her symptoms.  She had a CT scan of her neck that showed possible mild asymmetry in the soft tissue of the neck that could be due to mild edema from recent intubation but otherwise no gross abnormalities.  -Pt with hypoxemia requiring anywhere from 5-10L via oxymask while awake intermittently with bipap.  On 2/10, patient had noted difficulty with secretions at bedside that needed to be suctioned.  This is secondary to her ALS and weakness.    -She is now at mental status baseline. Stridor resolved     #Fever: Patient has had a low-grade fever on 2/7.  Tmax was 101.2 on the a.m. of 2/7.  Since then has had low-grade fevers of around 100.  Her chest x-ray does not show clear infiltrate.  Her UA does not show infection.  Her procalcitonin is not elevated.   -Monitor off antibiotics. No fevers. She has obvious difficulty with secretions which is going to be a chronic issue with her ALS.      #Leukocytosis: Pt with leukocytosis from 2/9-2/10.  She was started on steroids as above for her stridor so suspect etiology.  She has not had concurrent fever and infectious w/u negative as above.  She has been having difficulty with secretions with weak cough, poor swallow due to ALS.      #Hypokalemia: Replacement     #Bulbar ALS has vocal cord paralysis causing difficulty with speaking and dysphagia at baseline reportedly.   -On tube feeds via PEG, we will keep her strict n.p.o.  -resumed pta riluzole but patient and  would prefer that this be held.   -Patient needs BiPAP at night to support her respiratory status. Using her home mask which is helping.      #Hx of RA: On infliximab/mtx, and prn prednisone with flares     #Thrombocytopenia: Mild. Monitor. If platelets below 50 then would hold DOAC. No evidence of bleeding.      Dispo: Can transfer out of the ICU.  Cardiac telemetry.  CODE STATUS: DNR/DNI.   DVT PPX: DOAC     Family updated at bedside.     He Prather  MD    Interval History   No events.  Slept well.  Again reiterates desire to go home with hospice.  Currently on 7 L oxygen and working on weaning this as able.    -Data reviewed today: I reviewed all new labs and imaging results over the last 24 hours    Physical Exam   Temp: 97.5  F (36.4  C) Temp src: Bladder BP: 124/80 Pulse: 56   Resp: 18 SpO2: 97 % O2 Device: BiPAP/CPAP    Vitals:    02/07/23 1300 02/08/23 0400 02/10/23 0400   Weight: 51.1 kg (112 lb 10.5 oz) 52.1 kg (114 lb 13.8 oz) 54.4 kg (119 lb 14.9 oz)     Vital Signs with Ranges  Temp:  [97.3  F (36.3  C)-98.4  F (36.9  C)] 97.5  F (36.4  C)  Pulse:  [53-73] 56  Resp:  [13-27] 18  BP: (118-173)/() 124/80  FiO2 (%):  [30 %] 30 %  SpO2:  [92 %-98 %] 97 %  I/O last 3 completed shifts:  In: 950 [NG/GT:320]  Out: 2310 [Urine:2310]    Constitutional: Awake. Nonverbal due to vocal cord paralysis/weakness. Answers appropriately with writing and nods.  Can mouth words.   HEENT: Normocephalic.  No elevation of JVD.  Respiratory: She is on OxyMask.  Coarseness bilaterally at the bases.  No wheeze today.  Cardiovascular: Regular, no murmur  GI: Thin, feeding tube in place.  BS+, NT, ND  Skin/Integumen: WWP, no rash. No edema  Neuro: Awake.  Moves all extremities. Nods/shakes head appropriately.     Medications     dextrose       - MEDICATION INSTRUCTIONS -         amiodarone  400 mg Oral or Feeding Tube TID     apixaban ANTICOAGULANT  5 mg Oral or NG Tube BID     metoprolol tartrate  25 mg Per Feeding Tube BID     multivitamins w/minerals  15 mL Per Feeding Tube Daily     pantoprazole  40 mg Per Feeding Tube QAM AC    Or     pantoprazole  40 mg Intravenous QAM AC     [Held by provider] riluzole  50 mg Oral or NG Tube Q12H     sodium chloride (PF)  10 mL Intracatheter Q8H     traZODone  50 mg Per Feeding Tube At Bedtime       Data   Recent Labs   Lab 02/11/23  0609 02/10/23  1203 02/10/23  0817 02/10/23  0555 02/10/23  0549 02/09/23  0749 02/09/23  0557  02/08/23  0742 02/08/23  0555 02/07/23  0951 02/07/23  0826 02/07/23  0721 02/07/23  0634 02/05/23  1747   WBC  --   --   --  18.8*  --   --  13.1*  --  7.3  --   --   --    < > 10.5   HGB  --   --   --  12.4  --   --  12.7  --  11.2*  --   --   --    < > 15.8*   MCV  --   --   --  97  --   --  96  --  94  --   --   --    < > 97   PLT  --   --   --  184  --   --  139*  --  115*  --   --   --    < > 173   INR  --   --   --   --   --   --   --   --   --   --   --  1.18*  --  1.04     --   --   --  141  --  139  --  138  --  141  --   --  140   POTASSIUM 4.2  --   --   --  4.7  --  4.3   < > 3.1*  --  3.9  --   --  4.4   CHLORIDE 102  --   --   --  106  --  107  --  109*  --  109*  --   --  100   CO2 34*  --   --   --  29  --  26  --  21*  --  24  --   --  30*   BUN 14.8  --   --   --  17.4  --  12.6  --  12.5  --  12.8  --   --  24.3*   CR 0.50*  --   --   --  0.45*  --  0.42*  --  0.50*  --  0.41*  --   --  0.59   ANIONGAP 2*  --   --   --  6*  --  6*  --  8  --  8  --   --  10   MICHELLE 8.3*  --   --   --  8.3*  --  8.4*  --  7.9*  --  7.7*  --   --  9.7   * 125* 141*  --  143*   < > 166*   < > 143*   < > 138*  --   --  132*   ALBUMIN  --   --   --   --   --   --   --   --  2.6*  --  2.5*  --   --  3.7   PROTTOTAL  --   --   --   --   --   --   --   --  5.2*  --  5.5*  --   --  7.5   BILITOTAL  --   --   --   --   --   --   --   --  0.6  --  0.5  --   --  0.3   ALKPHOS  --   --   --   --   --   --   --   --  46  --  49  --   --  68   ALT  --   --   --   --   --   --   --   --  13  --  14  --   --  20   AST  --   --   --   --   --   --   --   --  19  --  27  --   --  34    < > = values in this interval not displayed.       Recent Results (from the past 24 hour(s))   XR Chest Port 1 View    Narrative    XR CHEST PORT 1 VIEW 2/10/2023 12:06 PM    HISTORY: SOB    COMPARISON: 2/7/2023      Impression    IMPRESSION: New dense retrocardiac opacities, either due to  atelectasis or consolidation. No significant  pleural effusion. No  pneumothorax. Normal heart size.    BOB POSADA MD         SYSTEM ID:  [id number]

## 2023-02-11 NOTE — PROGRESS NOTES
RN and MD aware of pts inability to tolerate our mask for BIPAP use on our V60 BIPAP machine.

## 2023-02-12 NOTE — PROGRESS NOTES
[facility]    Hospitalist Progress Note      Assessment & Plan   Natasha Allison is a 72 year old female with a history of bulbar ALS with dysphagia and inadequate oral intake with feeding tube in place, on BiPAP at night and independent in ambulating, hospitalized here 2/5-2/6/2022 with new diagnosis of AF with RVR admitted on 2/7/2023 with recurrent AF with RVR complicated by hypotension/obtundation necessitating intubation.      At her recent hospitalization:  CT PE negative for PE, echo with EF > 70 % with no significant valve dz (mild Ao sclerosis).  She required DCCV and stayed in NSR,  discharged with apixaban and metop 25 mg bid.  She felt well on discharge.  Around 1 am she called out to her  as was feeling SOB, he checked her BP/HR and they were fine, she put on her bipap and went to sleep.  She slept for about 30 minutes and then took her BiPAP off only to awaken around 3 am feeling very SOB.  She looked like she was in respiratory distress and so 911 called.  They found her alert but decompensated and become unresponsive with HR's in the 200's.  She was shocked 4 times without improvement.  She was given fentanyl 50 mcg, 10 mg midazolam, and 1 mg of lorazepam during the attempts at cardioversion.       On arrival ED 's and she was hypotensive, unresponsive, with apneic breathing pattern, no response to noxious stimuli.  She underwent synchronized DCCV with 150 J with restoration of NSR. And started on amiodarone gtt.  She has a hx of prolonged response to CNS active medications.  Pt has remained in sinus rhythm and has been on amiodarone through PEG tube.      #Goals of care, no escalation of care: She has had hypoxemia that is related to her ALS, respiratory muscle weakness and difficulty managing her secretions with bulbar weakness.  I had an extensive discussion with the patient,  and son at bedside on 2/10.  Patient notes that her goal at this point is to  get home and be comfortable.  Her chest x-ray done showed retrocardiac opacity secondary to aspiration of secretions.  We discussed that this is likely to recur in the setting of her ALS.  We discussed options including medications to help dry out secretions but patient has been on these before and is adamant she does not want these again.  She is absolutely adamant against any sort of mechanical intervention including tracheostomy.  She wants to move toward comfort cares and go home with hospice.  -She tells us she is ready to die and her goal is to get home with hospice care.  She is OK with maintaining status quo this weekend but no escalation of care.  -If she were to decline suddenly, she voices she would want to have comfort medications available which I have ordered.  I have also ordered home trazodone at night per her request.  -Given her stated goals, we will aim for home early next week with hospice support.   Social work consulted for establishment with hospice.  Palliative care to see the patient again on Monday.     #AF with RVR, recurrent: Recent hospitalization with CT negative for PE, echo without significant valve issues, TSH 3.4.  Sounds like she is on BiPAP with sleeping and doesn't tolerate it well and I suspect that could be inciting factor.  In any case has required DCCV 2x in 3 days leading to admission.  -Cards consulted.    Patient has not been able to tolerate amnio drip due to blood pressures.  She is on oral amiodarone load.  Bps quite variable. Will discontinue metoprolol given risk of hypotension.   -cont apixiban for stroke ppx     #Obtundation 2/2 medications for DCCV. New stridor post-extubation on 2/8: Hx of prolonged response, intubated for airway protection.  She was on minimal vent setting AM of 2/8 and extubated.  He had stridor noted after extubation.  Plan was originally to reintubate but goals of care conversation were had with family and patient and she noted she would not  want to be reintubated under any circumstance.  She was maintained on BiPAP, started on steroids and did seem to have improvement in her symptoms.  She had a CT scan of her neck that showed possible mild asymmetry in the soft tissue of the neck that could be due to mild edema from recent intubation but otherwise no gross abnormalities.  -Pt with hypoxemia requiring anywhere from 5-10L via oxymask while awake intermittently with bipap.  On 2/10, patient had noted difficulty with secretions at bedside that needed to be suctioned.  This is secondary to her ALS and weakness.    -She is now at mental status baseline. Stridor resolved     #Fever: Patient has had a low-grade fever on 2/7.  Tmax was 101.2 on the a.m. of 2/7.  Since then has had low-grade fevers of around 100.  Her chest x-ray does not show clear infiltrate.  Her UA does not show infection.  Her procalcitonin is not elevated.   -Monitor off antibiotics. No fevers. She has obvious difficulty with secretions which is going to be a chronic issue with her ALS.      #Leukocytosis: Pt with leukocytosis from 2/9-2/10.  She was started on steroids as above for her stridor so suspect etiology.  She has not had concurrent fever and infectious w/u negative as above.  She has been having difficulty with secretions with weak cough, poor swallow due to ALS.      #Hypokalemia: Replacement     #Bulbar ALS has vocal cord paralysis causing difficulty with speaking and dysphagia at baseline reportedly.   -On tube feeds via PEG, we will keep her strict n.p.o.  -resumed pta riluzole but patient and  would prefer that this be held.   -Patient needs BiPAP at night to support her respiratory status. Using her home mask which is helping.      #Hx of RA: On infliximab/mtx, and prn prednisone with flares     #Thrombocytopenia: Mild. Monitor. If platelets below 50 then would hold DOAC. No evidence of bleeding.      Dispo: Can transfer out of the ICU.  Cardiac telemetry.  CODE  STATUS: DNR/DNI.   DVT PPX: DOAC      Family updated at bedside.     He Prather MD    Interval History   Had anxiety last night with increased WOB. Helped with ativan. Feels better this AM. Asking for hurricaine spray for sore throat.     -Data reviewed today: I reviewed all new labs and imaging results over the last 24 hours.     Physical Exam   Temp: 99.1  F (37.3  C) Temp src: Temporal BP: 102/63 Pulse: 67   Resp: 15 SpO2: 96 % O2 Device: BiPAP/CPAP    Vitals:    02/07/23 1300 02/08/23 0400 02/10/23 0400   Weight: 51.1 kg (112 lb 10.5 oz) 52.1 kg (114 lb 13.8 oz) 54.4 kg (119 lb 14.9 oz)     Vital Signs with Ranges  Temp:  [97.3  F (36.3  C)-99.5  F (37.5  C)] 99.1  F (37.3  C)  Pulse:  [53-72] 67  Resp:  [12-31] 15  BP: ()/(61-95) 102/63  FiO2 (%):  [30 %-100 %] 50 %  SpO2:  [86 %-100 %] 96 %  I/O last 3 completed shifts:  In: 1750 [NG/GT:670]  Out: 3100 [Urine:3100]    Constitutional: Awake. Nonverbal due to vocal cord paralysis/weakness. Answers appropriately with writing and nods.  Can mouth words.   HEENT: Normocephalic.  No elevation of JVD.  Respiratory: She is on OxyMask.  Coarseness bilaterally at the bases.  No wheeze today.  Cardiovascular: Regular, no murmur  GI: Thin, feeding tube in place.  BS+, NT, ND  Skin/Integumen: WWP, no rash. No edema  Neuro: Awake.  Moves all extremities. Nods/shakes head appropriately.     Medications     dextrose       - MEDICATION INSTRUCTIONS -         amiodarone  400 mg Oral or Feeding Tube BID     apixaban ANTICOAGULANT  5 mg Oral or NG Tube BID     [Held by provider] metoprolol tartrate  25 mg Per Feeding Tube BID     multivitamins w/minerals  15 mL Per Feeding Tube Daily     pantoprazole  40 mg Per Feeding Tube QAM AC    Or     pantoprazole  40 mg Intravenous QAM AC     [Held by provider] riluzole  50 mg Oral or NG Tube Q12H     sodium chloride (PF)  10 mL Intracatheter Q8H     traZODone  50 mg Per Feeding Tube At Bedtime       Data   Recent Labs   Lab  02/12/23  0551 02/11/23  0609 02/10/23  1203 02/10/23  0817 02/10/23  0555 02/10/23  0549 02/09/23  0749 02/09/23  0557 02/08/23  0742 02/08/23  0555 02/07/23  0951 02/07/23  0826 02/07/23  0721 02/07/23  0634 02/05/23  1747   WBC  --   --   --   --  18.8*  --   --  13.1*  --  7.3  --   --   --    < > 10.5   HGB  --   --   --   --  12.4  --   --  12.7  --  11.2*  --   --   --    < > 15.8*   MCV  --   --   --   --  97  --   --  96  --  94  --   --   --    < > 97   PLT  --   --   --   --  184  --   --  139*  --  115*  --   --   --    < > 173   INR  --   --   --   --   --   --   --   --   --   --   --   --  1.18*  --  1.04   NA  --  138  --   --   --  141  --  139  --  138  --  141  --   --  140   POTASSIUM 4.7 4.2  --   --   --  4.7  --  4.3   < > 3.1*  --  3.9  --   --  4.4   CHLORIDE  --  102  --   --   --  106  --  107  --  109*  --  109*  --   --  100   CO2  --  34*  --   --   --  29  --  26  --  21*  --  24  --   --  30*   BUN  --  14.8  --   --   --  17.4  --  12.6  --  12.5  --  12.8  --   --  24.3*   CR  --  0.50*  --   --   --  0.45*  --  0.42*  --  0.50*  --  0.41*  --   --  0.59   ANIONGAP  --  2*  --   --   --  6*  --  6*  --  8  --  8  --   --  10   MICHELLE  --  8.3*  --   --   --  8.3*  --  8.4*  --  7.9*  --  7.7*  --   --  9.7   GLC  --  129* 125* 141*  --  143*   < > 166*   < > 143*   < > 138*  --   --  132*   ALBUMIN  --   --   --   --   --   --   --   --   --  2.6*  --  2.5*  --   --  3.7   PROTTOTAL  --   --   --   --   --   --   --   --   --  5.2*  --  5.5*  --   --  7.5   BILITOTAL  --   --   --   --   --   --   --   --   --  0.6  --  0.5  --   --  0.3   ALKPHOS  --   --   --   --   --   --   --   --   --  46  --  49  --   --  68   ALT  --   --   --   --   --   --   --   --   --  13  --  14  --   --  20   AST  --   --   --   --   --   --   --   --   --  19  --  27  --   --  34    < > = values in this interval not displayed.       No results found for this or any previous visit (from the past 24  hour(s)).

## 2023-02-12 NOTE — PROGRESS NOTES
Brief Tele-intensivist note  With HR 50's will decrease amiodarone from 400 tid to 400 bid and will resume tomorrow.     freddy chahal  February 11, 2023

## 2023-02-12 NOTE — PLAN OF CARE
Goal Outcome Evaluation: Alert and oriented. NPO for dysphagia. Has difficulty handling secretions at times. Bipap dependant with short breaks. TF via PEG tube. Family at bedside assisting with cares.

## 2023-02-12 NOTE — PROGRESS NOTES
ICU End of Shift Summary.  For vital signs and complete assessments, please see documentation flowsheets.      Pertinent assessments:   Neuro: A & O X4, unable to speak due to ALS decline, uses written words to answer and ask questions appropriately. Pt is slightly confused this am 0600.  Cardiac: SR- SB. Tele hub notified @ 2200 for SB. Amio oral held per MD due to SB.   Resp: LS diminished. Small amount thick white secretions, oral mucous dry and peeling. Pt refused oral swabs, pt stated she can not get rid of her secretions.   GI: Peg tube- TF @ 45 ml/hr. Loose BM X 2  : Sharp, good urine output  Skin:Bruising arms  Lines:PIV X 2 left  Drips:None    Major Shift Events:       Pt had increased anxiety this shift. Pt calling frequently to be suctioned, unable to clear secretions. PRN Atarax given at 0021.    Atarax helpful for approximately an hr. pt began having loose stools., pt  Anxiety increased with turning to clean up INC BM X 2.     pt stated she wanted to rest. Pt was offered PRN Ativan. Pt requested to update  Jaden before giving medication. Discussed with Jaden and pt, Both agreed to try the Ativan.  PRN Ativan given  @ 0216. Pt cont to be resting, no restlessness.     Plan (Upcoming Events): Palliative consult Monday, Dr Prather wrote in note to utilize PRN comfort medications if needed.   Discharge/Transfer Needs: Pt goal is to go home on hospice. Pt has orders to transfer off unit to med/tel unit.      Bedside Shift Report Completed : yes  Bedside Safety Check Completed: yes

## 2023-02-12 NOTE — PROGRESS NOTES
Pt's on and off BIPAP for SOB/WOB.  When not on BIPAP, on 5-7L oxymask.    Current BIPAP settings: 15/6, RR 14, FIO2: 30%.   Skin integrity: intact. Good. Using pt's mask with hosp BIPAP. Tolerating that well.     RT to follow.

## 2023-02-13 NOTE — PROGRESS NOTES
A BiPAP of 15/6 @ 50% was applied to the pt via the mask for an increase in WOB and/or SOB.  Skin is intact/fair. Barrier on bridge of nose in place. Pt is tolerating it well. Will continue to monitor and assess the pt's current respiratory status and needs.     Marguerite Lilly, RT

## 2023-02-13 NOTE — CONSULTS
Care Management Initial Consult    General Information  Assessment completed with: Arnoldo Suggs  Type of CM/SW Visit: Initial Assessment for Hospice consult order    Primary Care Provider verified and updated as needed:     Readmission within the last 30 days:        Reason for Consult: end of life/hospice  Advance Care Planning:            Communication Assessment  Patient's communication style: spoken language (English or Bilingual)             Cognitive  Cognitive/Neuro/Behavioral: .WDL except, speech  Level of Consciousness: alert  Arousal Level: opens eyes spontaneously  Orientation: oriented x 4, other (see comments) (will write on notepad, difficult to speak)  Mood/Behavior: cooperative, behavior appropriate to situation  Best Language: 3 - Mute  Speech: LILLY (unable to assess) (will write on notepad, difficult to speak)    Living Environment:   People in home: spouse     Current living Arrangements: house  -Rambler style home     Able to return to prior arrangements: other (see comments)       Family/Social Support:  Care provided by: self  Provides care for: no one  Marital Status:   , Children  Arnoldo       Description of Support System: Supportive, Involved    Support Assessment: Adequate family and caregiver support, Adequate social supports    Current Resources:   Patient receiving home care services: No     Community Resources: Hospice  Equipment currently used at home: wheelchair, manual  Supplies currently used at home:      Employment/Financial:  Employment Status: disabled, retired        Financial Concerns:             Lifestyle & Psychosocial Needs:  Social Determinants of Health     Tobacco Use: Medium Risk     Smoking Tobacco Use: Former     Smokeless Tobacco Use: Never     Passive Exposure: Not on file   Alcohol Use: Not on file   Financial Resource Strain: Not on file   Food Insecurity: Not on file   Transportation Needs: Not on file   Physical Activity: Not on file   Stress:  Not on file   Social Connections: Not on file   Intimate Partner Violence: Not on file   Depression: Not at risk     PHQ-2 Score: 0   Housing Stability: Not on file       Functional Status:  Prior to admission patient needed assistance:   Dependent ADLs:: Independent, Dressing  Dependent IADLs:: Transportation       Mental Health Status:          Chemical Dependency Status:                Values/Beliefs:  Spiritual, Cultural Beliefs, Jain Practices, Values that affect care:                 Additional Information:  SW unable to speak with pt at time of visit. ICU staff had just given Ativan and pt sleeping. Called Spouse at home. Spoke with Arnoldo about Hospice consult and discussed plans of support for pt. Arnoldo stated that Natasha wants to be able to return home with the support of Hospice. Arnoldo shares that he has been  to Natasha for almost 50 years and they have been together since they were teenagers. He was tearful at times of the phone. His would like and  support Natasha's wish to die at home but shared that he is VERY concerned about the level of care Natasha will need once home. Natasha is needing to wear the Bi-pap most of the time if not all the time and alsorequires deep suctioning on a regular basis pre Arnoldo.  The idea of being responsible for this and managing comfort meds is very overwhelming to him. Adult children are supportive but they do not live near pt/spouse.   SW spoke with Arnoldo about possible options for placement vs home. Arnoldo verbalizes that he knows Cindy's days are limited and if she is able to make it home her time there will m be days......  Arnoldo would like to speak with [facility] team on Monday to address his concerns about managing symptoms. Pt is open to [facility] home care so the logical support for Hospice would be [facility]. SW did not address the option of  Hospice choices given the current stress and care level concerns Arnoldo has  today. Sw would like to see if [facility] team can work with current medications and request Rn team assist with teaching on suctioning. Arnoldo reports suction is deep due to the limitations of lung function. Prior to this admission Natasha was fairly independent at home getting around with a walker at home. Since admission Natasha has been bed bound and very weak.    Should pt be stable enough to return home with Hospice DME needs would include Hospital bed, possible need for [employer] for suctioning and  bedside table.       Corinne C. White, [facility]   Care mgt team  [phone]

## 2023-02-13 NOTE — PROGRESS NOTES
A BiPAP of 15/6 @ 30% was applied to the pt via the mask for an increase in WOB and/or SOB.    On BIPAP with pt's mask.   Skin integrity: intact.  Pt is tolerating it well. Will continue to monitor and assess the pt's current respiratory status and needs.    Oral suction from the back of throat from  time to time for a moderate thick creamy white secretion.

## 2023-02-13 NOTE — PROGRESS NOTES
[facility]    Hospitalist Progress Note      Assessment & Plan   Natasha Allison is a 72 year old female with a history of bulbar ALS with dysphagia and inadequate oral intake with feeding tube in place, on BiPAP at night and independent in ambulating, hospitalized here 2/5-2/6/2022 with new diagnosis of AF with RVR admitted on 2/7/2023 with recurrent AF with RVR complicated by hypotension/obtundation necessitating intubation.      At her recent hospitalization:  CT PE negative for PE, echo with EF > 70 % with no significant valve dz (mild Ao sclerosis).  She required DCCV and stayed in NSR,  discharged with apixaban and metop 25 mg bid.  She felt well on discharge.  Around 1 am she called out to her  as was feeling SOB, he checked her BP/HR and they were fine, she put on her bipap and went to sleep.  She slept for about 30 minutes and then took her BiPAP off only to awaken around 3 am feeling very SOB.  She looked like she was in respiratory distress and so 911 called.  They found her alert but decompensated and become unresponsive with HR's in the 200's.  She was shocked 4 times without improvement.  She was given fentanyl 50 mcg, 10 mg midazolam, and 1 mg of lorazepam during the attempts at cardioversion.       On arrival ED 's and she was hypotensive, unresponsive, with apneic breathing pattern, no response to noxious stimuli.  She underwent synchronized DCCV with 150 J with restoration of NSR. And started on amiodarone gtt.  She has a hx of prolonged response to CNS active medications.  Pt has remained in sinus rhythm and has been on amiodarone through PEG tube.      #Goals of care, no escalation of care: She has had hypoxemia that is related to her ALS, respiratory muscle weakness and difficulty managing her secretions with bulbar weakness.  I had an extensive discussion with the patient,  and son at bedside on 2/10.  Patient notes that her goal at this point is to  get home and be comfortable.  Her chest x-ray done showed retrocardiac opacity secondary to aspiration of secretions.  We discussed that this is likely to recur in the setting of her ALS.  We discussed options including medications to help dry out secretions but patient has been on these before and is adamant she does not want these again.  She is absolutely adamant against any sort of mechanical intervention including tracheostomy.  She wants to move toward comfort cares and go home with hospice.  -She tells us she is ready to die and her goal is to get home with hospice care.  She is OK with maintaining status quo this weekend but no escalation of care.  -If she were to decline suddenly, she voices she would want to have comfort medications available which I have ordered.  I have also ordered home trazodone at night per her request.  -Social work consulted for establishment with hospice.  Palliative care to see the patient again on 2/13.   voices concern over ability to manage independently at home, particularly with secretions.      #AF with RVR, recurrent: Recent hospitalization with CT negative for PE, echo without significant valve issues, TSH 3.4.  Sounds like she is on BiPAP with sleeping and doesn't tolerate it well and I suspect that could be inciting factor.  In any case has required DCCV 2x in 3 days leading to admission.  -Cards consulted.    Patient has not been able to tolerate amnio drip due to blood pressures.  She is on oral amiodarone load.  Bps quite variable. Will discontinue metoprolol given risk of hypotension.   -cont apixiban for stroke ppx     #Obtundation 2/2 medications for DCCV. New stridor post-extubation on 2/8: Hx of prolonged response, intubated for airway protection.  She was on minimal vent setting AM of 2/8 and extubated.  He had stridor noted after extubation.  Plan was originally to reintubate but goals of care conversation were had with family and patient and she noted  she would not want to be reintubated under any circumstance.  She was maintained on BiPAP, started on steroids and did seem to have improvement in her symptoms.  She had a CT scan of her neck that showed possible mild asymmetry in the soft tissue of the neck that could be due to mild edema from recent intubation but otherwise no gross abnormalities.  -Pt with hypoxemia requiring anywhere from 5-10L via oxymask while awake intermittently with bipap.  Patient has had recurrent issues with secretions which have needed to be repeatedly suctioned at bedside.  This is secondary to her ALS.    -Stridor resolved     #Fever: Patient has had a low-grade fever on 2/7.  Tmax was 101.2 on the a.m. of 2/7.  Since then has had low-grade fevers of around 100.  Her chest x-ray does not show clear infiltrate.  Her UA does not show infection.  Her procalcitonin is not elevated.  Discussed as above with family.       #Leukocytosis: Pt with leukocytosis from 2/9-2/10.  She was started on steroids as above for her stridor so suspect etiology.  She has not had concurrent fever and infectious w/u negative as above.  She has been having difficulty with secretions with weak cough, poor swallow due to ALS.      #Hypokalemia: Replacement     #Bulbar ALS has vocal cord paralysis causing difficulty with speaking and dysphagia at baseline reportedly.   -On tube feeds via PEG, we will keep her strict n.p.o.  -resumed pta riluzole but patient and  would prefer that this be held.   -Patient needs BiPAP at night to support her respiratory status. Using her home mask which is helping.  Discussed with  that as dying process progresses, bipap would be discontinued.      #Hx of RA: On infliximab/mtx, and prn prednisone with flares     #Thrombocytopenia: Mild. Monitor.      Dispo: Can transfer out of the ICU.    CODE STATUS: DNR/DNI.   DVT PPX: DOAC       updated at bedside.     He Prather MD    Interval History   Anxious overnight.  Helped with ativan. Remains on bipap this AM while sleeping.     -Data reviewed today: I reviewed all new labs and imaging results over the last 24 hours.     Physical Exam   Temp: 98.2  F (36.8  C) Temp src: Bladder BP: 117/68 Pulse: 74   Resp: 11 SpO2: 99 % O2 Device: Oxymask Oxygen Delivery: 6 LPM  Vitals:    02/07/23 1300 02/08/23 0400 02/10/23 0400   Weight: 51.1 kg (112 lb 10.5 oz) 52.1 kg (114 lb 13.8 oz) 54.4 kg (119 lb 14.9 oz)     Vital Signs with Ranges  Temp:  [97.7  F (36.5  C)-99.1  F (37.3  C)] 98.2  F (36.8  C)  Pulse:  [68-85] 74  Resp:  [9-24] 11  BP: (113-146)/() 117/68  FiO2 (%):  [30 %-50 %] 30 %  SpO2:  [92 %-99 %] 99 %  I/O last 3 completed shifts:  In: 1710 [NG/GT:720]  Out: 2040 [Urine:2040]    Constitutional: Awake. Nonverbal due to vocal cord paralysis/weakness. Sleeping this AM.  HEENT: Normocephalic.  No elevation of JVD.  Respiratory: She is on bipap. No increased WOB.  Cardiovascular: Regular, no murmur  GI: Thin, feeding tube in place.  BS+, NT, ND  Skin/Integumen: WWP, no rash. No edema  Neuro: Sleeping this AM. Did not awaken.     Medications     dextrose       - MEDICATION INSTRUCTIONS -         amiodarone  400 mg Oral or Feeding Tube BID     apixaban ANTICOAGULANT  5 mg Oral or NG Tube BID     multivitamins w/minerals  15 mL Per Feeding Tube Daily     [START ON 2/14/2023] pantoprazole  40 mg Per Feeding Tube QAM AC     [Held by provider] riluzole  50 mg Oral or NG Tube Q12H     sodium chloride (PF)  10 mL Intracatheter Q8H     traZODone  50 mg Per Feeding Tube At Bedtime       Data   Recent Labs   Lab 02/13/23  0540 02/12/23  0551 02/11/23  0609 02/10/23  1203 02/10/23  0817 02/10/23  0555 02/10/23  0549 02/09/23  0749 02/09/23  0557 02/08/23  0742 02/08/23  0555 02/07/23  0951 02/07/23  0826 02/07/23  0721   0000   WBC  --   --   --   --   --  18.8*  --   --  13.1*  --  7.3  --   --   --   --    HGB  --   --   --   --   --  12.4  --   --  12.7  --  11.2*  --   --   --   --     MCV  --   --   --   --   --  97  --   --  96  --  94  --   --   --   --    PLT  --   --   --   --   --  184  --   --  139*  --  115*  --   --   --   --    INR  --   --   --   --   --   --   --   --   --   --   --   --   --  1.18*  --      --  138  --   --   --  141  --  139  --  138  --  141  --    < >   POTASSIUM 4.9 4.7 4.2  --   --   --  4.7  --  4.3   < > 3.1*  --  3.9  --    < >   CHLORIDE 100  --  102  --   --   --  106  --  107  --  109*  --  109*  --    < >   CO2 34*  --  34*  --   --   --  29  --  26  --  21*  --  24  --    < >   BUN 17.5  --  14.8  --   --   --  17.4  --  12.6  --  12.5  --  12.8  --    < >   CR 0.57  --  0.50*  --   --   --  0.45*  --  0.42*  --  0.50*  --  0.41*  --    < >   ANIONGAP 4*  --  2*  --   --   --  6*  --  6*  --  8  --  8  --    < >   MICHELLE 8.9  --  8.3*  --   --   --  8.3*  --  8.4*  --  7.9*  --  7.7*  --    < >   *  --  129* 125*   < >  --  143*   < > 166*   < > 143*   < > 138*  --    < >   ALBUMIN  --   --   --   --   --   --   --   --   --   --  2.6*  --  2.5*  --   --    PROTTOTAL  --   --   --   --   --   --   --   --   --   --  5.2*  --  5.5*  --   --    BILITOTAL  --   --   --   --   --   --   --   --   --   --  0.6  --  0.5  --   --    ALKPHOS  --   --   --   --   --   --   --   --   --   --  46  --  49  --   --    ALT  --   --   --   --   --   --   --   --   --   --  13  --  14  --   --    AST  --   --   --   --   --   --   --   --   --   --  19  --  27  --   --     < > = values in this interval not displayed.       No results found for this or any previous visit (from the past 24 hour(s)).

## 2023-02-13 NOTE — PROGRESS NOTES
ICU End of Shift Summary.  For vital signs and complete assessments, please see documentation flowsheets.      Pertinent assessments:   Neuro: Alert and orientated. Follows commands. Unable to verbally communicate due to affects of vocal cords due to condition. Communicated by writing.  Cardiac:SR  Resp: LS diminished. Continuously on Bipap @ 50 % FiO2. Oral suctioning, per pt request tolerates well. Small amt white/cloudy secretions.    GI: Peg tube, TF @ 45 ml/hr. No BM this shift. BS audible/active.  : Sharp, adequate urine output  Skin: JOÃO upper extremity bruising  Lines: PIV X2  Drips:None    Major Shift Events: PRN Ativan given @ 0030, pt rested most the night after   medication.      Plan (Upcoming Events): Hospice consult, pt wants to go home.  Discharge/Transfer Needs:  Hospice set up for pt to go home     Bedside Shift Report Completed : yes  Bedside Safety Check Completed:  yes

## 2023-02-13 NOTE — PROGRESS NOTES
Care Management Follow Up    Length of Stay (days): 6    Expected Discharge Date: 02/15/2023     Concerns to be Addressed: discharge planning    Patient plan of care discussed at interdisciplinary rounds: Yes    Anticipated Discharge Disposition: Hospice     Anticipated Discharge Services: None  Anticipated Discharge DME: Bed, Commode, Other (see comment) (suction)    Patient/family educated on Medicare website which has current facility and service quality ratings: no  Education Provided on the Discharge Plan:  yes  Patient/Family in Agreement with the Plan: yes    Referrals Placed by CM/SW: post acute facilities    Additional Information:  Lilian was updated by Kavitha from the Palliative Team, that the pt would like to discharge to [facility] in [place] with hospice services.  Lilian spoke with ARJUN Garcia at [facility] P: [phone], who said that they are unsure if the facility will be able to meet her BiPAP needs.  Lilian contacted the admission coordinator for [facility] facilities ([place], [place], and [place]), Karla P: [phone], to discuss their ability to admit the pt in [place].  Lilian updated the pt's spouse Arnoldo and addressed his questions.  Lilian will await a call back from Karla.      TREVIN Sanders, [facility]  Inpatient Care Coordination  [facility]  [phone]

## 2023-02-13 NOTE — PLAN OF CARE
ICU End of Shift Summary.  For vital signs and complete assessments, please see documentation flowsheets.      Pertinent assessments:   Neuro: Alert and orientated. Following commands. Non-verbal due to medical condition. Writes to communicate. Can make needs known.   Cardiac: WDL  Resp: LS dim, course. On bipap or oxy mask for maintain sats as well as decrease work of breathing.   GI: BS active. BM x 1. TF running at goal.   : bourne in place. WDL  Lines: PIV  Drips: done.     Major Shift Events:   Suctioned as needed  Family at bedside.   Plan (Upcoming Events): TBD. Palliative consult tomorrow. Hoping to go home on hospice early next week.   Discharge/Transfer Needs:  TBD - hospice enrollment.      Bedside Shift Report Completed : yes  Bedside Safety Check Completed: yes

## 2023-02-13 NOTE — PROGRESS NOTES
[facility]  Palliative Care Progress Note  Text Page     Assessment & Plan   Recommendations:  1. Goals of Care- No CPR- Do NOT Intubate  Hospitalization goals discussed with patient, daughter, sons and spouse.  DNR, Do NOT intubate.  Discharge to facility on hospice.  Symptom management while inpatient. If decline occurs, Natasha would not want escalation of cares and would want to be made comfortable at end of life.   Decisional Capacity- Intact. Patient does not have an advance directive. Per  informed consent policy next of kin should be involved if patient becomes unable.  POLST none on file.  Appropriate to complete prior to discharge given change in code status.     2.  Dyspnea  Initial respiratory distress due to CNS depression, intubation x 24 hours.  Extubated 2/08/23 with stridor present initially, has been maintained on bipap since that time.  - management with Bipap therapy per primary team.    3. Anxiety  Patient with reported high levels of anxiety at times.  Intermittent and severe, typically in correlation with transition to and from Bipap therapy.    - lorazepam 0.25 mg every 6 hours prn  - fan at the bedside  - nursing assessment and emotional support as needed.      4. Generalized weakness  Progressive weakness due to ALS.  Has become less able to ambulate safely for long distances.  Bulbar weakness has presented challenges with regard to respiratory patterns when lying supine.   - Therapy recommendations per primary team  - supportive measures per nursing assessment.     5. Spiritual Care  Oriented to Spiritual Health as part of Palliative Care team. Consultation placed for  to follow.  Spiritual Background: Benjamin     6. Care Planning  Appreciate Care of multidisciplinary team.  Medications for discharge - pending medical improvement and disposition recommendations.     Medical Decision Making and Goals of Care:  Discussed on February 13, 2023 with Kavitha  SINCERE Christy CNP:   Met with patient, , daughter and two sons at the bedside.  Reviewed discussions held with primary team and the understanding of hospice as the goal for care at discharge.  Patient acknowledged this plan and family stated understanding.  Discussed preference of patient to go home, but after reviewing practical care and safety considerations, patient verbalized agreement to go to a facility near home.    Discussed symptom burden and management while inpatient, discussed ability of the hospice team to continue to titrate and monitor medications to facilitate comfort.  Discussed ongoing therapies and the patient's choice to continue or discontinue interventions as she wishes.  Patient processed the information and verbalized emotions via writing on a clipboard.  Patient and family reached consensus that discharge to facility would best serve to meet patient and family needs.    Reviewed plan of care and discussed social work role in planning for discharge and facilitation of placement.    They denied questions or concerns.     SINCERE Sahu CNP  Pain Management and Palliative Care  [facility]  Pgr: [phone]      Time Spent on this Encounter   Total unit/floor time 75 minutes, time consisted of the following, examination of the patient, reviewing the record and completing documentation. >50% of time spent in counseling and coordination of care, Bedside Nurse Donna and Hospitalist Dr. Prather.  Time spend counseling with patient and family consisted of the following topics, goals of care, education about prognosis, care planning for discharge and symptom management.    Review of Systems    CONSTITUTIONAL: NEGATIVE for fever, chills, change in weight  ENT/MOUTH: NEGATIVE for ear, mouth and throat problems  RESP: NEGATIVE for significant cough or SOB  CV: NEGATIVE for chest pain, palpitations or peripheral edema    Physical Exam   Temp:  [97.7  F (36.5   C)-99.1  F (37.3  C)] 98.2  F (36.8  C)  Pulse:  [68-85] 74  Resp:  [9-24] 11  BP: (113-146)/() 117/68  FiO2 (%):  [30 %-50 %] 30 %  SpO2:  [92 %-99 %] 99 %  119 lbs 14.88 oz  Exam:  GEN:  On Bipap, communicates via writing on clipboard.  Alert, oriented x 3, appears comfortable, NAD.  HEENT:  Normocephalic/atraumatic, no scleral icterus, no nasal discharge, mouth moist.  CV:  RRR, S1, S2; no murmurs or other irregularities noted.  +3 DP/PT pulses bilatererally;  edema BLE.  RESP:  Coarse to auscultation bilaterally.  Symmetric chest rise on inhalation noted.  Bipap supported respirations  ABD:  Rounded, soft, non-tender/non-distended.  +BS  EXT:  Edema & pulses as noted above.  CMS intact x 4.      SKIN:  Dry to touch, no exanthems noted in the visualized areas.    NEURO: Symmetric strength +4/5.   PAIN BEHAVIOR: Cooperative  Psych:  Normal affect.  Calm, cooperative, verbal conversation limited due to bipap.    Medications     dextrose       - MEDICATION INSTRUCTIONS -         amiodarone  400 mg Oral or Feeding Tube BID     apixaban ANTICOAGULANT  5 mg Oral or NG Tube BID     multivitamins w/minerals  15 mL Per Feeding Tube Daily     [START ON 2/14/2023] pantoprazole  40 mg Per Feeding Tube QAM AC     [Held by provider] riluzole  50 mg Oral or NG Tube Q12H     sodium chloride (PF)  10 mL Intracatheter Q8H     traZODone  50 mg Per Feeding Tube At Bedtime       Data   Results for orders placed or performed during the hospital encounter of 02/07/23 (from the past 24 hour(s))   Basic metabolic panel   Result Value Ref Range    Sodium 138 136 - 145 mmol/L    Potassium 4.9 3.4 - 5.3 mmol/L    Chloride 100 98 - 107 mmol/L    Carbon Dioxide (CO2) 34 (H) 22 - 29 mmol/L    Anion Gap 4 (L) 7 - 15 mmol/L    Urea Nitrogen 17.5 8.0 - 23.0 mg/dL    Creatinine 0.57 0.51 - 0.95 mg/dL    Calcium 8.9 8.8 - 10.2 mg/dL    Glucose 120 (H) 70 - 99 mg/dL    GFR Estimate >90 >60 mL/min/1.73m2   Magnesium   Result Value Ref Range     Magnesium 2.1 1.7 - 2.3 mg/dL   Phosphorus   Result Value Ref Range    Phosphorus 4.2 2.5 - 4.5 mg/dL

## 2023-02-14 NOTE — PROGRESS NOTES
ICU End of Shift Summary.  For vital signs and complete assessments, please see documentation flowsheets.      Pertinent assessments:  CV: SR HR in the 60's, normotensive  Neuro: ANO x 4, communicates by writing. c/o of pain in throat, PRN tylenol given x 1 and PRN atarax given x 1 for anxiety  Pulm: BiPAP 16/6, FiO2 30%  Renal: Sharp, good UO   GI: TF @ 45 mL (at goal), no BM this shift   Endo: BS with morning labs   Skin: No changes      Discharge/Transfer Needs: Plan is for hospice     Bedside Shift Report Completed : Yes  Bedside Safety Check Completed: Yes

## 2023-02-14 NOTE — PROGRESS NOTES
Care Management Follow Up    Length of Stay (days): 7    Expected Discharge Date: 02/15/2023 - once placement is available and hospice is arranged.     Concerns to be Addressed: discharge planning  Patient plan of care discussed at interdisciplinary rounds: Yes    Anticipated Discharge Disposition: Hospice     Anticipated Discharge Services: None  Anticipated Discharge DME: Bed, Commode, Other (see comment) (suction)    Patient/family educated on Medicare website which has current facility and service quality ratings: no  Education Provided on the Discharge Plan:  yes  Patient/Family in Agreement with the Plan: unable to assess    Referrals Placed by CM/SW:  Post acute facilities  Private pay costs discussed: private room/amenity fees and transportation costs    Additional Information:  Lilian spoke with Karla, admissions coordinator for [facility] P: [phone], who said that they do not have any openings in [facility].  She suggested that Sw call back tomorrow to see if there has been any change in bed availability.  Lilian updated the pt's spouse Arnoldo.  Lilian told him that alternative options can be discussed.  He is going to chat with his kids and address alternative options.        TREVIN Sanders, [facility]  Inpatient Care Coordination  [facility]  [phone]

## 2023-02-14 NOTE — PLAN OF CARE
Goal Outcome Evaluation:       Patient A/O, writing appropriate notes. BP soft, SR, pulses palpable, no edema. On BiPap, sats adequate, suctioned for thick sputum, lungs dim. Denies N/V, NPO, tolerating TF at goal rate with flushes in Peg. Sharp with clear and yellow urine. Declines repositioning as she states she is comfortable. Denies pain. Family at bedside.

## 2023-02-14 NOTE — PLAN OF CARE
Problem: Enteral Nutrition  Goal: Absence of Aspiration Signs and Symptoms  Outcome: Progressing  Goal: Feeding Tolerance  Outcome: Progressing   Goal Outcome Evaluation: TF at goal, patient tolerating well

## 2023-02-14 NOTE — PROGRESS NOTES
[facility]    Hospitalist Progress Note      Assessment & Plan   Natasha Allison is a 72 year old female with a history of bulbar ALS with dysphagia and inadequate oral intake with feeding tube in place, on BiPAP at night and independent in ambulating, hospitalized here 2/5-2/6/2022 with new diagnosis of AF with RVR admitted on 2/7/2023 with recurrent AF with RVR complicated by hypotension/obtundation necessitating intubation.      At her recent hospitalization:  CT PE negative for PE, echo with EF > 70 % with no significant valve dz (mild Ao sclerosis).  She required DCCV and stayed in NSR,  discharged with apixaban and metop 25 mg bid.  She felt well on discharge.  Around 1 am she called out to her  as was feeling SOB, he checked her BP/HR and they were fine, she put on her bipap and went to sleep.  She slept for about 30 minutes and then took her BiPAP off only to awaken around 3 am feeling very SOB.  She looked like she was in respiratory distress and so 911 called.  They found her alert but decompensated and become unresponsive with HR's in the 200's.  She was shocked 4 times without improvement.  She was given fentanyl 50 mcg, 10 mg midazolam, and 1 mg of lorazepam during the attempts at cardioversion.       On arrival ED 's and she was hypotensive, unresponsive, with apneic breathing pattern, no response to noxious stimuli.  She underwent synchronized DCCV with 150 J with restoration of NSR and started on amiodarone.  She has a hx of prolonged response to CNS active medications and did require intubation post cardioversion.  She was able to be extubated within about 24 hours.  Pt has remained in sinus rhythm and has been on amiodarone through PEG tube.  With difficulty managing secretions, patient and family have made decision to transition to more comfort based approach with advanced ALS.      #Goals of care, no escalation of care. Currently looking for hospice  facility: She has had hypoxemia that is related to her ALS, respiratory muscle weakness and difficulty managing her secretions with bulbar weakness.  I had an extensive discussion with the patient,  and son at bedside on 2/10.  Patient notes that her goal at this point is to get home and be comfortable.  Her chest x-ray done showed retrocardiac opacity secondary to aspiration of secretions.  We discussed that this is likely to recur in the setting of her ALS.  We discussed options including medications to help dry out secretions but patient has been on these before and is adamant she does not want these again.  She is absolutely adamant against any sort of mechanical intervention including tracheostomy.  She wants to move toward comfort cares and go home with hospice.  -She tells us she is ready to die and her goal is to get home with hospice care.  She is OK with maintaining status quo this weekend but no escalation of care.  -If she were to decline suddenly, she voices she would want to have comfort medications available which I have ordered.  I have also ordered home trazodone at night per her request.  -Social work consulted for establishment with hospice.  Palliative care to see the patient again on 2/13.  We are now working on getting her to a hospice facility so she can be better supported.  -Pt notes that she wants to try a medicine that will help her fall asleep easier at night as she only slept 3 hours on 2/13-2/14.  Will discontinue trazodone and instead trial seroquel 25 mg. Low dose scheduled ativan, as needed pain meds and ativan also available.  Atarax also available for anxiety.   -If patient were to have a significant decline in status (recurrent afib w RVR, respiratory distress), comfort medications are available. Patient and family have reiterated that comfort is main priority.      #AF with RVR, recurrent: Recent hospitalization with CT negative for PE, echo without significant valve  issues, TSH 3.4.  Sounds like she is on BiPAP with sleeping and doesn't tolerate it well and I suspect that could be inciting factor.  In any case has required DCCV 2x in 3 days leading to admission.  -Cards consulted.    Patient has not been able to tolerate amnio drip due to blood pressures.  She is on oral amiodarone.  Stop telemetry given goals of care.   -cont apixiban     #Obtundation 2/2 medications for DCCV. New stridor post-extubation on 2/8: Hx of prolonged response, intubated for airway protection.  She was on minimal vent setting AM of 2/8 and extubated.  He had stridor noted after extubation.  Plan was originally to reintubate but goals of care conversation were had with family and patient and she noted she would not want to be reintubated under any circumstance.  She was maintained on BiPAP, started on steroids and did seem to have improvement in her symptoms.  She had a CT scan of her neck that showed possible mild asymmetry in the soft tissue of the neck that could be due to mild edema from recent intubation but otherwise no gross abnormalities.  -Pt with hypoxemia requiring variable amounts of O2 while awake intermittently with bipap.  Patient has had recurrent issues with secretions which have needed to be repeatedly suctioned at bedside.  This is secondary to her ALS.    -Stridor resolved     #Fever: Patient has had a low-grade fever on 2/7.  Tmax was 101.2 on the a.m. of 2/7.  Since then has had low-grade fevers of around 100.  Her chest x-ray does not show clear infiltrate.  Her UA does not show infection.  Her procalcitonin is not elevated.  Discussed as above with family.       #Leukocytosis: Pt with leukocytosis from 2/9-2/10.  She was started on steroids as above for her stridor so suspect etiology.  She has not had concurrent fever and infectious w/u negative as above.  She has been having difficulty with secretions with weak cough, poor swallow due to ALS.      #Hypokalemia:  Replacement     #Bulbar ALS has vocal cord paralysis causing difficulty with speaking and dysphagia at baseline reportedly.   -On tube feeds via PEG, we will keep her strict n.p.o.  -resumed pta riluzole but patient and  would prefer that this be held.   -Patient needs BiPAP at night to support her respiratory status. Using her home mask which is helping.  Discussed with  that as dying process progresses, bipap would be discontinued.      #Hx of RA: On infliximab/mtx, and prn prednisone with flares  #Thrombocytopenia: Mild.     Dispo: Can transfer out of the ICU.  Awaiting placement to hospice facility.   CODE STATUS: DNR/DNI.   DVT PPX: DOAC     Family updated at bedside.     He Prather MD    Interval History   Only slept 3 hours. Feels OK this AM. Still complaining of some throat pain. Asking to try a different sleep medicine.     -Data reviewed today: I reviewed all new labs and imaging results over the last 24 hours.    Physical Exam   Temp: 98.1  F (36.7  C) Temp src: Bladder BP: 110/62 Pulse: 66   Resp: 21 SpO2: 97 % O2 Device: (S) Oxymask (Per pt request) Oxygen Delivery: 6 LPM  Vitals:    02/07/23 1300 02/08/23 0400 02/10/23 0400   Weight: 51.1 kg (112 lb 10.5 oz) 52.1 kg (114 lb 13.8 oz) 54.4 kg (119 lb 14.9 oz)     Vital Signs with Ranges  Temp:  [97.9  F (36.6  C)-98.6  F (37  C)] 98.1  F (36.7  C)  Pulse:  [61-80] 66  Resp:  [7-24] 21  BP: ()/(51-68) 110/62  FiO2 (%):  [30 %] 30 %  SpO2:  [91 %-99 %] 97 %  I/O last 3 completed shifts:  In: 1195 [NG/GT:565]  Out: 845 [Urine:845]    Constitutional: Awake. Nonverbal due to vocal cord paralysis/weakness.  Writes to communicate and nods/shakes head to questions.  HEENT: Normocephalic.  No elevation of JVD.  Respiratory: On oxymask. No increased WOB.  Cardiovascular: Regular, no murmur  GI: Thin, feeding tube in place.  BS+, NT, ND  Skin/Integumen: WWP, no rash. No edema  Neuro: Awake. Writes to communicate.     Medications     dextrose        - MEDICATION INSTRUCTIONS -         amiodarone  400 mg Oral or Feeding Tube BID     apixaban ANTICOAGULANT  5 mg Oral or NG Tube BID     artificial saliva  1 spray Swish & Spit 4x Daily     guaiFENesin  10 mL Per G Tube Q6H     LORazepam  0.25 mg Per Feeding Tube Q6H     magnesium oxide  400 mg Oral Q4H     multivitamins w/minerals  15 mL Per Feeding Tube Daily     pantoprazole  40 mg Per Feeding Tube QAM AC     QUEtiapine  25 mg Per Feeding Tube At Bedtime     [Held by provider] riluzole  50 mg Oral or NG Tube Q12H     sodium chloride (PF)  10 mL Intracatheter Q8H       Data   Recent Labs   Lab 02/14/23  0530 02/13/23  0540 02/12/23  0551 02/11/23  0609 02/10/23  1203 02/10/23  0817 02/10/23  0555 02/10/23  0549 02/09/23  0749 02/09/23  0557 02/08/23  0742 02/08/23  0555   WBC  --   --   --   --   --   --  18.8*  --   --  13.1*  --  7.3   HGB  --   --   --   --   --   --  12.4  --   --  12.7  --  11.2*   MCV  --   --   --   --   --   --  97  --   --  96  --  94   PLT  --   --   --   --   --   --  184  --   --  139*  --  115*   NA  --  138  --  138  --   --   --  141  --  139  --  138   POTASSIUM 4.8 4.9 4.7 4.2  --   --   --  4.7  --  4.3   < > 3.1*   CHLORIDE  --  100  --  102  --   --   --  106  --  107  --  109*   CO2  --  34*  --  34*  --   --   --  29  --  26  --  21*   BUN  --  17.5  --  14.8  --   --   --  17.4  --  12.6  --  12.5   CR  --  0.57  --  0.50*  --   --   --  0.45*  --  0.42*  --  0.50*   ANIONGAP  --  4*  --  2*  --   --   --  6*  --  6*  --  8   MICHELLE  --  8.9  --  8.3*  --   --   --  8.3*  --  8.4*  --  7.9*   GLC  --  120*  --  129* 125*   < >  --  143*   < > 166*   < > 143*   ALBUMIN  --   --   --   --   --   --   --   --   --   --   --  2.6*   PROTTOTAL  --   --   --   --   --   --   --   --   --   --   --  5.2*   BILITOTAL  --   --   --   --   --   --   --   --   --   --   --  0.6   ALKPHOS  --   --   --   --   --   --   --   --   --   --   --  46   ALT  --   --   --   --   --   --   --    --   --   --   --  13   AST  --   --   --   --   --   --   --   --   --   --   --  19    < > = values in this interval not displayed.       No results found for this or any previous visit (from the past 24 hour(s)).

## 2023-02-14 NOTE — PROGRESS NOTES
Respiratory Therapy Note    Patient transitioned to 6 LPM oxymask this morning SpO2 98%. BiPAP on standby in room. RT to follow.     Morelia Triana, RT  5:43 PM February 14, 2023

## 2023-02-14 NOTE — PROGRESS NOTES
[facility]  Palliative Care Progress Note  Text Page     Assessment & Plan   Recommendations:  1. Goals of Care- No CPR- Do NOT Intubate  Hospitalization goals discussed with patient, daughter, sons and spouse.  DNR, Do NOT intubate.  Discharge to facility on hospice.  Symptom management while inpatient. If decline occurs, Natasha would not want escalation of cares and would want to be made comfortable at end of life.   Patient off of Bipap on assessment - States she WOULD want bipap used if she required it for work of breathing or change in status.  Decisional Capacity- Intact. Patient does not have an advance directive. Per  informed consent policy next of kin should be involved if patient becomes unable.  POLST none on file.  Appropriate to complete prior to discharge given change in code status.     2.  Dyspnea  Initial respiratory distress due to CNS depression, intubation x 24 hours.  Extubated 2/08/23 with stridor present initially, has been maintained on bipap since that time.  - management with Bipap therapy per primary team.  - wean as tolerated.    - Patient would want resumption of Bipap if indicated.    3. Anxiety  Patient with reported high levels of anxiety at times.  Intermittent and severe, typically in correlation with transition to and from Bipap therapy. - Noted improvement of anxiety with new recommendations  - lorazepam 0.25 mg every 6 hours scheduled  - lorazepam 1 mg every 3 hours prn severe refractory anxiety.  - fan at the bedside  - nursing assessment and emotional support as needed.      4. Generalized weakness  Progressive weakness due to ALS.  Has become less able to ambulate safely for long distances.  Bulbar weakness has presented challenges with regard to respiratory patterns when lying supine.   - Therapy recommendations per primary team  - supportive measures per nursing assessment.     5. Spiritual Care  Oriented to Spiritual Health as part of  Palliative Care team. Consultation placed for  to follow.  Spiritual Background: Benjamin Encarnacion. Care Planning  Appreciate Care of multidisciplinary team.  Medications for discharge - pending medical improvement and disposition recommendations.     Medical Decision Making and Goals of Care:  Discussed on February 14, 2023 with SINCERE Sahu CNP:   Met with patient at the bedside.  Family was not present.  Reviewed symptoms and overnight events.  Patient had simple oxygen mask in place, indicated relief to have a break from the bipap mask. Inquired if she had reviewed a wish to resume bipap mask if indicated and patient indicated that she had not.  Inquired if she would wish to have bipap mask placed if needed and she stated that she would like to use the bipap until after discharge to facility on hospice.   Discussed ongoing management plan while awaiting placement and support by the medical team.  She denied questions or concerns.    SINCERE Sahu CNP  Pain Management and Palliative Care  [facility]  Pgr: [phone]      Time Spent on this Encounter   Total unit/floor time 35 minutes, time consisted of the following, examination of the patient, reviewing the record and completing documentation. >50% of time spent in counseling and coordination of care, Bedside Nurse Beverley and Hospitalist Dr. Prather.  Time spend counseling with patient and family consisted of the following topics, care planning for discharge and symptom management.    Review of Systems    CONSTITUTIONAL: NEGATIVE for fever, chills, change in weight  ENT/MOUTH: NEGATIVE for ear, mouth and throat problems  RESP: NEGATIVE for significant cough or SOB  CV: NEGATIVE for chest pain, palpitations or peripheral edema    Physical Exam   Temp:  [97.9  F (36.6  C)-98.6  F (37  C)] 98.1  F (36.7  C)  Pulse:  [61-80] 66  Resp:  [7-24] 21  BP: ()/(51-68) 110/62  FiO2 (%):  [30 %] 30 %  SpO2:  [91 %-99  %] 97 %  119 lbs 14.88 oz  Exam:  GEN:  communicates via writing on clipboard and head nodding.  Alert, appears comfortable, NAD.  CV:  RRR, S1, S2; no murmurs or other irregularities noted.  +3 DP/PT pulses bilatererally;  edema BLE.  RESP:  Coarse to auscultation bilaterally.  Symmetric chest rise on inhalation noted.    EXT:  Edema & pulses as noted above.  CMS intact x 4.      SKIN:  Dry to touch, no exanthems noted in the visualized areas.    PAIN BEHAVIOR: Cooperative  Psych:  Normal affect.  Calm, cooperative, verbal conversation limited due to bipap.    Medications     dextrose       - MEDICATION INSTRUCTIONS -         amiodarone  400 mg Oral or Feeding Tube BID     apixaban ANTICOAGULANT  5 mg Oral or NG Tube BID     artificial saliva  1 spray Swish & Spit 4x Daily     guaiFENesin  10 mL Per G Tube Q6H     LORazepam  0.25 mg Per Feeding Tube Q6H     magnesium oxide  400 mg Oral Q4H     multivitamins w/minerals  15 mL Per Feeding Tube Daily     pantoprazole  40 mg Per Feeding Tube QAM AC     [Held by provider] riluzole  50 mg Oral or NG Tube Q12H     sodium chloride (PF)  10 mL Intracatheter Q8H     traZODone  50 mg Per Feeding Tube At Bedtime       Data   Results for orders placed or performed during the hospital encounter of 02/07/23 (from the past 24 hour(s))   Potassium   Result Value Ref Range    Potassium 4.8 3.4 - 5.3 mmol/L   Magnesium   Result Value Ref Range    Magnesium 2.0 1.7 - 2.3 mg/dL   Phosphorus   Result Value Ref Range    Phosphorus 3.8 2.5 - 4.5 mg/dL

## 2023-02-14 NOTE — PROGRESS NOTES
CLINICAL NUTRITION SERVICES - REASSESSMENT NOTE     Nutrition Prescription    Malnutrition Status:    % Intake: None noted--on EN  % Weight Loss: Weight loss does not meet criteria  Subcutaneous Fat Loss: Unable to assess  Muscle Loss: Unable to assess  Fluid Accumulation/Edema: None noted  Malnutrition Diagnosis: Unable to determine due to need for thorough NFPE       EVALUATION OF THE PROGRESS TOWARD GOALS     Nutrition Support:      Access: PEG    Formula/Rate/Provisions: 1620 kcals (~32 kcal/kg), 67 g PRO (1.3 g/kg), 822 ml free H20, 219 g CHO, and 0 g fiber daily per DW 51.1 kg    Flushes: H2O- 100 ml q 4 hrs    Intake/Tolerance:  5-day average intake of 813 ml (75% prescribed volume)--suspect incomplete charting as no nursing notes indicate TF holds     NEW FINDINGS   General: Plan is for discharge with hospice, currently seeking placement with a hospice service    Weight:   Date/Time Weight Weight Method   02/10/23 0400 54.4 kg (119 lb 14.9 oz) Bed scale   02/08/23 0400 52.1 kg (114 lb 13.8 oz) Bed scale   02/07/23 1300 51.1 kg (112 lb 10.5 oz) --     Labs: reviewed    GI: no concerns per rounds, RN charting    Skin: no concerns per rounds, RN charting    Meds: Biotene, mvit w/min, pantoprazole    ASSESSED NUTRITION NEEDS:  Dosing Weight: 51.1 kg    Estimated Energy Needs: 9429-7455 kcals/day (30 - 35 kcals/kg )  Justification: Likely increased needs r/t ALS   Estimated Protein Needs: 61-77 grams protein/day (1.2 - 1.5 grams of pro/kg)  Justification: Increased needs  Estimated Fluid Needs: 3180-7861 mL/day (25 - 30 mL/kg)   Justification: Maintenance or per MD pending fluid status    MALNUTRITION  As noted above    Previous Goals   Total avg nutritional intake to meet % estimated needs  Evaluation: likely met, suspect missing charting    Previous Nutrition Diagnosis  Swallowing difficulty related to ALS as evidenced by need for chronic enteral nutrition to meet needs    Evaluation: No change    CURRENT  NUTRITION DIAGNOSIS  Swallowing difficulty related to ALS as evidenced by need for chronic enteral nutrition to meet needs      INTERVENTIONS  Implementation  Enteral Nutrition - continue as ordered  Collaboration and Referral of Nutrition care: patient discussed during IDT rounds    Goals  Total avg nutritional intake to meet % estimated needs    Monitoring/Evaluation  Progress toward goals will be monitored and evaluated per protocol.    Lisa Galvez RD, LD, CNSC  Pager - 3rd floor/ICU: [phone]  Pager - All other floors: [phone]  Pager - Weekend/holiday: [phone]  Office: [phone]

## 2023-02-14 NOTE — PROGRESS NOTES
"Patient remains on a BiPAP of  16/6 @ 30% via the pt's mask for an increase in WOB and/or SOB. Skin looks intact. Pt is tolerating it well. Will continue to monitor and assess the pt's current respiratory status and needs.    /62 (BP Location: Right arm)   Pulse 65   Temp 97.9  F (36.6  C)   Resp 20   Ht 1.549 m (5' 0.98\")   Wt 54.4 kg (119 lb 14.9 oz)   SpO2 95%   BMI 22.67 kg/m      "

## 2023-02-15 NOTE — PROGRESS NOTES
"Pt on 3L oxymask today. Stable vital signs.  BP 92/52   Pulse 72   Temp 98.1  F (36.7  C)   Resp 18   Ht 1.549 m (5' 0.98\")   Wt 54.4 kg (119 lb 14.9 oz)   SpO2 97%   BMI 22.67 kg/m      Hasn't need BIPAP so far.   BIPAP order changed to at bedtime if pt wishes to use it.    RT to follow.      "

## 2023-02-15 NOTE — PROGRESS NOTES
[facility]    Hospitalist Progress Note    Date of Service: 02/15/2023    Assessment & Plan      Natasha Allison is a 72 year old female with a history of bulbar ALS with dysphagia and inadequate oral intake with feeding tube in place, on BiPAP at night and independent in ambulating, hospitalized here 2/5-2/6/2022 with new diagnosis of AF with RVR admitted on 2/7/2023 with recurrent AF with RVR complicated by hypotension/obtundation necessitating intubation.      At her recent hospitalization:  CT PE negative for PE, echo with EF > 70 % with no significant valve dz (mild Ao sclerosis).  She required DCCV and stayed in NSR,  discharged with apixaban and metop 25 mg bid.  She felt well on discharge.  Around 1 am she called out to her  as was feeling SOB, he checked her BP/HR and they were fine, she put on her bipap and went to sleep.  She slept for about 30 minutes and then took her BiPAP off only to awaken around 3 am feeling very SOB.  She looked like she was in respiratory distress and so 911 called.  They found her alert but decompensated and become unresponsive with HR's in the 200's.  She was shocked 4 times without improvement.  She was given fentanyl 50 mcg, 10 mg midazolam, and 1 mg of lorazepam during the attempts at cardioversion.       On arrival ED 's and she was hypotensive, unresponsive, with apneic breathing pattern, no response to noxious stimuli.  She underwent synchronized DCCV with 150 J with restoration of NSR and started on amiodarone.  She has a hx of prolonged response to CNS active medications and did require intubation post cardioversion.  She was able to be extubated within about 24 hours.  Pt has remained in sinus rhythm and has been on amiodarone through PEG tube.  With difficulty managing secretions, patient and family have made decision to transition to more comfort based approach with advanced ALS.      #Goals of care, no escalation of care. Currently  looking for hospice facility: She has had hypoxemia that is related to her ALS, respiratory muscle weakness and difficulty managing her secretions with bulbar weakness.  I had an extensive discussion with the patient,  and son at bedside on 2/10.  Patient notes that her goal at this point is to get home and be comfortable.  Her chest x-ray done showed retrocardiac opacity secondary to aspiration of secretions.  We discussed that this is likely to recur in the setting of her ALS.  We discussed options including medications to help dry out secretions but patient has been on these before and is adamant she does not want these again.  She is absolutely adamant against any sort of mechanical intervention including tracheostomy.  She wants to move toward comfort cares and go home with hospice.  -She tells us she is ready to die and her goal is to get home with hospice care.  She is OK with maintaining status quo this weekend but no escalation of care.  -If she were to decline suddenly, she voices she would want to have comfort medications available which I have ordered.  I have also ordered home trazodone at night per her request.  -Social work consulted for establishment with hospice.  Palliative care to see the patient again on 2/13.  We are now working on getting her to a hospice facility so she can be better supported.  -Pt notes that she wants to try a medicine that will help her fall asleep easier at night as she only slept 3 hours on 2/13-2/14.  Will discontinue trazodone and instead trial seroquel 25 mg. Low dose scheduled ativan, as needed pain meds and ativan also available.  Atarax also available for anxiety.   -If patient were to have a significant decline in status (recurrent afib w RVR, respiratory distress), comfort medications are available. Patient and family have reiterated that comfort is main priority.      #AF with RVR, recurrent: Recent hospitalization with CT negative for PE, echo without  significant valve issues, TSH 3.4.  Sounds like she is on BiPAP with sleeping and doesn't tolerate it well and I suspect that could be inciting factor.  In any case has required DCCV 2x in 3 days leading to admission.  -Cards consulted.    Patient has not been able to tolerate amnio drip due to blood pressures.  She is on oral amiodarone.  Stop telemetry given goals of care.   -cont apixiban     #Obtundation 2/2 medications for DCCV. New stridor post-extubation on 2/8: Hx of prolonged response, intubated for airway protection.  She was on minimal vent setting AM of 2/8 and extubated.  He had stridor noted after extubation.  Plan was originally to reintubate but goals of care conversation were had with family and patient and she noted she would not want to be reintubated under any circumstance.  She was maintained on BiPAP, started on steroids and did seem to have improvement in her symptoms.  She had a CT scan of her neck that showed possible mild asymmetry in the soft tissue of the neck that could be due to mild edema from recent intubation but otherwise no gross abnormalities.  -Pt with hypoxemia requiring variable amounts of O2 while awake intermittently with bipap.  Patient has had recurrent issues with secretions which have needed to be repeatedly suctioned at bedside.  This is secondary to her ALS.    -Stridor resolved     #Fever: Patient has had a low-grade fever on 2/7.  Tmax was 101.2 on the a.m. of 2/7.  Since then has had low-grade fevers of around 100.  Her chest x-ray does not show clear infiltrate.  Her UA does not show infection.  Her procalcitonin is not elevated.  Discussed as above with family.       #Leukocytosis: Pt with leukocytosis from 2/9-2/10.  She was started on steroids as above for her stridor so suspect etiology.  She has not had concurrent fever and infectious w/u negative as above.  She has been having difficulty with secretions with weak cough, poor swallow due to ALS.       #Hypokalemia: Replacement     #Bulbar ALS has vocal cord paralysis causing difficulty with speaking and dysphagia at baseline reportedly.   -On tube feeds via PEG, we will keep her strict n.p.o.  -resumed pta riluzole but patient and  would prefer that this be held.   -Patient needs BiPAP at night to support her respiratory status. Using her home mask which is helping.  Discussed with  that as dying process progresses, bipap would be discontinued.      #Hx of RA: On infliximab/mtx, and prn prednisone with flares  #Thrombocytopenia: Mild.     Dispo: Can transfer out of the ICU.  Awaiting placement to hospice facility.   CODE STATUS: DNR/DNI.   DVT PPX: DOAC     Family updated at bedside.     Eliazar Santamaria MD    Interval History     No acute events overnight  No CP  No fevers recorded  Working on hospice    -Data reviewed today: I reviewed all new labs and imaging results over the last 24 hours.    Physical Exam   Temp: 97.3  F (36.3  C) Temp src: Oral BP: (!) 140/75 Pulse: 73   Resp: 20 SpO2: 100 % O2 Device: Oxymask Oxygen Delivery: (S) 4 LPM (weaned down from 6L. sat 100%)  Vitals:    02/07/23 1300 02/08/23 0400 02/10/23 0400   Weight: 51.1 kg (112 lb 10.5 oz) 52.1 kg (114 lb 13.8 oz) 54.4 kg (119 lb 14.9 oz)     Vital Signs with Ranges  Temp:  [97.2  F (36.2  C)-98.6  F (37  C)] 97.3  F (36.3  C)  Pulse:  [64-85] 73  Resp:  [8-28] 20  BP: (115-140)/(68-75) 140/75  FiO2 (%):  [30 %] 30 %  SpO2:  [82 %-100 %] 100 %  I/O last 3 completed shifts:  In: 1710 [I.V.:10; NG/GT:665]  Out: 1900 [Urine:1900]    Constitutional: Awake. Nonverbal due to vocal cord paralysis/weakness.  HEENT: Normocephalic.  No elevation of JVD.  Respiratory: On oxymask. No increased WOB.  Cardiovascular: Regular, no murmur  GI: Thin, feeding tube in place.  BS+, NT, ND  Skin/Integumen: WWP, no rash. No edema  Neuro: Awake. Writes to communicate.     Medications     dextrose       - MEDICATION INSTRUCTIONS -         amiodarone   400 mg Oral or Feeding Tube BID     apixaban ANTICOAGULANT  5 mg Oral or NG Tube BID     artificial saliva  1 spray Swish & Spit 4x Daily     guaiFENesin  10 mL Per G Tube Q6H     LORazepam  0.25 mg Per Feeding Tube Q6H     magnesium oxide  400 mg Oral Q4H     multivitamins w/minerals  15 mL Per Feeding Tube Daily     pantoprazole  40 mg Per Feeding Tube QAM AC     QUEtiapine  25 mg Per Feeding Tube At Bedtime     [Held by provider] riluzole  50 mg Oral or NG Tube Q12H     sodium chloride (PF)  10 mL Intracatheter Q8H       Data   Recent Labs   Lab 02/15/23  0606 02/14/23  0530 02/13/23  0540 02/12/23  0551 02/11/23  0609 02/10/23  1203 02/10/23  0817 02/10/23  0555 02/10/23  0549 02/09/23  0749 02/09/23  0557   WBC  --   --   --   --   --   --   --  18.8*  --   --  13.1*   HGB  --   --   --   --   --   --   --  12.4  --   --  12.7   MCV  --   --   --   --   --   --   --  97  --   --  96   PLT  --   --   --   --   --   --   --  184  --   --  139*   NA  --   --  138  --  138  --   --   --  141  --  139   POTASSIUM 4.8 4.8 4.9   < > 4.2  --   --   --  4.7  --  4.3   CHLORIDE  --   --  100  --  102  --   --   --  106  --  107   CO2  --   --  34*  --  34*  --   --   --  29  --  26   BUN  --   --  17.5  --  14.8  --   --   --  17.4  --  12.6   CR  --   --  0.57  --  0.50*  --   --   --  0.45*  --  0.42*   ANIONGAP  --   --  4*  --  2*  --   --   --  6*  --  6*   MICHELLE  --   --  8.9  --  8.3*  --   --   --  8.3*  --  8.4*   GLC  --   --  120*  --  129* 125*   < >  --  143*   < > 166*    < > = values in this interval not displayed.       No results found for this or any previous visit (from the past 24 hour(s)).     Medical Decision Making       35 MINUTES SPENT BY ME on the date of service doing chart review, history, exam, documentation & further activities per the note.

## 2023-02-15 NOTE — PROGRESS NOTES
"RT NOTE:    Patient placed back on a BiPAP with current settings of  16/6 @ 30% via pt's home mask for an increase in WOB and/or SOB. Skin looks intact. Pt is tolerating it well. Will continue to monitor and assess the pt's current respiratory status and needs.    BP (!) 140/75 (BP Location: Right arm)   Pulse 67   Temp 98.1  F (36.7  C)   Resp 14   Ht 1.549 m (5' 0.98\")   Wt 54.4 kg (119 lb 14.9 oz)   SpO2 97%   BMI 22.67 kg/m      "

## 2023-02-15 NOTE — PLAN OF CARE
Patient alert, able to communicate by writing. Tolerating tube feeds. Adequate urine output.  Able to wean down oxygen. Can use bipap at night. Mag replaced  Continue to monitor. Transfer to medical bed when available.   Discharge to [place] [facility] when available

## 2023-02-15 NOTE — PLAN OF CARE
ICU End of Shift Summary.  For vital signs and complete assessments, please see documentation flowsheets.      Pertinent assessments:   Neuro: A&O X 4 non verbal. Clipboard and paper in room for communication  Cardiac: SR  Resp: OXY mask 6 lpm  GI: No BM this shift. Prn requested and given  : Sharp  Lines: PIV R & L SL, CHLOE w/ Continuous feeding  Other: Prn atarax given X 2 for anxiety.     Plan /Discharge/Transfer Needs:  : Continue to monitor. [facility].     Bedside Shift Report Completed : Yes  Bedside Safety Check Completed: Yes

## 2023-02-15 NOTE — PROGRESS NOTES
SPIRITUAL HEALTH SERVICES Progress Note  [facility]    Referral Source: Palliative Care team.    Natasha's spouse, Jaden is at the bedside.  Jaden said they continue to wait for appropriate placement.  Natasha was alert and engaged during our time of prayer.   No further needs expressed at this time.    Pt/Family oriented to Spirit[facility] for support during hospital stay.    Plan: [facility] Services remains available for additional emotional/spiritual support.    Troy Price MA  Staff   *[phone]   On Site Tu/We/Th    [facility] remains available 24/7 for emergent requests/referrals, either by having the on-call  paged or by entering an ASAP/STAT consult in Epic (this will also page the on-call ). Routine Epic consults receive an initial response within 24 hours.

## 2023-02-15 NOTE — PLAN OF CARE
ICU End of Shift Summary.  For vital signs and complete assessments, please see documentation flowsheets.      Pertinent assessments:   Neuro: A&O X 4  Cardiac: SR  Resp: Currently on Oximyzer canula @ 10Lpm.   GI:  :  Skin:  Lines:  Drips:    Major Shift Events:     Plan (Upcoming Events):   Discharge/Transfer Needs:      Bedside Shift Report Completed :   Bedside Safety Check Completed:

## 2023-02-15 NOTE — PROGRESS NOTES
Care Management Follow Up    Length of Stay (days): 8    Expected Discharge Date: 02/16/2023 - once placement is found and hospice is arranged     Concerns to be Addressed:  discharge planning     Patient plan of care discussed at interdisciplinary rounds: Yes    Anticipated Discharge Disposition: Hospice     Anticipated Discharge Services: None  Anticipated Discharge DME: Bed, Commode, Other (see comment) (suction)    Patient/family educated on Medicare website which has current facility and service quality ratings: no  Education Provided on the Discharge Plan:  yes  Patient/Family in Agreement with the Plan: yes    Referrals Placed by CM/SW:  Post acute facilities  Private pay costs discussed: transportation costs    Additional Information:  Sw left a vm for with Karla, admissions coordinator for [facility] P: [phone], to check bed availability for a potential admission.    TREVIN Sanders, [facility]  Inpatient Care Coordination  [facility]  [phone]

## 2023-02-15 NOTE — PROGRESS NOTES
ICU End of Shift Summary.  For vital signs and complete assessments, please see documentation flowsheets.      Pertinent assessments:  CV: SR HR in the 60-80's, normotensive  Neuro: ANO x 4, communicates by writing. c/o of pain in throat, PRN tylenol given x 1 and PRN IV ativan given x 1 for anxiety  Pulm: BiPAP 16/6, FiO2 30% overnight, swithced to Oxymask 6 L this morning  Renal: Sharp, good UO   GI: TF @ 45 mL (at goal), 2 PRN senna given this shift- no BM  Endo: BS with morning labs   Skin: No changes      Discharge/Transfer Needs: Plan is for hospice     Bedside Shift Report Completed : Yes  Bedside Safety Check Completed: Yes

## 2023-02-16 NOTE — PROGRESS NOTES
RT NOTE:    Pt declined BiPAP overnight and is tolerating on 2 LPM Oxymask. Will continue to monitor and assess the pt's current respiratory status and needs.    Cristi Bashir, RT

## 2023-02-16 NOTE — PROGRESS NOTES
ICU End of Shift Summary.  For vital signs and complete assessments, please see documentation flowsheets.      Pertinent assessments:  CV: HR in the 70's, normotensive  Neuro: ANO x 4, communicates by writing. PRN IV ativan given x 1 for anxiety  Pulm: Patient refusing BiPAP overnight, remained on Oxymask 2 L with saturations above 95%  Renal: Sharp, good UO   GI: TF @ 45 mL (at goal), PRN senna given this shift- no BM overnight  Endo: BS with morning labs   Skin: No changes      Discharge/Transfer Needs: Plan is for hospice and is awaiting placement     Bedside Shift Report Completed : Yes  Bedside Safety Check Completed: Yes

## 2023-02-16 NOTE — PROGRESS NOTES
Care Management Follow Up    Length of Stay (days): 9    Expected Discharge Date: 02/17/2023 - as soon as placement is available     Concerns to be Addressed:  discharge planning     Patient plan of care discussed at interdisciplinary rounds: Yes    Anticipated Discharge Disposition: Hospice     Anticipated Discharge Services: None  Anticipated Discharge DME: Bed, Commode, Other (see comment) (suction)    Patient/family educated on Medicare website which has current facility and service quality ratings: no  Education Provided on the Discharge Plan:  yes  Patient/Family in Agreement with the Plan: unable to assess    Referrals Placed by CM/SW:  Post acute facilities  Private pay costs discussed: transportation costs    Additional Information:  Lilian left a vm for with Karla, admissions coordinator for [facility] P: [phone], to check bed availability for a potential admission.    Lilian met with the pt's  Spouse and discussed this with him.      TREVIN Sanders, [facility]  Inpatient Care Coordination  [facility]  [phone]

## 2023-02-16 NOTE — PROGRESS NOTES
[facility]    Hospitalist Progress Note  Name: Natasha Allison    MRN: [id number]  Provider:  Mars Vargas DO  Date of Service: 02/16/2023    Summary of Stay: Natasha Allison is a 72 year old female with a history of bulbar ALS with dysphagia and inadequate oral intake requiring feeding tube, chronic BiPAP dependence at night admitted on 2/7/2023 with shortness of breath and respiratory distress.  Of note, the patient was recently hospitalized from 2/5 to 2/6/2023 with for the new diagnosis of atrial fibrillation with rapid ventricular rate.  In the emergency department, the patient was found to have a blood pressure of 110/67, heart rate 66, temperature 95.5  F, respiratory rate 12, SPO2 100% on mechanical ventilator.  Upon arrival by EMS they did find her to be unresponsive with heart rate in the 200s.  She was shocked 4 times without improvement.  The patient was quite obtunded and hypotensive upon arrival and did require intubation and admission to the ICU.  Initial heart rate was 160s and the patient was hypotensive.  She underwent synchronized cardioversion with restoration of normal sinus rhythm and was started on amiodarone drip.  Cardiology was consulted to see the patient.  The patient did become hypotensive with IV amiodarone and was treated with IV fluids and albumin.  In the setting of her advanced ALS, palliative care was consulted to see the patient and confirmed DNR/DNI CODE STATUS with the intention of no reintubation.  The patient was extubated on the morning of 2/8.  She did require continuous BiPAP at night and further discussions with palliative care, the patient elected for a more conservative comfort based approach with intention to initiate hospice at discharge.  Social work was consulted for discharge planning.    TODAY'S PLAN:  Pt waiting for hospice placement at this point.  Appreciate SW assistance with placement.  No escalation of cares.  Pt did not sleep well so  will increase seroquel to 50 mg at bedtime.    Problem List:   Goals of care, no escalation of care. Currently looking for hospice facility: She has had hypoxemia that is related to her ALS, respiratory muscle weakness and difficulty managing her secretions with bulbar weakness.  My partner had an extensive discussion with the patient,  and son at bedside on 2/10.  Patient notes that her goal at this point is to get home and be comfortable.  Her chest x-ray done showed retrocardiac opacity secondary to aspiration of secretions.  It was discussed that this is likely to recur in the setting of her ALS.  Discussed options including medications to help dry out secretions but patient has been on these before and is adamant she does not want these again.  She is absolutely adamant against any sort of mechanical intervention including tracheostomy.  She wants to move toward comfort cares and go home with hospice.  - She tells us she is ready to die and her goal is to get home with hospice care.  She is OK with maintaining status quo this weekend but no escalation of care.  - If she were to decline suddenly, she voices she would want to have comfort medications available which I have ordered.  I have also ordered home trazodone at night per her request.  - Social work consulted for establishment with hospice.  Palliative care to see the patient again on 2/13.  We are now working on getting her to a hospice facility so she can be better supported.  - Pt notes that she wants to try a medicine that will help her fall asleep easier at night as she only slept 3 hours on 2/13-2/14.  Will discontinue trazodone and instead trial seroquel 25 mg. Low dose scheduled ativan, as needed pain meds and ativan also available.  Atarax also available for anxiety.   - If patient were to have a significant decline in status (recurrent afib w RVR, respiratory distress), comfort medications are available. Patient and family have reiterated  that comfort is main priority.      AF with RVR, recurrent: Recent hospitalization with CT negative for PE, echo without significant valve issues, TSH 3.4.  Sounds like she is on BiPAP with sleeping and doesn't tolerate it well and I suspect that could be inciting factor.  In any case has required DCCV 2x in 3 days leading to admission.  - Cards consulted.    Patient has not been able to tolerate amnio drip due to blood pressures.  She is on oral amiodarone.  Stop telemetry given goals of care.   - cont apixiban     Obtundation 2/2 medications for DCCV. New stridor post-extubation on 2/8: Hx of prolonged response, intubated for airway protection.  She was on minimal vent setting AM of 2/8 and extubated.  He had stridor noted after extubation.  Plan was originally to reintubate but goals of care conversation were had with family and patient and she noted she would not want to be reintubated under any circumstance.  She was maintained on BiPAP, started on steroids and did seem to have improvement in her symptoms.  She had a CT scan of her neck that showed possible mild asymmetry in the soft tissue of the neck that could be due to mild edema from recent intubation but otherwise no gross abnormalities.  - Pt with hypoxemia requiring variable amounts of O2 while awake intermittently with bipap.  Patient has had recurrent issues with secretions which have needed to be repeatedly suctioned at bedside.  This is secondary to her ALS.    - Stridor resolved     Fever: Patient has had a low-grade fever on 2/7.  Tmax was 101.2 on the a.m. of 2/7.  Since then has had low-grade fevers of around 100.  Her chest x-ray does not show clear infiltrate.  Her UA does not show infection.  Her procalcitonin is not elevated.  Discussed as above with family.       Leukocytosis: Pt with leukocytosis from 2/9-2/10.  She was started on steroids as above for her stridor so suspect etiology.  She has not had concurrent fever and infectious w/u  negative as above.  She has been having difficulty with secretions with weak cough, poor swallow due to ALS.      Hypokalemia: Replacement     Bulbar ALS has vocal cord paralysis causing difficulty with speaking and dysphagia at baseline reportedly.   - On tube feeds via PEG, we will keep her strict n.p.o.  - Resumed pta riluzole but patient and  would prefer that this be held.   - Patient needs BiPAP at night to support her respiratory status. Using her home mask which is helping.  Discussed with  that as dying process progresses, bipap would be discontinued.      Hx of RA: On infliximab/mtx, and prn prednisone with flares  Thrombocytopenia: Mild.    I spent 42 minutes in reviewing this patient's labs, imaging, medications, medical history.  In addition time was spent interviewing the patient, communicating with family, and medical decision making.     DVT Prophylaxis: DOAC  Code Status: No CPR- Do NOT Intubate  Diet: Adult Formula Drip Feeding: Continuous Osmolite 1.5; Gastrostomy; Goal Rate: 45; mL/hr; Initial rate of 15 ml/hr advancing by 10 ml q 8 hrs to goal rate of 45 ml/hr; Do not advance tube feeding rate unless K+ is = or > 3.0, Mg++ is = or > 1.5, and...    Sharp Catheter: PRESENT, indication: End of Life  Disposition: Expected discharge to facility with hospice once hospice is coordinated.  Family updated today: No     Interval History   Pt seen and examined.  Pt reports not sleeping well.    -Data reviewed today: I personally reviewed all new labs and imaging results over the last 24 hours.     Physical Exam   Temp: 97.7  F (36.5  C) Temp src: Oral BP: 101/63 Pulse: 74   Resp: 18 SpO2: 96 % O2 Device: Oxymask Oxygen Delivery: 2 LPM  Vitals:    02/07/23 1300 02/08/23 0400 02/10/23 0400   Weight: 51.1 kg (112 lb 10.5 oz) 52.1 kg (114 lb 13.8 oz) 54.4 kg (119 lb 14.9 oz)     Vital Signs with Ranges  Temp:  [97.7  F (36.5  C)-98.4  F (36.9  C)] 97.7  F (36.5  C)  Pulse:  [72-82] 74  Resp:   [18-19] 18  BP: ()/(52-77) 101/63  SpO2:  [91 %-97 %] 96 %  I/O last 3 completed shifts:  In: 1600 [NG/GT:655]  Out: 1813 [Urine:1813]    GENERAL: No apparent distress. Awake, alert  HEENT: Normocephalic, atraumatic. Extraocular movements intact.  CARDIOVASCULAR: Regular rate and rhythm without murmurs or rubs. No S3.  PULMONARY: Clear bilaterally.  GASTROINTESTINAL: Soft, non-tender, non-distended. Bowel sounds normoactive.   EXTREMITIES: No cyanosis or clubbing. No edema.  NEUROLOGICAL: CN 2-12 grossly intact, no focal neurological deficits.  DERMATOLOGICAL: No rash, ulcer, bruising, nor jaundice.    Medications     dextrose       - MEDICATION INSTRUCTIONS -         amiodarone  400 mg Oral or Feeding Tube BID     apixaban ANTICOAGULANT  5 mg Oral or NG Tube BID     artificial saliva  1 spray Swish & Spit 4x Daily     guaiFENesin  10 mL Per G Tube Q6H     LORazepam  0.25 mg Per Feeding Tube Q6H     magnesium oxide  400 mg Oral Q4H     multivitamins w/minerals  15 mL Per Feeding Tube Daily     pantoprazole  40 mg Per Feeding Tube QAM AC     QUEtiapine  50 mg Per Feeding Tube At Bedtime     [Held by provider] riluzole  50 mg Oral or NG Tube Q12H     Data     Laboratory:  Recent Labs   Lab 02/10/23  0555   WBC 18.8*   HGB 12.4   HCT 38.9   MCV 97        Recent Labs   Lab 02/16/23  0612 02/15/23  0606 02/14/23  0530 02/13/23  0540 02/12/23  0551 02/11/23  0609 02/10/23  1203 02/10/23  0817 02/10/23  0549   NA  --   --   --  138  --  138  --   --  141   POTASSIUM 4.6 4.8 4.8 4.9   < > 4.2  --   --  4.7   CHLORIDE  --   --   --  100  --  102  --   --  106   CO2  --   --   --  34*  --  34*  --   --  29   ANIONGAP  --   --   --  4*  --  2*  --   --  6*   GLC  --   --   --  120*  --  129* 125*   < > 143*   BUN  --   --   --  17.5  --  14.8  --   --  17.4   CR  --   --   --  0.57  --  0.50*  --   --  0.45*   GFRESTIMATED  --   --   --  >90  --  >90  --   --  >90   MICHELLE  --   --   --  8.9  --  8.3*  --   --  8.3*     < > = values in this interval not displayed.     No results for input(s): CULT in the last 168 hours.    Imaging:  No results found for this or any previous visit (from the past 24 hour(s)).      Mars Vargas DO  [facility] Hospitalist  [address] [place] [address]  02/16/2023

## 2023-02-17 NOTE — PROGRESS NOTES
[facility]    Hospitalist Progress Note  Name: Natasha Allison    MRN: [id number]  Provider:  Mars Vargas DO  Date of Service: 02/17/2023    Summary of Stay: Natasha Allison is a 72 year old female with a history of bulbar ALS with dysphagia and inadequate oral intake requiring feeding tube, chronic BiPAP dependence at night admitted on 2/7/2023 with shortness of breath and respiratory distress.  Of note, the patient was recently hospitalized from 2/5 to 2/6/2023 with for the new diagnosis of atrial fibrillation with rapid ventricular rate.  In the emergency department, the patient was found to have a blood pressure of 110/67, heart rate 66, temperature 95.5  F, respiratory rate 12, SPO2 100% on mechanical ventilator.  Upon arrival by EMS they did find her to be unresponsive with heart rate in the 200s.  She was shocked 4 times without improvement.  The patient was quite obtunded and hypotensive upon arrival and did require intubation and admission to the ICU.  Initial heart rate was 160s and the patient was hypotensive.  She underwent synchronized cardioversion with restoration of normal sinus rhythm and was started on amiodarone drip.  Cardiology was consulted to see the patient.  The patient did become hypotensive with IV amiodarone and was treated with IV fluids and albumin.  In the setting of her advanced ALS, palliative care was consulted to see the patient and confirmed DNR/DNI CODE STATUS with the intention of no reintubation.  The patient was extubated on the morning of 2/8.  She did require continuous BiPAP at night and further discussions with palliative care, the patient elected for a more conservative comfort based approach with intention to initiate hospice at discharge.  Social work was consulted for discharge planning.    TODAY'S PLAN:  Pt is stable for discharge to hospice.  Appreciate SW assistance with discharge arrangements.  Appreciate Palliative Care assistance with  symptom management.  Pt c/o dry mouth and sore throat this morning.  Has biotene spray scheduled and prn hurricaine spray.      Problem List:   Goals of care, no escalation of care. Currently looking for hospice facility: She has had hypoxemia that is related to her ALS, respiratory muscle weakness and difficulty managing her secretions with bulbar weakness.  My partner had an extensive discussion with the patient,  and son at bedside on 2/10.  Patient notes that her goal at this point is to get home and be comfortable.  Her chest x-ray done showed retrocardiac opacity secondary to aspiration of secretions.  It was discussed that this is likely to recur in the setting of her ALS.  Discussed options including medications to help dry out secretions but patient has been on these before and is adamant she does not want these again.  She is absolutely adamant against any sort of mechanical intervention including tracheostomy.  She wants to move toward comfort cares and go home with hospice.  - She tells us she is ready to die and her goal is to get home with hospice care.  She is OK with maintaining status quo this weekend but no escalation of care.  - If she were to decline suddenly, she voices she would want to have comfort medications available which I have ordered.  I have also ordered home trazodone at night per her request.  - Social work consulted for establishment with hospice.  Palliative care to see the patient again on 2/13.  We are now working on getting her to a hospice facility so she can be better supported.  - Pt notes that she wants to try a medicine that will help her fall asleep easier at night as she only slept 3 hours on 2/13-2/14.  Will discontinue trazodone and instead trial seroquel 25 mg. Low dose scheduled ativan, as needed pain meds and ativan also available.  Atarax also available for anxiety.   - If patient were to have a significant decline in status (recurrent afib w RVR, respiratory  distress), comfort medications are available. Patient and family have reiterated that comfort is main priority.      AF with RVR, recurrent: Recent hospitalization with CT negative for PE, echo without significant valve issues, TSH 3.4.  Sounds like she is on BiPAP with sleeping and doesn't tolerate it well and I suspect that could be inciting factor.  In any case has required DCCV 2x in 3 days leading to admission.  - Cards consulted.    Patient has not been able to tolerate amnio drip due to blood pressures.  She is on oral amiodarone.  Stop telemetry given goals of care.   - cont apixiban     Obtundation 2/2 medications for DCCV. New stridor post-extubation on 2/8: Hx of prolonged response, intubated for airway protection.  She was on minimal vent setting AM of 2/8 and extubated.  He had stridor noted after extubation.  Plan was originally to reintubate but goals of care conversation were had with family and patient and she noted she would not want to be reintubated under any circumstance.  She was maintained on BiPAP, started on steroids and did seem to have improvement in her symptoms.  She had a CT scan of her neck that showed possible mild asymmetry in the soft tissue of the neck that could be due to mild edema from recent intubation but otherwise no gross abnormalities.  - Pt with hypoxemia requiring variable amounts of O2 while awake intermittently with bipap.  Patient has had recurrent issues with secretions which have needed to be repeatedly suctioned at bedside.  This is secondary to her ALS.    - Stridor resolved     Fever: Patient has had a low-grade fever on 2/7.  Tmax was 101.2 on the a.m. of 2/7.  Since then has had low-grade fevers of around 100.  Her chest x-ray does not show clear infiltrate.  Her UA does not show infection.  Her procalcitonin is not elevated.  Discussed as above with family.       Leukocytosis: Pt with leukocytosis from 2/9-2/10.  She was started on steroids as above for her  stridor so suspect etiology.  She has not had concurrent fever and infectious w/u negative as above.  She has been having difficulty with secretions with weak cough, poor swallow due to ALS.      Hypokalemia: Replacement     Bulbar ALS has vocal cord paralysis causing difficulty with speaking and dysphagia at baseline reportedly.   - On tube feeds via PEG, we will keep her strict n.p.o.  - Resumed pta riluzole but patient and  would prefer that this be held.   - Patient needs BiPAP at night to support her respiratory status. Using her home mask which is helping.  Discussed with  that as dying process progresses, bipap would be discontinued.      Hx of RA: On infliximab/mtx, and prn prednisone with flares  Thrombocytopenia: Mild.    I spent 39 minutes in reviewing this patient's labs, imaging, medications, medical history.  In addition time was spent interviewing the patient, communicating with family, and medical decision making.     DVT Prophylaxis: DOAC  Code Status: No CPR- Do NOT Intubate  Diet: Adult Formula Drip Feeding: Continuous Osmolite 1.5; Gastrostomy; Goal Rate: 45; mL/hr; Initial rate of 15 ml/hr advancing by 10 ml q 8 hrs to goal rate of 45 ml/hr; Do not advance tube feeding rate unless K+ is = or > 3.0, Mg++ is = or > 1.5, and...    Sharp Catheter: PRESENT, indication: End of Life  Disposition: Expected discharge to hospice once hospice is coordinated and facility able to accept pt.  Family updated today: No     Interval History   Pt seen and examined.  Pt denies issue sleeping.  C/o dry mouth and sore throat.    -Data reviewed today: I personally reviewed all new labs and imaging results over the last 24 hours.     Physical Exam   Temp: 97  F (36.1  C) Temp src: Oral BP: (!) 151/78 Pulse: 75   Resp: 20 SpO2: 94 % O2 Device: Oxymask Oxygen Delivery: 2 LPM  Vitals:    02/08/23 0400 02/10/23 0400 02/17/23 0600   Weight: 52.1 kg (114 lb 13.8 oz) 54.4 kg (119 lb 14.9 oz) 53.7 kg (118 lb 4.8  oz)     Vital Signs with Ranges  Temp:  [97  F (36.1  C)-98.1  F (36.7  C)] 97  F (36.1  C)  Pulse:  [70-75] 75  Resp:  [18-20] 20  BP: (124-151)/(69-78) 151/78  SpO2:  [91 %-97 %] 94 %  I/O last 3 completed shifts:  In: 1735 [NG/GT:655]  Out: 1325 [Urine:1325]    GENERAL: No apparent distress. Awake, alert, and fully oriented.  HEENT: Normocephalic, atraumatic. Extraocular movements intact.  CARDIOVASCULAR: Regular rate and rhythm without murmurs or rubs. No S3.  PULMONARY: Clear bilaterally.  GASTROINTESTINAL: Soft, non-tender, non-distended. Bowel sounds normoactive.   EXTREMITIES: No cyanosis or clubbing. No edema.  NEUROLOGICAL: Dysarthria, moves all ext  DERMATOLOGICAL: No rash, ulcer, bruising, nor jaundice.    Medications     dextrose       - MEDICATION INSTRUCTIONS -         amiodarone  400 mg Oral or Feeding Tube BID     apixaban ANTICOAGULANT  5 mg Oral or NG Tube BID     artificial saliva  1 spray Swish & Spit 4x Daily     guaiFENesin  10 mL Per G Tube Q6H     LORazepam  0.25 mg Per Feeding Tube Q6H     magnesium oxide  400 mg Oral Q4H     multivitamins w/minerals  15 mL Per Feeding Tube Daily     pantoprazole  40 mg Per Feeding Tube QAM AC     QUEtiapine  50 mg Per Feeding Tube At Bedtime     [Held by provider] riluzole  50 mg Oral or NG Tube Q12H     Data     Laboratory:  No results for input(s): WBC, HGB, HCT, MCV, PLT in the last 168 hours.  Recent Labs   Lab 02/17/23  0516 02/16/23  0612 02/15/23  0606 02/14/23  0530 02/13/23  0540 02/12/23  0551 02/11/23  0609 02/10/23  1203   NA  --   --   --   --  138  --  138  --    POTASSIUM 4.6 4.6 4.8   < > 4.9   < > 4.2  --    CHLORIDE  --   --   --   --  100  --  102  --    CO2  --   --   --   --  34*  --  34*  --    ANIONGAP  --   --   --   --  4*  --  2*  --    GLC  --   --   --   --  120*  --  129* 125*   BUN  --   --   --   --  17.5  --  14.8  --    CR  --   --   --   --  0.57  --  0.50*  --    GFRESTIMATED  --   --   --   --  >90  --  >90  --    MICHELLE   --   --   --   --  8.9  --  8.3*  --     < > = values in this interval not displayed.     No results for input(s): CULT in the last 168 hours.    Imaging:  No results found for this or any previous visit (from the past 24 hour(s)).      Mars Vargas DO  [facility] Hospitalist  [address] [place] [address]  02/17/2023

## 2023-02-17 NOTE — PROGRESS NOTES
ICU End of Shift Summary.  For vital signs and complete assessments, please see documentation flowsheets.      Pertinent assessments:  CV: HR in the 70's, normotensive  Neuro: ANO x 4, communicates by writing. PRN IV ativan given x 1  Pulm: Patient refusing BiPAP overnight again, remained on Oxymask 1-2 L with saturations above 95%  Renal: Sharp, good UO   GI: TF @ 45 mL (at goal), refused senna last night- no BM this shift  Skin: No changes      Discharge/Transfer Needs: Plan is for hospice once a bed becomes available     Bedside Shift Report Completed : Yes  Bedside Safety Check Completed: Yes

## 2023-02-17 NOTE — PROGRESS NOTES
Care Management Follow Up    Length of Stay (days): 10    Expected Discharge Date: 02/20/2023        Patient plan of care discussed at interdisciplinary rounds: Yes    Anticipated Discharge Disposition: Hospice     Anticipated Discharge Services: None  Anticipated Discharge DME: Bed, Commode, Other (see comment) (suction)      Additional Information:  Sw received a call from [facility] who said that they received a referral from the family and asked about discharge plans.  Sw informed them that Sw is still working on finding placement.  [facility] will reach out tot he pt's spouse to discuss what an admission will look like once placement is found.      TREVIN Sanders, [facility]  Inpatient Care Coordination  [facility]  [phone]

## 2023-02-17 NOTE — PROGRESS NOTES
"         [facility]  Respiratory Care Note    Pt remained off the BiPAP throughout the day and is currently tolerating it well. Will continue to monitor and assess the pt's current respiratory status and needs.     Vital signs:  Temp: 97.3  F (36.3  C) Temp src: Bladder BP: 130/66 Pulse: 78   Resp: 16 SpO2: 97 % O2 Device: Oxymask Oxygen Delivery: 2 LPM Height: 154.9 cm (5' 0.98\") Weight: 53.7 kg (118 lb 4.8 oz)  Estimated body mass index is 22.36 kg/m  as calculated from the following:    Height as of this encounter: 1.549 m (5' 0.98\").    Weight as of this encounter: 53.7 kg (118 lb 4.8 oz).        Past Medical History:   Diagnosis Date     Atrial flutter (H) 2023    Underwent DC cardioversion in the ER     Hypertension     lisinopril 10mg daily = annoying runny nose     Osteoporosis     alendronate 5mg daily = stomach upset /nausea     Rheumatoid arthritis(714.0)        Past Surgical History:   Procedure Laterality Date     CARPAL TUNNEL RELEASE RT/LT       COLONOSCOPY N/A 3/12/2019    Procedure: COLONOSCOPY;  Surgeon: Jermaine Rock MD;  Location:  GI     IR GASTROSTOMY TUBE PERCUTANEOUS PLCMNT  2022       Family History   Problem Relation Age of Onset     Hypertension Mother      Hypertension Father      Heart Disease Father      Intellectual Disability (Mental Retardation) Brother      Hypertension Sister      Hypertension Son      Hypertension Daughter      Colon Cancer Brother      Hypertension Brother      Hypertension Brother      Hypertension Brother      Hypertension Sister      Hypertension Sister        Social History     Tobacco Use     Smoking status: Former     Packs/day: 0.75     Years: 18.00     Pack years: 13.50     Types: Cigarettes     Quit date: 1978     Years since quittin.2     Smokeless tobacco: Never   Substance Use Topics     Alcohol use: Yes     Comment: 0-1 QD     Wei Ramachandran RT  [facility]  2023    "

## 2023-02-17 NOTE — PLAN OF CARE
Patient alert and oriented, able to communicate through writing. Good urine output. Tolerating tube feeds.  Able to wean oxygen down.   Transfer to medical bed when available  Discharge to hospice when bed available

## 2023-02-18 NOTE — PLAN OF CARE
Goal Outcome Evaluation:       Transfer from ICU. Alert and oriented x4 yet forgetful at times. Communicates best by writing down statements (d/t vocal cord paralysis). No complaints of pain. Strict NPO with tube feeds running at goal 45ml/hr with flushes [id number] 100ml. PEG site CDI. Assist of 2 ceiling lift. Sharp in place, adequate urine production. VSS on 2 L oxymask. BM overnight. PRN melatonin and atarax given. Discharge plan is for hospice care. Will continue with POC.

## 2023-02-18 NOTE — PROGRESS NOTES
[facility]    Hospitalist Progress Note      Assessment & Plan   Natasha Allison is a 72 year old female with a history of bulbar ALS with dysphagia and inadequate oral intake with feeding tube in place, on BiPAP at night and independent in ambulating, hospitalized here 2/5-2/6/2022 with new diagnosis of AF with RVR admitted on 2/7/2023 with recurrent AF with RVR complicated by hypotension/obtundation necessitating intubation.      At her recent hospitalization:  CT PE negative for PE, echo with EF > 70 % with no significant valve dz (mild Ao sclerosis).  She required DCCV and stayed in NSR,  discharged with apixaban and metop 25 mg bid.  She felt well on discharge.  Around 1 am she called out to her  as was feeling SOB, he checked her BP/HR and they were fine, she put on her bipap and went to sleep.  She slept for about 30 minutes and then took her BiPAP off only to awaken around 3 am feeling very SOB.  She looked like she was in respiratory distress and so 911 called.  They found her alert but decompensated and become unresponsive with HR's in the 200's.  She was shocked 4 times without improvement.  She was given fentanyl 50 mcg, 10 mg midazolam, and 1 mg of lorazepam during the attempts at cardioversion.       On arrival ED 's and she was hypotensive, unresponsive, with apneic breathing pattern, no response to noxious stimuli.  She underwent synchronized DCCV with 150 J with restoration of NSR and started on amiodarone.  She has a hx of prolonged response to CNS active medications and did require intubation post cardioversion.  She was able to be extubated within about 24 hours.  Pt has remained in sinus rhythm and has been on amiodarone through PEG tube.  With difficulty managing secretions, patient and family have made decision to transition to more comfort based approach with advanced ALS.      #Goals of care, no escalation of care. Currently looking for hospice  facility: She has had hypoxemia that is related to her ALS, respiratory muscle weakness and difficulty managing her secretions with bulbar weakness.  I had an extensive discussion with the patient,  and son at bedside on 2/10.  Patient notes that her goal at this point is to get home and be comfortable.  Her chest x-ray done showed retrocardiac opacity secondary to aspiration of secretions.  We discussed that this is likely to recur in the setting of her ALS.  We discussed options including medications to help dry out secretions but patient has been on these before and is adamant she does not want these again.  She is absolutely adamant against any sort of mechanical intervention including tracheostomy.    -She has been transition to hospice support.  If she were to decline suddenly, she voices she would want to have comfort medications available which I have ordered.   -If patient were to have a significant decline in status (recurrent afib w RVR, respiratory distress), comfort medications are available. Patient and family have reiterated that comfort is main priority.   -At this point, we are awaiting placement to hospice facility.  Prescient social work support.  -Patient did have difficulty sleeping but better than the night prior.  Think she slept about 4 hours.  We will increase Seroquel to 75 mg at night.  Continuing low-dose scheduled Ativan as this is helped her with her anxiety.  As needed medications are also available.  -Continue BiPAP at night with ALS.      #AF with RVR, recurrent: Recent hospitalization with CT negative for PE, echo without significant valve issues, TSH 3.4.  Sounds like she is on BiPAP with sleeping and doesn't tolerate it well and I suspect that could be inciting factor.  In any case has required DCCV 2x in 3 days leading to admission.  -Cards consulted.    Patient has not been able to tolerate amnio drip due to blood pressures.  She is on oral amiodarone.  Stop telemetry  given goals of care.   -Continue amiodarone 200 mg twice daily for until 2/25 then 200 mg daily thereafter.  -cont apixiban     #Obtundation 2/2 medications for DCCV. New stridor post-extubation on 2/8: Hx of prolonged response, intubated for airway protection.  She was on minimal vent setting AM of 2/8 and extubated.  He had stridor noted after extubation.  Plan was originally to reintubate but goals of care conversation were had with family and patient and she noted she would not want to be reintubated under any circumstance.  She was maintained on BiPAP, started on steroids and did seem to have improvement in her symptoms.  She had a CT scan of her neck that showed possible mild asymmetry in the soft tissue of the neck that could be due to mild edema from recent intubation but otherwise no gross abnormalities.  -Pt with hypoxemia requiring variable amounts of O2 while awake intermittently with bipap.  Patient has had recurrent issues with secretions which have needed to be repeatedly suctioned at bedside.  This is secondary to her ALS.   -Stridor resolved     #Fever: Patient has had a low-grade fever on 2/7.  Tmax was 101.2 on the a.m. of 2/7.  Since then has had low-grade fevers of around 100.  Her chest x-ray does not show clear infiltrate.  Her UA does not show infection.  Her procalcitonin is not elevated.  Discussed as above with family.       #Leukocytosis: Pt with leukocytosis from 2/9-2/10.  She was started on steroids as above for her stridor so suspect etiology.  She has not had concurrent fever and infectious w/u negative as above.  She has been having difficulty with secretions with weak cough, poor swallow due to ALS.      #Hypokalemia: Replacement     #Bulbar ALS has vocal cord paralysis causing difficulty with speaking and dysphagia at baseline reportedly.   -On tube feeds via PEG, we will keep her strict n.p.o.  -resumed pta riluzole but patient and  would prefer that this be  held.   -Patient needs BiPAP at night to support her respiratory status. Using her home mask which is helping.  Discussed with  that as dying process progresses, bipap would be discontinued.      #Hx of RA: On infliximab/mtx, and prn prednisone with flares  #Thrombocytopenia: Mild.     Dispo: Awaiting placement to hospice facility.  CODE STATUS: DNR/DNI.   DVT PPX: DOAC      updated at bedside.    He Prather MD    Interval History   Reassumed care.  No events.  Slept about 4 hours.  Asking for increased dose of sleep medicine.    -Data reviewed today: I reviewed all new labs and imaging results over the last 24 hours.     Physical Exam   Temp: 98.3  F (36.8  C) Temp src: Axillary BP: (!) 154/78 Pulse: 78   Resp: 24 SpO2: 95 % O2 Device: Oxymask Oxygen Delivery: 2 LPM  Vitals:    02/08/23 0400 02/10/23 0400 02/17/23 0600   Weight: 52.1 kg (114 lb 13.8 oz) 54.4 kg (119 lb 14.9 oz) 53.7 kg (118 lb 4.8 oz)     Vital Signs with Ranges  Temp:  [97.5  F (36.4  C)-98.4  F (36.9  C)] 98.3  F (36.8  C)  Pulse:  [77-87] 78  Resp:  [20-24] 24  BP: (128-159)/(71-88) 154/78  SpO2:  [95 %-97 %] 95 %  I/O last 3 completed shifts:  In: 915 [NG/GT:420]  Out: 1875 [Urine:1875]    Constitutional: Awake. Nonverbal due to vocal cord paralysis/weakness.  Writes to communicate and nods/shakes head to questions.  HEENT: Normocephalic.  No elevation of JVD.  Respiratory: On oxymask. No increased WOB.  Cardiovascular: Regular, no murmur  GI: Thin, feeding tube in place.  BS+, NT, ND  Skin/Integumen: WWP, no rash. No edema  Neuro: Awake. Writes to communicate.     Medications     dextrose       - MEDICATION INSTRUCTIONS -         amiodarone  200 mg Oral or Feeding Tube BID     apixaban ANTICOAGULANT  5 mg Oral or NG Tube BID     artificial saliva  1 spray Swish & Spit 4x Daily     guaiFENesin  10 mL Per G Tube Q6H     LORazepam  0.25 mg Per Feeding Tube Q6H     multivitamins w/minerals  15 mL Per Feeding Tube Daily      pantoprazole  40 mg Per Feeding Tube QAM AC     QUEtiapine  75 mg Per Feeding Tube At Bedtime     [Held by provider] riluzole  50 mg Oral or NG Tube Q12H       Data   Recent Labs   Lab 02/18/23  0723 02/17/23  0516 02/16/23  0612 02/14/23  0530 02/13/23  0540   NA  --   --   --   --  138   POTASSIUM 4.7 4.6 4.6   < > 4.9   CHLORIDE  --   --   --   --  100   CO2  --   --   --   --  34*   BUN  --   --   --   --  17.5   CR  --   --   --   --  0.57   ANIONGAP  --   --   --   --  4*   MICHELLE  --   --   --   --  8.9   GLC  --   --   --   --  120*    < > = values in this interval not displayed.       No results found for this or any previous visit (from the past 24 hour(s)).

## 2023-02-18 NOTE — PLAN OF CARE
Goal Outcome Evaluation:    Patient Transfer Information  Patient connected to monitoring equipment on arrival: Yes      Patient connected to wall oxygen on arrival: Yes    Belongings: with pt     Safety check completed: Yes

## 2023-02-18 NOTE — PLAN OF CARE
"Goal Outcome Evaluation: Progressing    BP (!) 187/94 (BP Location: Right arm)   Pulse 85   Temp 98.3  F (36.8  C) (Axillary)   Resp 20   Ht 1.549 m (5' 0.98\")   Wt 53.7 kg (118 lb 4.8 oz)   SpO2 91%   BMI 22.36 kg/m      On 3L via Oxymask.    Pertinent Assessments: A/Ox4. Severe aphasia, utilizes pen and paper at times for communication. Endorses shortness of breath. LS dim throughout. PEG tube with continuous feeding. Sharp catheter.   Major Shift Events: IV ativan x1 for anxiety with PO suctioning.   Treatment Plan: Plan to discharge to hospice facility. SW following for placement.                           "

## 2023-02-19 NOTE — PROGRESS NOTES
[facility]    Hospitalist Progress Note      Assessment & Plan   Natasha Allison is a 72 year old female with a history of bulbar ALS with dysphagia and inadequate oral intake with feeding tube in place, on BiPAP at night and independent in ambulating, hospitalized here 2/5-2/6/2022 with new diagnosis of AF with RVR admitted on 2/7/2023 with recurrent AF with RVR complicated by hypotension/obtundation necessitating intubation.      At her recent hospitalization:  CT PE negative for PE, echo with EF > 70 % with no significant valve dz (mild Ao sclerosis).  She required DCCV and stayed in NSR,  discharged with apixaban and metop 25 mg bid.  She felt well on discharge.  Around 1 am she called out to her  as was feeling SOB, he checked her BP/HR and they were fine, she put on her bipap and went to sleep.  She slept for about 30 minutes and then took her BiPAP off only to awaken around 3 am feeling very SOB.  She looked like she was in respiratory distress and so 911 called.  They found her alert but decompensated and become unresponsive with HR's in the 200's.  She was shocked 4 times without improvement.  She was given fentanyl 50 mcg, 10 mg midazolam, and 1 mg of lorazepam during the attempts at cardioversion.       On arrival ED 's and she was hypotensive, unresponsive, with apneic breathing pattern, no response to noxious stimuli.  She underwent synchronized DCCV with 150 J with restoration of NSR and started on amiodarone.  She has a hx of prolonged response to CNS active medications and did require intubation post cardioversion.  She was able to be extubated within about 24 hours.  Pt has remained in sinus rhythm and has been on amiodarone through PEG tube.      With difficulty managing secretions, patient and family have made decision to transition to comfort based approach with advanced ALS.      #Goals of care, Comfort-based approach. Currently looking for hospice  facility: She has had hypoxemia that is related to her ALS, respiratory muscle weakness and difficulty managing her secretions with bulbar weakness.  I had an extensive discussion with the patient,  and son at bedside on 2/10.  Patient notes that her goal at this point is to get home and be comfortable.  Her chest x-ray done showed retrocardiac opacity secondary to aspiration of secretions.  We discussed that this is likely to recur in the setting of her ALS.  We discussed options including medications to help dry out secretions but patient has been on these before and is adamant she does not want these again.  She is absolutely adamant against any sort of mechanical intervention including tracheostomy.    -She has been transition to hospice support.  If she were to decline suddenly, she voices she would want to have comfort medications available which I have ordered.   -If patient were to have a significant decline in status (recurrent afib w RVR, respiratory distress), comfort medications are available. Patient and family have reiterated that comfort is main priority.   -At this point, we are awaiting placement to hospice facility.  Appreciate social work support.  -Patient had a bit of a rough evening on 2/18-2/19.  She had sudden anxiety and desaturations.  She received Ativan and Seroquel and slept through the night per her .  She has continued to use BiPAP therapy which she uses at home for comfort.  -Adjusted Ativan dosing to provide a bit of a higher dose at 0.5 mg at night before bed to help get ahead of her nocturnal anxiety.  She will receive this in addition to Seroquel.  She also does have as needed medications available.  There is no need to respond to oxygenation as we are focused on comfort.  -Continue BiPAP at night with ALS.   I again had a discussion with patient and family on 2/19 that her ALS will progress and there will be a point that patient may decide to discontinue her BiPAP  and instead just use medications for symptom management.  -Patient would benefit from continued palliative discussions for symptom management and support, particularly for  who has been at her bedside frequently through her hospitalization, on 2/20.     #AF with RVR, recurrent: Recent hospitalization with CT negative for PE, echo without significant valve issues, TSH 3.4.  Sounds like she is on BiPAP with sleeping and doesn't tolerate it well and I suspect that could be inciting factor.  In any case has required DCCV 2x in 3 days leading to admission.  -Cards consulted.    Patient has not been able to tolerate amnio drip due to blood pressures.  She is on oral amiodarone.  Stop telemetry given goals of care.   -Continue amiodarone 200 mg twice daily for until 2/25 then 200 mg daily thereafter.  -cont apixiban     #Obtundation 2/2 medications for DCCV. New stridor post-extubation on 2/8: Hx of prolonged response, intubated for airway protection.  She was on minimal vent setting AM of 2/8 and extubated.  He had stridor noted after extubation.  Plan was originally to reintubate but goals of care conversation were had with family and patient and she noted she would not want to be reintubated under any circumstance.  She was maintained on BiPAP, started on steroids and did seem to have improvement in her symptoms.  She had a CT scan of her neck that showed possible mild asymmetry in the soft tissue of the neck that could be due to mild edema from recent intubation but otherwise no gross abnormalities.  -Pt with hypoxemia requiring variable amounts of O2 while awake intermittently with bipap.  Patient has had recurrent issues with secretions which have needed to be repeatedly suctioned at bedside.  This is secondary to her ALS.   -Stridor resolved     #Fever: Patient has had a low-grade fever on 2/7.  Tmax was 101.2 on the a.m. of 2/7.  Since then has had low-grade fevers of around 100.  Her chest x-ray does not  show clear infiltrate.  Her UA does not show infection.  Her procalcitonin is not elevated.  Discussed as above with family.       #Leukocytosis: Pt with leukocytosis from 2/9-2/10.  She was started on steroids as above for her stridor so suspect etiology.  She has not had concurrent fever and infectious w/u negative as above.  She has been having difficulty with secretions with weak cough, poor swallow due to ALS.      #Hypokalemia: Replacement     #Bulbar ALS has vocal cord paralysis causing difficulty with speaking and dysphagia at baseline reportedly.   -On tube feeds via PEG, we will keep her strict n.p.o.  -resumed pta riluzole but patient and  would prefer that this be held.   -Patient needs BiPAP at night to support her respiratory status. Using her home mask which is helping.  Discussed with  that as dying process progresses, bipap would be discontinued.      #Hx of RA: On infliximab/mtx, and prn prednisone with flares  #Thrombocytopenia: Mild.     Dispo: Awaiting placement to hospice facility.  CODE STATUS: DNR/DNI.   DVT PPX: DOAC     Family updated at bedside. Discussed plan of care and reiteration of comfort cares.     He Prather MD    Interval History   Had anxiety last night that was alleviated with additional Ativan.  Slept per  from 10 PM -6 AM.      -Data reviewed today: I reviewed all new labs and imaging results over the last 24 hours.    Physical Exam   Temp: 99.2  F (37.3  C) Temp src: Oral BP: 113/55 Pulse: 81   Resp: 20 SpO2: 98 % O2 Device: BiPAP/CPAP Oxygen Delivery: 3 LPM  Vitals:    02/08/23 0400 02/10/23 0400 02/17/23 0600   Weight: 52.1 kg (114 lb 13.8 oz) 54.4 kg (119 lb 14.9 oz) 53.7 kg (118 lb 4.8 oz)     Vital Signs with Ranges  Temp:  [97.7  F (36.5  C)-99.2  F (37.3  C)] 99.2  F (37.3  C)  Pulse:  [81-94] 81  Resp:  [14-24] 20  BP: (113-168)/(55-98) 113/55  FiO2 (%):  [40 %-100 %] 90 %  SpO2:  [92 %-99 %] 98 %  I/O last 3 completed shifts:  In: 1399  [NG/GT:1394]  Out: 1100 [Urine:1100]    Constitutional: Awake. Nonverbal due to vocal cord paralysis/weakness.  Writes to communicate and nods/shakes head to questions.  HEENT: Normocephalic.  No elevation of JVD.  Respiratory: On oxymask. No increased WOB.  Cardiovascular: Regular, no murmur  GI: Thin, feeding tube in place.  BS+, NT, ND  Skin/Integumen: WWP, no rash. No edema  Neuro: Awake. Writes to communicate.     Medications     dextrose       - MEDICATION INSTRUCTIONS -         [START ON 2/26/2023] amiodarone  200 mg Per Feeding Tube Daily     amiodarone  200 mg Oral or Feeding Tube BID     apixaban ANTICOAGULANT  5 mg Oral or NG Tube BID     artificial saliva  1 spray Swish & Spit 4x Daily     guaiFENesin  10 mL Per G Tube Q6H     LORazepam  0.25 mg Per Feeding Tube BID     LORazepam  0.5 mg Per Feeding Tube At Bedtime     multivitamins w/minerals  15 mL Per Feeding Tube Daily     pantoprazole  40 mg Per Feeding Tube QAM AC     QUEtiapine  75 mg Per Feeding Tube At Bedtime     [Held by provider] riluzole  50 mg Oral or NG Tube Q12H       Data   Recent Labs   Lab 02/19/23  0614 02/18/23  0723 02/17/23  0516 02/14/23  0530 02/13/23  0540   NA  --   --   --   --  138   POTASSIUM 5.3 4.7 4.6   < > 4.9   CHLORIDE  --   --   --   --  100   CO2  --   --   --   --  34*   BUN  --   --   --   --  17.5   CR  --   --   --   --  0.57   ANIONGAP  --   --   --   --  4*   MICHELLE  --   --   --   --  8.9   GLC  --   --   --   --  120*    < > = values in this interval not displayed.       No results found for this or any previous visit (from the past 24 hour(s)).

## 2023-02-19 NOTE — PLAN OF CARE
Goal Outcome Evaluation: Progressing    POC reviewed with: Patient and spouse    On 3L via Oxymask. Bipap NOC and PRN.    Pertinent Assessments: Pt A/O. Severe aphasia. Often uses pen and paper to communicate. LS dim throughout. Multiple episodes of anxiety, SoB. PO suction at bedside. Percy nous TF. Voids via bourne and bedpan. 5/10 pain.   Major Shift Events: Anxiety/pain meds adjusted. Comfort care ordered. Bourne catheter was obstructed, ok to irrigate per MD due to frequent urinary bypassing. Unable to irrigate, bourne replaced.   Treatment Plan: Palliative to see tomorrow to discuss symptom mgmt. Ok to use bipap for comfort. Per MD, focus on WIB rather than O2 sats. Plan for hospice at [facility] when placement found. CM following.

## 2023-02-19 NOTE — PROGRESS NOTES
A BiPAP of  16/6 @ 40% was applied to the pt via the mask for an increase in WOB and/or SOB.Pt is tolerating it well. Will continue to monitor and assess the pt's current respiratory status and needs.    Beverley Conde, RT

## 2023-02-19 NOTE — PLAN OF CARE
Goal Outcome Evaluation:       Assist of 2 ceiling lift. Strict NPO with TF running at goal (45). Sharp in place - for comfort. On Bipap overnight; x1 dose PRN ativan given for anxiety when wanting to take the bipap mask off. Plan is to discharge to hospice facility.  at bedside.

## 2023-02-19 NOTE — PLAN OF CARE
Goal Outcome Evaluation:    A/O. Elevated Bps. Pt saturation dropped to mid 80's and placed on Bipap. Pt utilizes pen and paper for communication. TF running at 45 ml/hr with flushes per orders. Sharp cath in place. PRN Ativan given 1x for anxiety.

## 2023-02-20 NOTE — PLAN OF CARE
Goal Outcome Evaluation:       Comfort cares maintained, repositioned as needed. Strict NPO with TF running via PEG. Sharp in place. Refused bipap so continued on 3 L oxymask. IV ativan given x1 for anxiety/sleep enhancement. Palliative coming today (Monday) for symptom management. Discharge plan is hospice.

## 2023-02-20 NOTE — PROGRESS NOTES
[facility]  Palliative Care Progress Note  Text Page     Assessment & Plan   Recommendations:  1. Goals of Care- No CPR- Do NOT Intubate-hospice on discharge  Hospitalization goals discussed Discussed symptom management and medications.   Decisional Capacity- Intact. Patient does not have an advance directive. Per  informed consent policy next of kin should be involved if patient becomes unable.  POLST-Consider completing prior to discharge     2.  Dyspnea  Initial respiratory distress due to CNS depression, intubation x 24 hours.  Extubated 2/08/23 with stridor present initially, has been maintained on bipap since that time.  - Continue with bipap per patient request at HS  - Continue to manage anxiety as able  - Morphine PRN for work of breathing, did discuss trying morphine today to see if this will help with work of breathing/anxiety     3. Anxiety, acute on chronic- Patient with reported high levels of anxiety at times  - Continue with lorazepam 0.25 mg BID and 0.5 mg at HS  - reduced lorazepam to 0.5 mg after discussion with spouse. He was not aware that she was neeeding/taking 1 mg at a time as her PRN and felt this was a large bump up from the -0.25 mg-0.5 mg she was on scheduled  - Continue with Seroquel 75 mg at HS- consider increasing if still having increase anxiety over night.   -Hydroxyzine 25 mg every 6 hours PRN    4. Generalized weakness  Progressive weakness due to ALS.  Has become less able to ambulate safely for long distances.  Bulbar weakness has presented challenges with regard to respiratory patterns when lying supine.   -Appreciate the assistance of staff with ADLs and cares as able  -Hospice on discharge     5. Spiritual Care  Oriented to Spiritual Health as part of Palliative Care team. Consultation placed for  to follow.  Spiritual Background: Bahai     6. Care Planning  Appreciate Care of multidisciplinary team.  Medications for discharge - Will  "need hospice medictions filled prior to discharge    Interval History    Patient with increased anxiety over night and today. We discussed ativan and morphine and trying morphine to see if this provides some relief given the recent ativan given. She was open to trying this.     Medical Decision Making and Goals of Care:  Discussed on February 20, 2023 with Kajal Chowdhury NP: Met with patient at the bedside. Reviewed her symptoms, she complains of some anxiety and wanted something more. Discussed SOB/Anxiety and she said she's having both. Discussed trying morphine to see if this will help. She also requested (by writing) that she wanted the shades opened up. Phone call placed to patient's spouse Arnoldo to go over this information as well. We talked about her increased needs of ativan. He is wondering if her bump in PRN was too quick and asked about a lower dose. It is reasonable to try 0.5 mg every 3 hours PRN and see how it goes. We discussed seroquel and morphine as well and the side effects of that. Questions asked and answeredPlans continue to be hospice on discharge.    Kajal Chowdhury NP  Palliative Care  [facility]  Pgr: [phone]    Time Spent on this Encounter   Total unit/floor time 37 minutes, time consisted of the following, examination of the patient, reviewing the record and completing documentation. >50% of time spent in counseling and coordination of care, Bedside Nurse Ros, Hospitalist Dr Smith and  Marlyn/Radha.  Time spend counseling with patient and family consisted of the following topics, goals of care, care planning for discharge and symptom management.         Review of Systems   \"Anxiety\"  \"hard breathing\"    Physical Exam   Pulse:  [81] 81  Resp:  [22-24] 22  SpO2:  [98 %] 98 %  118 lbs 4.8 oz  Exam:  GENERAL APPEARANCE:  Alert, cooperative  RESP:  no respiratory distress  CV:  Palpation and auscultation of heart done , regular " rate and rhythm, no murmur, rub, or gallop  ABDOMEN:  normal bowel sounds, soft, nontender, no hepatosplenomegaly or other masses  PSYCH:  oriented X 3, affect and mood normal    Medications     dextrose       - MEDICATION INSTRUCTIONS -         [START ON 2/26/2023] amiodarone  200 mg Per Feeding Tube Daily     amiodarone  200 mg Oral or Feeding Tube BID     apixaban ANTICOAGULANT  5 mg Oral or NG Tube BID     artificial saliva  1 spray Swish & Spit 4x Daily     guaiFENesin  10 mL Per G Tube Q6H     LORazepam  0.25 mg Per Feeding Tube BID     LORazepam  0.5 mg Per Feeding Tube At Bedtime     multivitamins w/minerals  15 mL Per Feeding Tube Daily     pantoprazole  40 mg Per Feeding Tube QAM AC     QUEtiapine  75 mg Per Feeding Tube At Bedtime     [Held by provider] riluzole  50 mg Oral or NG Tube Q12H       Data   Results for orders placed or performed during the hospital encounter of 02/07/23 (from the past 24 hour(s))   Basic metabolic panel   Result Value Ref Range    Sodium 139 136 - 145 mmol/L    Potassium 4.7 3.4 - 5.3 mmol/L    Chloride 96 (L) 98 - 107 mmol/L    Carbon Dioxide (CO2) 32 (H) 22 - 29 mmol/L    Anion Gap 11 7 - 15 mmol/L    Urea Nitrogen 20.2 8.0 - 23.0 mg/dL    Creatinine 0.51 0.51 - 0.95 mg/dL    Calcium 8.5 (L) 8.8 - 10.2 mg/dL    Glucose 122 (H) 70 - 99 mg/dL    GFR Estimate >90 >60 mL/min/1.73m2   Magnesium   Result Value Ref Range    Magnesium 1.9 1.7 - 2.3 mg/dL   Phosphorus   Result Value Ref Range    Phosphorus 3.6 2.5 - 4.5 mg/dL

## 2023-02-20 NOTE — PROGRESS NOTES
[facility]    Hospitalist Progress Note      Assessment & Plan      Natasha Allison is a 72 year old female with a history of bulbar ALS with dysphagia and inadequate oral intake with feeding tube in place, on BiPAP at night and independent in ambulating, hospitalized here 2/5-2/6/2022 with new diagnosis of AF with RVR admitted on 2/7/2023 with recurrent AF with RVR complicated by hypotension/obtundation necessitating intubation.      At her recent hospitalization:  CT PE negative for PE, echo with EF > 70 % with no significant valve dz (mild Ao sclerosis).  She required DCCV and stayed in NSR,  discharged with apixaban and metop 25 mg bid.  She felt well on discharge.  Around 1 am she called out to her  as was feeling SOB, he checked her BP/HR and they were fine, she put on her bipap and went to sleep.  She slept for about 30 minutes and then took her BiPAP off only to awaken around 3 am feeling very SOB.  She looked like she was in respiratory distress and so 911 called.  They found her alert but decompensated and become unresponsive with HR's in the 200's.  She was shocked 4 times without improvement.  She was given fentanyl 50 mcg, 10 mg midazolam, and 1 mg of lorazepam during the attempts at cardioversion.       On arrival ED 's and she was hypotensive, unresponsive, with apneic breathing pattern, no response to noxious stimuli.  She underwent synchronized DCCV with 150 J with restoration of NSR and started on amiodarone.  She has a hx of prolonged response to CNS active medications and did require intubation post cardioversion.  She was able to be extubated within about 24 hours.  Pt has remained in sinus rhythm and has been on amiodarone through PEG tube.  With difficulty managing secretions, patient and family have made decision to transition to more comfort based approach with advanced ALS.      #Goals of care, no escalation of care. Currently looking for hospice  facility: She has had hypoxemia that is related to her ALS, respiratory muscle weakness and difficulty managing her secretions with bulbar weakness.  I had an extensive discussion with the patient,  and son at bedside on 2/10.  Patient notes that her goal at this point is to get home and be comfortable.  Her chest x-ray done showed retrocardiac opacity secondary to aspiration of secretions.  We discussed that this is likely to recur in the setting of her ALS.  We discussed options including medications to help dry out secretions but patient has been on these before and is adamant she does not want these again.  She is absolutely adamant against any sort of mechanical intervention including tracheostomy.    -She has been transition to hospice support.  If she were to decline suddenly, she voices she would want to have comfort medications available   -If patient were to have a significant decline in status (recurrent afib w RVR, respiratory distress), comfort medications are available. Patient and family have reiterated that comfort is main priority.   -At this point, we are awaiting placement to hospice facility.  Prescient social work support.  -Patient did have difficulty sleeping but better than the night prior.  Think she slept about 4 hours.  We will increase Seroquel to 75 mg at night.  Continuing low-dose scheduled Ativan as this is helped her with her anxiety.  As needed medications are also available.  -Continue BiPAP at night with ALS.      #AF with RVR, recurrent: Recent hospitalization with CT negative for PE, echo without significant valve issues, TSH 3.4.  Sounds like she is on BiPAP with sleeping and doesn't tolerate it well and I suspect that could be inciting factor.  In any case has required DCCV 2x in 3 days leading to admission.  -Cards consulted.    Patient has not been able to tolerate amnio drip due to blood pressures.  She is on oral amiodarone.  Stop telemetry given goals of care.    -Continue amiodarone 200 mg twice daily for until 2/25 then 200 mg daily thereafter.  -cont apixiban     #Obtundation 2/2 medications for DCCV. New stridor post-extubation on 2/8: Hx of prolonged response, intubated for airway protection.  She was on minimal vent setting AM of 2/8 and extubated.  He had stridor noted after extubation.  Plan was originally to reintubate but goals of care conversation were had with family and patient and she noted she would not want to be reintubated under any circumstance.  She was maintained on BiPAP, started on steroids and did seem to have improvement in her symptoms.  She had a CT scan of her neck that showed possible mild asymmetry in the soft tissue of the neck that could be due to mild edema from recent intubation but otherwise no gross abnormalities.  -Pt with hypoxemia requiring variable amounts of O2 while awake intermittently with bipap.  Patient has had recurrent issues with secretions which have needed to be repeatedly suctioned at bedside.  This is secondary to her ALS.   -Stridor resolved     #Fever: Patient has had a low-grade fever on 2/7.  Tmax was 101.2 on the a.m. of 2/7.  Since then has had low-grade fevers of around 100.  Her chest x-ray does not show clear infiltrate.  Her UA does not show infection.  Her procalcitonin is not elevated.  Discussed as above with family.       #Leukocytosis: Pt with leukocytosis from 2/9-2/10.  She was started on steroids as above for her stridor so suspect etiology.  She has not had concurrent fever and infectious w/u negative as above.  She has been having difficulty with secretions with weak cough, poor swallow due to ALS.      #Hypokalemia: was replaced      #Bulbar ALS has vocal cord paralysis causing difficulty with speaking and dysphagia at baseline reportedly.   -On tube feeds via PEG, we will keep her strict n.p.o.  -resumed pta riluzole but patient and  would prefer that this be held.   -Patient needs BiPAP at  night to support her respiratory status. Using her home mask which is helping.  Discussed with  that as dying process progresses, bipap would be discontinued.      #Hx of RA: On infliximab/mtx, and prn prednisone with flares    #Thrombocytopenia: Mild.     Dispo: Awaiting placement to hospice facility.    CODE STATUS: DNR/DNI.     DVT PPX: DOAC      updated at bedside.    Jaylen Smith MD    Interval History      Patient is seen and examined by me today and medical record reviewed.Overnight events noted and care discussed with nursing staff.  Patient care assumed by me today.  She appears to be comfortable with her  at bedside.  No new complaints.  On comfort care plan and discharge to hospice facility when available    -Data reviewed today: I reviewed all new labs and imaging results over the last 24 hours.     Physical Exam             Resp: 22   O2 Device: Oxymask Oxygen Delivery: 3 LPM  Vitals:    02/08/23 0400 02/10/23 0400 02/17/23 0600   Weight: 52.1 kg (114 lb 13.8 oz) 54.4 kg (119 lb 14.9 oz) 53.7 kg (118 lb 4.8 oz)     Vital Signs with Ranges  Resp:  [22] 22  I/O last 3 completed shifts:  In: 150 [NG/GT:150]  Out: 900 [Urine:900]    Constitutional: Awake. Nonverbal due to vocal cord paralysis/weakness.  Writes to communicate and nods/shakes head to questions.  HEENT: Normocephalic.  No elevation of JVD.  Respiratory: On oxymask. No increased WOB.  Cardiovascular: Regular, no murmur  GI: Thin, feeding tube in place.  BS+, NT, ND  Skin/Integumen: WWP, no rash. No edema  Neuro: Awake. Writes to communicate.     Medications     dextrose       - MEDICATION INSTRUCTIONS -         [START ON 2/26/2023] amiodarone  200 mg Per Feeding Tube Daily     amiodarone  200 mg Oral or Feeding Tube BID     apixaban ANTICOAGULANT  5 mg Oral or NG Tube BID     artificial saliva  1 spray Swish & Spit 4x Daily     guaiFENesin  10 mL Per G Tube Q6H     LORazepam  0.25 mg Per Feeding Tube BID     LORazepam   0.5 mg Per Feeding Tube At Bedtime     multivitamins w/minerals  15 mL Per Feeding Tube Daily     pantoprazole  40 mg Per Feeding Tube QAM AC     QUEtiapine  75 mg Per Feeding Tube At Bedtime     [Held by provider] riluzole  50 mg Oral or NG Tube Q12H       Data   Recent Labs   Lab 02/20/23  0704 02/19/23  0614 02/18/23  0723     --   --    POTASSIUM 4.7 5.3 4.7   CHLORIDE 96*  --   --    CO2 32*  --   --    BUN 20.2  --   --    CR 0.51  --   --    ANIONGAP 11  --   --    MICHELLE 8.5*  --   --    *  --   --        No results found for this or any previous visit (from the past 24 hour(s)).

## 2023-02-20 NOTE — PROGRESS NOTES
Care Management Follow Up    Length of Stay (days): 13    Expected Discharge Date: 02/2/2023 - as soon as placement is available     Concerns to be Addressed: discharge planning  Patient plan of care discussed at interdisciplinary rounds: Yes    Anticipated Discharge Disposition: Hospice     Anticipated Discharge Services: None  Anticipated Discharge DME: Bed, Commode, Other (see comment) (suction)    Patient/family educated on Medicare website which has current facility and service quality ratings: yes  Education Provided on the Discharge Plan: yes  Patient/Family in Agreement with the Plan: yes    Referrals Placed by CM/SW:  Post acute facilities  Private pay costs discussed: private room/amenity fees and transportation costs    Additional Information:  Lilian spoke with Karla at the [facility] in [place] P: [phone], who said that they may have a bed opening up within the next 1-2 days.  Karla will call Lilian once the bed is available.  Pt's spouse was updated.    TREVIN Sanders, [facility]  Inpatient Care Coordination  [facility]  [phone]

## 2023-02-20 NOTE — PLAN OF CARE
Goal Outcome Evaluation:  Vitals: comfort cares  Pain: denies  Neuro: A&O, pt reports some anxiety. Scheduled ativan and prn morphine given.   Respiratory: oral suction prn, LS dim, NC  GI/: bourne in place.   LDAs: G/J tube inplace, PIVs saline locked.   Labs: k, mag and phos prot, mag replaced.   Diet: strict NPO, tube feed at goal rate of 45mls/hour with 100 ml flush q 4.   Activity: Bed rest, HOB 30 degrees or more.   Plan: continue POC, discharge to hospice facility.

## 2023-02-21 NOTE — PLAN OF CARE
Goal Outcome Evaluation:    Pt A/Ox4, VSS on 3L oxymask. TF at 45 ml/hr with 100 ml flush q4h- goal rate. Pt coughing up secretions, suctioning as needed. Sharp patent and draining. Repositioning as needed. Plan to go to Karla at the [facility] in 1-2 days for hospice care.

## 2023-02-21 NOTE — PROGRESS NOTES
[facility]    Hospitalist Progress Note      Assessment & Plan      Natasha Allison is a 72 year old female with a history of bulbar ALS with dysphagia and inadequate oral intake with feeding tube in place, on BiPAP at night and independent in ambulating, hospitalized here 2/5-2/6/2022 with new diagnosis of AF with RVR admitted on 2/7/2023 with recurrent AF with RVR complicated by hypotension/obtundation necessitating intubation.      At her recent hospitalization:  CT PE negative for PE, echo with EF > 70 % with no significant valve dz (mild Ao sclerosis).  She required DCCV and stayed in NSR,  discharged with apixaban and metop 25 mg bid.  She felt well on discharge.  Around 1 am she called out to her  as was feeling SOB, he checked her BP/HR and they were fine, she put on her bipap and went to sleep.  She slept for about 30 minutes and then took her BiPAP off only to awaken around 3 am feeling very SOB.  She looked like she was in respiratory distress and so 911 called.  They found her alert but decompensated and become unresponsive with HR's in the 200's.  She was shocked 4 times without improvement.  She was given fentanyl 50 mcg, 10 mg midazolam, and 1 mg of lorazepam during the attempts at cardioversion.       On arrival ED 's and she was hypotensive, unresponsive, with apneic breathing pattern, no response to noxious stimuli.  She underwent synchronized DCCV with 150 J with restoration of NSR and started on amiodarone.  She has a hx of prolonged response to CNS active medications and did require intubation post cardioversion.  She was able to be extubated within about 24 hours.  Pt has remained in sinus rhythm and has been on amiodarone through PEG tube.  With difficulty managing secretions, patient and family have made decision to transition to more comfort based approach with advanced ALS.      #Goals of care, no escalation of care. Currently looking for hospice  facility: She has had hypoxemia that is related to her ALS, respiratory muscle weakness and difficulty managing her secretions with bulbar weakness.  I had an extensive discussion with the patient,  and son at bedside on 2/10.  Patient notes that her goal at this point is to get home and be comfortable.  Her chest x-ray done showed retrocardiac opacity secondary to aspiration of secretions.  We discussed that this is likely to recur in the setting of her ALS.  We discussed options including medications to help dry out secretions but patient has been on these before and is adamant she does not want these again.  She is absolutely adamant against any sort of mechanical intervention including tracheostomy.    -She has been transition to hospice support.  If she were to decline suddenly, she voices she would want to have comfort medications available   -If patient were to have a significant decline in status (recurrent afib w RVR, respiratory distress), comfort medications are available. Patient and family have reiterated that comfort is main priority.   -At this point, we are awaiting placement to hospice facility.  Prescient social work support.  -Patient did have difficulty sleeping but better than the night prior.  Think she slept about 4 hours.  We will increase Seroquel to 75 mg at night.  Continuing low-dose scheduled Ativan as this is helped her with her anxiety.  As needed medications are also available.  -Continue BiPAP at night with ALS.      #AF with RVR, recurrent: Recent hospitalization with CT negative for PE, echo without significant valve issues, TSH 3.4.  Sounds like she is on BiPAP with sleeping and doesn't tolerate it well and I suspect that could be inciting factor.  In any case has required DCCV 2x in 3 days leading to admission.  -Cards consulted.    Patient has not been able to tolerate amnio drip due to blood pressures.  She is on oral amiodarone.  Stop telemetry given goals of care.    -Continue amiodarone 200 mg twice daily for until 2/25 then 200 mg daily thereafter.  -cont apixiban     #Obtundation 2/2 medications for DCCV. New stridor post-extubation on 2/8: Hx of prolonged response, intubated for airway protection.  She was on minimal vent setting AM of 2/8 and extubated.  He had stridor noted after extubation.  Plan was originally to reintubate but goals of care conversation were had with family and patient and she noted she would not want to be reintubated under any circumstance.  She was maintained on BiPAP, started on steroids and did seem to have improvement in her symptoms.  She had a CT scan of her neck that showed possible mild asymmetry in the soft tissue of the neck that could be due to mild edema from recent intubation but otherwise no gross abnormalities.  -Pt with hypoxemia requiring variable amounts of O2 while awake intermittently with bipap.  Patient has had recurrent issues with secretions which have needed to be repeatedly suctioned at bedside.  This is secondary to her ALS.   -Stridor resolved     #Fever: Patient has had a low-grade fever on 2/7.  Tmax was 101.2 on the a.m. of 2/7.  Since then has had low-grade fevers of around 100.  Her chest x-ray does not show clear infiltrate.  Her UA does not show infection.  Her procalcitonin is not elevated.  Discussed as above with family.       #Leukocytosis: Pt with leukocytosis from 2/9-2/10.  She was started on steroids as above for her stridor so suspect etiology.  She has not had concurrent fever and infectious w/u negative as above.  She has been having difficulty with secretions with weak cough, poor swallow due to ALS.      #Hypokalemia: was replaced      #Bulbar ALS has vocal cord paralysis causing difficulty with speaking and dysphagia at baseline reportedly.   -On tube feeds via PEG, we will keep her strict n.p.o.  -resumed pta riluzole but patient and  would prefer that this be held.   -Patient initially needed  BiPAP but currently off BiPAP    #Hx of RA: On infliximab/mtx, and prn prednisone with flares    #Thrombocytopenia: Mild.     Dispo: Awaiting placement to hospice facility.    CODE STATUS: DNR/DNI.     DVT PPX: DOAC      updated at bedside.    Jaylen Smith MD    Interval History      Patient is seen and examined by me today and medical record reviewed.Overnight events noted and care discussed with nursing staff.  Patient denies any new symptoms.   at bedside.  Care plan discussed with palliative care team.  Social service planning discharge to hospice facility.    -Data reviewed today: I reviewed all new labs and imaging results over the last 24 hours.     Physical Exam       BP: (!) 147/69 Pulse: 85   Resp: 22 SpO2: 98 % O2 Device: Oxymask Oxygen Delivery: 3 LPM  Vitals:    02/08/23 0400 02/10/23 0400 02/17/23 0600   Weight: 52.1 kg (114 lb 13.8 oz) 54.4 kg (119 lb 14.9 oz) 53.7 kg (118 lb 4.8 oz)     Vital Signs with Ranges  Pulse:  [85] 85  Resp:  [22] 22  BP: (147)/(69) 147/69  SpO2:  [98 %] 98 %  I/O last 3 completed shifts:  In: 2283 [NG/GT:2283]  Out: 2775 [Urine:2775]    Constitutional: Awake. Nonverbal due to vocal cord paralysis/weakness.  Writes to communicate and nods/shakes head to questions.  HEENT: Normocephalic.  No elevation of JVD.  Respiratory: On oxymask. No increased WOB.  Cardiovascular: Regular, no murmur  GI: Thin, feeding tube in place.  BS+, NT, ND  Skin/Integumen: WWP, no rash. No edema  Neuro: Awake. Writes to communicate.     Medications     dextrose       - MEDICATION INSTRUCTIONS -         [START ON 2/26/2023] amiodarone  200 mg Per Feeding Tube Daily     amiodarone  200 mg Oral or Feeding Tube BID     apixaban ANTICOAGULANT  5 mg Oral or NG Tube BID     artificial saliva  1 spray Swish & Spit 4x Daily     famotidine  20 mg Per Feeding Tube BID     guaiFENesin  10 mL Per G Tube Q6H     LORazepam  0.25 mg Per Feeding Tube BID     LORazepam  0.5 mg Per Feeding Tube At  Bedtime     multivitamins w/minerals  15 mL Per Feeding Tube Daily     pantoprazole  40 mg Per Feeding Tube QAM AC     QUEtiapine  75 mg Per Feeding Tube At Bedtime     [Held by provider] riluzole  50 mg Oral or NG Tube Q12H       Data   Recent Labs   Lab 02/21/23  0642 02/20/23  0704 02/19/23  0614   NA  --  139  --    POTASSIUM 4.7 4.7 5.3   CHLORIDE  --  96*  --    CO2  --  32*  --    BUN  --  20.2  --    CR  --  0.51  --    ANIONGAP  --  11  --    MICHELLE  --  8.5*  --    GLC  --  122*  --        No results found for this or any previous visit (from the past 24 hour(s)).

## 2023-02-21 NOTE — PLAN OF CARE
Goal Outcome Evaluation:  Vitals: comfort cares  Pain: denies  Neuro: A&O, nonverbal. Anxious. Morphine and ativan given prn.   GI/: bourne in place.   LDAs: PIV saline locked.   Labs: Per MD Smith, no need to cont electrolyte protocols or do any lab draws.    Diet: strict NPO. tube feeding at goal rate of 45mls/hour 100 ml flush q 4. At 1430 rate slowed to 25 mls/hour d/t pt repeated reports of indigestion and discomfort.   Activity: not OOB  Plan: discharge to hospice facillity.

## 2023-02-21 NOTE — PROGRESS NOTES
NUTRITION BRIEF NOTE  RD following for nutrition support, malnutrition. Upon chart review noted comfort care election. RD to sign off, but remains available. Please page/consult as needed.   Lisa Galvez RD, LD, [facility]  Pager - 3rd floor/ICU: [phone]  Pager - All other floors: [phone]  Pager - Weekend/holiday: [phone]  Office: [phone]

## 2023-02-22 NOTE — PLAN OF CARE
[id number]    Comfort cares. A/O, forgetful. Sharp in place. PRN ativan, morphine given. Pt requested scheduled PM seroquel at 1900, given per patient and family. Pt sleeping comfortably after this was given. Self-suctions. Family in room. Will discharge to hospice facility pending bed availability.

## 2023-02-22 NOTE — PROGRESS NOTES
[facility]  Palliative Care Progress Note  Text Page     Assessment & Plan   Recommendations:  1. Goals of Care- No CPR- Do NOT Intubate-hospice on discharge  Hospitalization goals discussed Discussed symptom management and medications.   Decisional Capacity- Intact. Patient does not have an advance directive. Per  informed consent policy next of kin should be involved if patient becomes unable.  POLST-Consider completing prior to discharge     2.  Dyspnea  Initial respiratory distress due to CNS depression, resolved. Extubated 2/8 and has since weaned from continuous bipap.  Patient with use of supplemental oxygen at time of assessment.  Noted increased anxiety and dyspnea this AM.  - Continue with bipap per patient request at HS  - Continue to manage anxiety as able - see below #3  - Morphine 7.5 mg PO solution every 1 hour prn dyspnea, Morphine IV solution 2-4 mg every 15 minutes prn ongoing severe /refractory dyspnea      3. Anxiety, acute on chronic- Patient with reported high levels of anxiety at times, associated with dyspnea, secretions.  - Continue with lorazepam 0.25 mg BID and 0.5 mg at HS  - lorazepam 0.25 mg three times daily, 0.5 mg daily at bedtime.  And 0.5 mg PO/IV every 3 hours prn  - Continue with Seroquel 25 mg in AM, 75 mg at HS   -Hydroxyzine 25 mg three times daily and 6 hours PRN    4. Generalized weakness  Progressive weakness due to ALS.  Has become less able to ambulate safely for long distances.  Bulbar weakness has presented challenges with regard to respiratory patterns when lying supine.   -Appreciate the assistance of staff with ADLs and cares as able  -Hospice on discharge     5. Spiritual Care  Oriented to Spiritual Health as part of Palliative Care team. Consultation placed for  to follow.  Spiritual Background: Mosque     6. Care Planning  Appreciate Care of multidisciplinary team.  Medications for discharge - pending.      Interval  "History:  Patient assessed to have more anxiety and dyspnea on assessment.  Medication modifications discussed and implemented as above. Patient and family verbalized agreement with plan.  Discussed placement process and family concerns.     SINCERE Sahu CNP  Palliative Care  [facility]  Pgr: [phone]    Time Spent on this Encounter   Total unit/floor time 55 minutes, time consisted of the following, examination of the patient, reviewing the record and completing documentation. >50% of time spent in counseling and coordination of care, Bedside Nurse Jessica, Hospitalist Dr. Prather and  ..  Time spend counseling with patient and family consisted of the following topics, care planning for discharge and symptom management.         Review of Systems   \"Anxiety\"  \"hard breathing\"    Physical Exam   Pulse:  [128-132] 132  BP: (110-111)/(67-72) 111/67  SpO2:  [94 %-100 %] 100 %  118 lbs 4.8 oz  Exam:  GENERAL APPEARANCE:  Alert, in respiratory  distress due to baseline condition  RESP:  dyspneic  CV:  Palpation and auscultation of heart done , regular rate and rhythm, no murmur, rub, or gallop  ABDOMEN:  normal bowel sounds, soft, nontender, no hepatosplenomegaly or other masses  PSYCH:  oriented X 3, anxious    Medications     dextrose       - MEDICATION INSTRUCTIONS -         [START ON 2/26/2023] amiodarone  200 mg Per Feeding Tube Daily     amiodarone  200 mg Oral or Feeding Tube BID     apixaban ANTICOAGULANT  5 mg Oral or NG Tube BID     artificial saliva  1 spray Swish & Spit 4x Daily     famotidine  20 mg Per Feeding Tube BID     guaiFENesin  10 mL Per G Tube Q6H     hydrOXYzine  25 mg Per Feeding Tube TID     LORazepam  0.25 mg Per Feeding Tube TID     LORazepam  0.5 mg Per Feeding Tube At Bedtime     multivitamins w/minerals  15 mL Per Feeding Tube Daily     pantoprazole  40 mg Per Feeding Tube QAM AC     QUEtiapine  25 mg Per Feeding Tube Daily     QUEtiapine  75 " mg Per Feeding Tube At Bedtime     [Held by provider] riluzole  50 mg Oral or NG Tube Q12H       Data   No results found for this or any previous visit (from the past 24 hour(s)).

## 2023-02-22 NOTE — PLAN OF CARE
Goal Outcome Evaluation:    Pt on comfort cares. Anxiety was hard to manage this morning. Gave scheduled Ativan and PRN IV ativan, with little relief. Gave Atarax PRN with little relief. MD increased frequency of scheduled Ativan. Spoke with Palliative, added 25mq Seroquel morning, Atarax now scheduled and increased morphine PRN. IV morphine also helping with anxiety. Pt slept 8 hours with 0.5mg Ativan and 75mg Seroquel. Sharp in place. Very small liq bm.   On Tube feedings, pt was decreased yesterday due to indigestion, increased 35cc/hr, goal rate 45cc if pt tolerates.

## 2023-02-22 NOTE — PROGRESS NOTES
[facility]    Hospitalist Progress Note      Assessment & Plan   Natasah Allison is a 72 year old female with a history of bulbar ALS with dysphagia and inadequate oral intake with feeding tube in place, on BiPAP at night and independent in ambulating, hospitalized here 2/5-2/6/2022 with new diagnosis of AF with RVR admitted on 2/7/2023 with recurrent AF with RVR complicated by hypotension/obtundation necessitating intubation.      At her recent hospitalization:  CT PE negative for PE, echo with EF > 70 % with no significant valve dz (mild Ao sclerosis).  She required DCCV and stayed in NSR,  discharged with apixaban and metop 25 mg bid.  She felt well on discharge.  Around 1 am she called out to her  as was feeling SOB, he checked her BP/HR and they were fine, she put on her bipap and went to sleep.  She slept for about 30 minutes and then took her BiPAP off only to awaken around 3 am feeling very SOB.  She looked like she was in respiratory distress and so 911 called.  They found her alert but decompensated and become unresponsive with HR's in the 200's.  She was shocked 4 times without improvement.  She was given fentanyl 50 mcg, 10 mg midazolam, and 1 mg of lorazepam during the attempts at cardioversion.       On arrival ED 's and she was hypotensive, unresponsive, with apneic breathing pattern, no response to noxious stimuli.  She underwent synchronized DCCV with 150 J with restoration of NSR and started on amiodarone.  She has a hx of prolonged response to CNS active medications and did require intubation post cardioversion.  She was able to be extubated within about 24 hours.  Pt has remained in sinus rhythm and has been on amiodarone through PEG tube.  With difficulty managing secretions, patient and family have made decision to transition to more comfort based approach with advanced ALS.     Plan for today (02/22):  -Increase tube feeds back toward goal.  -Increase  schedule ativan 0.25 mg TID with adjustment in schedule (0900, 1300, 1700).  Continue ativan 0.5 mg at bedtime.   -Continue to work toward placement per .      #Goals of care, no escalation of care. Currently looking for hospice facility: She has had hypoxemia that is related to her ALS, respiratory muscle weakness and difficulty managing her secretions with bulbar weakness.  I had an extensive discussion with the patient,  and son at bedside on 2/10.  Patient notes that her goal at this point is to get home and be comfortable.  Her chest x-ray done showed retrocardiac opacity secondary to aspiration of secretions.  We discussed that this is likely to recur in the setting of her ALS.  We discussed options including medications to help dry out secretions but patient has been on these before and is adamant she does not want these again.  She is absolutely adamant against any sort of mechanical intervention including tracheostomy.    -She has been transition to hospice support.  If she were to decline suddenly, she voices she would want to have comfort medications available   -If patient were to have a significant decline in status (recurrent afib w RVR, respiratory distress), comfort medications are available. Patient and family have reiterated that comfort is main priority.   -At this point, we are awaiting placement to hospice facility.  Prescient social work support.  -Scheduled Seroquel and Ativan at night.  As needed comfort medications also available.  -Continue BiPAP at night with ALS.      #AF with RVR, recurrent: Recent hospitalization with CT negative for PE, echo without significant valve issues, TSH 3.4.  Sounds like she is on BiPAP with sleeping and doesn't tolerate it well and I suspect that could be inciting factor.  In any case has required DCCV 2x in 3 days leading to admission.  -Cards consulted.    Patient has not been able to tolerate amnio drip due to blood pressures.  She is on oral  amiodarone.  Stop telemetry given goals of care.   -Continue amiodarone 200 mg twice daily for until 2/25 then 200 mg daily thereafter.  -cont apixiban     #Obtundation 2/2 medications for DCCV. New stridor post-extubation on 2/8: Hx of prolonged response, intubated for airway protection.  She was on minimal vent setting AM of 2/8 and extubated.  He had stridor noted after extubation.  Plan was originally to reintubate but goals of care conversation were had with family and patient and she noted she would not want to be reintubated under any circumstance.  She was maintained on BiPAP, started on steroids and did seem to have improvement in her symptoms.  She had a CT scan of her neck that showed possible mild asymmetry in the soft tissue of the neck that could be due to mild edema from recent intubation but otherwise no gross abnormalities.  -Pt with hypoxemia requiring variable amounts of O2 while awake intermittently with bipap.  Patient has had recurrent issues with secretions which have needed to be repeatedly suctioned at bedside.  This is secondary to her ALS.   -Stridor resolved     #Fever: Patient has had a low-grade fever on 2/7.  Tmax was 101.2 on the a.m. of 2/7.  Since then has had low-grade fevers of around 100.  Her chest x-ray does not show clear infiltrate.  Her UA does not show infection.  Her procalcitonin is not elevated.  Discussed as above with family.       #Leukocytosis: Pt with leukocytosis from 2/9-2/10.  She was started on steroids as above for her stridor so suspect etiology.  She has not had concurrent fever and infectious w/u negative as above.  She has been having difficulty with secretions with weak cough, poor swallow due to ALS.      #Hypokalemia: was replaced      #Bulbar ALS has vocal cord paralysis causing difficulty with speaking and dysphagia at baseline reportedly.   -On tube feeds via PEG, we will keep her strict n.p.o.  -resumed pta riluzole but patient and  would  prefer that this be held.   -Patient initially needed BiPAP but currently off BiPAP     #Hx of RA: On infliximab/mtx, and prn prednisone with flares     #Thrombocytopenia: Mild.     Dispo: Awaiting placement to hospice facility.  CODE STATUS: DNR/DNI.   DVT PPX: DOAC     Son updated at bedside.     He Prather MD    Interval History   Patient had tube feeds reduced given some indigestion.  Patient notes that she slept 8 hours last night.  She still feels some anxiety during the day.  Adjusted Ativan dosing discussed with patient and son.    -Data reviewed today: I reviewed all new labs and imaging results over the last 24 hours.    Physical Exam       BP: 111/67 Pulse: (!) 132     SpO2: 100 % O2 Device: Oxymask Oxygen Delivery: 3 LPM  Vitals:    02/08/23 0400 02/10/23 0400 02/17/23 0600   Weight: 52.1 kg (114 lb 13.8 oz) 54.4 kg (119 lb 14.9 oz) 53.7 kg (118 lb 4.8 oz)     Vital Signs with Ranges  Pulse:  [128-132] 132  BP: (110-111)/(67-72) 111/67  SpO2:  [94 %-100 %] 100 %  I/O last 3 completed shifts:  In: -   Out: 1425 [Urine:1425]    Awake, comfortable.  Answers appropriately.  No acute distress.  Extremities warm and well-perfused.  Nonverbal but interacts mainly by writing.    Medications     dextrose       - MEDICATION INSTRUCTIONS -         [START ON 2/26/2023] amiodarone  200 mg Per Feeding Tube Daily     amiodarone  200 mg Oral or Feeding Tube BID     apixaban ANTICOAGULANT  5 mg Oral or NG Tube BID     artificial saliva  1 spray Swish & Spit 4x Daily     famotidine  20 mg Per Feeding Tube BID     guaiFENesin  10 mL Per G Tube Q6H     LORazepam  0.25 mg Per Feeding Tube TID     LORazepam  0.5 mg Per Feeding Tube At Bedtime     multivitamins w/minerals  15 mL Per Feeding Tube Daily     pantoprazole  40 mg Per Feeding Tube QAM AC     QUEtiapine  75 mg Per Feeding Tube At Bedtime     [Held by provider] riluzole  50 mg Oral or NG Tube Q12H       Data   Recent Labs   Lab 02/21/23  0642 02/20/23  0704  02/19/23  0614   NA  --  139  --    POTASSIUM 4.7 4.7 5.3   CHLORIDE  --  96*  --    CO2  --  32*  --    BUN  --  20.2  --    CR  --  0.51  --    ANIONGAP  --  11  --    MICHELLE  --  8.5*  --    GLC  --  122*  --        No results found for this or any previous visit (from the past 24 hour(s)).

## 2023-02-22 NOTE — PROGRESS NOTES
Care Management Follow Up    Length of Stay (days): 15    Expected Discharge Date: 02/24/2023       Additional Information:  STEVE LALA for [facility] at [facility] in [place] P: [phone] to see when they will have a bed opened. Awaiting a response.     Addendum 1446: called [facility] again. No answer.     TREVIN Acosta, [facility]  Emergency Room   [phone]-Please contact the STEVE on the floor in which the patient is staying for any questions or concerns

## 2023-02-22 NOTE — PLAN OF CARE
Goal Outcome Evaluation:       Comfort care. Diminished LS. No complaints of pain. Slept well throughout the night.

## 2023-02-23 NOTE — PROGRESS NOTES
[facility]    Hospitalist Progress Note      Assessment & Plan   Natasha Allison is a 72 year old female with a history of bulbar ALS with dysphagia and inadequate oral intake with feeding tube in place, on BiPAP at night and independent in ambulating, hospitalized here 2/5-2/6/2022 with new diagnosis of AF with RVR admitted on 2/7/2023 with recurrent AF with RVR complicated by hypotension/obtundation necessitating intubation..      At her recent hospitalization:  CT PE negative for PE, echo with EF > 70 % with no significant valve dz (mild Ao sclerosis).  She required DCCV and stayed in NSR,  discharged with apixaban and metop 25 mg bid.  She felt well on discharge.  Around 1 am she called out to her  as was feeling SOB, he checked her BP/HR and they were fine, she put on her bipap and went to sleep.  She slept for about 30 minutes and then took her BiPAP off only to awaken around 3 am feeling very SOB.  She looked like she was in respiratory distress and so 911 called.  They found her alert but decompensated and become unresponsive with HR's in the 200's.  She was shocked 4 times without improvement.  She was given fentanyl 50 mcg, 10 mg midazolam, and 1 mg of lorazepam during the attempts at cardioversion.       On arrival ED 's and she was hypotensive, unresponsive, with apneic breathing pattern, no response to noxious stimuli.  She underwent synchronized DCCV with 150 J with restoration of NSR and started on amiodarone.  She has a hx of prolonged response to CNS active medications and did require intubation post cardioversion.  She was able to be extubated within about 24 hours.  Pt has remained in sinus rhythm and has been on amiodarone through PEG tube.  With difficulty managing secretions, patient and family have made decision to transition to more comfort based approach with advanced ALS.      #Goals of care, Comfort care. Currently looking for hospice facility: She has  had hypoxemia that is related to her ALS, respiratory muscle weakness and difficulty managing her secretions with bulbar weakness.  I had an extensive discussion with the patient,  and son at bedside on 2/10.  Patient notes that her goal at this point is to get home and be comfortable.  Her chest x-ray done showed retrocardiac opacity secondary to aspiration of secretions.  We discussed that this is likely to recur in the setting of her ALS.  We discussed options including medications to help dry out secretions but patient has been on these before and is adamant she does not want these again.  She is absolutely adamant against any sort of mechanical intervention including tracheostomy.    -She has been transition to hospice support.  If she were to decline suddenly, she voices she would want to have comfort medications available   -If patient were to have a significant decline in status (recurrent afib w RVR, respiratory distress), comfort medications are available. Patient and family have reiterated that comfort is main priority.   -At this point, we are awaiting placement to hospice facility.  Appreciate social work support.  -Scheduled Seroquel and Ativan at night.  As needed comfort medications also available.  Ativan dosing adjusted a bit on 2/22 with improvement.   -Continue BiPAP at night with ALS.      #AF with RVR, recurrent: Recent hospitalization with CT negative for PE, echo without significant valve issues, TSH 3.4.  Sounds like she is on BiPAP with sleeping and doesn't tolerate it well and I suspect that could be inciting factor.  In any case has required DCCV 2x in 3 days leading to admission.  -Cards consulted.    Patient has not been able to tolerate amnio drip due to blood pressures.  She is on oral amiodarone.  Stop telemetry given goals of care.   -Continue amiodarone 200 mg twice daily for until 2/25 then 200 mg daily thereafter.  -cont apixiban     #Obtundation 2/2 medications for DCCV.  New stridor post-extubation on 2/8: Hx of prolonged response, intubated for airway protection.  She was on minimal vent setting AM of 2/8 and extubated.  He had stridor noted after extubation.  Plan was originally to reintubate but goals of care conversation were had with family and patient and she noted she would not want to be reintubated under any circumstance.  She was maintained on BiPAP, started on steroids and did seem to have improvement in her symptoms.  She had a CT scan of her neck that showed possible mild asymmetry in the soft tissue of the neck that could be due to mild edema from recent intubation but otherwise no gross abnormalities.  -Pt with hypoxemia requiring variable amounts of O2 while awake intermittently with bipap.  Patient has had recurrent issues with secretions which have needed to be repeatedly suctioned at bedside.  This is secondary to her ALS.   -Stridor resolved     #Fever: Patient has had a low-grade fever on 2/7.  Tmax was 101.2 on the a.m. of 2/7.  Since then has had low-grade fevers of around 100.  Her chest x-ray does not show clear infiltrate.  Her UA does not show infection.  Her procalcitonin is not elevated.  Discussed as above with family.       #Leukocytosis: Pt with leukocytosis from 2/9-2/10.  She was started on steroids as above for her stridor so suspect etiology.  She has not had concurrent fever and infectious w/u negative as above.  She has been having difficulty with secretions with weak cough, poor swallow due to ALS.      #Hypokalemia: was replaced      #Bulbar ALS has vocal cord paralysis causing difficulty with speaking and dysphagia at baseline reportedly.   -On tube feeds via PEG, we will keep her strict n.p.o.  -resumed pta riluzole but patient and  would prefer that this be held.   -Patient initially needed BiPAP but currently off BiPAP     #Hx of RA: On infliximab/mtx, and prn prednisone with flares     #Thrombocytopenia: Mild.     Dispo: Awaiting  placement to hospice facility.  Waiting on [facility].   CODE STATUS: DNR/DNI.   DVT PPX: ROCKY Prather MD    Interval History   No events. More comfortable today with changes in anxiety medications.     -Data reviewed today: I reviewed all new labs and imaging results over the last 24 hours.    Physical Exam   Temp: 98.3  F (36.8  C) Temp src: Axillary BP: 90/62 Pulse: 80   Resp: 14 SpO2: 100 % O2 Device: Oxymask Oxygen Delivery: 3 LPM  Vitals:    02/08/23 0400 02/10/23 0400 02/17/23 0600   Weight: 52.1 kg (114 lb 13.8 oz) 54.4 kg (119 lb 14.9 oz) 53.7 kg (118 lb 4.8 oz)     Vital Signs with Ranges  Temp:  [98.3  F (36.8  C)] 98.3  F (36.8  C)  Pulse:  [80] 80  Resp:  [14-20] 14  BP: ()/(54-62) 90/62  SpO2:  [97 %-100 %] 100 %  I/O last 3 completed shifts:  In: 120 [NG/GT:120]  Out: 850 [Urine:850]    Awake, comfortable.  Less anxious today. Answers appropriately.  No acute distress.  Extremities warm and well-perfused.  Nonverbal but interacts mainly by writing.    Medications     dextrose       - MEDICATION INSTRUCTIONS -         [START ON 2/26/2023] amiodarone  200 mg Per Feeding Tube Daily     amiodarone  200 mg Oral or Feeding Tube BID     apixaban ANTICOAGULANT  5 mg Oral or NG Tube BID     artificial saliva  1 spray Swish & Spit 4x Daily     famotidine  20 mg Per Feeding Tube BID     guaiFENesin  10 mL Per G Tube Q6H     hydrOXYzine  25 mg Per Feeding Tube TID     LORazepam  0.25 mg Per Feeding Tube TID     LORazepam  0.5 mg Per Feeding Tube At Bedtime     multivitamins w/minerals  15 mL Per Feeding Tube Daily     pantoprazole  40 mg Per Feeding Tube QAM AC     QUEtiapine  25 mg Per Feeding Tube Daily     QUEtiapine  75 mg Per Feeding Tube At Bedtime     [Held by provider] riluzole  50 mg Oral or NG Tube Q12H       Data   Recent Labs   Lab 02/21/23  0642 02/20/23  0704 02/19/23  0614   NA  --  139  --    POTASSIUM 4.7 4.7 5.3   CHLORIDE  --  96*  --    CO2  --  32*  --    BUN  --  20.2  --     CR  --  0.51  --    ANIONGAP  --  11  --    MICHELLE  --  8.5*  --    GLC  --  122*  --        No results found for this or any previous visit (from the past 24 hour(s)).

## 2023-02-23 NOTE — PLAN OF CARE
Pt here for comfort cares. A/O but forgetful. Was a little confused at HS. Has high levels of anxiety which increases sob and wob. Has oxy mask on 3L. Morphine given for anxiety and WOB. Morphine works well for her. Communicates by writing. Has aphasia r/t ALS. Sharp in place. Leaking. Urine odorous. G-tube patient and infusing at 35. NPO. Bed bound. RPIV.

## 2023-02-23 NOTE — PROGRESS NOTES
Care Management Follow Up    Length of Stay (days): 16    Expected Discharge Date: 02/24/2023     Concerns to be Addressed:   Disposition    Patient plan of care discussed at interdisciplinary rounds: Yes    Anticipated Discharge Disposition: Hospice     Anticipated Discharge Services: None  Anticipated Discharge DME: Bed, Commode, Other (see comment) (suction)    Patient/family educated on Medicare website which has current facility and service quality ratings: no  Education Provided on the Discharge Plan:  yes  Patient/Family in Agreement with the Plan: family prefers that patient goes to the [facility] or home with hospice.     Referrals Placed by CM/SW:  Yes    Private pay costs discussed: private room/amenity fees and transportation costs    Additional Information:  Spoke with son in law, Donnie today. He had questions regarding taking patient home on hospice and private pay home care. Discussed what that would look like. He wasn't sure if the family could make this work or not. The spouse of patient is unable to care for patient alone he would need at least 8 hours of private pay services a day. A list of private pay agencies were emailed to Donnie. Left message at the [facility] stating that we are still interested in a bed there.    SW will continue to work with family to make a safe plan for patient .    LIAT Rios   Inpatient Care Coordination   Supervisor  [facility]  [phone]        LIAT Aguiar

## 2023-02-23 NOTE — PROGRESS NOTES
[facility]  Palliative Care Progress Note  Text Page     Assessment & Plan   Recommendations:  1. Goals of Care- No CPR- Do NOT Intubate-hospice on discharge  Hospitalization goals discussed Discussed symptom management and medications.   Decisional Capacity- Intact. Patient does not have an advance directive. Per  informed consent policy next of kin should be involved if patient becomes unable.  POLST-Consider completing prior to discharge     2.  Dyspnea  Initial respiratory distress due to CNS depression, resolved. Extubated 2/8 and has since weaned from continuous bipap.  Patient with use of supplemental oxygen at time of assessment.  Stable overnight.  - Continue with bipap per patient request at HS  - Continue to manage anxiety as able - see below #3  - Morphine 7.5 mg PO solution every 1 hour prn dyspnea, Morphine IV solution 2-4 mg every 15 minutes prn ongoing severe /refractory dyspnea      3. Anxiety, acute on chronic- Patient with reported high levels of anxiety at times, associated with dyspnea, secretions.  Stable overnight  - Continue with lorazepam 0.25 mg BID and 0.5 mg at HS  - lorazepam 0.25 mg three times daily, 0.5 mg daily at bedtime.  And 0.5 mg PO/IV every 3 hours prn  - Continue with Seroquel 25 mg in AM, 75 mg at HS   -Hydroxyzine 25 mg three times daily and 6 hours PRN    4. Generalized weakness  Progressive weakness due to ALS.  Has become less able to ambulate safely for long distances.  Bulbar weakness has presented challenges with regard to respiratory patterns when lying supine.   -Appreciate the assistance of staff with ADLs and cares as able  -Hospice on discharge     5. Spiritual Care  Oriented to Spiritual Health as part of Palliative Care team. Consultation placed for  to follow.  Spiritual Background: Sikhism     6. Care Planning  Appreciate Care of multidisciplinary team.  Medications for discharge - pending.      Interval History:  Patient  more calm and comfortable overnight.  States she slept well.  Patient had questions regarding discharge plan.  Deferred questions to social work.     SINCERE Sahu CNP  Palliative Care  [facility]  Pgr: [phone]    Time Spent on this Encounter   Total unit/floor time 35 minutes, time consisted of the following, examination of the patient, reviewing the record and completing documentation. >50% of time spent in counseling and coordination of care, Bedside Nurse Jessica and Hospitalist Dr. Prather.  Time spend counseling with patient and family consisted of the following topics, symptom management.         Review of Systems   Negative    Physical Exam   Temp:  [98.3  F (36.8  C)] 98.3  F (36.8  C)  Pulse:  [80] 80  Resp:  [14-20] 18  BP: ()/(54-62) 90/62  SpO2:  [97 %-100 %] 100 %  118 lbs 4.8 oz  Exam:  GENERAL APPEARANCE:  in no distress  RESP:  respiratory effort and palpation of chest normal  CV:  Palpation and auscultation of heart done , regular rate and rhythm, no murmur, rub, or gallop  ABDOMEN:  normal bowel sounds, soft, nontender, no hepatosplenomegaly or other masses  PSYCH:  oriented X 3    Medications     dextrose       - MEDICATION INSTRUCTIONS -         [START ON 2/26/2023] amiodarone  200 mg Per Feeding Tube Daily     amiodarone  200 mg Oral or Feeding Tube BID     apixaban ANTICOAGULANT  5 mg Oral or NG Tube BID     artificial saliva  1 spray Swish & Spit 4x Daily     famotidine  20 mg Per Feeding Tube BID     guaiFENesin  10 mL Per G Tube Q6H     hydrOXYzine  25 mg Per Feeding Tube TID     LORazepam  0.25 mg Per Feeding Tube TID     LORazepam  0.5 mg Per Feeding Tube At Bedtime     multivitamins w/minerals  15 mL Per Feeding Tube Daily     pantoprazole  40 mg Per Feeding Tube QAM AC     QUEtiapine  25 mg Per Feeding Tube Daily     QUEtiapine  75 mg Per Feeding Tube At Bedtime     [Held by provider] riluzole  50 mg Oral or NG Tube Q12H       Data   No results  found for this or any previous visit (from the past 24 hour(s)).

## 2023-02-23 NOTE — PLAN OF CARE
Goal Outcome Evaluation:    Pt on comfort cares. Pt's anxiety improving with scheduled Atarax, Seroquel and Ativan. Pt wanting IV morphine, improvement of anxiety.  Palliative following. On Tube feedings, increased to 45cc/hr goal rate. Bourne was continuing to by pass and pt getting frustration being incont of urine. Exchanged bourne.

## 2023-02-24 NOTE — PLAN OF CARE
Pt here for comfort cares. A/O but forgetful. Has high levels of anxiety which increases sob and wob. Has oxy mask on 3L. Morphine given for anxiety and WOB. Morphine works well for her. Communicates by writing. Has aphasia r/t ALS. Sharp in place. Leaking. Urine odorous. G-tube patient and infusing at 45. NPO. Bed bound. RPIV SL.

## 2023-02-24 NOTE — PROGRESS NOTES
Care Management Follow Up    Length of Stay (days): 17    Expected Discharge Date: 02/28/2023     Concerns to be Addressed:   Hospice/Disposition   Patient plan of care discussed at interdisciplinary rounds: Yes    Anticipated Discharge Disposition: Hospice facility  unable to care for her at home alone     Anticipated Discharge Services: [facility]    Anticipated Discharge DME: Bed, Commode, Other (see comment) (suction)    Patient/family educated on Medicare website which has current facility and service quality ratings: no  Education Provided on the Discharge Plan:  yes  Patient/Family in Agreement with the Plan: yes    Referrals Placed by CM/SW:  Yes at [facility]  Private pay costs discussed: private room/amenity fees and transportation costs    Additional Information:  Met with patient and family today. Updated them on bed situation at the [facility]. They still don't have a bed for patient but they are hopeful that maybe they will really next week. Discussed the possibility of taking the patient home on hospice. The children reported that their father is unable to care for her at all. They would have to hire private pay nursing into the home along with hospice. They will discuss this option with the family over the weekend and if the [facility] doesn't have a bed early next week they would possible take patient home if they could get private pay nursing set up.    STEVE will continue to follow and assist.    LIAT Rios   Inpatient Care Coordination   Supervisor  [facility]  [phone]        LIAT Aguiar

## 2023-02-24 NOTE — PROGRESS NOTES
[facility]    Hospitalist Progress Note      Assessment & Plan   Natasha Allison is a 72 year old female with a history of bulbar ALS with dysphagia and inadequate oral intake with feeding tube in place, on BiPAP at night and independent in ambulating, hospitalized here 2/5-2/6/2022 with new diagnosis of AF with RVR admitted on 2/7/2023 with recurrent AF with RVR complicated by hypotension/obtundation necessitating intubation..      At her recent hospitalization:  CT PE negative for PE, echo with EF > 70 % with no significant valve dz (mild Ao sclerosis).  She required DCCV and stayed in NSR,  discharged with apixaban and metop 25 mg bid.  She felt well on discharge.  Around 1 am she called out to her  as was feeling SOB, he checked her BP/HR and they were fine, she put on her bipap and went to sleep.  She slept for about 30 minutes and then took her BiPAP off only to awaken around 3 am feeling very SOB.  She looked like she was in respiratory distress and so 911 called.  They found her alert but decompensated and become unresponsive with HR's in the 200's.  She was shocked 4 times without improvement.  She was given fentanyl 50 mcg, 10 mg midazolam, and 1 mg of lorazepam during the attempts at cardioversion.       On arrival ED 's and she was hypotensive, unresponsive, with apneic breathing pattern, no response to noxious stimuli.  She underwent synchronized DCCV with 150 J with restoration of NSR and started on amiodarone.  She has a hx of prolonged response to CNS active medications and did require intubation post cardioversion.  She was able to be extubated within about 24 hours.  Pt has remained in sinus rhythm and has been on amiodarone through PEG tube.  With difficulty managing secretions, patient and family have made decision to transition to more comfort based approach with advanced ALS.      #Goals of care, Comfort care. Currently looking for hospice facility: She has  had hypoxemia that is related to her ALS, respiratory muscle weakness and difficulty managing her secretions with bulbar weakness.  I had an extensive discussion with the patient,  and son at bedside on 2/10.  Patient notes that her goal at this point is to get home and be comfortable.  Her chest x-ray done showed retrocardiac opacity secondary to aspiration of secretions.  We discussed that this is likely to recur in the setting of her ALS.  We discussed options including medications to help dry out secretions but patient has been on these before and is adamant she does not want these again.  She is absolutely adamant against any sort of mechanical intervention including tracheostomy.    -She has been transition to hospice support.  If she were to decline suddenly, she voices she would want to have comfort medications available   -If patient were to have a significant decline in status (recurrent afib w RVR, respiratory distress), comfort medications are available. Patient and family have reiterated that comfort is main priority.   -At this point, we are awaiting placement to hospice facility.  Appreciate social work support.  -Scheduled Seroquel and Ativan at night.  As needed comfort medications also available.  Ativan dosing adjusted a bit on 2/22 with improvement.   -Continue BiPAP at night with ALS.      #AF with RVR, recurrent: Recent hospitalization with CT negative for PE, echo without significant valve issues, TSH 3.4.  Sounds like she is on BiPAP with sleeping and doesn't tolerate it well and I suspect that could be inciting factor.  In any case has required DCCV 2x in 3 days leading to admission.  -Cards consulted.    Patient has not been able to tolerate amnio drip due to blood pressures.  She is on oral amiodarone.  Stop telemetry given goals of care.   -Continue amiodarone 200 mg twice daily for until 2/25 then 200 mg daily thereafter.  -cont apixiban     #Obtundation 2/2 medications for DCCV.  New stridor post-extubation on 2/8: Hx of prolonged response, intubated for airway protection.  She was on minimal vent setting AM of 2/8 and extubated.  He had stridor noted after extubation.  Plan was originally to reintubate but goals of care conversation were had with family and patient and she noted she would not want to be reintubated under any circumstance.  She was maintained on BiPAP, started on steroids and did seem to have improvement in her symptoms.  She had a CT scan of her neck that showed possible mild asymmetry in the soft tissue of the neck that could be due to mild edema from recent intubation but otherwise no gross abnormalities.  -Pt with hypoxemia requiring variable amounts of O2 while awake intermittently with bipap.  Patient has had recurrent issues with secretions which have needed to be repeatedly suctioned at bedside.  This is secondary to her ALS.   -Stridor resolved     #Fever: Patient has had a low-grade fever on 2/7.  Tmax was 101.2 on the a.m. of 2/7.  Since then has had low-grade fevers of around 100.  Her chest x-ray does not show clear infiltrate.  Her UA does not show infection.  Her procalcitonin is not elevated.  Discussed as above with family.       #Leukocytosis: Pt with leukocytosis from 2/9-2/10.  She was started on steroids as above for her stridor so suspect etiology.  She has not had concurrent fever and infectious w/u negative as above.  She has been having difficulty with secretions with weak cough, poor swallow due to ALS.      #Hypokalemia: was replaced      #Bulbar ALS has vocal cord paralysis causing difficulty with speaking and dysphagia at baseline reportedly.   -On tube feeds via PEG, we will keep her strict n.p.o.  -resumed pta riluzole but patient and  would prefer that this be held.   -Patient initially needed BiPAP but currently off BiPAP     #Hx of RA: On infliximab/mtx, and prn prednisone with flares     #Thrombocytopenia: Mild.     Dispo: Awaiting  placement to hospice facility.  Waiting on [facility].   CODE STATUS: DNR/DNI.   DVT PPX: DOAC     He Prather MD    Interval History   No events. Woken up last night and had some trouble falling back asleep. NO distress today. Anxiety under good control. Denies pain.     -Data reviewed today: I reviewed all new labs and imaging results over the last 24 hours.     Physical Exam             Resp: 18 SpO2: 100 %   Oxygen Delivery: 3 LPM  Vitals:    02/08/23 0400 02/10/23 0400 02/17/23 0600   Weight: 52.1 kg (114 lb 13.8 oz) 54.4 kg (119 lb 14.9 oz) 53.7 kg (118 lb 4.8 oz)     Vital Signs with Ranges  Resp:  [18] 18  SpO2:  [100 %] 100 %  I/O last 3 completed shifts:  In: -   Out: 665 [Urine:665]    Awake, comfortable.  Not anxious.  Answers appropriately through writing.  No acute distress.  Extremities warm and well-perfused.  Nonverbal but interacts mainly by writing.    Medications     dextrose       - MEDICATION INSTRUCTIONS -         [START ON 2/26/2023] amiodarone  200 mg Per Feeding Tube Daily     amiodarone  200 mg Oral or Feeding Tube BID     apixaban ANTICOAGULANT  5 mg Oral or NG Tube BID     artificial saliva  1 spray Swish & Spit 4x Daily     famotidine  20 mg Per Feeding Tube BID     guaiFENesin  10 mL Per G Tube Q6H     hydrOXYzine  25 mg Per Feeding Tube TID     LORazepam  0.25 mg Per Feeding Tube TID     LORazepam  0.5 mg Per Feeding Tube At Bedtime     multivitamins w/minerals  15 mL Per Feeding Tube Daily     pantoprazole  40 mg Per Feeding Tube QAM AC     QUEtiapine  25 mg Per Feeding Tube Daily     QUEtiapine  75 mg Per Feeding Tube At Bedtime     [Held by provider] riluzole  50 mg Oral or NG Tube Q12H       Data   Recent Labs   Lab 02/21/23  0642 02/20/23  0704 02/19/23  0614   NA  --  139  --    POTASSIUM 4.7 4.7 5.3   CHLORIDE  --  96*  --    CO2  --  32*  --    BUN  --  20.2  --    CR  --  0.51  --    ANIONGAP  --  11  --    MICHELLE  --  8.5*  --    GLC  --  122*  --        No results found  for this or any previous visit (from the past 24 hour(s)).

## 2023-02-24 NOTE — PLAN OF CARE
Care from [id number]   -  comfort care     Orientation:  x4, non verbal. Uses writing and gestures for communication -   Pain:  some pain and restlessness in the afternoon, Morphine 0.2mg iv given PRN   Activity:  in bed, turned as requested   Resp:   on oxymask 3l, pt suctions mucus as needed   GI:  no BM this shift, NPO   G-tube in place, running at goal rate 45ml/h   : Sharp in place. Leaking. Cares done. Balloon check done, 10ml in place. Urine odorous  Skin:  Mepilex on sacral are in place for protection, no issues otherwise   Lines: PIV some leaking but patent   Other:  Family at bedside this afternoon   Plan:   continue with plan of care

## 2023-02-25 NOTE — PLAN OF CARE
Comfort cares. Pt displaying some confusion around 2330. Nonverbal, communicates with pen and paper - frustrated she couldn't sleep, given PRN melatonin through G tube. Pt requested no disturbances once she's asleep. VS deferred d/t comfort cares.   Sharp leaked onto chux - pt able to turn self with some help to change chux out. Given PRN ativan IV x1 for anxiety with relief.

## 2023-02-25 NOTE — PROGRESS NOTES
[facility]    Hospitalist Progress Note      Assessment & Plan   Natasha Allison is a 72 year old female with a history of bulbar ALS with dysphagia and inadequate oral intake with feeding tube in place, on BiPAP at night and independent in ambulating, hospitalized here 2/5-2/6/2022 with new diagnosis of AF with RVR admitted on 2/7/2023 with recurrent AF with RVR complicated by hypotension/obtundation necessitating intubation..      At her recent hospitalization:  CT PE negative for PE, echo with EF > 70 % with no significant valve dz (mild Ao sclerosis).  She required DCCV and stayed in NSR,  discharged with apixaban and metop 25 mg bid.  She felt well on discharge.  Around 1 am she called out to her  as was feeling SOB, he checked her BP/HR and they were fine, she put on her bipap and went to sleep.  She slept for about 30 minutes and then took her BiPAP off only to awaken around 3 am feeling very SOB.  She looked like she was in respiratory distress and so 911 called.  They found her alert but decompensated and become unresponsive with HR's in the 200's.  She was shocked 4 times without improvement.  She was given fentanyl 50 mcg, 10 mg midazolam, and 1 mg of lorazepam during the attempts at cardioversion.       On arrival ED 's and she was hypotensive, unresponsive, with apneic breathing pattern, no response to noxious stimuli.  She underwent synchronized DCCV with 150 J with restoration of NSR and started on amiodarone.  She has a hx of prolonged response to CNS active medications and did require intubation post cardioversion.  She was able to be extubated within about 24 hours.  Pt has remained in sinus rhythm and has been on amiodarone through PEG tube.  With difficulty managing secretions, patient and family have made decision to transition to more comfort based approach with advanced ALS.      #Goals of care.  Comfort care. Currently looking for hospice facility: She has  had hypoxemia that is related to her ALS, respiratory muscle weakness and difficulty managing her secretions with bulbar weakness.  I had an extensive discussion with the patient,  and son at bedside on 2/10.  Patient notes that her goal at this point is to get home and be comfortable.  Her chest x-ray done showed retrocardiac opacity secondary to aspiration of secretions.  We discussed that this is likely to recur in the setting of her ALS.  We discussed options including medications to help dry out secretions but patient has been on these before and is adamant she does not want these again.  She is absolutely adamant against any sort of mechanical intervention including tracheostomy.    -She has been transition to hospice support.  If she were to decline suddenly, she voices she would want to have comfort medications available   -If patient were to have a significant decline in status (recurrent afib w RVR, respiratory distress), comfort medications are available. Patient and family have reiterated that comfort is main priority.   -At this point, we are awaiting placement to hospice facility.  Appreciate social work support.  -Scheduled Seroquel and Ativan at night.  As needed comfort medications also available.  Ativan dosing adjusted a bit on 2/22 with improvement.   -Continue BiPAP at night with ALS.      #AF with RVR, recurrent: Recent hospitalization with CT negative for PE, echo without significant valve issues, TSH 3.4.  Sounds like she is on BiPAP with sleeping and doesn't tolerate it well and I suspect that could be inciting factor.  In any case has required DCCV 2x in 3 days leading to admission.  -Cards consulted.    Patient has not been able to tolerate amnio drip due to blood pressures.  She is on oral amiodarone.  Stop telemetry given goals of care.   -Continue amiodarone 200 mg twice daily for until 2/25 then 200 mg daily thereafter.  -cont apixiban     #Obtundation 2/2 medications for DCCV.  New stridor post-extubation on 2/8: Hx of prolonged response, intubated for airway protection.  She was on minimal vent setting AM of 2/8 and extubated.  He had stridor noted after extubation.  Plan was originally to reintubate but goals of care conversation were had with family and patient and she noted she would not want to be reintubated under any circumstance.  She was maintained on BiPAP, started on steroids and did seem to have improvement in her symptoms.  She had a CT scan of her neck that showed possible mild asymmetry in the soft tissue of the neck that could be due to mild edema from recent intubation but otherwise no gross abnormalities.  -Pt with hypoxemia requiring variable amounts of O2 while awake intermittently with bipap.  Patient has had recurrent issues with secretions which have needed to be repeatedly suctioned at bedside.  This is secondary to her ALS.   -Stridor resolved     #Fever: Patient has had a low-grade fever on 2/7.  Tmax was 101.2 on the a.m. of 2/7.  Since then has had low-grade fevers of around 100.  Her chest x-ray does not show clear infiltrate.  Her UA does not show infection.  Her procalcitonin is not elevated.  Discussed as above with family.       #Leukocytosis: Pt with leukocytosis from 2/9-2/10.  She was started on steroids as above for her stridor so suspect etiology.  She has not had concurrent fever and infectious w/u negative as above.  She has been having difficulty with secretions with weak cough, poor swallow due to ALS.      #Hypokalemia: was replaced      #Bulbar ALS has vocal cord paralysis causing difficulty with speaking and dysphagia at baseline reportedly.   -On tube feeds via PEG, we will keep her strict n.p.o.  -resumed pta riluzole but patient and  would prefer that this be held.   -Patient initially needed BiPAP but currently off BiPAP     #Hx of RA: On infliximab/mtx, and prn prednisone with flares     #Thrombocytopenia: Mild.     Dispo: Comfort  cares. Awaiting placement to hospice facility.  Waiting on [facility].   CODE STATUS: DNR/DNI.   DVT PPX: DOAC     He Prather MD    Interval History   No events.  Patient resting comfortably.  Son at bedside.  I did not wake her up.    -Data reviewed today: I reviewed all new labs and imaging results over the last 24 hours.     Physical Exam                 O2 Device: Oxymask Oxygen Delivery: 3 LPM  Vitals:    02/08/23 0400 02/10/23 0400 02/17/23 0600   Weight: 52.1 kg (114 lb 13.8 oz) 54.4 kg (119 lb 14.9 oz) 53.7 kg (118 lb 4.8 oz)     Vital Signs with Ranges     I/O last 3 completed shifts:  In: 2030 [NG/GT:2030]  Out: 1310 [Urine:1010; Emesis/NG output:300]    Sleeping comfortably.  No acute distress.  Son at bedside.    Medications     dextrose       - MEDICATION INSTRUCTIONS -         [START ON 2/26/2023] amiodarone  200 mg Per Feeding Tube Daily     apixaban ANTICOAGULANT  5 mg Oral or NG Tube BID     artificial saliva  1 spray Swish & Spit 4x Daily     famotidine  20 mg Per Feeding Tube BID     guaiFENesin  10 mL Per G Tube Q6H     hydrOXYzine  25 mg Per Feeding Tube TID     LORazepam  0.25 mg Per Feeding Tube TID     LORazepam  0.5 mg Per Feeding Tube At Bedtime     multivitamins w/minerals  15 mL Per Feeding Tube Daily     pantoprazole  40 mg Per Feeding Tube QAM AC     QUEtiapine  25 mg Per Feeding Tube Daily     QUEtiapine  75 mg Per Feeding Tube At Bedtime     [Held by provider] riluzole  50 mg Oral or NG Tube Q12H       Data   Recent Labs   Lab 02/21/23  0642 02/20/23  0704 02/19/23  0614   NA  --  139  --    POTASSIUM 4.7 4.7 5.3   CHLORIDE  --  96*  --    CO2  --  32*  --    BUN  --  20.2  --    CR  --  0.51  --    ANIONGAP  --  11  --    MICHELLE  --  8.5*  --    GLC  --  122*  --        No results found for this or any previous visit (from the past 24 hour(s)).

## 2023-02-25 NOTE — PLAN OF CARE
A&Ox4, nonverbal, communicates in writing, confused at HS, comfort cares, 3LPM oxymask, bourne draining, ativan for anxiety, morphine for pain/respiratory, tube feeding at goal rate of 45mL/hr, IV replaced this shift due to leaking.

## 2023-02-25 NOTE — PLAN OF CARE
[id number]   comfort care     Orientation: x4, very pleasant. Non verbal, communication via gestures and note pad  Pain:  headache and some overall pain, Morphine PRN iv given   Activity:  in bed, turned frequently as requested   Resp:   on 3l O2 oxymask  GI:  no BM this shift.   G-tube: on 45ml/h = goal rate. Tolerates it well.   : bourne still leaking - paged MD. After taking to family, decided to pull bourne and install P[place]ick instead. Pt urinated after pulling bourne.   Skin:  WDL  Lines: PIV patent   Other:  family at bedside most of the day   Plan:   continue with plan of care

## 2023-02-26 NOTE — PROGRESS NOTES
[facility]    Hospitalist Progress Note      Assessment & Plan   Natasha Allison is a 72 year old female with a history of bulbar ALS with dysphagia and inadequate oral intake with feeding tube in place, on BiPAP at night and independent in ambulating, hospitalized here 2/5-2/6/2022 with new diagnosis of AF with RVR admitted on 2/7/2023 with recurrent AF with RVR complicated by hypotension/obtundation necessitating intubation..      At her recent hospitalization:  CT PE negative for PE, echo with EF > 70 % with no significant valve dz (mild Ao sclerosis).  She required DCCV and stayed in NSR,  discharged with apixaban and metop 25 mg bid.  She felt well on discharge.  Around 1 am she called out to her  as was feeling SOB, he checked her BP/HR and they were fine, she put on her bipap and went to sleep.  She slept for about 30 minutes and then took her BiPAP off only to awaken around 3 am feeling very SOB.  She looked like she was in respiratory distress and so 911 called.  They found her alert but decompensated and become unresponsive with HR's in the 200's.  She was shocked 4 times without improvement.  She was given fentanyl 50 mcg, 10 mg midazolam, and 1 mg of lorazepam during the attempts at cardioversion.       On arrival ED 's and she was hypotensive, unresponsive, with apneic breathing pattern, no response to noxious stimuli.  She underwent synchronized DCCV with 150 J with restoration of NSR and started on amiodarone.  She has a hx of prolonged response to CNS active medications and did require intubation post cardioversion.  She was able to be extubated within about 24 hours.  Pt has remained in sinus rhythm and has been on amiodarone through PEG tube.  With difficulty managing secretions, patient and family have made decision to transition to more comfort based approach with advanced ALS.      #Goals of care.  Comfort care. Currently looking for hospice facility: She has  had hypoxemia that is related to her ALS, respiratory muscle weakness and difficulty managing her secretions with bulbar weakness.  I had an extensive discussion with the patient,  and son at bedside on 2/10.  Patient notes that her goal at this point is to get home and be comfortable.  Her chest x-ray done showed retrocardiac opacity secondary to aspiration of secretions.  We discussed that this is likely to recur in the setting of her ALS.  We discussed options including medications to help dry out secretions but patient has been on these before and is adamant she does not want these again.  She is absolutely adamant against any sort of mechanical intervention including tracheostomy.    -She has been transition to hospice support.  If she were to decline suddenly, she voices she would want to have comfort medications available   -If patient were to have a significant decline in status (recurrent afib w RVR, respiratory distress), comfort medications are available. Patient and family have reiterated that comfort is main priority.   -At this point, we are awaiting placement to hospice facility.  Appreciate social work support.  -Scheduled Seroquel and Ativan at night.  As needed comfort medications also available.  Ativan dosing adjusted a bit on 2/22 with improvement.   -Pt did have bourne catheter for comfort with leakage around catheter noted. Per patient preference, exchanged with purewick instead.   -Continue BiPAP at night with ALS.      #AF with RVR, recurrent: Recent hospitalization with CT negative for PE, echo without significant valve issues, TSH 3.4.  Sounds like she is on BiPAP with sleeping and doesn't tolerate it well and I suspect that could be inciting factor.  In any case has required DCCV 2x in 3 days leading to admission.  -Cards consulted.    Patient has not been able to tolerate amnio drip due to blood pressures.  She is on oral amiodarone.  Stop telemetry given goals of care.   -Continue  amiodarone 200 mg twice daily for until 2/25 then 200 mg daily thereafter.  -cont apixiban     #Obtundation 2/2 medications for DCCV. New stridor post-extubation on 2/8: Hx of prolonged response, intubated for airway protection.  She was on minimal vent setting AM of 2/8 and extubated.  He had stridor noted after extubation.  Plan was originally to reintubate but goals of care conversation were had with family and patient and she noted she would not want to be reintubated under any circumstance.  She was maintained on BiPAP, started on steroids and did seem to have improvement in her symptoms.  She had a CT scan of her neck that showed possible mild asymmetry in the soft tissue of the neck that could be due to mild edema from recent intubation but otherwise no gross abnormalities.  -Pt with hypoxemia requiring variable amounts of O2 while awake intermittently with bipap.  Patient has had recurrent issues with secretions which have needed to be repeatedly suctioned at bedside.  This is secondary to her ALS.   -Stridor resolved     #Fever: Patient has had a low-grade fever on 2/7.  Tmax was 101.2 on the a.m. of 2/7.  Since then has had low-grade fevers of around 100.  Her chest x-ray does not show clear infiltrate.  Her UA does not show infection.  Her procalcitonin is not elevated.  Discussed as above with family.       #Leukocytosis: Pt with leukocytosis from 2/9-2/10.  She was started on steroids as above for her stridor so suspect etiology.  She has not had concurrent fever and infectious w/u negative as above.  She has been having difficulty with secretions with weak cough, poor swallow due to ALS.      #Hypokalemia: was replaced      #Bulbar ALS has vocal cord paralysis causing difficulty with speaking and dysphagia at baseline reportedly.   -On tube feeds via PEG, we will keep her strict n.p.o.  -resumed pta riluzole but patient and  would prefer that this be held.   -Patient initially needed BiPAP but  currently off BiPAP     #Hx of RA: On infliximab/mtx, and prn prednisone with flares     #Thrombocytopenia: Mild.     Dispo: Comfort cares. Awaiting placement to hospice facility.  Waiting on [facility] Hotline.   CODE STATUS: DNR/DNI.   DVT PPX: DOAC      updated at bedside on 2/26.     eH Prather MD    Interval History   Sleeping, comfortable. No events.     -Data reviewed today: I reviewed all new labs and imaging results over the last 24 hours. .    Physical Exam                 O2 Device: Oxymask Oxygen Delivery: 3 LPM  Vitals:    02/08/23 0400 02/10/23 0400 02/17/23 0600   Weight: 52.1 kg (114 lb 13.8 oz) 54.4 kg (119 lb 14.9 oz) 53.7 kg (118 lb 4.8 oz)     Vital Signs with Ranges     I/O last 3 completed shifts:  In: 1492 [NG/GT:1492]  Out: 610 [Urine:610]    Sleeping comfortably, NAD.  updated at bedside. Patient waking up to voice and somewhat somnolent but comfortable.     Medications     dextrose       - MEDICATION INSTRUCTIONS -         amiodarone  200 mg Per Feeding Tube Daily     apixaban ANTICOAGULANT  5 mg Oral or NG Tube BID     artificial saliva  1 spray Swish & Spit 4x Daily     famotidine  20 mg Per Feeding Tube BID     guaiFENesin  10 mL Per G Tube Q6H     hydrOXYzine  25 mg Per Feeding Tube TID     LORazepam  0.25 mg Per Feeding Tube TID     LORazepam  0.5 mg Per Feeding Tube At Bedtime     multivitamins w/minerals  15 mL Per Feeding Tube Daily     pantoprazole  40 mg Per Feeding Tube QAM AC     QUEtiapine  25 mg Per Feeding Tube Daily     QUEtiapine  75 mg Per Feeding Tube At Bedtime     [Held by provider] riluzole  50 mg Oral or NG Tube Q12H       Data   Recent Labs   Lab 02/21/23  0642 02/20/23  0704   NA  --  139   POTASSIUM 4.7 4.7   CHLORIDE  --  96*   CO2  --  32*   BUN  --  20.2   CR  --  0.51   ANIONGAP  --  11   MICHELLE  --  8.5*   GLC  --  122*       No results found for this or any previous visit (from the past 24 hour(s)).

## 2023-02-26 NOTE — PLAN OF CARE
0700 - 1900 - comfort care     Orientation:  x4, very pleasant. Non verbal, communication via gestures and note pad  Pain:  morphine PRN iv given early during this shift. Pt slept more this shift   Activity:  in bed, repositioned frequently   Resp:   on 3l oxymask,   GI:  no BM this shift,   G-tube: on goal rate 45ml/h, well tolerating it. Dressing clean dry and intact, insertion site clean and dry  : on purewick with mash underpants and pad, staying dry   Skin:  spine red blanchable, pillows used for minor side lying as tolerated   Lines: lost PIV at end of shift - pt does not want a new one tonight. Morphine via tube feed given. Paged MD for loss of PIV  Other:  Family at bedside   Plan: waiting for [facility]. Continue with plan of care

## 2023-02-26 NOTE — PLAN OF CARE
Goal Outcome Evaluation:    Pt on comfort cares. AO not able to verbalize her needs but writes them on a note pad. Morphine adm. For generalized pain. PT is repositioned per her request. 3L Oximask. Pt on G tube feeding @45ml/hr . External cath (purewick) PIV is patent and intact. Continue POC.

## 2023-02-27 NOTE — PROGRESS NOTES
Care Management Follow Up    Length of Stay (days): 20    Expected Discharge Date: 02/28/2023     Concerns to be Addressed:       Patient plan of care discussed at interdisciplinary rounds: Yes    Anticipated Discharge Disposition: Hospice     Anticipated Discharge Services: None  Anticipated Discharge DME: Bed, Commode, Other (see comment) (suction)    Patient/family educated on Medicare website which has current facility and service quality ratings: no  Education Provided on the Discharge Plan:    Patient/Family in Agreement with the Plan: unable to assess    Referrals Placed by CM/SW:    Private pay costs discussed: insurance costs out of pocket expenses    Additional Information:  SW met with patient and spouse at bedside. Patient was sleeping and did not arouse to SW voice. SW update spouse that [facility] does not have openings at this time. SW provided a list of private duty home cares. He will look over the list.     Family is interested in [facility], [facility], [facility], or [facility].     SW spoke with son Donnie. A list of private duty agencies was emailed to him. He will look over the list and start calling with patient's spouse.     SW explained insurance coverage.     Addendum 1527: Family would like a referral to [facility].     TREVIN Acosta, SW  Emergency Room   [phone]-Please contact the SW on the floor in which the patient is staying for any questions or concerns

## 2023-02-27 NOTE — PROGRESS NOTES
[facility]    Medicine Progress Note - Hospitalist Service    Date of Admission:  2/7/2023    Assessment & Plan   Natasha Allison is a 72 year old female with a history of bulbar ALS with dysphagia and inadequate oral intake with feeding tube in place, on BiPAP at night and independent in ambulating, hospitalized here 2/5-2/6/2022 with new diagnosis of AF with RVR admitted on 2/7/2023 with recurrent AF with RVR complicated by hypotension/obtundation necessitating intubation..      At her recent hospitalization:  CT PE negative for PE, echo with EF > 70 % with no significant valve dz (mild Ao sclerosis).  She required DCCV and stayed in NSR,  discharged with apixaban and metop 25 mg bid.  She felt well on discharge.  Around 1 am she called out to her  as was feeling SOB, he checked her BP/HR and they were fine, she put on her bipap and went to sleep.  She slept for about 30 minutes and then took her BiPAP off only to awaken around 3 am feeling very dyspneic.  She looked like she was in respiratory distress and so 911 called.  They found her alert but decompensated and become unresponsive with HR's in the 200's.  She was shocked 4 times without improvement.  She was given fentanyl 50 mcg, 10 mg midazolam, and 1 mg of lorazepam during the attempts at cardioversion.       On arrival ED 's and she was hypotensive, unresponsive, with apneic breathing pattern, no response to noxious stimuli.  She underwent synchronized DCCV with 150 J with restoration of NSR and started on amiodarone.  She has a hx of prolonged response to CNS active medications and did require intubation post cardioversion.  She was able to be extubated within about 24 hours.  Pt has remained in sinus rhythm and has been on amiodarone through PEG tube.  With difficulty managing secretions, patient and family have made decision to transition to more comfort based approach with advanced ALS.     -- No changes since  yesterday.  Continue with comfort cares and awaiting hospice placement.  See below for hospital course      #Goals of care.  Comfort care. Currently looking for hospice facility: She has had hypoxemia that is related to her ALS, respiratory muscle weakness and difficulty managing her secretions with bulbar weakness.  My colleague had an extensive discussion with the patient,  and son at bedside on 2/10.  Patient notes that her goal at this point is to get home and be comfortable.  Her chest x-ray done showed retrocardiac opacity secondary to aspiration of secretions.  We discussed that this is likely to recur in the setting of her ALS.  We discussed options including medications to help dry out secretions but patient has been on these before and is adamant she does not want these again.  She is absolutely adamant against any sort of mechanical intervention including tracheostomy.    -She has been transition to hospice support.  If she were to decline suddenly, she voices she would want to have comfort medications available   -If patient were to have a significant decline in status (recurrent afib w RVR, respiratory distress), comfort medications are available. Patient and family have reiterated that comfort is main priority.   -At this point, we are awaiting placement to hospice facility.  Appreciate social work support.  -Scheduled Seroquel and Ativan at night.  As needed comfort medications also available.  Ativan dosing adjusted a bit on 2/22 with improvement.   -Pt did have bourne catheter for comfort with leakage around catheter noted. Per patient preference, exchanged with purewick instead.   -Continue BiPAP at night with ALS.      #AF with RVR, recurrent: Recent hospitalization with CT negative for PE, echo without significant valve issues, TSH 3.4.  Sounds like she is on BiPAP with sleeping and doesn't tolerate it well and I suspect that could be inciting factor.  In any case has required DCCV 2x in 3  days leading to admission.  -Cards consulted.    Patient has not been able to tolerate amnio drip due to blood pressures.  She is on oral amiodarone.  Stop telemetry given goals of care.   -Continue amiodarone 200 mg twice daily for until 2/25 then 200 mg daily thereafter.  -cont apixiban     #Obtundation 2/2 medications for DCCV. New stridor post-extubation on 2/8: Hx of prolonged response, intubated for airway protection.  She was on minimal vent setting AM of 2/8 and extubated.  He had stridor noted after extubation.  Plan was originally to reintubate but goals of care conversation were had with family and patient and she noted she would not want to be reintubated under any circumstance.  She was maintained on BiPAP, started on steroids and did seem to have improvement in her symptoms.  She had a CT scan of her neck that showed possible mild asymmetry in the soft tissue of the neck that could be due to mild edema from recent intubation but otherwise no gross abnormalities.  -Pt with hypoxemia requiring variable amounts of O2 while awake intermittently with bipap.  Patient has had recurrent issues with secretions which have needed to be repeatedly suctioned at bedside.  This is secondary to her ALS.   -Stridor resolved     #Fever: Patient has had a low-grade fever on 2/7.  Tmax was 101.2 on the a.m. of 2/7.  Since then has had low-grade fevers of around 100.  Her chest x-ray does not show clear infiltrate.  Her UA does not show infection.  Her procalcitonin is not elevated.  Discussed as above with family.       #Leukocytosis: Pt with leukocytosis from 2/9-2/10.  She was started on steroids as above for her stridor so suspect etiology.  She has not had concurrent fever and infectious w/u negative as above.  She has been having difficulty with secretions with weak cough, poor swallow due to ALS.      #Hypokalemia: was replaced      #Bulbar ALS has vocal cord paralysis causing difficulty with speaking and  dysphagia at baseline reportedly.   -On tube feeds via PEG, we will keep her strict n.p.o.  -resumed pta riluzole but patient and  would prefer that this be held.   -Patient initially needed BiPAP but currently off BiPAP     #Hx of RA: On infliximab/mtx, and prn prednisone with flares     #Thrombocytopenia: Mild.    I discussed with her , Arnoldo by phone.      Dispo: Comfort cares.  Stable for discharge.  Awaiting placement to hospice facility.  Waiting on [facility].   CODE STATUS: DNR/DNI.   DVT PPX: DOAC        Diet: Adult Formula Drip Feeding: Continuous Osmolite 1.5; Gastrostomy; Goal Rate: 45; mL/hr; Initial rate of 15 ml/hr advancing by 10 ml q 8 hrs to goal rate of 45 ml/hr; Do not advance tube feeding rate unless K+ is = or > 3.0, Mg++ is = or > 1.5, and...    DVT Prophylaxis: DOAC  Sharp Catheter: Not present  Lines: None     Cardiac Monitoring: None  Code Status: No CPR- Do NOT Intubate      Clinically Significant Risk Factors              # Hypoalbuminemia: Lowest albumin = 2.5 g/dL at 2/7/2023  8:26 AM, will monitor as appropriate                   Disposition Plan     Expected Discharge Date: 02/28/2023    Discharge Delays: Comfort Care/Hospice  Placement - LTC  Destination: home with help/services            Meir Lassiter MD  Hospitalist Service  [facility]  Securely message with [employer] (more info)  Text page via [employer] Paging/Directory   ______________________________________________________________________    Interval History   Sleeping.  Appears comfortable.    Physical Exam   Vital Signs:           Resp: 20   O2 Device: Oxymask Oxygen Delivery: 3 LPM  Weight: 118 lbs 4.8 oz    Deferred exam due to comfort cares.     Medical Decision Making             Data   None

## 2023-02-27 NOTE — PLAN OF CARE
Goal Outcome Evaluation:    Comfort cares. Pt is lethargic and somnolent. Communicates through writing. Oxymask 3L. All meds given through G-tube. Prn morphine given. Purewick in place.

## 2023-02-27 NOTE — PLAN OF CARE
Goal Outcome Evaluation:      Plan of Care Reviewed With: patient    Overall Patient Progress: improvingOverall Patient Progress: improving     Pt on comfort care. Complain of CP, meds adm. Pt c/o SOB Morphine adm. PIV SL R lower hand. SOB w/ 3L Oxygen Oxymask. Pt is relaxed, sleeping and breathing with ease. No signs of air discomfort. RN provided aromatherapy, music to help pt. Nurse will continue POC.

## 2023-02-27 NOTE — PROGRESS NOTES
Care Management Follow Up    Length of Stay (days): 20    Expected Discharge Date: 02/28/2023     Concerns to be Addressed:       Patient plan of care discussed at interdisciplinary rounds: Yes    Anticipated Discharge Disposition: Hospice     Anticipated Discharge Services: None  Anticipated Discharge DME: Bed, Commode, Other (see comment) (suction)    Patient/family educated on Medicare website which has current facility and service quality ratings: no  Education Provided on the Discharge Plan:    Patient/Family in Agreement with the Plan: unable to assess    Referrals Placed by CM/SW:    Private pay costs discussed: Not applicable    Additional Information:  STEVE spoke with Karla at [facility] (P:[phone]). They do not have any openings and do not know when something might open.     TREVIN Acosta, [facility]  Emergency Room   [phone]-Please contact the SW on the floor in which the patient is staying for any questions or concerns

## 2023-02-27 NOTE — PROVIDER NOTIFICATION
Paged MD: FYI pt lost PIV, does not want a new one at this time.Orders for oral/tube feed and sublingual present.

## 2023-02-28 NOTE — PLAN OF CARE
Goal Outcome Evaluation:    A/O. On Comfort cares. Lethargic and somnolent. On oxymask 3 L. TF running at 45 ml/hr and flushes per orders. Purewick in place.

## 2023-02-28 NOTE — PROGRESS NOTES
Care Management Follow Up    Length of Stay (days): 21    Expected Discharge Date: 02/28/2023     Concerns to be Addressed:       Patient plan of care discussed at interdisciplinary rounds: Yes    Anticipated Discharge Disposition: Hospice     Anticipated Discharge Services: None  Anticipated Discharge DME: Bed, Commode, Other (see comment) (suction)      Additional Information:  LVM for [facility] to see if they can accept.     SW continues to follow for placement.     Addendum 1347: SW spoke with daughter to provide an update. They will call [facility] in [place] for a tour. SW has not heard back from [facility] yet     Addemdum 1416: SW spoke with [facility]. They have spoken with the family about moving patient to [facility]. They have discussed costs and family is going to tour. They have an opening. Family is able to choose whatever hospice agency they feel is best. [facility] will call back to verify if family wants to pursue this option. P: [phone]    TREVIN Acosta, [facility]  Emergency Room   [phone]-Please contact the SW on the floor in which the patient is staying for any questions or concerns

## 2023-02-28 NOTE — PLAN OF CARE
Goal Outcome Evaluation:    Comfort cares. Pt is restless, prn morphine and Hurricane spray given. Complaining of stomach pain, milk of mag given, last BM 2/18/23. Suction by bedside. Tube feed running. Communicates through writing. Oxymask 3L. Purewick in place. Thorough oral cares performed.

## 2023-02-28 NOTE — PLAN OF CARE
Goal Outcome Evaluation:        Pt is alert, speech is incoherent, lethargic, unable to write needs on the clip board. Moaning, Roxanol 7.5mg given with effect, on 3L of NC. Incontinent, 2 assist with cares, pure wick to suction. Turned and repositioned as needed. Comfort care continued.  Nursing will continue to monitor.

## 2023-02-28 NOTE — PROGRESS NOTES
[facility]    Medicine Progress Note - Hospitalist Service    Date of Admission:  2/7/2023    Assessment & Plan   Natasha Allison is a 72 year old female with a history of bulbar ALS with dysphagia and inadequate oral intake with feeding tube in place, on BiPAP at night and independent in ambulating, hospitalized here 2/5-2/6/2022 with new diagnosis of AF with RVR admitted on 2/7/2023 with recurrent AF with RVR complicated by hypotension/obtundation necessitating intubation..      At her recent hospitalization:  CT PE negative for PE, echo with EF > 70 % with no significant valve dz (mild Ao sclerosis).  She required DCCV and stayed in NSR,  discharged with apixaban and metop 25 mg bid.  She felt well on discharge.  Around 1 am she called out to her  as was feeling SOB, he checked her BP/HR and they were fine, she put on her bipap and went to sleep.  She slept for about 30 minutes and then took her BiPAP off only to awaken around 3 am feeling very dyspneic.  She looked like she was in respiratory distress and so 911 called.  They found her alert but decompensated and become unresponsive with HR's in the 200's.  She was shocked 4 times without improvement.  She was given fentanyl 50 mcg, 10 mg midazolam, and 1 mg of lorazepam during the attempts at cardioversion.       On arrival ED 's and she was hypotensive, unresponsive, with apneic breathing pattern, no response to noxious stimuli.  She underwent synchronized DCCV with 150 J with restoration of NSR and started on amiodarone.  She has a hx of prolonged response to CNS active medications and did require intubation post cardioversion.  She was able to be extubated within about 24 hours.  Pt has remained in sinus rhythm and has been on amiodarone through PEG tube.  With difficulty managing secretions, patient and family have made decision to transition to more comfort based approach with advanced ALS.     -- No changes since past 2  days.  Continue with comfort cares and awaiting hospice placement.  See below for hospital course      #Goals of care.  Comfort care. Currently looking for hospice facility: She has had hypoxemia that is related to her ALS, respiratory muscle weakness and difficulty managing her secretions with bulbar weakness.  My colleague had an extensive discussion with the patient,  and son at bedside on 2/10.  Patient notes that her goal at this point is to get home and be comfortable.  Her chest x-ray done showed retrocardiac opacity secondary to aspiration of secretions.  We discussed that this is likely to recur in the setting of her ALS.  We discussed options including medications to help dry out secretions but patient has been on these before and is adamant she does not want these again.  She is absolutely adamant against any sort of mechanical intervention including tracheostomy.    -She has been transition to hospice support.  If she were to decline suddenly, she voices she would want to have comfort medications available   -If patient were to have a significant decline in status (recurrent afib w RVR, respiratory distress), comfort medications are available. Patient and family have reiterated that comfort is main priority.   -At this point, we are awaiting placement to hospice facility.  Appreciate social work support.  -Scheduled Seroquel and Ativan at night.  As needed comfort medications also available.  Ativan dosing adjusted a bit on 2/22 with improvement.   -Pt did have bourne catheter for comfort with leakage around catheter noted. Per patient preference, exchanged with purewick instead.   -Continue BiPAP at night with ALS.      #AF with RVR, recurrent: Recent hospitalization with CT negative for PE, echo without significant valve issues, TSH 3.4.  Sounds like she is on BiPAP with sleeping and doesn't tolerate it well and I suspect that could be inciting factor.  In any case has required DCCV 2x in 3 days  leading to admission.  -Cards consulted.    Patient has not been able to tolerate amnio drip due to blood pressures.  She is on oral amiodarone.  Stop telemetry given goals of care.   -Continue amiodarone 200 mg twice daily for until 2/25 then 200 mg daily thereafter.  -cont apixiban     #Obtundation 2/2 medications for DCCV. New stridor post-extubation on 2/8: Hx of prolonged response, intubated for airway protection.  She was on minimal vent setting AM of 2/8 and extubated.  He had stridor noted after extubation.  Plan was originally to reintubate but goals of care conversation were had with family and patient and she noted she would not want to be reintubated under any circumstance.  She was maintained on BiPAP, started on steroids and did seem to have improvement in her symptoms.  She had a CT scan of her neck that showed possible mild asymmetry in the soft tissue of the neck that could be due to mild edema from recent intubation but otherwise no gross abnormalities.  -Pt with hypoxemia requiring variable amounts of O2 while awake intermittently with bipap.  Patient has had recurrent issues with secretions which have needed to be repeatedly suctioned at bedside.  This is secondary to her ALS.   -Stridor resolved     #Fever: Patient has had a low-grade fever on 2/7.  Tmax was 101.2 on the a.m. of 2/7.  Since then has had low-grade fevers of around 100.  Her chest x-ray does not show clear infiltrate.  Her UA does not show infection.  Her procalcitonin is not elevated.  Discussed as above with family.       #Leukocytosis: Pt with leukocytosis from 2/9-2/10.  She was started on steroids as above for her stridor so suspect etiology.  She has not had concurrent fever and infectious w/u negative as above.  She has been having difficulty with secretions with weak cough, poor swallow due to ALS.      #Hypokalemia: was replaced      #Bulbar ALS has vocal cord paralysis causing difficulty with speaking and dysphagia at  baseline reportedly.   -On tube feeds via PEG, we will keep her strict n.p.o.  -resumed pta riluzole but patient and  would prefer that this be held.   -Patient initially needed BiPAP but currently off BiPAP     #Hx of RA: On infliximab/mtx, and prn prednisone with flares     #Thrombocytopenia: Mild.    I discussed with her , Arnoldo by phone yesterday.      Dispo: Comfort cares.  Stable for discharge.  Awaiting placement to hospice facility.    CODE STATUS: DNR/DNI.   DVT PPX: DOAC        Diet: Adult Formula Drip Feeding: Continuous Osmolite 1.5; Gastrostomy; Goal Rate: 45; mL/hr; Initial rate of 15 ml/hr advancing by 10 ml q 8 hrs to goal rate of 45 ml/hr; Do not advance tube feeding rate unless K+ is = or > 3.0, Mg++ is = or > 1.5, and...    DVT Prophylaxis: DOAC  Sharp Catheter: Not present  Lines: None     Cardiac Monitoring: None  Code Status: No CPR- Do NOT Intubate      Clinically Significant Risk Factors              # Hypoalbuminemia: Lowest albumin = 2.5 g/dL at 2/7/2023  8:26 AM, will monitor as appropriate                   Disposition Plan     Expected Discharge Date: 02/28/2023    Discharge Delays: Comfort Care/Hospice  Placement - LTC  Destination: home with help/services            Meir Lassiter MD  Hospitalist Service  [facility]  Securely message with [employer] (more info)  Text page via [employer] Paging/Directory   ______________________________________________________________________    Interval History   Awake.  Speech not understandable.  Fell back asleep.    Physical Exam   Vital Signs: Temp: 98.7  F (37.1  C)   BP: 99/62 Pulse: 115   Resp: 28 SpO2: 95 % O2 Device: Oxymask Oxygen Delivery: 3 LPM  Weight: 118 lbs 4.8 oz    Gen:  Awake.  Speech is not understandable.     Medical Decision Making             Data   None

## 2023-03-01 NOTE — DISCHARGE SUMMARY
[facility]  Hospitalist Discharge Summary      Date of Admission:  2/7/2023  Date of Discharge:  3/1/2023  9:00 AM  Discharging Provider: Meir Lassiter MD  Discharge Service: Hospitalist Service    Discharge Diagnoses   Atrial fibrillation with severe rapid ventricular response resulting in cardiopulmonary arrest    Bulbar ALS    Follow-ups Needed After Discharge     Hospital Course   Natasha Allison is a 72 year old female with a history of bulbar ALS with dysphagia and inadequate oral intake with feeding tube in place, on BiPAP at night and independent in ambulating, hospitalized here 2/5-2/6/2022 with new diagnosis of AF with RVR admitted on 2/7/2023 with recurrent AF with RVR complicated by hypotension/obtundation necessitating intubation..      At her recent hospitalization:  CT PE negative for PE, echo with EF > 70 % with no significant valve dz (mild Ao sclerosis).  She required DCCV and stayed in NSR,  discharged with apixaban and metop 25 mg bid.  She felt well on discharge.  Around 1 am she called out to her  as was feeling SOB, he checked her BP/HR and they were fine, she put on her bipap and went to sleep.  She slept for about 30 minutes and then took her BiPAP off only to awaken around 3 am feeling very dyspneic.  She looked like she was in respiratory distress and so 911 called.  They found her alert but decompensated and become unresponsive with HR's in the 200's.  She was shocked 4 times without improvement.  She was given fentanyl 50 mcg, 10 mg midazolam, and 1 mg of lorazepam during the attempts at cardioversion.       On arrival ED 's and she was hypotensive, unresponsive, with apneic breathing pattern, no response to noxious stimuli.  She underwent synchronized DCCV with 150 J with restoration of NSR and started on amiodarone.  She has a hx of prolonged response to CNS active medications and did require intubation post cardioversion.  She was able to  be extubated within about 24 hours.  Pt  remained in sinus rhythm and has been on amiodarone through PEG tube.  With difficulty managing secretions, patient and family made decision to transition to more comfort/hospice based approach with advanced ALS.      -- She had no changes in the last couple of days of her hospitalization while awaiting outpatient hospice placement.  On the morning of 3/1 respirations ceased and death was pronounced.             Consultations This Hospital Stay   CARDIOLOGY IP CONSULT  VASCULAR ACCESS ADULT IP CONSULT  NUTRITION SERVICES ADULT IP CONSULT  PHARMACY IP CONSULT  NUTRITION SERVICES ADULT IP CONSULT  PALLIATIVE CARE ADULT IP CONSULT  SPIRITUAL HEALTH SERVICES IP CONSULT  ENT IP CONSULT  PHYSICAL THERAPY ADULT IP CONSULT  CARE MANAGEMENT / SOCIAL WORK IP CONSULT    Code Status   Prior    Time Spent on this Encounter   I, Meir Lassiter MD, personally saw the patient today and spent less than or equal to 30 minutes discharging this patient.       Meir Lassiter MD  [place] [address] [facility]  [address] [place] [address]  Phone: [phone]  Fax: [phone]  ______________________________________________________________________    Physical Exam   Vital Signs: Temp: 99.3  F (37.4  C) Temp src: Axillary BP: (!) 153/84 Pulse: 118   Resp: 24 SpO2: (!) 88 % O2 Device: Oxymask Oxygen Delivery: 3 LPM  Weight: 118 lbs 4.8 oz         Primary Care Physician   Beverley Maloney    Discharge Orders       Significant Results and Procedures   Most Recent 3 CBC's:Recent Labs   Lab Test 02/10/23  0555 02/09/23  0557 02/08/23  0555   WBC 18.8* 13.1* 7.3   HGB 12.4 12.7 11.2*   MCV 97 96 94    139* 115*     Most Recent 3 BMP's:Recent Labs   Lab Test 02/21/23  0642 02/20/23  0704 02/19/23  0614 02/14/23  0530 02/13/23  0540 02/12/23  0551 02/11/23  0609   NA  --  139  --   --  138  --  138   POTASSIUM 4.7 4.7 5.3   < > 4.9   < > 4.2    CHLORIDE  --  96*  --   --  100  --  102   CO2  --  32*  --   --  34*  --  34*   BUN  --  20.2  --   --  17.5  --  14.8   CR  --  0.51  --   --  0.57  --  0.50*   ANIONGAP  --  11  --   --  4*  --  2*   MICHELLE  --  8.5*  --   --  8.9  --  8.3*   GLC  --  122*  --   --  120*  --  129*    < > = values in this interval not displayed.   ,   Results for orders placed or performed during the hospital encounter of 02/07/23   XR Chest Port 1 View    Narrative    EXAM: XR CHEST PORT 1 VIEW  LOCATION: [facility]  DATE/TIME: 2/7/2023 6:25 AM    INDICATION: sob  COMPARISON: None.      Impression    IMPRESSION: Endotracheal tube 3 cm above yumiko. Lungs clear. Heart size normal. No pleural collections.   Head CT w/o contrast    Narrative    EXAM: CT HEAD W/O CONTRAST  LOCATION: [facility]  DATE/TIME: 2/7/2023 6:49 AM    INDICATION: ams, anticoagulated  COMPARISON: None.  TECHNIQUE: Routine CT Head without IV contrast. Multiplanar reformats. Dose reduction techniques were used.    FINDINGS:  INTRACRANIAL CONTENTS: No intracranial hemorrhage, extraaxial collection, or mass effect.  No CT evidence of acute infarct. Mild volume loss and mild burden presumed chronic small vessel ischemia.    VISUALIZED ORBITS/SINUSES/MASTOIDS: No intraorbital abnormality. No paranasal sinus mucosal disease. No middle ear or mastoid effusion.    BONES/SOFT TISSUES: No acute abnormality.      Impression    IMPRESSION:  1.  No acute intracranial abnormality.    2.  Mild age-related change.   XR Chest Port 1 View    Narrative    EXAM: XR CHEST PORT 1 VIEW  LOCATION: [facility]  DATE/TIME: 2/7/2023 4:56 PM    INDICATION: Fever.  COMPARISON: 02/07/2023      Impression    IMPRESSION:     Endotracheal tube with tip 2.9 cm above the yumiko. Enteric tube coursing into the stomach with tip out of view.    No focal airspace disease. No pleural effusion or pneumothorax.    The cardiomediastinal  silhouette is unremarkable.   CT Soft Tissue Neck w Contrast    Narrative    CT SCAN OF THE NECK WITH CONTRAST  2/8/2023 3:14 PM     HISTORY: Stridor post-extubation.    TECHNIQUE: Axial CT images of the neck following administration of  intravenous contrast with reformations. Radiation dose for this scan  was reduced using automated exposure control, adjustment of the mA  and/or kV according to patient size, or iterative reconstruction  technique. 100mL Isovue-370 IV.     COMPARISON: 6/14/2022.     FINDINGS:   Visualized paranasal sinuses, skull base and orbits: Unremarkable.     Oral cavity, pharynx, larynx and visualized trachea: Evaluation of the  oral cavity/oropharynx is mildly limited by streak artifact from the  patient's dental amalgam. Normal appearance of the nasopharynx. No  gross abnormality of the oral cavity/floor of mouth structures. The  oropharynx also appears grossly unremarkable.    Evaluation of the larynx and upper trachea is somewhat limited due to  motion-related artifacts. Possible mild asymmetric soft tissue  thickening of the right aryepiglottic fold (series 2 image 73, series  4 images 37-38). While this could be artifactual, given the patient's  history, it also could potentially represent mild right aryepiglottic  fold edema, potentially in the setting of/as a result of recent  intubation. The epiglottis is not thickened. The larynx otherwise  appears grossly unremarkable. No obvious significant subglottic/upper  tracheal stenosis identified, although again evaluation is mildly  limited by motion artifact.     and parapharyngeal spaces: Unremarkable.    Thyroid: No dominant thyroid nodule or mass is identified.     Submandibular glands: Prominent fatty atrophy of the bilateral  submandibular glands, as before.     Parotid glands: Unremarkable.       Lymph nodes: No suspicious enlarged or necrotic cervical lymph nodes.     Vasculature: Right carotid bifurcation atherosclerosis  "with probable  less than 50% stenosis by NASCET criteria, unchanged. Otherwise, the  bilateral cervical carotid and vertebral arteries appear grossly  patent.     Visualized upper mediastinum and lungs: Unremarkable.     Bones: Advanced left temporomandibular joint degenerative changes, as  before. Multilevel degenerative changes in the cervical and partially  visualized upper to mid thoracic spine.      Impression    IMPRESSION:   1. Evaluation of the larynx/upper trachea is somewhat limited by  motion artifact.  2. Findings concerning for possible mild asymmetric soft tissue  thickening of the right aryepiglottic fold, which could be due to mild  edema in the setting of recent intubation. Consider correlation with  direct inspection.  3. Otherwise, no gross abnormality of the larynx or visualized trachea  is identified.  4. Otherwise stable chronic findings, as described.    NEREYDA CHEN MD         SYSTEM ID:  [id number]   XR Chest Port 1 View    Narrative    XR CHEST PORT 1 VIEW 2/10/2023 12:06 PM    HISTORY: SOB    COMPARISON: 2/7/2023      Impression    IMPRESSION: New dense retrocardiac opacities, either due to  atelectasis or consolidation. No significant pleural effusion. No  pneumothorax. Normal heart size.    BOB POSADA MD         SYSTEM ID:  [id number]       Discharge Medications   Discharge Medication List as of 2/27/2023  3:06 PM      CONTINUE these medications which have NOT CHANGED    Details   albuterol (PROAIR HFA/PROVENTIL HFA/VENTOLIN HFA) 108 (90 Base) MCG/ACT inhaler Inhale 2 puffs into the lungs every 6 hours as needed for shortness of breath or wheezing, Disp-18 g, R-3, E-PrescribePharmacy may dispense brand covered by insurance (Proair, or proventil or ventolin or generic albuterol inhaler)      apixaban ANTICOAGULANT (ELIQUIS) 5 MG tablet Take 1 tablet (5 mg) by mouth 2 times daily, Disp-60 tablet, R-1, E-Prescribe      B-D TB SYRINGE 27G X 1/2\" 1 ML MISC USE FOR METHOTREXATE INJECTION, " DANIEL, Historical      desoximetasone (TOPICORT) 0.25 % external cream Apply 1 inch topically as neededHistorical      folic acid (FOLVITE) 1 MG tablet Take 1 tablet (1 mg) by mouth daily, Disp-100 tablet, R-3, OTC      inFLIXimab-dyyb (INFLECTRA) 100 MG injection Inject 5mg/kg into the vein every 8 weeks., Historical      lidocaine (LIDODERM) 5 % patch Place 1 patch onto the skin every 24 hours To prevent lidocaine toxicity, patient should be patch free for 12 hrs daily.Disp-15 patch, [id number]      methotrexate sodium, pres-free, 50 MG/2ML SOLN injection CHEMO Inject 20 mg Subcutaneous every 7 days On Monday, R-0, Historical      metoprolol tartrate (LOPRESSOR) 25 MG tablet Take 1 tablet (25 mg) by mouth 2 times daily, Disp-60 tablet, R-1, E-Prescribe      omeprazole (PRILOSEC) 2 mg/mL suspension Take 10 mLs (20 mg) by mouth 2 times daily, Disp-300 mL, R-0, E-Prescribe      predniSONE (DELTASONE) 5 MG tablet Take 10 mg by mouth daily as needed (as needed for flares ups), Historical      riluzole (RILUTEK) 50 MG tablet Take 1 tablet (50 mg) by mouth every 12 hours, Disp-60 tablet, R-2, E-Prescribe      syringe, disposable, 1 ML MISC 1 Syringe once a week To be used with Methotrexate Injections, Historical      traZODone (DESYREL) 50 MG tablet Take 0.5-1 tablets (25-50 mg) by mouth nightly as needed for sleep, Disp-30 tablet, R-0, E-Prescribe           Allergies   Allergies   Allergen Reactions     Alendronic Acid      Severe aching     Latex      Seasonal Allergies Other (See Comments)     Watery eyes, runny nose.

## 2023-03-01 NOTE — PROGRESS NOTES
X-cover    I was called to pronounce the death of this patient    Exam:  Pupils dilated and unresponsive to light  No heart or lung sounds  No pulse    Time of death 0247    Family notified by nurse

## 2023-03-01 NOTE — PROGRESS NOTES
Comfort care. Disoriented X 3. Ls diminished with coarse crackles. Turn and repositioned every 2 hours. Restless and anxious. On oxygen 3L oxymask for comfort. purwick intact and draining. Adult formula tube feeding infusing at 45 ml/hr. Oral care administered. IV morphine administered for pain control, scheduled ativan /atarx and seroquel. At 0247 writer was alerted to come and assess patient because pateint was unresponsive. Upon assessment patient was pale and unresponsive. Writer paged md and notified md about patient not responding. Md came up and assessed patient and pronounced her dead. Writer called patient's  and notified him. Writer called organ donor [phone]. [facility] is the  home for the family. They planned to come and  patient sometime around 08.00 a,m. Body was not sent to the cooler because family requested to leave the body in the room for  home to come and . All the patient's belonging was taken by patient son (  Arnoldo Allison). Patient's belongings are 1 black cell phone, CD's( DVD), some toiletries. Patient's body was cleaned up ready for transportation.  Writer reported off to the next shift nurse.

## 2023-03-01 NOTE — PROVIDER NOTIFICATION
Provider Notification:     Paged Md patient is unresponsive. Could you come and assess?   Universal Safety Interventions
